# Patient Record
Sex: FEMALE | Race: WHITE | NOT HISPANIC OR LATINO | Employment: OTHER | URBAN - METROPOLITAN AREA
[De-identification: names, ages, dates, MRNs, and addresses within clinical notes are randomized per-mention and may not be internally consistent; named-entity substitution may affect disease eponyms.]

---

## 2017-06-02 DIAGNOSIS — M81.0 POST-MENOPAUSAL OSTEOPOROSIS: ICD-10-CM

## 2017-06-09 RX ORDER — ALENDRONATE SODIUM 70 MG/1
TABLET ORAL
Qty: 12 TABLET | Refills: 3 | OUTPATIENT
Start: 2017-06-09

## 2017-06-09 NOTE — TELEPHONE ENCOUNTER
Received refill request for fosamax.  Last in clinic 6/2016.     Spoke with Esperanza and advised that annual exam is due but a 30 day supply of medicine can be sent to pharmacy. She reports she DOES NOT need a refill at this time but will call later today to make an appointment. Nurse agreed with plan.

## 2017-06-26 ENCOUNTER — TELEPHONE (OUTPATIENT)
Dept: GASTROENTEROLOGY | Facility: CLINIC | Age: 72
End: 2017-06-26

## 2017-06-26 ENCOUNTER — OFFICE VISIT (OUTPATIENT)
Dept: INTERNAL MEDICINE | Facility: CLINIC | Age: 72
End: 2017-06-26
Attending: INTERNAL MEDICINE
Payer: MEDICARE

## 2017-06-26 VITALS
WEIGHT: 178 LBS | HEIGHT: 64 IN | BODY MASS INDEX: 30.39 KG/M2 | DIASTOLIC BLOOD PRESSURE: 78 MMHG | HEART RATE: 68 BPM | SYSTOLIC BLOOD PRESSURE: 123 MMHG

## 2017-06-26 DIAGNOSIS — B35.3 TINEA PEDIS OF BOTH FEET: ICD-10-CM

## 2017-06-26 DIAGNOSIS — R73.01 IMPAIRED FASTING GLUCOSE: ICD-10-CM

## 2017-06-26 DIAGNOSIS — E78.00 PURE HYPERCHOLESTEROLEMIA: ICD-10-CM

## 2017-06-26 DIAGNOSIS — M17.0 PRIMARY OSTEOARTHRITIS OF BOTH KNEES: ICD-10-CM

## 2017-06-26 DIAGNOSIS — H91.90 HEARING LOSS, UNSPECIFIED HEARING LOSS TYPE, UNSPECIFIED LATERALITY: ICD-10-CM

## 2017-06-26 DIAGNOSIS — Z12.31 ENCOUNTER FOR SCREENING MAMMOGRAM FOR MALIGNANT NEOPLASM OF BREAST: ICD-10-CM

## 2017-06-26 DIAGNOSIS — I10 BENIGN ESSENTIAL HYPERTENSION: ICD-10-CM

## 2017-06-26 DIAGNOSIS — M81.0 POST-MENOPAUSAL OSTEOPOROSIS: ICD-10-CM

## 2017-06-26 DIAGNOSIS — Z00.00 ROUTINE GENERAL MEDICAL EXAMINATION AT A HEALTH CARE FACILITY: Primary | ICD-10-CM

## 2017-06-26 DIAGNOSIS — M81.0 SENILE OSTEOPOROSIS: ICD-10-CM

## 2017-06-26 DIAGNOSIS — M19.041 PRIMARY OSTEOARTHRITIS OF RIGHT HAND: ICD-10-CM

## 2017-06-26 LAB
ANION GAP SERPL CALCULATED.3IONS-SCNC: 9 MMOL/L (ref 3–14)
BUN SERPL-MCNC: 12 MG/DL (ref 7–30)
CALCIUM SERPL-MCNC: 9 MG/DL (ref 8.5–10.1)
CHLORIDE SERPL-SCNC: 109 MMOL/L (ref 94–109)
CHOLEST SERPL-MCNC: 177 MG/DL
CO2 SERPL-SCNC: 24 MMOL/L (ref 20–32)
CREAT SERPL-MCNC: 0.64 MG/DL (ref 0.52–1.04)
GFR SERPL CREATININE-BSD FRML MDRD: ABNORMAL ML/MIN/1.7M2
GLUCOSE SERPL-MCNC: 112 MG/DL (ref 70–99)
HBA1C MFR BLD: 5.9 % (ref 4.3–6)
HDLC SERPL-MCNC: 82 MG/DL
LDLC SERPL CALC-MCNC: 79 MG/DL
NONHDLC SERPL-MCNC: 95 MG/DL
POTASSIUM SERPL-SCNC: 4.3 MMOL/L (ref 3.4–5.3)
SODIUM SERPL-SCNC: 142 MMOL/L (ref 133–144)
TRIGL SERPL-MCNC: 78 MG/DL

## 2017-06-26 PROCEDURE — 36415 COLL VENOUS BLD VENIPUNCTURE: CPT | Performed by: INTERNAL MEDICINE

## 2017-06-26 PROCEDURE — 99213 OFFICE O/P EST LOW 20 MIN: CPT | Mod: ZF

## 2017-06-26 PROCEDURE — 80061 LIPID PANEL: CPT | Performed by: INTERNAL MEDICINE

## 2017-06-26 PROCEDURE — 83036 HEMOGLOBIN GLYCOSYLATED A1C: CPT | Performed by: INTERNAL MEDICINE

## 2017-06-26 PROCEDURE — 80048 BASIC METABOLIC PNL TOTAL CA: CPT | Performed by: INTERNAL MEDICINE

## 2017-06-26 RX ORDER — ALENDRONATE SODIUM 70 MG/1
70 TABLET ORAL
Qty: 12 TABLET | Refills: 3 | Status: SHIPPED | OUTPATIENT
Start: 2017-06-26 | End: 2018-06-27

## 2017-06-26 RX ORDER — CICLOPIROX OLAMINE 7.7 MG/G
CREAM TOPICAL 2 TIMES DAILY
Qty: 30 G | Refills: 3 | Status: SHIPPED | OUTPATIENT
Start: 2017-06-26 | End: 2018-09-25

## 2017-06-26 RX ORDER — LOSARTAN POTASSIUM 25 MG/1
25 TABLET ORAL DAILY
Qty: 90 TABLET | Refills: 3 | Status: SHIPPED | OUTPATIENT
Start: 2017-06-26 | End: 2018-06-27

## 2017-06-26 RX ORDER — SIMVASTATIN 10 MG
10 TABLET ORAL AT BEDTIME
Qty: 90 TABLET | Refills: 3 | Status: SHIPPED | OUTPATIENT
Start: 2017-06-26 | End: 2018-06-27

## 2017-06-26 ASSESSMENT — PAIN SCALES - GENERAL: PAINLEVEL: NO PAIN (0)

## 2017-06-26 NOTE — PROGRESS NOTES
SUBJECTIVE:                                                            Esperanza Gage is a 71 year old female who presents for Preventive Visit.    Are you in the first 12 months of your Medicare Part B coverage?  No    Healthy Habits:    Do you get at least three servings of calcium containing foods daily (dairy, green leafy vegetables, etc.)? yes    Amount of exercise or daily activities, outside of work: limited aerobic activity due to knee pain    Problems taking medications regularly No    Medication side effects: No    Have you had an eye exam in the past two years? yes    Do you see a dentist twice per year? yes    Do you have sleep apnea, excessive snoring or daytime drowsiness?no    COGNITIVE SCREEN  1) Repeat 3 items (Banana, Sunrise, Chair)    2) Clock draw: NORMAL  3) 3 item recall: Recalls 3 objects  Results: 3 items recalled: COGNITIVE IMPAIRMENT LESS LIKELY    Mini-CogTM Copyright MARK Chaudhry. Licensed by the author for use in [facility] [employer]; reprinted with permission (ahsan@[email or web address]). All rights reserved.        -------------------------------------  Patient reports that she has enrolled in diabetes prevention program. She has been working on diet changes as well as increasing physical activity. She was able to loose 10 lbs over the last year. She has not been able to exercise due to knee pain.     Reviewed and updated as needed this visit by clinical staff  Tobacco  Allergies  Meds         Reviewed and updated as needed this visit by Provider        Social History   Substance Use Topics     Smoking status: Never Smoker     Smokeless tobacco: Never Used     Alcohol use Yes       The patient does not drink >3 drinks per day nor >7 drinks per week.    Today's PHQ-2 Score:   PHQ-2 ( 1999 Pfizer) 12/9/2013   Q1: Little interest or pleasure in doing things 0   Q2: Feeling down, depressed or hopeless 0   PHQ-2 Score 0       Do you feel safe in your environment - Yes    Do you have a Health Care  "Directive?: Yes: Patient states has Advance Directive and will bring in a copy to clinic.    Current providers sharing in care for this patient include:   Patient Care Team:  Julia Gifford MD as PCP - General (Internal Medicine)  Conchita Martínez, RN as Nurse Coordinator (Neurology)  Rubina Barron, RN as Nurse Coordinator (Neurology)  Julia Gifford MD as MD (Internal Medicine)  Sima Pascal PA-C as Physician Assistant (Family Practice)      Hearing impairment: No    Ability to successfully perform activities of daily living: Yes, no assistance needed     Fall risk:       Home safety:  none identified  click delete button to remove this line now    The following health maintenance items are reviewed in Epic and correct as of today:  Health Maintenance   Topic Date Due     ADVANCE DIRECTIVE PLANNING Q5 YRS  [birth year]14/1963     FALL RISK ASSESSMENT  12/14/2011     COLONOSCOPY Q5 YR  08/27/2012     INFLUENZA VACCINE (SYSTEM ASSIGNED)  09/01/2017     MAMMO SCREEN Q2 YR (SYSTEM ASSIGNED)  10/27/2017     TETANUS IMMUNIZATION (SYSTEM ASSIGNED)  02/08/2020     LIPID SCREEN Q5 YR FEMALE (SYSTEM ASSIGNED)  06/20/2021     DEXA SCAN SCREENING (SYSTEM ASSIGNED)  Completed     PNEUMOCOCCAL  Completed     HEPATITIS C SCREENING  Completed         ROS:  Constitutional, HEENT, cardiovascular, pulmonary, GI, , musculoskeletal, neuro, skin, endocrine and psych systems are negative, except as otherwise noted.    Problem list, Medication list, Allergies, and Medical/Social/Surgical histories reviewed in EPIC and updated as appropriate.  OBJECTIVE:                                                            /78  Pulse 68  Ht 1.626 m (5' 4\")  Wt 80.7 kg (178 lb)  Breastfeeding? No  BMI 30.55 kg/m2 Estimated body mass index is 30.55 kg/(m^2) as calculated from the following:    Height as of this encounter: 1.626 m (5' 4\").    Weight as of this encounter: 80.7 kg (178 lb).  EXAM:   GENERAL APPEARANCE: healthy, " alert and no distress  EYES: Eyes grossly normal to inspection, PERRL and conjunctivae and sclerae normal  HENT: ear canals and TM's normal, nose and mouth without ulcers or lesions, oropharynx clear and oral mucous membranes moist  NECK: no adenopathy, no asymmetry, masses, or scars and thyroid normal to palpation  RESP: lungs clear to auscultation - no rales, rhonchi or wheezes  CV: regular rate and rhythm, normal S1 S2, no S3 or S4, no murmur, click or rub, no peripheral edema and peripheral pulses strong  ABDOMEN: soft, nontender, no hepatosplenomegaly, no masses and bowel sounds normal  MS: no musculoskeletal defects are noted and gait is age appropriate without ataxia  SKIN: no suspicious lesions or rashes  NEURO: Normal strength and tone, sensory exam grossly normal, mentation intact and speech normal  PSYCH: mentation appears normal and affect normal/bright    ASSESSMENT / PLAN:                                                            1. Routine general medical examination at a health care facility  Patient is up to date on vaccinations. Recommend mammography and colonoscopy.   - Mammo Screening digital (bilateral); Future  - GASTROENTEROLOGY ADULT REF PROCEDURE ONLY    2. Pure hypercholesterolemia  Refill for simvastatin was given to the patient. Will check lipid panel today.   - simvastatin (ZOCOR) 10 MG tablet; Take 1 tablet (10 mg) by mouth At Bedtime  Dispense: 90 tablet; Refill: 3  - Lipid Profile    3. Benign essential hypertension  Blood pressure is at goal. Will continue with current medical therapy without changes.   - losartan (COZAAR) 25 MG tablet; Take 1 tablet (25 mg) by mouth daily  Dispense: 90 tablet; Refill: 3  - Basic Metabolic Panel    4. Tinea pedis of both feet  Refill for antifungal cream was given. Patient was referred to Dermatology per her request.   - ciclopirox (LOPROX) 0.77 % cream; Apply topically 2 times daily  Dispense: 30 g; Refill: 3    5. Post-menopausal  "osteoporosis  Will obtain DEXA scan. Refill for alendronate was given to the patient today.   - Dexa hip/pelvis/spine; Future    6. Impaired fasting glucose  Patient reports weight loss through lifestyle changes. Will check Hgb A1C today. Patient will be advised accordingly.   - Hemoglobin A1c    8. Primary osteoarthritis of both knees  Referral to Sports medicine was given to the patient.   - SPORTS MEDICINE REFERRAL    9. Primary osteoarthritis of right hand  Suspect osteoarthritis. Will obtain X-rays to evaluate joints of the right hand.  - XR Hand Right 2 Views; Future    10. Encounter for screening mammogram for malignant neoplasm of breast     - Mammo Screening digital (bilateral); Future    11. Hearing loss, unspecified hearing loss type, unspecified laterality  Patient reported hearing her own pulse. Recommend evaluation by ENT, referral was given today.   - OTOLARYNGOLOGY REFERRAL    End of Life Planning:  Patient currently has an advanced directive: No.  I have verified the patient's ablity to prepare an advanced directive/make health care decisions.  Literature was provided to assist patient in preparing an advanced directive.    COUNSELING:  Reviewed preventive health counseling, as reflected in patient instructions       Regular exercise       Healthy diet/nutrition       Vision screening        Estimated body mass index is 30.55 kg/(m^2) as calculated from the following:    Height as of this encounter: 1.626 m (5' 4\").    Weight as of this encounter: 80.7 kg (178 lb).     reports that she has never smoked. She has never used smokeless tobacco.      Appropriate preventive services were discussed with this patient, including applicable screening as appropriate for cardiovascular disease, diabetes, osteopenia/osteoporosis, and glaucoma.  As appropriate for age/gender, discussed screening for colorectal cancer, prostate cancer, breast cancer, and cervical cancer. Checklist reviewing preventive services " available has been given to the patient.    Reviewed patients plan of care and provided an AVS. The Basic Care Plan (routine screening as documented in Health Maintenance) for Esperanza meets the Care Plan requirement. This Care Plan has been established and reviewed with the Patient.    Counseling Resources:  ATP IV Guidelines  Pooled Cohorts Equation Calculator  Breast Cancer Risk Calculator  FRAX Risk Assessment  ICSI Preventive Guidelines  Dietary Guidelines for Americans, 2010  [employer]'s MyPlate  ASA Prophylaxis  Lung CA Screening    Julia Gifford MD  [facility]

## 2017-06-26 NOTE — LETTER
7/10/2017         Esperanza Gage   [address] [address]        Dear Ms. Gage:    Your blood glucose was mildly elevated. Other results were within acceptable range    Results for orders placed or performed in visit on 06/26/17   Lipid Profile   Result Value Ref Range    Cholesterol 177 <200 mg/dL    Triglycerides 78 <150 mg/dL    HDL Cholesterol 82 >49 mg/dL    LDL Cholesterol Calculated 79 <100 mg/dL    Non HDL Cholesterol 95 <130 mg/dL   Basic Metabolic Panel   Result Value Ref Range    Sodium 142 133 - 144 mmol/L    Potassium 4.3 3.4 - 5.3 mmol/L    Chloride 109 94 - 109 mmol/L    Carbon Dioxide 24 20 - 32 mmol/L    Anion Gap 9 3 - 14 mmol/L    Glucose 112 (H) 70 - 99 mg/dL    Urea Nitrogen 12 7 - 30 mg/dL    Creatinine 0.64 0.52 - 1.04 mg/dL    GFR Estimate >90  Non  GFR Calc   >60 mL/min/1.7m2    GFR Estimate If Black >90   GFR Calc   >60 mL/min/1.7m2    Calcium 9.0 8.5 - 10.1 mg/dL   Hemoglobin A1c   Result Value Ref Range    Hemoglobin A1C 5.9 4.3 - 6.0 %         Please note that test explanations are brief and do not reflect all diagnostic uses.  If you have any questions or concerns, please call the clinic at [phone].      Sincerely,      Maria A Mcginnis sent on behalf of  Julia Gifford MD

## 2017-06-26 NOTE — LETTER
6/26/2017       RE: Esperanza Gage  [address] [address]     Dear Colleague,    Thank you for referring your patient, Esperanza Gage, to the [facility]  at [facility]. Please see a copy of my visit note below.    SUBJECTIVE:                                                            Esperanza Gage is a 71 year old female who presents for Preventive Visit.    Are you in the first 12 months of your Medicare Part B coverage?  No    Healthy Habits:    Do you get at least three servings of calcium containing foods daily (dairy, green leafy vegetables, etc.)? yes    Amount of exercise or daily activities, outside of work: limited aerobic activity due to knee pain    Problems taking medications regularly No    Medication side effects: No    Have you had an eye exam in the past two years? yes    Do you see a dentist twice per year? yes    Do you have sleep apnea, excessive snoring or daytime drowsiness?no    COGNITIVE SCREEN  1) Repeat 3 items (Banana, Sunrise, Chair)    2) Clock draw: NORMAL  3) 3 item recall: Recalls 3 objects  Results: 3 items recalled: COGNITIVE IMPAIRMENT LESS LIKELY    Mini-CogTM Copyright S [employer]. Licensed by the author for use in [facility]; reprinted with permission (ahsan@[facility]). All rights reserved.        -------------------------------------  Patient reports that she has enrolled in diabetes prevention program. She has been working on diet changes as well as increasing physical activity. She was able to loose 10 lbs over the last year. She has not been able to exercise due to knee pain.     Reviewed and updated as needed this visit by clinical staff  Tobacco  Allergies  Meds       Reviewed and updated as needed this visit by Provider      Social History   Substance Use Topics     Smoking status: Never Smoker     Smokeless tobacco: Never Used     Alcohol use Yes       The patient does not drink >3 drinks  per day nor >7 drinks per week.    Today's PHQ-2 Score:   PHQ-2 ( 1999 Pfizer) 12/9/2013   Q1: Little interest or pleasure in doing things 0   Q2: Feeling down, depressed or hopeless 0   PHQ-2 Score 0     Do you feel safe in your environment - Yes    Do you have a Health Care Directive?: Yes: Patient states has Advance Directive and will bring in a copy to clinic.    Current providers sharing in care for this patient include:   Patient Care Team:  Julia Gifford MD as PCP - General (Internal Medicine)  Conchita Martínez, RN as Nurse Coordinator (Neurology)  Rubina Barron, RN as Nurse Coordinator (Neurology)  Julia Gifford MD as MD (Internal Medicine)  Sima Pascal PA-C as Physician Assistant (Family Practice)      Hearing impairment: No    Ability to successfully perform activities of daily living: Yes, no assistance needed     Fall risk:       Home safety:  none identified  click delete button to remove this line now    The following health maintenance items are reviewed in Epic and correct as of today:  Health Maintenance   Topic Date Due     ADVANCE DIRECTIVE PLANNING Q5 YRS  12/14/1963     FALL RISK ASSESSMENT  12/14/2011     COLONOSCOPY Q5 YR  08/27/2012     INFLUENZA VACCINE (SYSTEM ASSIGNED)  09/01/2017     MAMMO SCREEN Q2 YR (SYSTEM ASSIGNED)  10/27/2017     TETANUS IMMUNIZATION (SYSTEM ASSIGNED)  02/08/2020     LIPID SCREEN Q5 YR FEMALE (SYSTEM ASSIGNED)  06/20/2021     DEXA SCAN SCREENING (SYSTEM ASSIGNED)  Completed     PNEUMOCOCCAL  Completed     HEPATITIS C SCREENING  Completed         ROS:  Constitutional, HEENT, cardiovascular, pulmonary, GI, , musculoskeletal, neuro, skin, endocrine and psych systems are negative, except as otherwise noted.    Problem list, Medication list, Allergies, and Medical/Social/Surgical histories reviewed in [facility] and updated as appropriate.  OBJECTIVE:                                                            /78  Pulse 68  Ht 1.626 m (5'  "4\")  Wt 80.7 kg (178 lb)  Breastfeeding? No  BMI 30.55 kg/m2 Estimated body mass index is 30.55 kg/(m^2) as calculated from the following:    Height as of this encounter: 1.626 m (5' 4\").    Weight as of this encounter: 80.7 kg (178 lb).  EXAM:   GENERAL APPEARANCE: healthy, alert and no distress  EYES: Eyes grossly normal to inspection, PERRL and conjunctivae and sclerae normal  HENT: ear canals and TM's normal, nose and mouth without ulcers or lesions, oropharynx clear and oral mucous membranes moist  NECK: no adenopathy, no asymmetry, masses, or scars and thyroid normal to palpation  RESP: lungs clear to auscultation - no rales, rhonchi or wheezes  CV: regular rate and rhythm, normal S1 S2, no S3 or S4, no murmur, click or rub, no peripheral edema and peripheral pulses strong  ABDOMEN: soft, nontender, no hepatosplenomegaly, no masses and bowel sounds normal  MS: no musculoskeletal defects are noted and gait is age appropriate without ataxia  SKIN: no suspicious lesions or rashes  NEURO: Normal strength and tone, sensory exam grossly normal, mentation intact and speech normal  PSYCH: mentation appears normal and affect normal/bright  ASSESSMENT / PLAN:                                                            1. Routine general medical examination at a health care facility  Patient is up to date on vaccinations. Recommend mammography and colonoscopy.   - Mammo Screening digital (bilateral); Future  - GASTROENTEROLOGY ADULT REF PROCEDURE ONLY    2. Pure hypercholesterolemia  Refill for simvastatin was given to the patient. Will check lipid panel today.   - simvastatin (ZOCOR) 10 MG tablet; Take 1 tablet (10 mg) by mouth At Bedtime  Dispense: 90 tablet; Refill: 3  - Lipid Profile    3. Benign essential hypertension  Blood pressure is at goal. Will continue with current medical therapy without changes.   - losartan (COZAAR) 25 MG tablet; Take 1 tablet (25 mg) by mouth daily  Dispense: 90 tablet; Refill: 3  - " "Basic Metabolic Panel    4. Tinea pedis of both feet  Refill for antifungal cream was given. Patient was referred to Dermatology per her request.   - ciclopirox (LOPROX) 0.77 % cream; Apply topically 2 times daily  Dispense: 30 g; Refill: 3    5. Post-menopausal osteoporosis  Will obtain DEXA scan. Refill for alendronate was given to the patient today.   - Dexa hip/pelvis/spine; Future    6. Impaired fasting glucose  Patient reports weight loss through lifestyle changes. Will check Hgb A1C today. Patient will be advised accordingly.   - Hemoglobin A1c    8. Primary osteoarthritis of both knees  Referral to Sports medicine was given to the patient.   - SPORTS MEDICINE REFERRAL    9. Primary osteoarthritis of right hand  Suspect osteoarthritis. Will obtain X-rays to evaluate joints of the right hand.  - XR Hand Right 2 Views; Future    10. Encounter for screening mammogram for malignant neoplasm of breast     - Mammo Screening digital (bilateral); Future    11. Hearing loss, unspecified hearing loss type, unspecified laterality  Patient reported hearing her own pulse. Recommend evaluation by ENT, referral was given today.   - OTOLARYNGOLOGY REFERRAL    End of Life Planning:  Patient currently has an advanced directive: No.  I have verified the patient's ablity to prepare an advanced directive/make health care decisions.  Literature was provided to assist patient in preparing an advanced directive.    COUNSELING:  Reviewed preventive health counseling, as reflected in patient instructions       Regular exercise       Healthy diet/nutrition       Vision screening        Estimated body mass index is 30.55 kg/(m^2) as calculated from the following:    Height as of this encounter: 1.626 m (5' 4\").    Weight as of this encounter: 80.7 kg (178 lb).     reports that she has never smoked. She has never used smokeless tobacco.      Appropriate preventive services were discussed with this patient, including applicable screening " as appropriate for cardiovascular disease, diabetes, osteopenia/osteoporosis, and glaucoma.  As appropriate for age/gender, discussed screening for colorectal cancer, prostate cancer, breast cancer, and cervical cancer. Checklist reviewing preventive services available has been given to the patient.    Reviewed patients plan of care and provided an AVS. The Basic Care Plan (routine screening as documented in Health Maintenance) for Esperanza meets the Care Plan requirement. This Care Plan has been established and reviewed with the Patient.    Julia Gifford MD  [facility]

## 2017-06-26 NOTE — MR AVS SNAPSHOT
After Visit Summary   6/26/2017    Esperanza Gage    MRN: [id number]           Patient Information     Date Of Birth          1945        Visit Information        Provider Department      6/26/2017 8:20 AM Julia Gifford MD [facility]         Today's Diagnoses     Routine general medical examination at a health care facility    -  1    Pure hypercholesterolemia        Benign essential hypertension        Tinea pedis of both feet        Post-menopausal osteoporosis        Senile osteoporosis        Impaired fasting glucose        Primary osteoarthritis of both knees        Primary osteoarthritis of right hand        Encounter for screening mammogram for malignant neoplasm of breast         Hearing loss, unspecified hearing loss type, unspecified laterality          Care Instructions      Preventive Health Recommendations  Female Ages 65 +    Yearly exam:     See your health care provider every year in order to  o Review health changes.   o Discuss preventive care.    o Review your medicines if your doctor has prescribed any.      You no longer need a yearly Pap test unless you've had an abnormal Pap test in the past 10 years. If you have vaginal symptoms, such as bleeding or discharge, be sure to talk with your provider about a Pap test.      Every 1 to 2 years, have a mammogram.  If you are over 69, talk with your health care provider about whether or not you want to continue having screening mammograms.      Every 10 years, have a colonoscopy. Or, have a yearly FIT test (stool test). These exams will check for colon cancer.       Have a cholesterol test every 5 years, or more often if your doctor advises it.       Have a diabetes test (fasting glucose) every three years. If you are at risk for diabetes, you should have this test more often.       At age 65, have a bone density scan (DEXA) to check for osteoporosis (brittle bone disease).    Shots:    Get a flu shot  each year.    Get a tetanus shot every 10 years.    Talk to your doctor about your pneumonia vaccines. There are now two you should receive - Pneumovax (PPSV 23) and Prevnar (PCV 13).    Talk to your doctor about the shingles vaccine.    Talk to your doctor about the hepatitis B vaccine.    Nutrition:     Eat at least 5 servings of fruits and vegetables each day.      Eat whole-grain bread, whole-wheat pasta and brown rice instead of white grains and rice.      Talk to your provider about Calcium and Vitamin D.     Lifestyle    Exercise at least 150 minutes a week (30 minutes a day, 5 days a week). This will help you control your weight and prevent disease.      Limit alcohol to one drink per day.      No smoking.       Wear sunscreen to prevent skin cancer.       See your dentist twice a year for an exam and cleaning.      See your eye doctor every 1 to 2 years to screen for conditions such as glaucoma, macular degeneration, cataracts, etc           Follow-ups after your visit        Additional Services     DERMATOLOGY REFERRAL       Your provider has referred you to: [facility]: Dermatology Clinic [facility] [phone]   [email or web address]    Please be aware that coverage of these services is subject to the terms and limitations of your health insurance plan.  Call member services at your health plan with any benefit or coverage questions.      Please bring the following with you to your appointment:    (1) Any X-Rays, CTs or MRIs which have been performed.  Contact the facility where they were done to arrange for  prior to your scheduled appointment.    (2) List of current medications  (3) This referral request   (4) Any documents/labs given to you for this referral            GASTROENTEROLOGY ADULT REF PROCEDURE ONLY       Last Lab Result: Creatinine (mg/dL)       Date                     Value                 06/20/2016               0.59             ----------  Body  mass index is 30.55 kg/(m^2).     Needed:  No  Language:  English    Patient will be contacted to schedule procedure.     Please be aware that coverage of these services is subject to the terms and limitations of your health insurance plan.  Call member services at your health plan with any benefit or coverage questions.  Any procedures must be performed at a [place] facility OR coordinated by your clinic's referral office.    Please bring the following with you to your appointment:    (1) Any X-Rays, CTs or MRIs which have been performed.  Contact the facility where they were done to arrange for  prior to your scheduled appointment.    (2) List of current medications   (3) This referral request   (4) Any documents/labs given to you for this referral            OTOLARYNGOLOGY REFERRAL       Your provider has referred you to: [facility]: Adult [facility]roat Clinic (Otolaryngology) [facility] [phone]  [email or web address]    Please be aware that coverage of these services is subject to the terms and limitations of your health insurance plan.  Call member services at your health plan with any benefit or coverage questions.      Please bring the following with you to your appointment:    (1) Any X-Rays, CTs or MRIs which have been performed.  Contact the facility where they were done to arrange for  prior to your scheduled appointment.   (2) List of current medications  (3) This referral request   (4) Any documents/labs given to you for this referral            SPORTS MEDICINE REFERRAL       Your provider has referred you to:  [facility]: Sports Medicine Clinic [facility] [phone]   [email or web address]    Please be aware that coverage of these services is subject to the terms and limitations of your health insurance plan.  Call member services at your health plan with any benefit or coverage  questions.      Please bring the following to your appointment:    >>   Any x-rays, CTs or MRIs which have been performed.  Contact the facility where they were done to arrange for  prior to your scheduled appointment.    >>   List of current medications   >>   This referral request   >>   Any documents/labs given to you for this referral                  Future tests that were ordered for you today     Open Future Orders        Priority Expected Expires Ordered    Mammo Screening digital (bilateral) Routine  6/26/2018 6/26/2017    Dexa hip/pelvis/spine Routine  6/26/2018 6/26/2017    XR Hand Right 2 Views Routine 6/26/2017 6/26/2018 6/26/2017            Who to contact     Please call your clinic at [phone] to:    Ask questions about your health    Make or cancel appointments    Discuss your medicines    Learn about your test results    Speak to your doctor   If you have compliments or concerns about an experience at your clinic, or if you wish to file a complaint, please contact [facility] Patient Relations at [phone] or email us at [email or web address]         Additional Information About Your Visit        LegalGuruhart Information     BeMo gives you secure access to your electronic health record. If you see a primary care provider, you can also send messages to your care team and make appointments. If you have questions, please call your primary care clinic.  If you do not have a primary care provider, please call [phone] and they will assist you.      BeMo is an electronic gateway that provides easy, online access to your medical records. With BeMo, you can request a clinic appointment, read your test results, renew a prescription or communicate with your care team.     To access your existing account, please contact your [facility] Clinic or call [phone] for assistance.        Care EveryWhere ID     This is your Care  "EveryWhere ID. This could be used by other organizations to access your [place] medical records  [id number]        Your Vitals Were     Pulse Height Breastfeeding? BMI (Body Mass Index)          68 1.626 m (5' 4\") No 30.55 kg/m2         Blood Pressure from Last 3 Encounters:   06/26/17 123/78   06/20/16 117/75   10/15/15 136/82    Weight from Last 3 Encounters:   06/26/17 80.7 kg (178 lb)   06/20/16 85.3 kg (188 lb)   10/15/15 85.3 kg (188 lb 1.3 oz)              We Performed the Following     Basic Metabolic Panel     DERMATOLOGY REFERRAL     GASTROENTEROLOGY ADULT REF PROCEDURE ONLY     Hemoglobin A1c     Lipid Profile     OTOLARYNGOLOGY REFERRAL     SPORTS MEDICINE REFERRAL          Today's Medication Changes          These changes are accurate as of: 6/26/17  9:06 AM.  If you have any questions, ask your nurse or doctor.               These medicines have changed or have updated prescriptions.        Dose/Directions    losartan 25 MG tablet   Commonly known as:  COZAAR   This may have changed:  See the new instructions.   Used for:  Benign essential hypertension   Changed by:  Julia Gifford MD        Dose:  25 mg   Take 1 tablet (25 mg) by mouth daily   Quantity:  90 tablet   Refills:  3            Where to get your medicines      These medications were sent to [address] [address]     Phone:  [phone]     ciclopirox 0.77 % cream    losartan 25 MG tablet    simvastatin 10 MG tablet         Some of these will need a paper prescription and others can be bought over the counter.  Ask your nurse if you have questions.     Bring a paper prescription for each of these medications     alendronate 70 MG tablet                Primary Care Provider Office Phone # Fax #    Julia Gifford [phone]       [facility] [address] [facility] [address]      "   Equal Access to Services     [facility]: Hadii aad ku hadmyraphani Xiomyali, waisabelda luqilir, qaaurea tahiryahairanoelle galvez. So [facility] [phone].    ATENCIÓN: Si habla amaury, tiene a nair disposición servicios gratuitos de asistencia lingüística. Leoame al [phone].    We comply with applicable federal civil rights laws and [place] laws. We do not discriminate on the basis of race, color, national origin, age, disability sex, sexual orientation or gender identity.            Thank you!     Thank you for choosing [facility]   for your care. Our goal is always to provide you with excellent care. Hearing back from our patients is one way we can continue to improve our services. Please take a few minutes to complete the written survey that you may receive in the mail after your visit with us. Thank you!             Your Updated Medication List - Protect others around you: Learn how to safely use, store and throw away your medicines at www.disposemymeds.org.          This list is accurate as of: 6/26/17  9:06 AM.  Always use your most recent med list.                   Brand Name Dispense Instructions for use Diagnosis    alendronate 70 MG tablet    FOSAMAX    12 tablet    Take 1 tablet (70 mg) by mouth every 7 days Take with over 8 ounces water and stay upright for at least 30 minutes after dose.  Take at least 60 minutes before breakfast        aspirin 81 MG tablet      Take 1 tablet by mouth daily.        cholecalciferol 5000 UNITS Caps capsule    vitamin D3    30 capsule    Take 1 capsule (5,000 Units) by mouth daily    Vitamin D deficiency       ciclopirox 0.77 % cream    LOPROX    30 g    Apply topically 2 times daily    Tinea pedis of both feet       Fish Oil Oil           GLUCOSAMINE 1500 COMPLEX Caps     270 capsule    Take 1,500 mg by mouth daily.    Primary localized osteoarthrosis, lower leg, Chondromalacia of patella       LECITHIN            losartan 25 MG tablet    COZAAR    90 tablet    Take 1 tablet (25 mg) by mouth daily    Benign essential hypertension       Multi-vitamin Tabs tablet      Take 1 tablet by mouth daily        simvastatin 10 MG tablet    ZOCOR    90 tablet    Take 1 tablet (10 mg) by mouth At Bedtime    Pure hypercholesterolemia       vitamin B complex with vitamin C Tabs tablet      Take 1 tablet by mouth daily        Vitamin C 100 MG Tabs      Take 1 tablet by mouth 2 times daily.        vitamin E 400 UNITS Tabs      Take 400 Units by mouth daily

## 2017-07-06 DIAGNOSIS — M25.562 PAIN IN BOTH KNEES, UNSPECIFIED CHRONICITY: Primary | ICD-10-CM

## 2017-07-06 DIAGNOSIS — M25.561 PAIN IN BOTH KNEES, UNSPECIFIED CHRONICITY: Primary | ICD-10-CM

## 2017-07-25 ENCOUNTER — PRE VISIT (OUTPATIENT)
Dept: OTOLARYNGOLOGY | Facility: CLINIC | Age: 72
End: 2017-07-25

## 2017-07-25 NOTE — TELEPHONE ENCOUNTER
1.  Date/reason for appt: 8/7/17 - Hearing loss  2.  Referring provider: Dr. Julia Gifford  3.  Call to patient (Yes / No - short description): no, recs in epic  4.  Previous care at:   [facility]    [facility] Audio (audio to be done 8/7/17)

## 2017-08-04 DIAGNOSIS — H91.90 HL (HEARING LOSS): Primary | ICD-10-CM

## 2017-08-07 ENCOUNTER — OFFICE VISIT (OUTPATIENT)
Dept: AUDIOLOGY | Facility: CLINIC | Age: 72
End: 2017-08-07

## 2017-08-07 ENCOUNTER — OFFICE VISIT (OUTPATIENT)
Dept: OTOLARYNGOLOGY | Facility: CLINIC | Age: 72
End: 2017-08-07

## 2017-08-07 ENCOUNTER — RADIANT APPOINTMENT (OUTPATIENT)
Dept: MAMMOGRAPHY | Facility: CLINIC | Age: 72
End: 2017-08-07

## 2017-08-07 VITALS — HEIGHT: 65 IN | WEIGHT: 176 LBS | BODY MASS INDEX: 29.32 KG/M2

## 2017-08-07 DIAGNOSIS — H90.3 SENSORINEURAL HEARING LOSS, BILATERAL: Primary | ICD-10-CM

## 2017-08-07 DIAGNOSIS — Z00.00 ROUTINE GENERAL MEDICAL EXAMINATION AT A HEALTH CARE FACILITY: ICD-10-CM

## 2017-08-07 DIAGNOSIS — H90.3 SENSORINEURAL HEARING LOSS (SNHL) OF BOTH EARS: Primary | ICD-10-CM

## 2017-08-07 DIAGNOSIS — Z12.31 ENCOUNTER FOR SCREENING MAMMOGRAM FOR MALIGNANT NEOPLASM OF BREAST: ICD-10-CM

## 2017-08-07 ASSESSMENT — PAIN SCALES - GENERAL: PAINLEVEL: NO PAIN (0)

## 2017-08-07 NOTE — LETTER
8/7/2017       RE: Esperanza Gage  [address] [place] [address]     Dear Colleague,    Thank you for referring your patient, Esperanza Gage, to the [facility] EAR [facility] THROAT at [facility]. Please see a copy of my visit note below.    HISTORY OF PRESENT ILLNESS:  Ms. Gage is here to see us today for the first time.  She notes that she has been not able to hear as well and is having some left-sided pulsatile tinnitus.  This has been going on for the last year or so.  She states that she has no pain, no vertigo, facial numbness or weakness.  She states that she has no history of otologic issues in the past.  No recent hearing test previously.  She denies any other acute changes in her health.      PAST MEDICAL HISTORY:  Notable for osteoporosis, hyperlipidemia, hypertension.      FAMILY HISTORY:  Noted and reviewed in the chart.      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      REVIEW OF SYSTEMS:  As above.  Otherwise, complete review of systems is noted and reviewed in the chart.      PHYSICAL EXAMINATION:  A 71-year-old woman in no acute distress.  Normal mood, normal affect, normal ability to communicate.  Alert and appropriate.  Head is normocephalic.  Cranial nerve VII is House-Brackmann I out of VI bilaterally.  Breathing without any difficulty or stridor.  Eyes are anicteric.  Skin of the head and neck appears normal.  Examination of the ears shows normal external auditory canal and tympanic membrane bilaterally.  No evidence of any vascular lesions in the left middle ear.  Palpation of the neck shows no masses, tenderness or lymphadenopathy.  Neck is supple.  Musculature of the neck is within normal limits.  There is no thyromegaly appreciated.      Oral cavity shows normal tongue, buccal mucosa and oropharynx.      AUDIOGRAM:  The audiogram shows a mild to moderate sensorineural hearing loss in the low and high frequencies with normal hearing  in the mid-frequencies.      ASSESSMENT AND PLAN:  A 71-year-old with some sensorineural hearing loss.  We discussed the options of hearing aids today.  She decided not to do that currently.  She also has pulsatile tinnitus in her left ear going on for a year with a normal exam.  We discussed options regarding this.  We discussed doing a scan to evaluate more thoroughly.  She would like to hold off on that and wait to see if the symptoms get worse.  We will get a repeat audiogram in a year.         Again, thank you for allowing me to participate in the care of your patient.      Sincerely,    Carlos Alberto Pinto MD

## 2017-08-07 NOTE — MR AVS SNAPSHOT
After Visit Summary   8/7/2017    Esperanza Gage    MRN: [id number]           Patient Information     Date Of Birth          1945        Visit Information        Provider Department      8/7/2017 10:30 AM [facility]        Today's Diagnoses     Sensorineural hearing loss, bilateral    -  1       Follow-ups after your visit        Your next 10 appointments already scheduled     Aug 07, 2017 11:30 AM CDT   (Arrive by 11:15 AM)   New Patient Visit with Carlos Alberto Pinto MD   [facility]at ([facility])    [address] [place] [id number] [phone]            Aug 11, 2017  8:00 AM CDT   (Arrive by 7:45 AM)   MAYRA Hand with [facility] ([facility])    [address] [place] [id number] [phone]            Aug 16, 2017  8:00 AM CDT   MA[facility]RA Hand with [facility] ([facility])    [address] [place] [id number] [phone]            Aug 18, 2017  1:45 PM CDT   Colonoscopy with Andres Coleman MD   [facility] ([facility])    [address] [place] [id number] [phone]            Aug 21, 2017  9:15 AM CDT   New Patient Visit with Nathalia Marin MD   [facility] ([facility])    [address] [place] [address] [address]  [place] [facility]  [facility] [id number] [phone]            Aug 23, 2017  8:00 AM CDT   MAYRA Hand with Trice Ramires OT    [facility] ([facility])    [address] [place] [id number] [phone]              Who to contact     Please call your clinic at [phone] to:    Ask questions about your health    Make or cancel appointments    Discuss your  medicines    Learn about your test results    Speak to your doctor   If you have compliments or concerns about an experience at your clinic, or if you wish to file a complaint, please contact [facility] Patient Relations at [phone] or email us at [email or web address]         Additional Information About Your Visit        MyChart Information     365lookshart gives you secure access to your electronic health record. If you see a primary care provider, you can also send messages to your care team and make appointments. If you have questions, please call your primary care clinic.  If you do not have a primary care provider, please call [phone] and they will assist you.      "[employer] is an electronic gateway that provides easy, online access to your medical records. With "[employer] you can request a clinic appointment, read your test results, renew a prescription or communicate with your care team.     To access your existing account, please contact your [facility] or call [phone] for assistance.        Care EveryWhere ID     This is your Care EveryWhere ID. This could be used by other organizations to access your [place] medical records  [id number]         Blood Pressure from Last 3 Encounters:   06/26/17 123/78   06/20/16 117/75   10/15/15 136/82    Weight from Last 3 Encounters:   07/06/17 80.7 kg (178 lb)   06/26/17 80.7 kg (178 lb)   06/20/16 85.3 kg (188 lb)              We Performed the Following     AUDIOGRAM/TYMPANOGRAM - INTERFACE     [facility] Audiometry Thrshld Eval & Speech Recog ([address])     Tymps / Reflex   ([id number])        Primary Care Provider Office Phone # Fax #    Julia Raúl Gifford [phone]       [facility] [address]  [facility] [address]        Equal Access to Services     NICHOLAS JUAREZ AH: Sam Painter, dolly akhtar, noelle olmedo  sandra laValentinaaalilian ah. So [facility] [phone].    ATENCIÓN: Si alejandro rebolledo, tiene a nair disposición servicios gratuitos de asistencia lingüística. Ngoc al [phone].    We comply with applicable federal civil rights laws and [place] laws. We do not discriminate on the basis of race, color, national origin, age, disability sex, sexual orientation or gender identity.            Thank you!     Thank you for choosing [facility] AUDIOLOGY  for your care. Our goal is always to provide you with excellent care. Hearing back from our patients is one way we can continue to improve our services. Please take a few minutes to complete the written survey that you may receive in the mail after your visit with us. Thank you!             Your Updated Medication List - Protect others around you: Learn how to safely use, store and throw away your medicines at www.disposemymeds.org.          This list is accurate as of: 8/7/17 10:52 AM.  Always use your most recent med list.                   Brand Name Dispense Instructions for use Diagnosis    alendronate 70 MG tablet    FOSAMAX    12 tablet    Take 1 tablet (70 mg) by mouth every 7 days Take with over 8 ounces water and stay upright for at least 30 minutes after dose.  Take at least 60 minutes before breakfast        aspirin 81 MG tablet      Take 1 tablet by mouth daily.        cholecalciferol 5000 UNITS Caps capsule    vitamin D3    30 capsule    Take 1 capsule (5,000 Units) by mouth daily    Vitamin D deficiency       ciclopirox 0.77 % cream    LOPROX    30 g    Apply topically 2 times daily    Tinea pedis of both feet       Fish Oil Oil           GLUCOSAMINE 1500 COMPLEX Caps     270 capsule    Take 1,500 mg by mouth daily.    Primary localized osteoarthrosis, lower leg, Chondromalacia of patella       LECITHIN           losartan 25 MG tablet    COZAAR    90 tablet    Take 1 tablet (25 mg) by mouth daily    Benign essential hypertension       Multi-vitamin Tabs tablet      Take 1  tablet by mouth daily        simvastatin 10 MG tablet    ZOCOR    90 tablet    Take 1 tablet (10 mg) by mouth At Bedtime    Pure hypercholesterolemia       vitamin B complex with vitamin C Tabs tablet      Take 1 tablet by mouth daily        Vitamin C 100 MG Tabs      Take 1 tablet by mouth 2 times daily.        vitamin E 400 UNITS Tabs      Take 400 Units by mouth daily

## 2017-08-07 NOTE — NURSING NOTE
Chief Complaint   Patient presents with     Consult     New pt referred by Dr. Gifford for Hearing Loss. No outside records. No pain today. Pt had Audio today.        AISHA Benavidez LPN

## 2017-08-07 NOTE — PATIENT INSTRUCTIONS
Follow up with Dr Pinto is on an as needed basis. For any questions or concerns please call the nurse line at [phone].   Yusuf Castro RN  [phone]

## 2017-08-07 NOTE — PROGRESS NOTES
AUDIOLOGY REPORT    SUMMARY: Audiology visit completed. See audiogram for results.      RECOMMENDATIONS: Follow-up with ENT.    Rosa Avilez  Licensed Audiologist  [place] License #[id number]

## 2017-08-07 NOTE — LETTER
8/7/2017      RE: Esperanza Gage  [address] [place] [address]       HISTORY OF PRESENT ILLNESS:  Ms. Gage is here to see us today for the first time.  She notes that she has been not able to hear as well and is having some left-sided pulsatile tinnitus.  This has been going on for the last year or so.  She states that she has no pain, no vertigo, facial numbness or weakness.  She states that she has no history of otologic issues in the past.  No recent hearing test previously.  She denies any other acute changes in her health.      PAST MEDICAL HISTORY:  Notable for osteoporosis, hyperlipidemia, hypertension.      FAMILY HISTORY:  Noted and reviewed in the chart.      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      REVIEW OF SYSTEMS:  As above.  Otherwise, complete review of systems is noted and reviewed in the chart.      PHYSICAL EXAMINATION:  A 71-year-old woman in no acute distress.  Normal mood, normal affect, normal ability to communicate.  Alert and appropriate.  Head is normocephalic.  Cranial nerve VII is House-Brackmann I out of VI bilaterally.  Breathing without any difficulty or stridor.  Eyes are anicteric.  Skin of the head and neck appears normal.  Examination of the ears shows normal external auditory canal and tympanic membrane bilaterally.  No evidence of any vascular lesions in the left middle ear.  Palpation of the neck shows no masses, tenderness or lymphadenopathy.  Neck is supple.  Musculature of the neck is within normal limits.  There is no thyromegaly appreciated.      Oral cavity shows normal tongue, buccal mucosa and oropharynx.      AUDIOGRAM:  The audiogram shows a mild to moderate sensorineural hearing loss in the low and high frequencies with normal hearing in the mid-frequencies.      ASSESSMENT AND PLAN:  A 71-year-old with some sensorineural hearing loss.  We discussed the options of hearing aids today.  She decided not to do that currently.  She also  has pulsatile tinnitus in her left ear going on for a year with a normal exam.  We discussed options regarding this.  We discussed doing a scan to evaluate more thoroughly.  She would like to hold off on that and wait to see if the symptoms get worse.  We will get a repeat audiogram in a year.         Carlos Alberto Pinto MD

## 2017-08-07 NOTE — MR AVS SNAPSHOT
After Visit Summary   8/7/2017    Esperanza Gage    MRN: [id number]           Patient Information     Date Of Birth          1945        Visit Information        Provider Department      8/7/2017 11:30 AM Carlos Alberto Pinto MD [facility]        Today's Diagnoses     Sensorineural hearing loss (SNHL) of both ears    -  1      Care Instructions    Follow up with Dr Pinto is on an as needed basis. For any questions or concerns please call the nurse line at [phone].   Yusuf Castro RN  [phone]              Follow-ups after your visit        Your next 10 appointments already scheduled     Aug 16, 2017  8:00 AM CDT   MA[facility]RA Hand [facility] ([facility] [place])    [address] [address] [phone]            Aug 18, 2017  1:45 PM CDT   Colonoscopy with Andres Coleman MD   [facility] ([facility])    [address] [place] [id number] [phone]            Aug 21, 2017  9:15 AM CDT   New Patient Visit with Nathalia Marin MD   [facility] ([facility])    [address] [place] [address] [address] [place] [address] Professional Bldg  [facility] [address] [phone]            Aug 23, 2017  8:00 AM CDT   MAYRA Hand with Trice Ramires OT    [facility] ([facility] [place])    [address] [place] [address] [phone]              Who to contact     Please call your clinic at [phone] to:    Ask questions about your health    Make or cancel appointments    Discuss your medicines    Learn about your test results    Speak to your doctor   If you have compliments or concerns about an experience at your clinic, or if you wish to file a complaint, please contact [facility] Patient Relations at [phone] or email  "us at [email or web address]         Additional Information About Your Visit        Tourjiveharbeau Information     HistoryFile gives you secure access to your electronic health record. If you see a primary care provider, you can also send messages to your care team and make appointments. If you have questions, please call your primary care clinic.  If you do not have a primary care provider, please call [phone] and they will assist you.      HistoryFile is an electronic gateway that provides easy, online access to your medical records. With HistoryFile, you can request a clinic appointment, read your test results, renew a prescription or communicate with your care team.     To access your existing account, please contact your [facility] or call [phone] for assistance.        Care EveryWhere ID     This is your Care EveryWhere ID. This could be used by other organizations to access your [place] medical records  [id number]        Your Vitals Were     Height BMI (Body Mass Index)                1.645 m (5' 4.76\") 29.5 kg/m2           Blood Pressure from Last 3 Encounters:   06/26/17 123/78   06/20/16 117/75   10/15/15 136/82    Weight from Last 3 Encounters:   08/07/17 79.8 kg (176 lb)   07/06/17 80.7 kg (178 lb)   06/26/17 80.7 kg (178 lb)              Today, you had the following     No orders found for display       Primary Care Provider Office Phone # Fax #    Julia Raúl Gifford [phone]       [facility] [address]  [facility] [address]        Equal Access to Services     [facility]: Hadii aad ku hadasho Soomaali, waaxda luqadaha, qaybta kaalmada noelle byrnes . So [facility] [phone].    ATENCIÓN: Si habla español, tiene a nair disposición servicios gratuitos de asistencia lingüística. Llame al [phone].    We comply with applicable federal civil rights laws and [place] laws. We do not " discriminate on the basis of race, color, national origin, age, disability sex, sexual orientation or gender identity.            Thank you!     Thank you for choosing [facility]  for your care. Our goal is always to provide you with excellent care. Hearing back from our patients is one way we can continue to improve our services. Please take a few minutes to complete the written survey that you may receive in the mail after your visit with us. Thank you!             Your Updated Medication List - Protect others around you: Learn how to safely use, store and throw away your medicines at www.disposemymeds.org.          This list is accurate as of: 8/7/17 11:59 PM.  Always use your most recent med list.                   Brand Name Dispense Instructions for use Diagnosis    alendronate 70 MG tablet    FOSAMAX    12 tablet    Take 1 tablet (70 mg) by mouth every 7 days Take with over 8 ounces water and stay upright for at least 30 minutes after dose.  Take at least 60 minutes before breakfast        aspirin 81 MG tablet      Take 1 tablet by mouth daily.        cholecalciferol 5000 UNITS Caps capsule    vitamin D3    30 capsule    Take 1 capsule (5,000 Units) by mouth daily    Vitamin D deficiency       ciclopirox 0.77 % cream    LOPROX    30 g    Apply topically 2 times daily    Tinea pedis of both feet       Fish Oil Oil           GLUCOSAMINE 1500 COMPLEX Caps     270 capsule    Take 1,500 mg by mouth daily.    Primary localized osteoarthrosis, lower leg, Chondromalacia of patella       LECITHIN           losartan 25 MG tablet    COZAAR    90 tablet    Take 1 tablet (25 mg) by mouth daily    Benign essential hypertension       Multi-vitamin Tabs tablet      Take 1 tablet by mouth daily        simvastatin 10 MG tablet    ZOCOR    90 tablet    Take 1 tablet (10 mg) by mouth At Bedtime    Pure hypercholesterolemia       vitamin B complex with vitamin C Tabs tablet      Take 1 tablet by mouth daily         Vitamin C 100 MG Tabs      Take 1 tablet by mouth 2 times daily.        vitamin E 400 UNITS Tabs      Take 400 Units by mouth daily

## 2017-08-07 NOTE — PROGRESS NOTES
HISTORY OF PRESENT ILLNESS:  Ms. Gage is here to see us today for the first time.  She notes that she has been not able to hear as well and is having some left-sided pulsatile tinnitus.  This has been going on for the last year or so.  She states that she has no pain, no vertigo, facial numbness or weakness.  She states that she has no history of otologic issues in the past.  No recent hearing test previously.  She denies any other acute changes in her health.      PAST MEDICAL HISTORY:  Notable for osteoporosis, hyperlipidemia, hypertension.      FAMILY HISTORY:  Noted and reviewed in the chart.      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      REVIEW OF SYSTEMS:  As above.  Otherwise, complete review of systems is noted and reviewed in the chart.      PHYSICAL EXAMINATION:  A 71-year-old woman in no acute distress.  Normal mood, normal affect, normal ability to communicate.  Alert and appropriate.  Head is normocephalic.  Cranial nerve VII is House-Brackmann I out of VI bilaterally.  Breathing without any difficulty or stridor.  Eyes are anicteric.  Skin of the head and neck appears normal.  Examination of the ears shows normal external auditory canal and tympanic membrane bilaterally.  No evidence of any vascular lesions in the left middle ear.  Palpation of the neck shows no masses, tenderness or lymphadenopathy.  Neck is supple.  Musculature of the neck is within normal limits.  There is no thyromegaly appreciated.      Oral cavity shows normal tongue, buccal mucosa and oropharynx.      AUDIOGRAM:  The audiogram shows a mild to moderate sensorineural hearing loss in the low and high frequencies with normal hearing in the mid-frequencies.      ASSESSMENT AND PLAN:  A 71-year-old with some sensorineural hearing loss.  We discussed the options of hearing aids today.  She decided not to do that currently.  She also has pulsatile tinnitus in her left ear going on for a year with a normal exam.  We  discussed options regarding this.  We discussed doing a scan to evaluate more thoroughly.  She would like to hold off on that and wait to see if the symptoms get worse.  We will get a repeat audiogram in a year.

## 2017-08-08 ENCOUNTER — TELEPHONE (OUTPATIENT)
Dept: GASTROENTEROLOGY | Facility: OUTPATIENT CENTER | Age: 72
End: 2017-08-08

## 2017-08-08 DIAGNOSIS — Z12.11 ENCOUNTER FOR SCREENING COLONOSCOPY: Primary | ICD-10-CM

## 2017-08-08 NOTE — TELEPHONE ENCOUNTER
Patient taking any blood thinners ? Advised patient to stop aspirin 2 days prior to exam    Heart disease ? denies    Lung disease ? denies      Sleep apnea ? denies    Diabetic ? denies    Kidney disease ? denies    Dialysis ? n/a    Electronic implanted medical devices ? denies    Are you taking any narcotic pain medication ? no  What is your daily dosage ?    PTSD ? n/a    Prep instructions reviewed with patient ? Patient declined review.  policy, MAC sedation plan reviewed. Advised patient to have someone stay with her post exam    Pharmacy : [facility]    Indication for procedure :  Colonoscopy       Purpose of Procedure Screening         Referring provider :  Providers      Authorizing Provider Encounter Provider     Julia Gifford MD

## 2017-08-11 ENCOUNTER — THERAPY VISIT (OUTPATIENT)
Dept: OCCUPATIONAL THERAPY | Facility: CLINIC | Age: 72
End: 2017-08-11
Payer: COMMERCIAL

## 2017-08-11 DIAGNOSIS — M79.644 PAIN OF RIGHT THUMB: Primary | ICD-10-CM

## 2017-08-11 DIAGNOSIS — M19.031 CMC DJD(CARPOMETACARPAL DEGENERATIVE JOINT DISEASE), LOCALIZED PRIMARY, RIGHT: ICD-10-CM

## 2017-08-11 PROCEDURE — 29130 APPL FINGER SPLINT STATIC: CPT | Mod: GO | Performed by: OCCUPATIONAL THERAPIST

## 2017-08-11 PROCEDURE — 97140 MANUAL THERAPY 1/> REGIONS: CPT | Mod: GO | Performed by: OCCUPATIONAL THERAPIST

## 2017-08-11 PROCEDURE — 97165 OT EVAL LOW COMPLEX 30 MIN: CPT | Mod: GO | Performed by: OCCUPATIONAL THERAPIST

## 2017-08-11 NOTE — MR AVS SNAPSHOT
After Visit Summary   8/11/2017    Esperanza Gage    MRN: [id number]           Patient Information     Date Of Birth          1945        Visit Information        Provider Department      8/11/2017 8:00 AM Trice [facility]ires OT [facility]        Today's Diagnoses     Pain of right thumb    -  1    CMC DJD(carpometacarpal degenerative joint disease), localized primary, right           Follow-ups after your visit        Your next 10 appointments already scheduled     Aug 16, 2017  8:00 AM CDT   MA[facility]RA Hand [facility] ([facility])    [address] [facility]  [address] [facility] [id number] [phone]            Aug 18, 2017  1:45 PM CDT   Colonoscopy with Andres Coleman MD   [facility] ([facility])    [address] [facility] [address]  [facility] [address]   [phone]            Aug 21, 2017  9:15 AM CDT   New Patient Visit with Nathalia Marin MD   [facility] ([facility])    [address] [place] [address] 300  3rd [facility] Professional Bldg  [facility] [address] [phone]            Aug 23, 2017  8:00 AM CDT   MAYRA Hand with [facility] ([facility] [place])    [address] [id number] [phone]              Who to contact     If you have questions or need follow up information about today's clinic visit or your schedule please contact [facility] directly at [phone].  Normal or non-critical lab and imaging results will be communicated to you by MyChart, letter or phone within 4 business days after the clinic has received the results. If you do not hear from us within 7 days, please contact the clinic through MyChart or phone. If you have a critical or abnormal lab result, we will notify you by phone as soon as possible.  Submit refill  requests through Picklive or call your pharmacy and they will forward the refill request to us. Please allow 3 business days for your refill to be completed.          Additional Information About Your Visit        CloudLink Techhart Information     Picklive gives you secure access to your electronic health record. If you see a primary care provider, you can also send messages to your care team and make appointments. If you have questions, please call your primary care clinic.  If you do not have a primary care provider, please call [phone] and they will assist you.        Care EveryWhere ID     This is your Care EveryWhere ID. This could be used by other organizations to access your [place] medical records  [id number]         Blood Pressure from Last 3 Encounters:   06/26/17 123/78   06/20/16 117/75   10/15/15 136/82    Weight from Last 3 Encounters:   08/07/17 79.8 kg (176 lb)   07/06/17 80.7 kg (178 lb)   06/26/17 80.7 kg (178 lb)              We Performed the Following     APPLY FINGER SPLINT,STATIC     INITIAL EVAL REPORT     MANUAL THER TECH     OT Eval, Low Complexity ([address])        Primary Care Provider Office Phone # Fax #    Julia Raúl Gifford [phone]       [facility] [address] [facility] [address]        Equal Access to Services     NICHOLAS JUAREZ : Hadii oksana ku hadasho Soomaali, waaxda luqadaha, qaybta kaalmada adeegyada, noelle mtz. So [facility] [phone].    ATENCIÓN: Si habla español, tiene a nair disposición servicios gratuitos de asistencia lingüística. Llame al [phone].    We comply with applicable federal civil rights laws and [place] laws. We do not discriminate on the basis of race, color, national origin, age, disability sex, sexual orientation or gender identity.            Thank you!     Thank you for choosing [facility]  for your care. Our goal is always to provide you with excellent care. Hearing back from our  patients is one way we can continue to improve our services. Please take a few minutes to complete the written survey that you may receive in the mail after your visit with us. Thank you!             Your Updated Medication List - Protect others around you: Learn how to safely use, store and throw away your medicines at www.disposemymeds.org.          This list is accurate as of: 8/11/17  9:12 AM.  Always use your most recent med list.                   Brand Name Dispense Instructions for use Diagnosis    alendronate 70 MG tablet    FOSAMAX    12 tablet    Take 1 tablet (70 mg) by mouth every 7 days Take with over 8 ounces water and stay upright for at least 30 minutes after dose.  Take at least 60 minutes before breakfast        aspirin 81 MG tablet      Take 1 tablet by mouth daily.        cholecalciferol 5000 UNITS Caps capsule    vitamin D3    30 capsule    Take 1 capsule (5,000 Units) by mouth daily    Vitamin D deficiency       ciclopirox 0.77 % cream    LOPROX    30 g    Apply topically 2 times daily    Tinea pedis of both feet       Fish Oil Oil           GLUCOSAMINE 1500 COMPLEX Caps     270 capsule    Take 1,500 mg by mouth daily.    Primary localized osteoarthrosis, lower leg, Chondromalacia of patella       LECITHIN           losartan 25 MG tablet    COZAAR    90 tablet    Take 1 tablet (25 mg) by mouth daily    Benign essential hypertension       Multi-vitamin Tabs tablet      Take 1 tablet by mouth daily        simvastatin 10 MG tablet    ZOCOR    90 tablet    Take 1 tablet (10 mg) by mouth At Bedtime    Pure hypercholesterolemia       vitamin B complex with vitamin C Tabs tablet      Take 1 tablet by mouth daily        Vitamin C 100 MG Tabs      Take 1 tablet by mouth 2 times daily.        vitamin E 400 UNITS Tabs      Take 400 Units by mouth daily

## 2017-08-11 NOTE — PROGRESS NOTES
Hand Therapy Initial Evaluation  Current Date:  8/11/2017    Subjective:  Esperanza Gage is a 71 year old right hand dominant female.    Diagnosis:   Right CMC DJD  DOI:  7/6/2017  Post: > 1 years since onset of symptoms    Patient reports symptoms of pain and stiffness of the right thumb which occurred due to CMC DJD. Since onset symptoms are gradually getting worse.  Special tests:  Xray- Confirms CMC DJD.  Previous treatment: none.    General health as reported by patient is good.  Pertinent medical history includes:  OA, history of fractures, overweight, high blood pressure.  Medical allergies: none.  Surgical history: L Knee 1983.  Medication history: Bone density medication, losartin, simvastatin, alendronate.    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, administrative work  Prior functional level:  no limitations  Barriers include:none  Mobility: No difficulty  Transportation: drives    Functional Outcome Measure:   Upper Extremity Functional Index Score:  SCORE:   Column Totals: /80: 63   (A lower score indicates greater disability.)    Objective:    Pain Level Report - On scale 0-10/10  Date 8/11/17         Left Right Left Right Left Right Left Right Left Right Left Right   at Rest NT 0             with Activity NT 6               Primary Report:  Location: R thumb CMCJ and MPJ  Radiation: yes, to the MPJ  Pain Quality: aching, sore, just hurts  Frequency: intermittent  Pain is worse: during the day, impacted by function  Pain is exacerbated by: Gripping large objects (jars, big books), shaking hands, vacuuming, pinching and gripping  Pain is relieved by: rest, stopping the aggravating activity  Progression since onset: gradually worsening over time    Tenderness:  Tender to touch over the Right CMC of the Thumb   2/10  Tender to touch over the  Left CMC of the Thumb   NT/10    Appearance:  Shoulder sign is present over the CMC - very mild  Volar subluxation present  Swollen over the CMC joint - mild  Noted collapse of MP into hyperextension during pinch - positive bilaterally    Thumb Range of Motion:  Date 8/11/17        AROM  (PROM) Left Right Left Right Left Right Left Right Left Right Left Right   MP ext + +             MP flex 56 58             IP ext + 0             IP flex 72 64             RABD 60 55             PABD 47 50+               Special Tests:  Grind test to CMC: +    Strength: (Measured in pounds)    Date 8/11/17        Trials Left Right Left Right Left Right Left Right Left Right Left Right   1 58 70             2               3               Avg 58 70               3 Point Pinch  Date 8/11/17        Trials Left Right Left Right Left Right Left Right Left Right Left Right   1 14 12             2               3               Avg 14 12               Lateral Pinch  Date 8/11/17        Trials Left Right Left Right Left Right Left Right Left Right Left Right   1 13 13             2               3               Avg 13 13               Assessment:  Patient presents with symptoms consistent with diagnosis of CMC thumb arthritis, with conservative intervention.    Patient s limitations or Problem List includes: Pain, Decreased ROM/motion, weakness, decreased stability of the CMC joint, decreased  and pinch strength of the thumb which interferes with patients ability to perform  Self Care Tasks (dressing, eating, bathing, hygiene/toileting), Recreational Activities and Household Chores  as compared to previous level of function.    Rehab Potential: Good- Return to full activity, some limitations.    Patient will benefit from skilled Occupational Therapy to increase ROM, flexibility, pinch strength, and stability of the thumb and decrease pain to return to previous activity level and resume normal daily tasks and to reach their rehab  potential.    Barriers to Learning:  No barrier    Communication Issues: Patient appears to be able to clearly communicate and understand verbal and written communication and follow directions correctly.    Chart Review: Chart Review and Detailed history review with patient    Identified Performance Deficits: hygiene and grooming, home establishment and management, meal preparation and cleanup, work and leisure activities    Assessment of Occupational Performance:  3-5 Performance Deficits    Clinical Decision Making (Complexity): Low complexity    Treatment Explanations:  The following has been discussed with the patient,  Rx ordered/plan of care  Anticipated outcomes  Possible risks and side effects    Treatment Plan:  Frequency:  1 X week, once daily  Duration:  for 4 weeks    Clinic:  Therapeutic Exercise: AROM, Isometrics, and Stabilization exercises of the Thumb CMC, including  active and resisted abduction, 1st DI strengthening.  Manual Techniques: Joint Mobilization or reseating of the trapezium  Splinting:  Hand based Thumb Spica, CMC support (allowing more thumb mobility)  Education: anatomy of CMC, joint protection principles, adaptive equipment as needed.    Home Program:  R HBTSS - night and day per symptoms  Heat - epsom salt soaks  Adductor release with clip    Discharge Plan:  Achieve all LTG  [place] in home treatment program.  Reach maximal therapeutic benefit.  Please refer to the daily flowsheet for treatment today and total treatment time.      Next Visit:  Check splint  Paraffin trial  MFR  Initiate thumb stability - joint mobs, isometric C, 1st DI strengthening  Future visits: AE/Jt protection education, K-tape trial?

## 2017-08-16 ENCOUNTER — THERAPY VISIT (OUTPATIENT)
Dept: OCCUPATIONAL THERAPY | Facility: CLINIC | Age: 72
End: 2017-08-16
Payer: COMMERCIAL

## 2017-08-16 DIAGNOSIS — M19.031 CMC DJD(CARPOMETACARPAL DEGENERATIVE JOINT DISEASE), LOCALIZED PRIMARY, RIGHT: ICD-10-CM

## 2017-08-16 DIAGNOSIS — M79.644 PAIN OF RIGHT THUMB: ICD-10-CM

## 2017-08-16 PROCEDURE — 97140 MANUAL THERAPY 1/> REGIONS: CPT | Mod: GO | Performed by: OCCUPATIONAL THERAPIST

## 2017-08-16 PROCEDURE — 97018 PARAFFIN BATH THERAPY: CPT | Mod: GO | Performed by: OCCUPATIONAL THERAPIST

## 2017-08-16 PROCEDURE — 97110 THERAPEUTIC EXERCISES: CPT | Mod: GO | Performed by: OCCUPATIONAL THERAPIST

## 2017-08-16 NOTE — PROGRESS NOTES
"Chief Complaint   Patient presents with     Well Child     Health Maintenance       Initial BP 98/61  Pulse 78  Temp 98  F (36.7  C) (Oral)  Ht 3' 5.54\" (1.055 m)  Wt 38 lb 6.4 oz (17.4 kg)  BMI 15.65 kg/m2 Estimated body mass index is 15.65 kg/(m^2) as calculated from the following:    Height as of this encounter: 3' 5.54\" (1.055 m).    Weight as of this encounter: 38 lb 6.4 oz (17.4 kg).  Medication Reconciliation: complete     Gracia Ghosh      " Please refer to the daily flowsheet for treatment today, total treatment time and time spent performing 1:1 timed codes.       Home Program:  R HBTSS - night and day per symptoms  Heat - epsom salt soaks  Adductor release with clip  Self joint mobs on chest  Isometric C  1st DI strengthening    Next Visit:   Paraffin   MFR  Review thumb stability - joint mobs, isometric C, 1st DI strengthening  Add pinch and hold  AE/Jt protection review  Final visit one month out?

## 2017-08-16 NOTE — MR AVS SNAPSHOT
After Visit Summary   8/16/2017    Esperanza Gage    MRN: [id number]           Patient Information     Date Of Birth          1945        Visit Information        Provider Department      8/16/2017 8:00 AM Trice Ramires OT [facility]        Today's Diagnoses     Pain of right thumb        CMC DJD(carpometacarpal degenerative joint disease), localized primary, right           Follow-ups after your visit        Your next 10 appointments already scheduled     Aug 18, 2017  1:45 PM CDT   Colonoscopy with Andres Coleman MD   [facility] ([facility])    [address]  [address]  [facility] [address]   [phone]            Aug 21, 2017  9:15 AM CDT   New Patient Visit with Nathalia Marin MD   [facility] ([facility])    [address] [place] [address] [address]  [address] [place] [address] Professional Bldg  [facility] [address] [phone]            Aug 23, 2017  8:00 AM CDT   [facility]RA Hand with [facility] ([facility])    [address]  4th Floor  [facility] [address] [phone]              Who to contact     If you have questions or need follow up information about today's clinic visit or your schedule please contact [facility] directly at [phone].  Normal or non-critical lab and imaging results will be communicated to you by MyChart, letter or phone within 4 business days after the clinic has received the results. If you do not hear from us within 7 days, please contact the clinic through MyChart or phone. If you have a critical or abnormal lab result, we will notify you by phone as soon as possible.  Submit refill requests through e-Zassi or call your pharmacy and they will forward the refill request to us. Please allow 3 business days for your refill to be completed.          Additional Information About Your Visit        [facility]  Information     [employer] gives you secure access to your electronic health record. If you see a primary care provider, you can also send messages to your care team and make appointments. If you have questions, please call your primary care clinic.  If you do not have a primary care provider, please call [phone] and they will assist you.        Care EveryWhere ID     This is your Care EveryWhere ID. This could be used by other organizations to access your [place] medical records  [id number]         Blood Pressure from Last 3 Encounters:   06/26/17 123/78   06/20/16 117/75   10/15/15 136/82    Weight from Last 3 Encounters:   08/07/17 79.8 kg (176 lb)   07/06/17 80.7 kg (178 lb)   06/26/17 80.7 kg (178 lb)              We Performed the Following     MANUAL THER TECH     PARAFFIN BATH THERAPY     THERAPEUTIC EXERCISES        Primary Care Provider Office Phone # Fax #    Julia Raúl Gifford [phone]       [facility] [address] [place] [address]        Equal Access to Services     [facility]: Hadii aad ku hadasho Soomaali, waaxda luqadaha, qaybta kaalmada adeegcarly, noelle sky . So [facility] [phone].    ATENCIÓN: Si habla español, tiene a nair disposición servicios gratuitos de asistencia lingüística. Leoame al [phone].    We comply with applicable federal civil rights laws and [place] laws. We do not discriminate on the basis of race, color, national origin, age, disability sex, sexual orientation or gender identity.            Thank you!     Thank you for choosing [facility]  for your care. Our goal is always to provide you with excellent care. Hearing back from our patients is one way we can continue to improve our services. Please take a few minutes to complete the written survey that you may receive in the mail after your visit with us. Thank you!             Your Updated Medication List - Protect others around you:  Learn how to safely use, store and throw away your medicines at www.disposemymeds.org.          This list is accurate as of: 8/16/17  9:09 AM.  Always use your most recent med list.                   Brand Name Dispense Instructions for use Diagnosis    alendronate 70 MG tablet    FOSAMAX    12 tablet    Take 1 tablet (70 mg) by mouth every 7 days Take with over 8 ounces water and stay upright for at least 30 minutes after dose.  Take at least 60 minutes before breakfast        aspirin 81 MG tablet      Take 1 tablet by mouth daily.        cholecalciferol 5000 UNITS Caps capsule    vitamin D3    30 capsule    Take 1 capsule (5,000 Units) by mouth daily    Vitamin D deficiency       ciclopirox 0.77 % cream    LOPROX    30 g    Apply topically 2 times daily    Tinea pedis of both feet       Fish Oil Oil           GLUCOSAMINE 1500 COMPLEX Caps     270 capsule    Take 1,500 mg by mouth daily.    Primary localized osteoarthrosis, lower leg, Chondromalacia of patella       LECITHIN           losartan 25 MG tablet    COZAAR    90 tablet    Take 1 tablet (25 mg) by mouth daily    Benign essential hypertension       Multi-vitamin Tabs tablet      Take 1 tablet by mouth daily        simvastatin 10 MG tablet    ZOCOR    90 tablet    Take 1 tablet (10 mg) by mouth At Bedtime    Pure hypercholesterolemia       vitamin B complex with vitamin C Tabs tablet      Take 1 tablet by mouth daily        Vitamin C 100 MG Tabs      Take 1 tablet by mouth 2 times daily.        vitamin E 400 UNITS Tabs      Take 400 Units by mouth daily

## 2017-08-18 ENCOUNTER — DOCUMENTATION ONLY (OUTPATIENT)
Dept: GASTROENTEROLOGY | Facility: OUTPATIENT CENTER | Age: 72
End: 2017-08-18

## 2017-08-18 ENCOUNTER — TRANSFERRED RECORDS (OUTPATIENT)
Dept: HEALTH INFORMATION MANAGEMENT | Facility: CLINIC | Age: 72
End: 2017-08-18

## 2017-08-21 ENCOUNTER — OFFICE VISIT (OUTPATIENT)
Dept: DERMATOLOGY | Facility: CLINIC | Age: 72
End: 2017-08-21
Attending: DERMATOLOGY
Payer: MEDICARE

## 2017-08-21 VITALS
SYSTOLIC BLOOD PRESSURE: 117 MMHG | HEIGHT: 64 IN | DIASTOLIC BLOOD PRESSURE: 74 MMHG | BODY MASS INDEX: 29.96 KG/M2 | WEIGHT: 175.5 LBS | HEART RATE: 63 BPM

## 2017-08-21 DIAGNOSIS — Z86.19 HISTORY OF TINEA: ICD-10-CM

## 2017-08-21 DIAGNOSIS — L82.1 SEBORRHEIC KERATOSIS: Primary | ICD-10-CM

## 2017-08-21 DIAGNOSIS — L72.0 MILIA: ICD-10-CM

## 2017-08-21 PROCEDURE — 99213 OFFICE O/P EST LOW 20 MIN: CPT | Mod: ZF

## 2017-08-21 ASSESSMENT — PAIN SCALES - GENERAL: PAINLEVEL: NO PAIN (0)

## 2017-08-21 NOTE — NURSING NOTE
Chief Complaint   Patient presents with     Establish Care     C/O dry skin hands /feet   Maria A Mcginnis LPN

## 2017-08-21 NOTE — PROGRESS NOTES
Dermatology Clinic Note    Dermatology Problem List:  1. Milia  2. Seborrheic keratoses  3. Hand and foot eruption which resolved with ciclopirox per patient      CC: Patient presents with:  Establish Care: C/O dry skin hands /feet      HPI: Esperanza Gage is a 71 year old female presenting for initial evaluation of long-standing history of scaling itching rash on the hands and feet. Both conditions flare concurrently. Patient had tried many over the counter treatments, but none were effective. She received an prescription for ciclopirox which cleared both conditions. In addition, she dries her feet daily with blow dryer.      Patient notes a bump on the R arm that occasionally turns white. In addition, she has a bump on the central lower back. She has a white lump on the L forehead that does not resolve and itches.        Patient Active Problem List   Diagnosis     Vitamin D deficiency     Hyperlipidemia with target LDL less than 130     Osteopenia     Breast signs and symptoms     Chondromalacia of patella     Primary localized osteoarthrosis, lower leg     Pelvic fracture (H)     Medication management     Leg numbness     Pain of right thumb     CMC DJD(carpometacarpal degenerative joint disease), localized primary, right       No Known Allergies      Current Outpatient Prescriptions   Medication     simvastatin (ZOCOR) 10 MG tablet     losartan (COZAAR) 25 MG tablet     ciclopirox (LOPROX) 0.77 % cream     alendronate (FOSAMAX) 70 MG tablet     cholecalciferol (VITAMIN D3) 5000 UNITS CAPS capsule     multivitamin, therapeutic with minerals (MULTI-VITAMIN) TABS     Fish Oil OIL     Ascorbic Acid (VITAMIN C) 100 MG TABS     vitamin  B complex with vitamin C (VITAMIN  B COMPLEX) TABS     vitamin E 400 UNITS TABS     LECITHIN     Glucosamine-Chondroit-Vit C-Mn (GLUCOSAMINE 1500 COMPLEX) CAPS     aspirin 81 MG tablet     No current facility-administered medications for this visit.        Family History   Problem  "Relation Age of Onset     OSTEOPOROSIS Mother      C.A.D. No family hx of      DIABETES No family hx of      Hypertension No family hx of      Skin Cancer No family hx of      Melanoma No family hx of      Dementia No family hx of      HEART DISEASE No family hx of      CEREBROVASCULAR DISEASE No family hx of        Social History     Social History     Marital status: Single     Spouse name: N/A     Number of children: N/A     Years of education: N/A     Occupational History     Not on file.     Social History Main Topics     Smoking status: Former Smoker     Types: Cigarettes     Smokeless tobacco: Never Used     Alcohol use Yes     Drug use: No     Sexual activity: Not on file     Other Topics Concern     Not on file     Social History Narrative         ROS: Feeling well without other skin concerns.     EXAM:  /74  Pulse 63  Ht 1.626 m (5' 4\")  Wt 79.6 kg (175 lb 8 oz)  Breastfeeding? No  BMI 30.12 kg/m2  GEN: Alert, no distress  HEENT: Conjunctiva clear.   PULM: Breathing comfortably on RA  CV: Extrem warm and well perfused  ABD: No distension  SKIN: Exam of the face, neck, arms, back, hands, feet  --Hands and feet are clear  --Central lower back with an open comedone  --L upper forehead with 1 mm white papule, smooth  --R medial forearm with 3 mm firm subcutaneous papule with dimple sign  --Scatted brown scaling papules on the temples, arms      Assessment and Plan:  1. Milia of the L forehead. Benign keratinous cyst    2. History of hand and foot scaling. Noted that she may have had a tinea infection of the feet that was triggering a hand dermatitis, but both rashes are now clear. To prevent recurrent of tinea pedis I suggested 2 time weekly use of the ciclopirox to the feet.     3. Granulomatous reaction on the R forearm: Firm scar. No concerning features. May be a foreign body reaction to ingrown hair, or site of repetitive trauma. No treatment advised.     4. Seborrheic keratoses: Benign " hyperkeratotic papules and plaques. No treatment advised unless irritation occurs.       RTC prn.     Thank you for involving me in this patient's care.     Nathalia Marin MD  Dermatology Staff    CC: Isaias Self, MD Julia Gifford MD

## 2017-08-21 NOTE — LETTER
8/21/2017       RE: Esperanza Gage  [address] [place] [address]     Dear Colleague,    Thank you for referring your patient, Esperanza Gage, to [facility] Please see a copy of my visit note below.    Dermatology Clinic Note    Dermatology Problem List:  1. Milia  2. Seborrheic keratoses  3. Hand and foot eruption which resolved with ciclopirox per patient      CC: Patient presents with:  Establish Care: C/O dry skin hands /feet      HPI: Esperanza Gage is a 71 year old female presenting for initial evaluation of long-standing history of scaling itching rash on the hands and feet. Both conditions flare concurrently. Patient had tried many over the counter treatments, but none were effective. She received an prescription for ciclopirox which cleared both conditions. In addition, she dries her feet daily with blow dryer.      Patient notes a bump on the R arm that occasionally turns white. In addition, she has a bump on the central lower back. She has a white lump on the L forehead that does not resolve and itches.        Patient Active Problem List   Diagnosis     Vitamin D deficiency     Hyperlipidemia with target LDL less than 130     Osteopenia     Breast signs and symptoms     Chondromalacia of patella     Primary localized osteoarthrosis, lower leg     Pelvic fracture (H)     Medication management     Leg numbness     Pain of right thumb     CMC DJD(carpometacarpal degenerative joint disease), localized primary, right       No Known Allergies      Current Outpatient Prescriptions   Medication     simvastatin (ZOCOR) 10 MG tablet     losartan (COZAAR) 25 MG tablet     ciclopirox (LOPROX) 0.77 % cream     alendronate (FOSAMAX) 70 MG tablet     cholecalciferol (VITAMIN D3) 5000 UNITS CAPS capsule     multivitamin, therapeutic with minerals (MULTI-VITAMIN) TABS     Fish Oil OIL     Ascorbic Acid (VITAMIN C) 100 MG TABS      "vitamin  B complex with vitamin C (VITAMIN  B COMPLEX) TABS     vitamin E 400 UNITS TABS     LECITHIN     Glucosamine-Chondroit-Vit C-Mn (GLUCOSAMINE 1500 COMPLEX) CAPS     aspirin 81 MG tablet     No current facility-administered medications for this visit.        Family History   Problem Relation Age of Onset     OSTEOPOROSIS Mother      C.A.D. No family hx of      DIABETES No family hx of      Hypertension No family hx of      Skin Cancer No family hx of      Melanoma No family hx of      Dementia No family hx of      HEART DISEASE No family hx of      CEREBROVASCULAR DISEASE No family hx of        Social History     Social History     Marital status: Single     Spouse name: N/A     Number of children: N/A     Years of education: N/A     Occupational History     Not on file.     Social History Main Topics     Smoking status: Former Smoker     Types: Cigarettes     Smokeless tobacco: Never Used     Alcohol use Yes     Drug use: No     Sexual activity: Not on file     Other Topics Concern     Not on file     Social History Narrative         ROS: Feeling well without other skin concerns.     EXAM:  /74  Pulse 63  Ht 1.626 m (5' 4\")  Wt 79.6 kg (175 lb 8 oz)  Breastfeeding? No  BMI 30.12 kg/m2  GEN: Alert, no distress  HEENT: Conjunctiva clear.   PULM: Breathing comfortably on RA  CV: Extrem warm and well perfused  ABD: No distension  SKIN: Exam of the face, neck, arms, back, hands, feet  --Hands and feet are clear  --Central lower back with an open comedone  --L upper forehead with 1 mm white papule, smooth  --R medial forearm with 3 mm firm subcutaneous papule with dimple sign  --Scatted brown scaling papules on the temples, arms      Assessment and Plan:  1. Milia of the L forehead. Benign keratinous cyst    2. History of hand and foot scaling. Noted that she may have had a tinea infection of the feet that was triggering a hand dermatitis, but both rashes are now clear. To prevent recurrent of tinea " pedis I suggested 2 time weekly use of the ciclopirox to the feet.     3. Granulomatous reaction on the R forearm: Firm scar. No concerning features. May be a foreign body reaction to ingrown hair, or site of repetitive trauma. No treatment advised.     4. Seborrheic keratoses: Benign hyperkeratotic papules and plaques. No treatment advised unless irritation occurs.       RTC prn.     Thank you for involving me in this patient's care.     Nathalia Marin MD  Dermatology Staff    CC:   Referred Self, MD Julia Gifford MD

## 2017-08-21 NOTE — MR AVS SNAPSHOT
After Visit Summary   8/21/2017    Esperanza Gage    MRN: [id number]           Patient Information     Date Of Birth          1945        Visit Information        Provider Department      8/21/2017 9:15 AM Nathalia Marin MD [facility]        Today's Diagnoses     Seborrheic keratosis    -  1    Milia        History of tinea           Follow-ups after your visit        Your next 10 appointments already scheduled     Aug 23, 2017  8:00 AM CDT   MAYRA Hand with Trice Ramires OT   [facility] ([facility])    [address] [place] [address] [phone]              Who to contact     Please call your clinic at [phone] to:    Ask questions about your health    Make or cancel appointments    Discuss your medicines    Learn about your test results    Speak to your doctor   If you have compliments or concerns about an experience at your clinic, or if you wish to file a complaint, please contact [facility] Patient Relations at [phone] or email us at [email or web address]         Additional Information About Your Visit        MyChart Information     [employer] gives you secure access to your electronic health record. If you see a primary care provider, you can also send messages to your care team and make appointments. If you have questions, please call your primary care clinic.  If you do not have a primary care provider, please call [phone] and they will assist you.      [employer] is an electronic gateway that provides easy, online access to your medical records. With [employer], you can request a clinic appointment, read your test results, renew a prescription or communicate with your care team.     To access your existing account, please contact your [facility] or call [phone] for assistance.        Care EveryWhere ID     This is  "your Care EveryWhere ID. This could be used by other organizations to access your [place] medical records  [id number]        Your Vitals Were     Pulse Height Breastfeeding? BMI (Body Mass Index)          63 1.626 m (5' 4\") No 30.12 kg/m2         Blood Pressure from Last 3 Encounters:   08/21/17 117/74   06/26/17 123/78   06/20/16 117/75    Weight from Last 3 Encounters:   08/21/17 79.6 kg (175 lb 8 oz)   08/07/17 79.8 kg (176 lb)   07/06/17 80.7 kg (178 lb)              Today, you had the following     No orders found for display       Primary Care Provider Office Phone # Fax #    Julia Raúl Gifford [phone]       [facility] [address]  [facility] [address]        Equal Access to Services     [facility]: Hadii oksana tijerina Soashish, waaxda luchrista, qaybta kaalmada fitz, noelle sky . So [facility] [phone].    ATENCIÓN: Si habla español, tiene a nair disposición servicios gratuitos de asistencia lingüística. Llame al [phone].    We comply with applicable federal civil rights laws and [place] laws. We do not discriminate on the basis of race, color, national origin, age, disability sex, sexual orientation or gender identity.            Thank you!     Thank you for choosing [facility]  for your care. Our goal is always to provide you with excellent care. Hearing back from our patients is one way we can continue to improve our services. Please take a few minutes to complete the written survey that you may receive in the mail after your visit with us. Thank you!             Your Updated Medication List - Protect others around you: Learn how to safely use, store and throw away your medicines at www.disposemymeds.org.          This list is accurate as of: 8/21/17 11:39 AM.  Always use your most recent med list.                   Brand Name Dispense Instructions for use Diagnosis    alendronate 70 MG tablet    " FOSAMAX    12 tablet    Take 1 tablet (70 mg) by mouth every 7 days Take with over 8 ounces water and stay upright for at least 30 minutes after dose.  Take at least 60 minutes before breakfast        aspirin 81 MG tablet      Take 1 tablet by mouth daily.        cholecalciferol 5000 UNITS Caps capsule    vitamin D3    30 capsule    Take 1 capsule (5,000 Units) by mouth daily    Vitamin D deficiency       ciclopirox 0.77 % cream    LOPROX    30 g    Apply topically 2 times daily    Tinea pedis of both feet       Fish Oil Oil           GLUCOSAMINE 1500 COMPLEX Caps     270 capsule    Take 1,500 mg by mouth daily.    Primary localized osteoarthrosis, lower leg, Chondromalacia of patella       LECITHIN           losartan 25 MG tablet    COZAAR    90 tablet    Take 1 tablet (25 mg) by mouth daily    Benign essential hypertension       Multi-vitamin Tabs tablet      Take 1 tablet by mouth daily        simvastatin 10 MG tablet    ZOCOR    90 tablet    Take 1 tablet (10 mg) by mouth At Bedtime    Pure hypercholesterolemia       vitamin B complex with vitamin C Tabs tablet      Take 1 tablet by mouth daily        Vitamin C 100 MG Tabs      Take 1 tablet by mouth 2 times daily.        vitamin E 400 UNITS Tabs      Take 400 Units by mouth daily

## 2017-08-22 LAB — COPATH REPORT: NORMAL

## 2017-08-23 ENCOUNTER — THERAPY VISIT (OUTPATIENT)
Dept: OCCUPATIONAL THERAPY | Facility: CLINIC | Age: 72
End: 2017-08-23
Payer: COMMERCIAL

## 2017-08-23 DIAGNOSIS — M19.031 CMC DJD(CARPOMETACARPAL DEGENERATIVE JOINT DISEASE), LOCALIZED PRIMARY, RIGHT: ICD-10-CM

## 2017-08-23 DIAGNOSIS — M79.644 PAIN OF RIGHT THUMB: ICD-10-CM

## 2017-08-23 PROCEDURE — 97535 SELF CARE MNGMENT TRAINING: CPT | Mod: GO | Performed by: OCCUPATIONAL THERAPIST

## 2017-08-23 PROCEDURE — 97110 THERAPEUTIC EXERCISES: CPT | Mod: GO | Performed by: OCCUPATIONAL THERAPIST

## 2017-08-23 PROCEDURE — 97018 PARAFFIN BATH THERAPY: CPT | Mod: GO | Performed by: OCCUPATIONAL THERAPIST

## 2017-08-23 NOTE — PROGRESS NOTES
Please refer to the daily flowsheet for treatment today, total treatment time and time spent performing 1:1 timed codes.       Home Program:  R HBTSS - night and day per symptoms  Heat - epsom salt soaks  Adductor release with clip  Self joint mobs on chest  Isometric C  1st DI strengthening  Place and hold pinch  Joint protection education  AE recommendations - jar opener, can/tab opener, built up handles/ on pens, wider handles on kitchen tools/appliances, use choppers/mixers when cooking, book crawford    Next Visit:   Paraffin   MFR  Review thumb stability - joint mobs, isometric C, 1st DI strengthening, place and hold pinch  DC to HEP

## 2017-08-23 NOTE — MR AVS SNAPSHOT
After Visit Summary   8/23/2017    Esperanza Gage    MRN: [id number]           Patient Information     Date Of Birth          1945        Visit Information        Provider Department      8/23/2017 8:00 AM Trice Ramires OT [facility] Hand Therapy        Today's Diagnoses     Pain of right thumb        CMC DJD(carpometacarpal degenerative joint disease), localized primary, right           Follow-ups after your visit        Who to contact     If you have questions or need follow up information about today's clinic visit or your schedule please contact [facility]ER[facility] directly at [phone].  Normal or non-critical lab and imaging results will be communicated to you by [employer], letter or phone within 4 business days after the clinic has received the results. If you do not hear from us within 7 days, please contact the clinic through [employer]Your[employer] or phone. If you have a critical or abnormal lab result, we will notify you by phone as soon as possible.  Submit refill requests through [employer] or call your pharmacy and they will forward the refill request to us. Please allow 3 business days for your refill to be completed.          Additional Information About Your Visit        MyChart Information     Servergy gives you secure access to your electronic health record. If you see a primary care provider, you can also send messages to your care team and make appointments. If you have questions, please call your primary care clinic.  If you do not have a primary care provider, please call [phone] and they will assist you.        Care EveryWhere ID     This is your Care EveryWhere ID. This could be used by other organizations to access your [place] medical records  [id number]         Blood Pressure from Last 3 Encounters:   08/21/17 117/74   06/26/17 123/78   06/20/16 117/75    Weight from Last 3 Encounters:   08/21/17 79.6 kg (175 lb 8 oz)   08/07/17 79.8 kg (176 lb)   07/06/17 80.7 kg (178 lb)               We Performed the Following     PARAFFIN BATH THERAPY     SELF CARE MNGMENT TRAINING     THERAPEUTIC EXERCISES        Primary Care Provider Office Phone # Fax #    Julia Raúl Gifford [phone]       [facility] [address]  [facility] [address]        Equal Access to Services     MARY JANE ANN : Hadii oksana brady hadmyrao Soomaali, waaxda luqadaha, qaybta kaalmada adeegyada, waxari bibiana tamirlilian collins sivakumarbeth sky . So [facility] [phone].    ATENCIÓN: Si habla español, tiene a nair disposición servicios gratuitos de asistencia lingüística. Llame al [phone].    We comply with applicable federal civil rights laws and [place] laws. We do not discriminate on the basis of race, color, national origin, age, disability sex, sexual orientation or gender identity.            Thank you!     Thank you for choosing [facility]  for your care. Our goal is always to provide you with excellent care. Hearing back from our patients is one way we can continue to improve our services. Please take a few minutes to complete the written survey that you may receive in the mail after your visit with us. Thank you!             Your Updated Medication List - Protect others around you: Learn how to safely use, store and throw away your medicines at www.disposemymeds.org.          This list is accurate as of: 8/23/17  9:15 AM.  Always use your most recent med list.                   Brand Name Dispense Instructions for use Diagnosis    alendronate 70 MG tablet    FOSAMAX    12 tablet    Take 1 tablet (70 mg) by mouth every 7 days Take with over 8 ounces water and stay upright for at least 30 minutes after dose.  Take at least 60 minutes before breakfast        aspirin 81 MG tablet      Take 1 tablet by mouth daily.        cholecalciferol 5000 UNITS Caps capsule    vitamin D3    30 capsule    Take 1 capsule (5,000 Units) by mouth daily    Vitamin D deficiency       ciclopirox 0.77 %  cream    LOPROX    30 g    Apply topically 2 times daily    Tinea pedis of both feet       Fish Oil Oil           GLUCOSAMINE 1500 COMPLEX Caps     270 capsule    Take 1,500 mg by mouth daily.    Primary localized osteoarthrosis, lower leg, Chondromalacia of patella       LECITHIN           losartan 25 MG tablet    COZAAR    90 tablet    Take 1 tablet (25 mg) by mouth daily    Benign essential hypertension       Multi-vitamin Tabs tablet      Take 1 tablet by mouth daily        simvastatin 10 MG tablet    ZOCOR    90 tablet    Take 1 tablet (10 mg) by mouth At Bedtime    Pure hypercholesterolemia       vitamin B complex with vitamin C Tabs tablet      Take 1 tablet by mouth daily        Vitamin C 100 MG Tabs      Take 1 tablet by mouth 2 times daily.        vitamin E 400 UNITS Tabs      Take 400 Units by mouth daily

## 2017-11-17 PROBLEM — M79.644 PAIN OF RIGHT THUMB: Status: RESOLVED | Noted: 2017-08-11 | Resolved: 2017-08-23

## 2017-11-17 PROBLEM — M19.031 CMC DJD(CARPOMETACARPAL DEGENERATIVE JOINT DISEASE), LOCALIZED PRIMARY, RIGHT: Status: RESOLVED | Noted: 2017-08-11 | Resolved: 2017-08-23

## 2017-11-17 NOTE — PROGRESS NOTES
Discharge Summary - Hand Therapy    Patient did not return to therapy.  We will assume that patient's goals were met.    D/C from [facility].

## 2018-05-16 ENCOUNTER — OFFICE VISIT (OUTPATIENT)
Dept: INTERNAL MEDICINE | Facility: CLINIC | Age: 73
End: 2018-05-16
Attending: INTERNAL MEDICINE
Payer: MEDICARE

## 2018-05-16 VITALS
DIASTOLIC BLOOD PRESSURE: 82 MMHG | HEART RATE: 70 BPM | HEIGHT: 66 IN | WEIGHT: 194 LBS | BODY MASS INDEX: 31.18 KG/M2 | SYSTOLIC BLOOD PRESSURE: 127 MMHG

## 2018-05-16 DIAGNOSIS — I10 BENIGN ESSENTIAL HYPERTENSION: ICD-10-CM

## 2018-05-16 DIAGNOSIS — M17.0 PRIMARY OSTEOARTHRITIS OF BOTH KNEES: Primary | ICD-10-CM

## 2018-05-16 DIAGNOSIS — R73.01 IMPAIRED FASTING GLUCOSE: ICD-10-CM

## 2018-05-16 LAB
ANION GAP SERPL CALCULATED.3IONS-SCNC: 8 MMOL/L (ref 3–14)
BUN SERPL-MCNC: 14 MG/DL (ref 7–30)
CALCIUM SERPL-MCNC: 9.3 MG/DL (ref 8.5–10.1)
CHLORIDE SERPL-SCNC: 108 MMOL/L (ref 94–109)
CHOLEST SERPL-MCNC: 180 MG/DL
CO2 SERPL-SCNC: 26 MMOL/L (ref 20–32)
CREAT SERPL-MCNC: 0.62 MG/DL (ref 0.52–1.04)
GFR SERPL CREATININE-BSD FRML MDRD: >90 ML/MIN/1.7M2
GLUCOSE SERPL-MCNC: 109 MG/DL (ref 70–99)
HBA1C MFR BLD: 5.9 % (ref 0–5.6)
HDLC SERPL-MCNC: 82 MG/DL
LDLC SERPL CALC-MCNC: 83 MG/DL
NONHDLC SERPL-MCNC: 98 MG/DL
POTASSIUM SERPL-SCNC: 4.1 MMOL/L (ref 3.4–5.3)
SODIUM SERPL-SCNC: 142 MMOL/L (ref 133–144)
TRIGL SERPL-MCNC: 75 MG/DL

## 2018-05-16 PROCEDURE — 36415 COLL VENOUS BLD VENIPUNCTURE: CPT | Performed by: INTERNAL MEDICINE

## 2018-05-16 PROCEDURE — 80061 LIPID PANEL: CPT | Performed by: INTERNAL MEDICINE

## 2018-05-16 PROCEDURE — 83036 HEMOGLOBIN GLYCOSYLATED A1C: CPT | Performed by: INTERNAL MEDICINE

## 2018-05-16 PROCEDURE — G0463 HOSPITAL OUTPT CLINIC VISIT: HCPCS | Mod: ZF

## 2018-05-16 PROCEDURE — 80048 BASIC METABOLIC PNL TOTAL CA: CPT | Performed by: INTERNAL MEDICINE

## 2018-05-16 ASSESSMENT — PAIN SCALES - GENERAL: PAINLEVEL: MODERATE PAIN (4)

## 2018-05-16 NOTE — LETTER
5/17/2018         Esperanza Gage   [address] [place] [address]        Dear Ms. Gage:    Your glucose and Hgb A1C are mildly elevated which is consistent with prediabetes. Other results are within acceptable range    Results for orders placed or performed in visit on 05/16/18   Basic Metabolic Panel   Result Value Ref Range    Sodium 142 133 - 144 mmol/L    Potassium 4.1 3.4 - 5.3 mmol/L    Chloride 108 94 - 109 mmol/L    Carbon Dioxide 26 20 - 32 mmol/L    Anion Gap 8 3 - 14 mmol/L    Glucose 109 (H) 70 - 99 mg/dL    Urea Nitrogen 14 7 - 30 mg/dL    Creatinine 0.62 0.52 - 1.04 mg/dL    GFR Estimate >90 >60 mL/min/1.7m2    GFR Estimate If Black >90 >60 mL/min/1.7m2    Calcium 9.3 8.5 - 10.1 mg/dL   Lipid Profile   Result Value Ref Range    Cholesterol 180 <200 mg/dL    Triglycerides 75 <150 mg/dL    HDL Cholesterol 82 >49 mg/dL    LDL Cholesterol Calculated 83 <100 mg/dL    Non HDL Cholesterol 98 <130 mg/dL   Hemoglobin A1c   Result Value Ref Range    Hemoglobin A1C 5.9 (H) 0 - 5.6 %         Please note that test explanations are brief and do not reflect all diagnostic uses.  If you have any questions or concerns, please call the clinic at [phone].      Sincerely,      Maria A Mcginnis sent on behalf of  Julia Gifford MD

## 2018-05-16 NOTE — PROGRESS NOTES
"HPI  Patient is here for follow up on knee pain. SHe reports that she has been dealing with significant knee problems. She reports that it has been difficult for her to walk. She is considering knee replacement surgery. She would like a referral to orthopedic surgery. She states that she was not ready for knee replacement surgery at the time.     Review of Systems     Constitutional:  Negative for fever, chills and fatigue.   HENT:  Negative for dry mouth and sinus congestion.    Respiratory:   Negative for cough and hemoptysis.    Gastrointestinal:  Negative for nausea, vomiting, abdominal pain, diarrhea and constipation.   Musculoskeletal:  Positive for arthralgias (bilateral knee pain).   Endo/Heme:  Negative for anemia, swollen glands and bruises/bleeds easily.   Psychiatric/Behavioral:  Negative for depression, decreased concentration, mood swings and panic attacks.    Endocrine:  Negative for altered temperature regulation, polyphagia, polydipsia, unwanted hair growth and change in facial hair.    Current Outpatient Prescriptions   Medication     alendronate (FOSAMAX) 70 MG tablet     Ascorbic Acid (VITAMIN C) 100 MG TABS     aspirin 81 MG tablet     cholecalciferol (VITAMIN D3) 5000 UNITS CAPS capsule     ciclopirox (LOPROX) 0.77 % cream     Fish Oil OIL     Glucosamine-Chondroit-Vit C-Mn (GLUCOSAMINE 1500 COMPLEX) CAPS     LECITHIN     losartan (COZAAR) 25 MG tablet     multivitamin, therapeutic with minerals (MULTI-VITAMIN) TABS     simvastatin (ZOCOR) 10 MG tablet     vitamin  B complex with vitamin C (VITAMIN  B COMPLEX) TABS     vitamin E 400 UNITS TABS     No current facility-administered medications for this visit.      Vitals:    05/16/18 0832 05/16/18 0836 05/16/18 0837   BP: 137/84 133/85 127/82   Pulse: 69 69 70   Weight: 88 kg (194 lb)     Height: 1.664 m (5' 5.5\")           Physical Exam   Constitutional: She is well-developed, well-nourished, and in no distress.   HENT:   Head: Normocephalic and " atraumatic.   Cardiovascular: Normal rate.    Pulmonary/Chest: Effort normal.   Skin: Skin is warm and dry.   Psychiatric: Mood, memory, affect and judgment normal.   Vitals reviewed.    Assessment and Plan:  Esperanza was seen today for recheck.    Diagnoses and all orders for this visit:    Primary osteoarthritis of both knees.  Reviewed prior imaging with patient.  Patient has been previously advised that surgery could be considered for management of her knee osteoarthritis.  Referral to orthopedic surgery was made today as patient is willing to consider surgery at this point.  -     ORTHOPEDICS ADULT REFERRAL    Benign essential hypertension.  Blood pressure is within acceptable range.  Recommend to continue with current anti-hypertensive therapy.  Will check fasting lipid panel as well as chemistry panel today.    -     Basic Metabolic Panel  -     Lipid Profile    Impaired fasting glucose.   Discussed increased risk of progression to diabetes.  Will check hemoglobin A1c today.  Patient will be advised accordingly.  -     Hemoglobin A1c    Total time spent 25 minutes.  More than 50% of the time spent with Ms. Gage on counseling / coordinating her care    Julia Gifford MD

## 2018-05-16 NOTE — MR AVS SNAPSHOT
After Visit Summary   5/16/2018    Esperanza Gage    MRN: [id number]           Patient Information     Date Of Birth          1945        Visit Information        Provider Department      5/16/2018 8:40 AM Julia Gifford MD [facility]         Today's Diagnoses     Primary osteoarthritis of both knees    -  1    Benign essential hypertension        Impaired fasting glucose           Follow-ups after your visit        Additional Services     ORTHOPEDICS ADULT REFERRAL       Your provider has referred you to: [facility]: Orthopaedic Clinic [facility] [phone]   [email or web address]      Please be aware that coverage of these services is subject to the terms and limitations of your health insurance plan.  Call member services at your health plan with any benefit or coverage questions.      Please bring the following to your appointment:    >>   Any x-rays, CTs or MRIs which have been performed.  Contact the facility where they were done to arrange for  prior to your scheduled appointment.    >>   List of current medications   >>   This referral request   >>   Any documents/labs given to you for this referral                  Your next 10 appointments already scheduled     Jun 27, 2018  8:00 AM CDT   Annual Visit with Julia Gifford MD   [facility]  ([facility])    [address],[facility] [address]  [address]  [address] [id number] [phone]              Who to contact     Please call your clinic at [phone] to:    Ask questions about your health    Make or cancel appointments    Discuss your medicines    Learn about your test results    Speak to your doctor            Additional Information About Your Visit        MyChart Information     [employer] gives you secure access to your electronic health record. If you see a primary care provider, you can also send  "messages to your care team and make appointments. If you have questions, please call your primary care clinic.  If you do not have a primary care provider, please call [phone] and they will assist you.      Re[employer] is an electronic gateway that provides easy, online access to your medical records. With [employer], you can request a clinic appointment, read your test results, renew a prescription or communicate with your care team.     To access your existing account, please contact your [facility] or call [phone] for assistance.        Care EveryWhere ID     This is your Care EveryWhere ID. This could be used by other organizations to access your [place] medical records  [id number]        Your Vitals Were     Pulse Height Breastfeeding? BMI (Body Mass Index)          70 1.664 m (5' 5.5\") No 31.79 kg/m2         Blood Pressure from Last 3 Encounters:   05/16/18 127/82   08/21/17 117/74   06/26/17 123/78    Weight from Last 3 Encounters:   05/16/18 88 kg (194 lb)   08/21/17 79.6 kg (175 lb 8 oz)   08/07/17 79.8 kg (176 lb)              We Performed the Following     Basic Metabolic Panel     Hemoglobin A1c     Lipid Profile     ORTHOPEDICS ADULT REFERRAL        Primary Care Provider Office Phone # Fax #    Julia Raúl Gifford [phone]       [facility] [address]  [facility] [address]        Equal Access to Services     [facility]: Hadii aad ku hadasho Somiguelali, waaxda luqadaha, qaybta kaalmada adeegyada, noelle sky . So [facility] [phone].    ATENCIÓN: Si habla español, tiene a nair disposición servicios gratuitos de asistencia lingüística. Llame al [phone].    We comply with applicable federal civil rights laws and [place] laws. We do not discriminate on the basis of race, color, national origin, age, disability, sex, sexual orientation, or gender identity.            Thank you!     Thank you for " choosing [facility]   for your care. Our goal is always to provide you with excellent care. Hearing back from our patients is one way we can continue to improve our services. Please take a few minutes to complete the written survey that you may receive in the mail after your visit with us. Thank you!             Your Updated Medication List - Protect others around you: Learn how to safely use, store and throw away your medicines at www.disposemymeds.org.          This list is accurate as of 5/16/18  8:57 AM.  Always use your most recent med list.                   Brand Name Dispense Instructions for use Diagnosis    alendronate 70 MG tablet    FOSAMAX    12 tablet    Take 1 tablet (70 mg) by mouth every 7 days Take with over 8 ounces water and stay upright for at least 30 minutes after dose.  Take at least 60 minutes before breakfast        aspirin 81 MG tablet      Take 1 tablet by mouth daily.        cholecalciferol 5000 units Caps capsule    vitamin D3    30 capsule    Take 1 capsule (5,000 Units) by mouth daily    Vitamin D deficiency       ciclopirox 0.77 % cream    LOPROX    30 g    Apply topically 2 times daily    Tinea pedis of both feet       Fish Oil Oil           GLUCOSAMINE 1500 COMPLEX Caps     270 capsule    Take 1,500 mg by mouth daily.    Primary localized osteoarthrosis, lower leg, Chondromalacia of patella       LECITHIN           losartan 25 MG tablet    COZAAR    90 tablet    Take 1 tablet (25 mg) by mouth daily    Benign essential hypertension       Multi-vitamin Tabs tablet      Take 1 tablet by mouth daily        simvastatin 10 MG tablet    ZOCOR    90 tablet    Take 1 tablet (10 mg) by mouth At Bedtime    Pure hypercholesterolemia       vitamin B complex with vitamin C Tabs tablet      Take 1 tablet by mouth daily        Vitamin C 100 MG Tabs      Take 1 tablet by mouth 2 times daily.        vitamin E 400 units Tabs      Take 400 Units by mouth daily

## 2018-05-16 NOTE — NURSING NOTE
Chief Complaint   Patient presents with     RECHECK     C/O Left Knee pain    Maria A Mcginnis LPN

## 2018-05-16 NOTE — LETTER
5/16/2018       RE: Esperanza Gage  [address] [place] [address]     Dear Colleague,    Thank you for referring your patient, Esperanza Gage, to the [facility]  at [facility]. Please see a copy of my visit note below.    HPI  Patient is here for follow up on knee pain. SHe reports that she has been dealing with significant knee problems. She reports that it has been difficult for her to walk. She is considering knee replacement surgery. She would like a referral to orthopedic surgery. She states that she was not ready for knee replacement surgery at the time.     Review of Systems     Constitutional:  Negative for fever, chills and fatigue.   HENT:  Negative for dry mouth and sinus congestion.    Respiratory:   Negative for cough and hemoptysis.    Gastrointestinal:  Negative for nausea, vomiting, abdominal pain, diarrhea and constipation.   Musculoskeletal:  Positive for arthralgias (bilateral knee pain).   Endo/Heme:  Negative for anemia, swollen glands and bruises/bleeds easily.   Psychiatric/Behavioral:  Negative for depression, decreased concentration, mood swings and panic attacks.    Endocrine:  Negative for altered temperature regulation, polyphagia, polydipsia, unwanted hair growth and change in facial hair.    Current Outpatient Prescriptions   Medication     alendronate (FOSAMAX) 70 MG tablet     Ascorbic Acid (VITAMIN C) 100 MG TABS     aspirin 81 MG tablet     cholecalciferol (VITAMIN D3) 5000 UNITS CAPS capsule     ciclopirox (LOPROX) 0.77 % cream     Fish Oil OIL     Glucosamine-Chondroit-Vit C-Mn (GLUCOSAMINE 1500 COMPLEX) CAPS     LECITHIN     losartan (COZAAR) 25 MG tablet     multivitamin, therapeutic with minerals (MULTI-VITAMIN) TABS     simvastatin (ZOCOR) 10 MG tablet     vitamin  B complex with vitamin C (VITAMIN  B COMPLEX) TABS     vitamin E 400 UNITS TABS     No current facility-administered medications for this visit.   "    Vitals:    05/16/18 0832 05/16/18 0836 05/16/18 0837   BP: 137/84 133/85 127/82   Pulse: 69 69 70   Weight: 88 kg (194 lb)     Height: 1.664 m (5' 5.5\")           Physical Exam   Constitutional: She is well-developed, well-nourished, and in no distress.   HENT:   Head: Normocephalic and atraumatic.   Cardiovascular: Normal rate.    Pulmonary/Chest: Effort normal.   Skin: Skin is warm and dry.   Psychiatric: Mood, memory, affect and judgment normal.   Vitals reviewed.    Assessment and Plan:  Esperanza was seen today for recheck.    Diagnoses and all orders for this visit:    Primary osteoarthritis of both knees.  Reviewed prior imaging with patient.  Patient has been previously advised that surgery could be considered for management of her knee osteoarthritis.  Referral to orthopedic surgery was made today as patient is willing to consider surgery at this point.  -     ORTHOPEDICS ADULT REFERRAL    Benign essential hypertension.  Blood pressure is within acceptable range.  Recommend to continue with current anti-hypertensive therapy.  Will check fasting lipid panel as well as chemistry panel today.    -     Basic Metabolic Panel  -     Lipid Profile    Impaired fasting glucose.   Discussed increased risk of progression to diabetes.  Will check hemoglobin A1c today.  Patient will be advised accordingly.  -     Hemoglobin A1c    Total time spent 25 minutes.  More than 50% of the time spent with Ms. Gage on counseling / coordinating her care    Julia Gifford MD    "

## 2018-05-20 ASSESSMENT — ENCOUNTER SYMPTOMS
BRUISES/BLEEDS EASILY: 0
ABDOMINAL PAIN: 0
POLYPHAGIA: 0
FEVER: 0
VOMITING: 0
DIARRHEA: 0
CONSTIPATION: 0
COUGH: 0
SWOLLEN GLANDS: 0
PANIC: 0
HEMOPTYSIS: 0
INSOMNIA: 0
FATIGUE: 0
ARTHRALGIAS: 1
POLYDIPSIA: 0
SINUS CONGESTION: 0
NERVOUS/ANXIOUS: 0
ALTERED TEMPERATURE REGULATION: 0
CHILLS: 0
DECREASED CONCENTRATION: 0
DEPRESSION: 0
NAUSEA: 0

## 2018-05-31 NOTE — TELEPHONE ENCOUNTER
FUTURE VISIT INFORMATION      FUTURE VISIT INFORMATION:    Date: 6/4/18    Time: 0845    Location: ORTHO  REFERRAL INFORMATION:    Referring provider:  DR LOPEZ    Referring providers clinic:  SPORTS MED    Reason for visit/diagnosis  KNEE PAIN      RECORDS STATUS        ALL RECORDS INTERNAL

## 2018-06-04 ENCOUNTER — DOCUMENTATION ONLY (OUTPATIENT)
Dept: ORTHOPEDICS | Facility: CLINIC | Age: 73
End: 2018-06-04

## 2018-06-04 ENCOUNTER — PRE VISIT (OUTPATIENT)
Dept: ORTHOPEDICS | Facility: CLINIC | Age: 73
End: 2018-06-04

## 2018-06-04 ENCOUNTER — OFFICE VISIT (OUTPATIENT)
Dept: ORTHOPEDICS | Facility: CLINIC | Age: 73
End: 2018-06-04
Payer: COMMERCIAL

## 2018-06-04 ENCOUNTER — RADIANT APPOINTMENT (OUTPATIENT)
Dept: GENERAL RADIOLOGY | Facility: CLINIC | Age: 73
End: 2018-06-04
Attending: ORTHOPAEDIC SURGERY
Payer: COMMERCIAL

## 2018-06-04 DIAGNOSIS — M17.12 PRIMARY LOCALIZED OSTEOARTHROSIS OF LEFT LOWER LEG: ICD-10-CM

## 2018-06-04 DIAGNOSIS — M17.12 PRIMARY LOCALIZED OSTEOARTHROSIS OF LEFT LOWER LEG: Primary | ICD-10-CM

## 2018-06-04 ASSESSMENT — KOOS JR
HOW SEVERE IS YOUR KNEE STIFFNESS AFTER FIRST WAKING IN MORNING: MILD
HOW SEVERE IS YOUR KNEE STIFFNESS AFTER FIRST WAKING IN MORNING: 1

## 2018-06-04 ASSESSMENT — ENCOUNTER SYMPTOMS
ARTHRALGIAS: 1
JOINT SWELLING: 0
NECK PAIN: 0
MUSCLE WEAKNESS: 0
BACK PAIN: 0
MYALGIAS: 0
STIFFNESS: 1
MUSCLE CRAMPS: 0

## 2018-06-04 ASSESSMENT — ACTIVITIES OF DAILY LIVING (ADL): FUNCTION,_DAILY_LIVING_SCORE: 60.29

## 2018-06-04 NOTE — MR AVS SNAPSHOT
After Visit Summary   6/4/2018    Esperanza Gage    MRN: [id number]           Patient Information     Date Of Birth          1945        Visit Information        Provider Department      6/4/2018 8:45 AM Pierre Campos MD [facility]        Today's Diagnoses     Primary localized osteoarthrosis of left lower leg    -  1       Follow-ups after your visit        Your next 10 appointments already scheduled     Jun 27, 2018  8:00 AM CDT   Annual Visit with Julia Gifford MD   [facility]  ([facility])    [address],[facility] [address]  [address] [id number] [phone]              Who to contact     Please call your clinic at [phone] to:    Ask questions about your health    Make or cancel appointments    Discuss your medicines    Learn about your test results    Speak to your doctor            Additional Information About Your Visit        MyChart Information     [facility] gives you secure access to your electronic health record. If you see a primary care provider, you can also send messages to your care team and make appointments. If you have questions, please call your primary care clinic.  If you do not have a primary care provider, please call [phone] and they will assist you.      S[employer] is an electronic gateway that provides easy, online access to your medical records. With [employer], you can request a clinic appointment, read your test results, renew a prescription or communicate with your care team.     To access your existing account, please contact your [facility] or call [phone] for assistance.        Care EveryWhere ID     This is your Care EveryWhere ID. This could be used by other organizations to access your [facility] medical records  [id number]         Blood Pressure from Last 3 Encounters:   05/16/18 127/82   08/21/17 117/74   06/26/17 123/78     Weight from Last 3 Encounters:   05/16/18 88 kg (194 lb)   08/21/17 79.6 kg (175 lb 8 oz)   08/07/17 79.8 kg (176 lb)              We Performed the Following     Mercedes-Operative Worksheet (Tumor/Adult Reconstruction: Knee/Hip/Fracture )        Primary Care Provider Office Phone # Fax #    Julia Raúl Gifford [phone]       [facility] [address]  [facility] [address]        Equal Access to Services     MARY JANE JUAREZ : Hadii aad ku hadasho Soomaali, waaxda luqadaha, qaybta kaalmada adeegyada, noelle mccarty haybalwinder sky . So [facility] [phone].    ATENCIÓN: Si habla español, tiene a nair disposición servicios gratuitos de asistencia lingüística. Ngoc al [phone].    We comply with applicable federal civil rights laws and [place] laws. We do not discriminate on the basis of race, color, national origin, age, disability, sex, sexual orientation, or gender identity.            Thank you!     Thank you for choosing [facility]  for your care. Our goal is always to provide you with excellent care. Hearing back from our patients is one way we can continue to improve our services. Please take a few minutes to complete the written survey that you may receive in the mail after your visit with us. Thank you!             Your Updated Medication List - Protect others around you: Learn how to safely use, store and throw away your medicines at www.disposemymeds.org.          This list is accurate as of 6/4/18 10:33 AM.  Always use your most recent med list.                   Brand Name Dispense Instructions for use Diagnosis    alendronate 70 MG tablet    FOSAMAX    12 tablet    Take 1 tablet (70 mg) by mouth every 7 days Take with over 8 ounces water and stay upright for at least 30 minutes after dose.  Take at least 60 minutes before breakfast        aspirin 81 MG tablet      Take 1 tablet by mouth daily.        cholecalciferol 5000 units Caps capsule     vitamin D3    30 capsule    Take 1 capsule (5,000 Units) by mouth daily    Vitamin D deficiency       ciclopirox 0.77 % cream    LOPROX    30 g    Apply topically 2 times daily    Tinea pedis of both feet       Fish Oil Oil           GLUCOSAMINE 1500 COMPLEX Caps     270 capsule    Take 1,500 mg by mouth daily.    Primary localized osteoarthrosis, lower leg, Chondromalacia of patella       LECITHIN           losartan 25 MG tablet    COZAAR    90 tablet    Take 1 tablet (25 mg) by mouth daily    Benign essential hypertension       Multi-vitamin Tabs tablet      Take 1 tablet by mouth daily        simvastatin 10 MG tablet    ZOCOR    90 tablet    Take 1 tablet (10 mg) by mouth At Bedtime    Pure hypercholesterolemia       vitamin B complex with vitamin C Tabs tablet      Take 1 tablet by mouth daily        Vitamin C 100 MG Tabs      Take 1 tablet by mouth 2 times daily.        vitamin E 400 units Tabs      Take 400 Units by mouth daily

## 2018-06-04 NOTE — PROGRESS NOTES
[facility]  Orthopaedic Surgery, Joint Replacement Consultation  by Pierre Campos M.D.    Esperanza Gage MRN# [id number]   Age: 72 year old YOB: 1945     Requesting physician: Julia Gifford, Julia Olsen            Assessment and Plan:   Assessment:  Advised she proceed with L TKA as only means of providing complete pain relief.  She is an excellent candidate.     Plan:  Discussed both non-surgical and surgical managements.    I discussed the risks, benefits, alternatives regarding the proposed surgery with the patient who both demonstrated understanding and indicated a desire to proceed with the surgery as planned.    1. PCP H and P  2. PACS pre op visit  3. Dental eval  4. Preop teaching TKA class.  5. XR today for planning purposes             History of Present Illness:   72 year old female  chief complaint    Dr. Gifford requested consult.    C/o weakness, limp, trouble negotiating stairs ( does one stair at a time), unable to do ADL's , uses cart at grocery store and parking lot, does not use a cane.  Had steroid shot x 2 few years ago.  Not taking analgesics.    Current symptoms:  Problem: as above  Onset and duration: years  Awakens from sleep due to sx's:  Yes, in past.  Precipitating Injury:  No    Other joints or sites painful:  Yes, notes stiffness in R knee      Background history:  DX:  1. L knee DJD    TREATMENTS:  1. 7/1983, L knee medial meniscectomy. [place], [place]           Physical Exam:     EXAMINATION pertinent findings:   VITAL SIGNS: not currently breastfeeding.  There is no height or weight on file to calculate BMI.   BMI 31.1  5' 5 1/2, 190 lbs  RESP: non labored breathing   ABD: benign   SKIN: grossly normal   LYMPHATIC: grossly normal   NEURO: grossly normal   VASCULAR: satisfactory perfusion of all extremities . 2+ DP pulse  MUSCULOSKELETAL:   Gait : antalgic  Hips full ROM  Knees L 0-120 deg ROM , no laxity.  Opens to valgus stress to neutral  position.             Data:   All laboratory data reviewed  All imaging studies reviewed by me           MD Al Zimmerman Family Professor  Oncology and Adult Reconstructive Surgery  Dept Orthopaedic Surgery, [facility]  [phone] office, [phone] pager  [email or web address]            DATA for DOCUMENTATION:         Past Medical History:     Patient Active Problem List   Diagnosis     Vitamin D deficiency     Hyperlipidemia with target LDL less than 130     Osteopenia     Breast signs and symptoms     Chondromalacia of patella     Primary localized osteoarthrosis, lower leg     Pelvic fracture (H)     Medication management     Leg numbness     Seborrheic keratosis     Milia     History of tinea     Past Medical History:   Diagnosis Date     Hyperlipidemia LDL goal < 130      Hypertension      Osteoporosis, post-menopausal      Pelvic fracture (H)        Also see scanned health assessment forms.       Past Surgical History:     Past Surgical History:   Procedure Laterality Date     No prior surgeries       ORTHOPEDIC SURGERY              Social History:     Social History     Social History     Marital status: Single     Spouse name: N/A     Number of children: N/A     Years of education: N/A     Occupational History     Not on file.     Social History Main Topics     Smoking status: Former Smoker     Types: Cigarettes     Smokeless tobacco: Never Used     Alcohol use Yes     Drug use: No     Sexual activity: Not on file     Other Topics Concern     Not on file     Social History Narrative            Family History:       Family History   Problem Relation Age of Onset     OSTEOPOROSIS Mother      C.A.D. No family hx of      DIABETES No family hx of      Hypertension No family hx of      Skin Cancer No family hx of      Melanoma No family hx of      Dementia No family hx of      HEART DISEASE No family hx of      CEREBROVASCULAR DISEASE No family hx of             Medications:     Current  Outpatient Prescriptions   Medication Sig     alendronate (FOSAMAX) 70 MG tablet Take 1 tablet (70 mg) by mouth every 7 days Take with over 8 ounces water and stay upright for at least 30 minutes after dose.  Take at least 60 minutes before breakfast     Ascorbic Acid (VITAMIN C) 100 MG TABS Take 1 tablet by mouth 2 times daily.      aspirin 81 MG tablet Take 1 tablet by mouth daily.     cholecalciferol (VITAMIN D3) 5000 UNITS CAPS capsule Take 1 capsule (5,000 Units) by mouth daily     ciclopirox (LOPROX) 0.77 % cream Apply topically 2 times daily     Fish Oil OIL      Glucosamine-Chondroit-Vit C-Mn (GLUCOSAMINE 1500 COMPLEX) CAPS Take 1,500 mg by mouth daily.     LECITHIN      losartan (COZAAR) 25 MG tablet Take 1 tablet (25 mg) by mouth daily     multivitamin, therapeutic with minerals (MULTI-VITAMIN) TABS Take 1 tablet by mouth daily     simvastatin (ZOCOR) 10 MG tablet Take 1 tablet (10 mg) by mouth At Bedtime     vitamin  B complex with vitamin C (VITAMIN  B COMPLEX) TABS Take 1 tablet by mouth daily     vitamin E 400 UNITS TABS Take 400 Units by mouth daily     No current facility-administered medications for this visit.               Review of Systems:   A comprehensive 10 point review of systems (constitutional, ENT, cardiac, peripheral vascular, lymphatic, respiratory, GI, , Musculoskeletal, skin, Neurological) was performed and found to be negative except as described in this note.     See intake form completed by patient    Answers for HPI/ROS submitted by the patient on 6/4/2018   General Symptoms: No  Skin Symptoms: No  HENT Symptoms: No  EYE SYMPTOMS: No  HEART SYMPTOMS: No  LUNG SYMPTOMS: No  INTESTINAL SYMPTOMS: No  URINARY SYMPTOMS: No  GYNECOLOGIC SYMPTOMS: No  BREAST SYMPTOMS: No  SKELETAL SYMPTOMS: Yes  BLOOD SYMPTOMS: No  NERVOUS SYSTEM SYMPTOMS: No  MENTAL HEALTH SYMPTOMS: No  Back pain: No  Muscle aches: No  Neck pain: No  Swollen joints: No  Joint pain: Yes  Bone pain: Yes  Muscle cramps:  No  Muscle weakness: No  Joint stiffness: Yes  Bone fracture: No

## 2018-06-04 NOTE — LETTER
6/4/2018     RE: Esperanza Gage  [address] [place] [address]     Dear Colleague,    Thank you for referring your patient, Esperanza Gage, to the [facility] at [facility]. Please see a copy of my visit note below.         [facility]  Orthopaedic Surgery, Joint Replacement Consultation  by Pierre Campos M.D.    Esperanza Gage MRN# [id number]   Age: 72 year old YOB: 1945     Requesting physician: Julia Gifford, Julia Olsen            Assessment and Plan:   Assessment:  Advised she proceed with L TKA as only means of providing complete pain relief.  She is an excellent candidate.     Plan:  Discussed both non-surgical and surgical managements.    I discussed the risks, benefits, alternatives regarding the proposed surgery with the patient who both demonstrated understanding and indicated a desire to proceed with the surgery as planned.    1. PCP H and P  2. PACS pre op visit  3. Dental eval  4. Preop teaching TKA class.  5. XR today for planning purposes             History of Present Illness:   72 year old female  chief complaint    Dr. Gifford requested consult.    C/o weakness, limp, trouble negotiating stairs ( does one stair at a time), unable to do ADL's , uses cart at grocery store and parking lot, does not use a cane.  Had steroid shot x 2 few years ago.  Not taking analgesics.    Current symptoms:  Problem: as above  Onset and duration: years  Awakens from sleep due to sx's:  Yes, in past.  Precipitating Injury:  No    Other joints or sites painful:  Yes, notes stiffness in R knee      Background history:  DX:  1. L knee DJD    TREATMENTS:  1. 7/1983, L knee medial meniscectomy. [place], [place]           Physical Exam:     EXAMINATION pertinent findings:   VITAL SIGNS: not currently breastfeeding.  There is no height or weight on file to calculate BMI.   BMI 31.1  5' 5 1/2, 190 lbs  RESP: non labored breathing    ABD: benign   SKIN: grossly normal   LYMPHATIC: grossly normal   NEURO: grossly normal   VASCULAR: satisfactory perfusion of all extremities . 2+ DP pulse  MUSCULOSKELETAL:   Gait : antalgic  Hips full ROM  Knees L 0-120 deg ROM , no laxity.  Opens to valgus stress to neutral position.             Data:   All laboratory data reviewed  All imaging studies reviewed by me           MD Al Zimmerman Family Professor  Oncology and Adult Reconstructive Surgery  Dept Orthopaedic Surgery, [facility]  [phone] office, [phone] pager  [email or web address]            DATA for DOCUMENTATION:         Past Medical History:     Patient Active Problem List   Diagnosis     Vitamin D deficiency     Hyperlipidemia with target LDL less than 130     Osteopenia     Breast signs and symptoms     Chondromalacia of patella     Primary localized osteoarthrosis, lower leg     Pelvic fracture (H)     Medication management     Leg numbness     Seborrheic keratosis     Milia     History of tinea     Past Medical History:   Diagnosis Date     Hyperlipidemia LDL goal < 130      Hypertension      Osteoporosis, post-menopausal      Pelvic fracture (H)        Also see scanned health assessment forms.       Past Surgical History:     Past Surgical History:   Procedure Laterality Date     No prior surgeries       ORTHOPEDIC SURGERY              Social History:     Social History     Social History     Marital status: Single     Spouse name: N/A     Number of children: N/A     Years of education: N/A     Occupational History     Not on file.     Social History Main Topics     Smoking status: Former Smoker     Types: Cigarettes     Smokeless tobacco: Never Used     Alcohol use Yes     Drug use: No     Sexual activity: Not on file     Other Topics Concern     Not on file     Social History Narrative            Family History:       Family History   Problem Relation Age of Onset     OSTEOPOROSIS Mother      C.A.D. No family hx of       DIABETES No family hx of      Hypertension No family hx of      Skin Cancer No family hx of      Melanoma No family hx of      Dementia No family hx of      HEART DISEASE No family hx of      CEREBROVASCULAR DISEASE No family hx of             Medications:     Current Outpatient Prescriptions   Medication Sig     alendronate (FOSAMAX) 70 MG tablet Take 1 tablet (70 mg) by mouth every 7 days Take with over 8 ounces water and stay upright for at least 30 minutes after dose.  Take at least 60 minutes before breakfast     Ascorbic Acid (VITAMIN C) 100 MG TABS Take 1 tablet by mouth 2 times daily.      aspirin 81 MG tablet Take 1 tablet by mouth daily.     cholecalciferol (VITAMIN D3) 5000 UNITS CAPS capsule Take 1 capsule (5,000 Units) by mouth daily     ciclopirox (LOPROX) 0.77 % cream Apply topically 2 times daily     Fish Oil OIL      Glucosamine-Chondroit-Vit C-Mn (GLUCOSAMINE 1500 COMPLEX) CAPS Take 1,500 mg by mouth daily.     LECITHIN      losartan (COZAAR) 25 MG tablet Take 1 tablet (25 mg) by mouth daily     multivitamin, therapeutic with minerals (MULTI-VITAMIN) TABS Take 1 tablet by mouth daily     simvastatin (ZOCOR) 10 MG tablet Take 1 tablet (10 mg) by mouth At Bedtime     vitamin  B complex with vitamin C (VITAMIN  B COMPLEX) TABS Take 1 tablet by mouth daily     vitamin E 400 UNITS TABS Take 400 Units by mouth daily     No current facility-administered medications for this visit.               Review of Systems:   A comprehensive 10 point review of systems (constitutional, ENT, cardiac, peripheral vascular, lymphatic, respiratory, GI, , Musculoskeletal, skin, Neurological) was performed and found to be negative except as described in this note.     See intake form completed by patient    Answers for HPI/ROS submitted by the patient on 6/4/2018   General Symptoms: No  Skin Symptoms: No  HENT Symptoms: No  EYE SYMPTOMS: No  HEART SYMPTOMS: No  LUNG SYMPTOMS: No  INTESTINAL SYMPTOMS: No  URINARY  SYMPTOMS: No  GYNECOLOGIC SYMPTOMS: No  BREAST SYMPTOMS: No  SKELETAL SYMPTOMS: Yes  BLOOD SYMPTOMS: No  NERVOUS SYSTEM SYMPTOMS: No  MENTAL HEALTH SYMPTOMS: No  Back pain: No  Muscle aches: No  Neck pain: No  Swollen joints: No  Joint pain: Yes  Bone pain: Yes  Muscle cramps: No  Muscle weakness: No  Joint stiffness: Yes  Bone fracture: No    Again, thank you for allowing me to participate in the care of your patient.      Sincerely,  Pierre Campos MD

## 2018-06-04 NOTE — NURSING NOTE
Teaching Flowsheet   Relevant Diagnosis: Osteoarthritis left knee  Teaching Topic: Pre-op:  Left total knee replacement     Person(s) involved in teaching:   Patient     Motivation Level:  Asks Questions: Yes  Eager to Learn: Yes  Cooperative: Yes  Receptive (willing/able to accept information): Yes  Any cultural factors/Presybeterian beliefs that may influence understanding or compliance? NA       Patient demonstrates understanding of the following:  Reason for the appointment, diagnosis and treatment plan: Yes  Knowledge of proper use of medications and conditions for which they are ordered (with special attention to potential side effects or drug interactions): Yes  Which situations necessitate calling provider and whom to contact: Yes       Teaching Concerns Addressed:        Proper use and care of  (medical equip, care aids, etc.): NA  Nutritional needs and diet plan: Yes  Pain management techniques: Yes  Wound Care: Yes  How and/when to access community resources: NA     Instructional Materials Used/Given: Surgery folder     Time spent with patient: 15 minutes.

## 2018-06-04 NOTE — PROGRESS NOTES
Mercedes op worksheet received and forwarded to surgery scheduling. Patient has been scheduled for surgery with Dr. Campos for 7/24/18 at [place] per Daniela Bradford RN to Dr. Campos.

## 2018-06-11 DIAGNOSIS — M17.12 PRIMARY LOCALIZED OSTEOARTHROSIS OF LEFT LOWER LEG: Primary | ICD-10-CM

## 2018-06-12 ENCOUNTER — HOSPITAL ENCOUNTER (OUTPATIENT)
Dept: EDUCATION SERVICES | Facility: CLINIC | Age: 73
End: 2018-06-12
Payer: MEDICARE

## 2018-06-27 ENCOUNTER — OFFICE VISIT (OUTPATIENT)
Dept: INTERNAL MEDICINE | Facility: CLINIC | Age: 73
End: 2018-06-27
Attending: INTERNAL MEDICINE
Payer: MEDICARE

## 2018-06-27 VITALS
WEIGHT: 195.7 LBS | HEIGHT: 66 IN | SYSTOLIC BLOOD PRESSURE: 124 MMHG | HEART RATE: 67 BPM | BODY MASS INDEX: 31.45 KG/M2 | TEMPERATURE: 98.3 F | DIASTOLIC BLOOD PRESSURE: 76 MMHG

## 2018-06-27 DIAGNOSIS — M81.0 SENILE OSTEOPOROSIS: Primary | ICD-10-CM

## 2018-06-27 DIAGNOSIS — I10 BENIGN ESSENTIAL HYPERTENSION: ICD-10-CM

## 2018-06-27 DIAGNOSIS — E78.00 PURE HYPERCHOLESTEROLEMIA: ICD-10-CM

## 2018-06-27 DIAGNOSIS — M25.661 STIFFNESS OF RIGHT KNEE, NOT ELSEWHERE CLASSIFIED: ICD-10-CM

## 2018-06-27 DIAGNOSIS — L98.9 SKIN LESION: ICD-10-CM

## 2018-06-27 LAB
ALBUMIN UR-MCNC: NEGATIVE MG/DL
ANION GAP SERPL CALCULATED.3IONS-SCNC: 6 MMOL/L (ref 3–14)
APPEARANCE UR: CLEAR
BILIRUB UR QL STRIP: NEGATIVE
BUN SERPL-MCNC: 10 MG/DL (ref 7–30)
CALCIUM SERPL-MCNC: 8.9 MG/DL (ref 8.5–10.1)
CHLORIDE SERPL-SCNC: 108 MMOL/L (ref 94–109)
CO2 SERPL-SCNC: 28 MMOL/L (ref 20–32)
COLOR UR AUTO: YELLOW
CREAT SERPL-MCNC: 0.59 MG/DL (ref 0.52–1.04)
ERYTHROCYTE [DISTWIDTH] IN BLOOD BY AUTOMATED COUNT: 12.9 % (ref 10–15)
GFR SERPL CREATININE-BSD FRML MDRD: >90 ML/MIN/1.7M2
GLUCOSE SERPL-MCNC: 109 MG/DL (ref 70–99)
GLUCOSE UR STRIP-MCNC: NEGATIVE MG/DL
HCT VFR BLD AUTO: 41.6 % (ref 35–47)
HGB BLD-MCNC: 13.3 G/DL (ref 11.7–15.7)
HGB UR QL STRIP: NEGATIVE
KETONES UR STRIP-MCNC: NEGATIVE MG/DL
LEUKOCYTE ESTERASE UR QL STRIP: NEGATIVE
MCH RBC QN AUTO: 31 PG (ref 26.5–33)
MCHC RBC AUTO-ENTMCNC: 32 G/DL (ref 31.5–36.5)
MCV RBC AUTO: 97 FL (ref 78–100)
MUCOUS THREADS #/AREA URNS LPF: PRESENT /LPF
NITRATE UR QL: NEGATIVE
PH UR STRIP: 7.5 PH (ref 5–7)
PLATELET # BLD AUTO: 216 10E9/L (ref 150–450)
POTASSIUM SERPL-SCNC: 4 MMOL/L (ref 3.4–5.3)
RBC # BLD AUTO: 4.29 10E12/L (ref 3.8–5.2)
RBC #/AREA URNS AUTO: 0 /HPF (ref 0–2)
SODIUM SERPL-SCNC: 142 MMOL/L (ref 133–144)
SOURCE: ABNORMAL
SP GR UR STRIP: 1.01 (ref 1–1.03)
SQUAMOUS #/AREA URNS AUTO: <1 /HPF (ref 0–1)
UROBILINOGEN UR STRIP-MCNC: NORMAL MG/DL (ref 0–2)
WBC # BLD AUTO: 6.2 10E9/L (ref 4–11)
WBC #/AREA URNS AUTO: <1 /HPF (ref 0–5)

## 2018-06-27 PROCEDURE — 93010 ELECTROCARDIOGRAM REPORT: CPT | Performed by: INTERNAL MEDICINE

## 2018-06-27 PROCEDURE — 80048 BASIC METABOLIC PNL TOTAL CA: CPT | Performed by: INTERNAL MEDICINE

## 2018-06-27 PROCEDURE — G0463 HOSPITAL OUTPT CLINIC VISIT: HCPCS | Mod: ZF

## 2018-06-27 PROCEDURE — 85027 COMPLETE CBC AUTOMATED: CPT | Performed by: INTERNAL MEDICINE

## 2018-06-27 PROCEDURE — 93005 ELECTROCARDIOGRAM TRACING: CPT | Mod: ZF | Performed by: INTERNAL MEDICINE

## 2018-06-27 PROCEDURE — 36415 COLL VENOUS BLD VENIPUNCTURE: CPT | Performed by: INTERNAL MEDICINE

## 2018-06-27 PROCEDURE — 81001 URINALYSIS AUTO W/SCOPE: CPT | Performed by: INTERNAL MEDICINE

## 2018-06-27 RX ORDER — LOSARTAN POTASSIUM 25 MG/1
25 TABLET ORAL DAILY
Qty: 90 TABLET | Refills: 3 | Status: SHIPPED | OUTPATIENT
Start: 2018-06-27 | End: 2018-09-25

## 2018-06-27 RX ORDER — ALENDRONATE SODIUM 70 MG/1
70 TABLET ORAL
Qty: 12 TABLET | Refills: 3 | Status: SHIPPED | OUTPATIENT
Start: 2018-06-27 | End: 2019-08-12

## 2018-06-27 RX ORDER — SIMVASTATIN 10 MG
10 TABLET ORAL AT BEDTIME
Qty: 90 TABLET | Refills: 3 | Status: SHIPPED | OUTPATIENT
Start: 2018-06-27 | End: 2019-08-12

## 2018-06-27 NOTE — LETTER
6/27/2018         Esperanza Gage   [address] [place] [address]        Dear Ms. Gage:    Your labs were reviewed by Julia Gifford MD and are within acceptable ranges.  No changes are recommended.     Results for orders placed or performed in visit on 06/27/18   Basic Metabolic Panel   Result Value Ref Range    Sodium 142 133 - 144 mmol/L    Potassium 4.0 3.4 - 5.3 mmol/L    Chloride 108 94 - 109 mmol/L    Carbon Dioxide 28 20 - 32 mmol/L    Anion Gap 6 3 - 14 mmol/L    Glucose 109 (H) 70 - 99 mg/dL    Urea Nitrogen 10 7 - 30 mg/dL    Creatinine 0.59 0.52 - 1.04 mg/dL    GFR Estimate >90 >60 mL/min/1.7m2    GFR Estimate If Black >90 >60 mL/min/1.7m2    Calcium 8.9 8.5 - 10.1 mg/dL   CBC with Platelets   Result Value Ref Range    WBC 6.2 4.0 - 11.0 10e9/L    RBC Count 4.29 3.8 - 5.2 10e12/L    Hemoglobin 13.3 11.7 - 15.7 g/dL    Hematocrit 41.6 35.0 - 47.0 %    MCV 97 78 - 100 fl    MCH 31.0 26.5 - 33.0 pg    MCHC 32.0 31.5 - 36.5 g/dL    RDW 12.9 10.0 - 15.0 %    Platelet Count 216 150 - 450 10e9/L   Routine UA with Micro Reflex to Culture   Result Value Ref Range    Color Urine Yellow     Appearance Urine Clear     Glucose Urine Negative NEG^Negative mg/dL    Bilirubin Urine Negative NEG^Negative    Ketones Urine Negative NEG^Negative mg/dL    Specific Gravity Urine 1.013 1.003 - 1.035    Blood Urine Negative NEG^Negative    pH Urine 7.5 (H) 5.0 - 7.0 pH    Protein Albumin Urine Negative NEG^Negative mg/dL    Urobilinogen mg/dL Normal 0.0 - 2.0 mg/dL    Nitrite Urine Negative NEG^Negative    Leukocyte Esterase Urine Negative NEG^Negative    Source Midstream Urine     WBC Urine <1 0 - 5 /HPF    RBC Urine 0 0 - 2 /HPF    Squamous Epithelial /HPF Urine <1 0 - 1 /HPF    Mucous Urine Present (A) NEG^Negative /LPF         Please note that test explanations are brief and do not reflect all diagnostic uses.  If you have any questions or concerns, please call the clinic at [phone].       Sincerely,      Maria A Mcginnis sent on behalf of  Julia Gifford MD

## 2018-06-27 NOTE — NURSING NOTE
Chief Complaint   Patient presents with     Physical     Annual and pre op for left total knee replacement.       See OSCAR Natarajan 6/27/2018

## 2018-06-27 NOTE — LETTER
2018       RE: Esperanza Gage  [address] [place] [address]     Dear Colleague,    Thank you for referring your patient, Esperanza Gage, to the [facility]  at [facility]. Please see a copy of my visit note below.    [facility]   [facility]  [address]  [facility]  [facility] [address] [phone]  Dept: [phone]    PRE-OP EVALUATION:  Today's date: 2018    Esperanza Gage (: 1945) presents for pre-operative evaluation assessment as requested by Dr. Campos.  She requires evaluation and anesthesia risk assessment prior to undergoing surgery/procedure for treatment of knee osteoarthritis .    Proposed Surgery/ Procedure: left total knee replacement  Date of Surgery/ Procedure: 18  Time of Surgery/ Procedure: 10:00 AM  Hospital/Surgical Facility: [facility]  Primary Physician: Julia Gifford  Type of Anesthesia Anticipated: General    Patient has a Health Care Directive or Living Will:  NO    1. NO - Do you have a history of heart attack, stroke, stent, bypass or surgery on an artery in the head, neck, heart or legs?  2. NO - Do you ever have any pain or discomfort in your chest?  3. NO - Do you have a history of  Heart Failure?  4. NO - Are you troubled by shortness of breath when: walking on the level, up a slight hill or at night?  5. NO - Do you currently have a cold, bronchitis or other respiratory infection?  6. NO - Do you have a cough, shortness of breath or wheezing?  7. NO - Do you sometimes get pains in the calves of your legs when you walk?  8. NO - Do you or anyone in your family have previous history of blood clots?  9. NO - Do you or does anyone in your family have a serious bleeding problem such as prolonged bleeding following surgeries or cuts?  10. NO - Have you ever had problems with anemia or been told to take iron pills?  11. NO - Have you  had any abnormal blood loss such as black, tarry or bloody stools, or abnormal vaginal bleeding?  12. NO - Have you ever had a blood transfusion?  13. NO - Have you or any of your relatives ever had problems with anesthesia?  14. NO - Do you have sleep apnea, excessive snoring or daytime drowsiness?  15. NO - Do you have any prosthetic heart valves?  16. NO - Do you have prosthetic joints?  17. NO - Is there any chance that you may be pregnant?      HPI:   HPI related to upcoming procedure: Patient has tried several approaches to management of knee pain associated with knee osteoarthritis. She has been advised to have surgery and decided to proceed.       HYPERLIPIDEMIA - Patient has a long history of significant Hyperlipidemia requiring medication for treatment with recent good control. Patient reports no problems or side effects with the medication.                                                                                                                                                       .  HYPERTENSION - Patient has longstanding history of HTN , currently denies any symptoms referable to elevated blood pressure. Specifically denies chest pain, palpitations, dyspnea, orthopnea, PND or peripheral edema. Blood pressure readings have been in normal range. Current medication regimen is as listed below. Patient denies any side effects of medication.               .    MEDICAL HISTORY:     Patient Active Problem List    Diagnosis Date Noted     Seborrheic keratosis 08/21/2017     Priority: Medium     Milia 08/21/2017     Priority: Medium     History of tinea 08/21/2017     Priority: Medium     Leg numbness 08/19/2014     Priority: Medium     Pelvic fracture (H) 09/26/2013     Priority: Medium     Medication management 09/26/2013     Priority: Medium     Chondromalacia of patella 10/16/2012     Priority: Medium     Primary localized osteoarthrosis, lower leg 10/16/2012     Priority: Medium     Breast signs and  symptoms 07/23/2012     Priority: Medium     Osteopenia 07/20/2012     Priority: Medium     Hyperlipidemia with target LDL less than 130 07/17/2012     Priority: Medium     Diagnosis updated by automated process. Provider to review and confirm.       Vitamin D deficiency 07/16/2012     Priority: Medium      Past Medical History:   Diagnosis Date     Hearing problem 2017     Hyperlipidemia 2005     Hyperlipidemia LDL goal < 130      Hypertension      Osteoporosis, post-menopausal      Pelvic fracture (H)      Past Surgical History:   Procedure Laterality Date     KNEE SURGERY       No prior surgeries       ORTHOPEDIC SURGERY       Current Outpatient Prescriptions   Medication Sig Dispense Refill     alendronate (FOSAMAX) 70 MG tablet Take 1 tablet (70 mg) by mouth every 7 days Take with over 8 ounces water and stay upright for at least 30 minutes after dose.  Take at least 60 minutes before breakfast 12 tablet 3     Ascorbic Acid (VITAMIN C) 100 MG TABS Take 1 tablet by mouth 2 times daily.        aspirin 81 MG tablet Take 1 tablet by mouth daily.       cholecalciferol (VITAMIN D3) 5000 UNITS CAPS capsule Take 1 capsule (5,000 Units) by mouth daily 30 capsule 5     ciclopirox (LOPROX) 0.77 % cream Apply topically 2 times daily 30 g 3     Fish Oil OIL        Glucosamine-Chondroit-Vit C-Mn (GLUCOSAMINE 1500 COMPLEX) CAPS Take 1,500 mg by mouth daily. 270 capsule 1     LECITHIN        losartan (COZAAR) 25 MG tablet Take 1 tablet (25 mg) by mouth daily 90 tablet 3     multivitamin, therapeutic with minerals (MULTI-VITAMIN) TABS Take 1 tablet by mouth daily       simvastatin (ZOCOR) 10 MG tablet Take 1 tablet (10 mg) by mouth At Bedtime 90 tablet 3     vitamin  B complex with vitamin C (VITAMIN  B COMPLEX) TABS Take 1 tablet by mouth daily       vitamin E 400 UNITS TABS Take 400 Units by mouth daily       OTC products: None, except as noted above    No Known Allergies   Latex Allergy: NO    Social History   Substance Use  "Topics     Smoking status: Former Smoker     Types: Cigarettes     Smokeless tobacco: Never Used     Alcohol use Yes     History   Drug Use No       REVIEW OF SYSTEMS:   CONSTITUTIONAL: NEGATIVE for fever, chills, change in weight  INTEGUMENTARY/SKIN: NEGATIVE for worrisome rashes, moles or lesions  EYES: NEGATIVE for vision changes or irritation  ENT/MOUTH: NEGATIVE for ear, mouth and throat problems  RESP: NEGATIVE for significant cough or SOB  BREAST: NEGATIVE for masses, tenderness or discharge  CV: NEGATIVE for chest pain, palpitations or peripheral edema  GI: NEGATIVE for nausea, abdominal pain, heartburn, or change in bowel habits  : NEGATIVE for frequency, dysuria, or hematuria  MUSCULOSKELETAL:bilateral knee pain  NEURO: NEGATIVE for weakness, dizziness or paresthesias  ENDOCRINE: NEGATIVE for temperature intolerance, skin/hair changes  HEME: NEGATIVE for bleeding problems  PSYCHIATRIC: NEGATIVE for changes in mood or affect    EXAM:   /76 (BP Location: Left arm, Patient Position: Chair)  Pulse 67  Temp 98.3  F (36.8  C) (Oral)  Ht 1.664 m (5' 5.5\")  Wt 88.8 kg (195 lb 11.2 oz)  BMI 32.07 kg/m2    GENERAL APPEARANCE: healthy, alert and no distress     EYES: EOMI, PERRL     HENT: ear canals and TM's normal and nose and mouth without ulcers or lesions     NECK: no adenopathy, no asymmetry, masses, or scars and thyroid normal to palpation     RESP: lungs clear to auscultation - no rales, rhonchi or wheezes     CV: regular rates and rhythm, normal S1 S2, no S3 or S4 and no murmur, click or rub     ABDOMEN:  soft, nontender, no HSM or masses and bowel sounds normal     MS: extremities normal- no gross deformities noted, no evidence of inflammation in joints, FROM in all extremities.     SKIN: no suspicious lesions or rashes     NEURO: Normal strength and tone, sensory exam grossly normal, mentation intact and speech normal     PSYCH: mentation appears normal. and affect normal/bright     LYMPHATICS: " No cervical adenopathy    DIAGNOSTICS:   EKG: appears normal, NSR, no LVH by voltage criteria, unchanged from previous tracings    Recent Labs   Lab Test  05/16/18   0910  06/26/17   0915   06/21/10   1635   HGB   --    --    --   13.1   PLT   --    --    --   221   NA  142  142   < >  142   POTASSIUM  4.1  4.3   < >  4.7   CR  0.62  0.64   < >  0.70   A1C  5.9*  5.9   < >   --     < > = values in this interval not displayed.        IMPRESSION:   Reason for surgery/procedure: knee osteoarthritis  Diagnosis/reason for consult: hypertension, hyperlipidemia    The proposed surgical procedure is considered INTERMEDIATE risk.    REVISED CARDIAC RISK INDEX  The patient has the following serious cardiovascular risks for perioperative complications such as (MI, PE, VFib and 3  AV Block):  No serious cardiac risks  INTERPRETATION: 1 risks: Class II (low risk - 0.9% complication rate)    The patient has the following additional risks for perioperative complications:  No identified additional risks      ICD-10-CM    1. Benign essential hypertension I10 losartan (COZAAR) 25 MG tablet   2. Pure hypercholesterolemia E78.00 simvastatin (ZOCOR) 10 MG tablet       RECOMMENDATIONS:   --Consult hospital rounder / IM to assist post-op medical management    --Patient is to take all scheduled medications on the day of surgery EXCEPT for modifications listed below.    Anticoagulant or Antiplatelet Medication Use  ASPIRIN: Discontinue ASA 7-10 days prior to procedure to reduce bleeding risk.  It should be resumed post-operatively.        ACE Inhibitor or Angiotensin Receptor Blocker (ARB) Use  Ace inhibitor or Angiotensin Receptor Blocker (ARB) and will continue this medication due to the higher risk of uncontrolled perioperative hypertension (e.g. neurosurgical procedure)      APPROVAL GIVEN to proceed with proposed procedure, without further diagnostic evaluation  Signed Electronically by: Julia Gifford MD    Copy of this  evaluation report is provided to requesting physician.    [place] Preop Guidelines  Revised Cardiac Risk Indexekg

## 2018-06-27 NOTE — MR AVS SNAPSHOT
After Visit Summary   6/27/2018    Esperanza Gage    MRN: [id number]           Patient Information     Date Of Birth          1945        Visit Information        Provider Department      6/27/2018 8:00 AM Julia Gifford MD [facility]         Today's Diagnoses     Senile osteoporosis    -  1    Benign essential hypertension        Pure hypercholesterolemia        Stiffness of right knee, not elsewhere classified         Skin lesion           Follow-ups after your visit        Additional Services     DERMATOLOGY REFERRAL       Your provider has referred you to: [facility]: Dermatology Clinic [facility] [phone]   [email or web address]    Please be aware that coverage of these services is subject to the terms and limitations of your health insurance plan.  Call member services at your health plan with any benefit or coverage questions.      Please bring the following with you to your appointment:    (1) Any X-Rays, CTs or MRIs which have been performed.  Contact the facility where they were done to arrange for  prior to your scheduled appointment.    (2) List of current medications  (3) This referral request   (4) Any documents/labs given to you for this referral                  Your next 10 appointments already scheduled     Jul 16, 2018  8:30 AM CDT   (Arrive by 8:15 AM)   PAC EVALUATION with Asha Khan PA-C   [facility] ([facility])    [address] [place] [id number] [phone]            Jul 16, 2018  9:30 AM CDT   (Arrive by 9:15 AM)   PAC RN ASSESSMENT with Janet Pac Rn   [facility] ([facility])    [address] [place] [id number] [phone]            Jul 16, 2018  9:50 AM CDT   (Arrive by 9:35 AM)   PAC Anesthesia Consult with Janet Pac  "Anesthesiologist   [facility] ([facility])    [address]  4th Floor  [facility] [address] [phone]            Jul 24, 2018   Procedure with Pierre Campos MD   [facility], [place], Same Day Surgery (--)    [address]  [place] [address] [phone]              Who to contact     Please call your clinic at [phone] to:    Ask questions about your health    Make or cancel appointments    Discuss your medicines    Learn about your test results    Speak to your doctor            Additional Information About Your Visit        [employer] Information     [employer] gives you secure access to your electronic health record. If you see a primary care provider, you can also send messages to your care team and make appointments. If you have questions, please call your primary care clinic.  If you do not have a primary care provider, please call [phone] and they will assist you.      [employer] is an electronic gateway that provides easy, online access to your medical records. With [employer], you can request a clinic appointment, read your test results, renew a prescription or communicate with your care team.     To access your existing account, please contact your [facility] or call [phone] for assistance.        Care EveryWhere ID     This is your Care EveryWhere ID. This could be used by other organizations to access your [place] medical records  [id number]        Your Vitals Were     Pulse Temperature Height BMI (Body Mass Index)          67 98.3  F (36.8  C) (Oral) 1.664 m (5' 5.5\") 32.07 kg/m2         Blood Pressure from Last 3 Encounters:   06/27/18 124/76   05/16/18 127/82   08/21/17 117/74    Weight from Last 3 Encounters:   06/27/18 88.8 kg (195 lb 11.2 oz)   05/16/18 88 kg (194 lb)   08/21/17 79.6 kg (175 lb 8 oz)              We Performed the Following     Basic Metabolic Panel     CBC with " Platelets     DERMATOLOGY REFERRAL     EKG 12-lead complete w/read - Clinics     Routine UA with Micro Reflex to Culture          Where to get your medicines      These medications were sent to [facility] [address] [place] [address]), [place] - [address] [place] [address]  [address] [place] [address] ([place]) [place] [address]     Phone:  [phone]     losartan 25 MG tablet    simvastatin 10 MG tablet         Some of these will need a paper prescription and others can be bought over the counter.  Ask your nurse if you have questions.     Bring a paper prescription for each of these medications     alendronate 70 MG tablet          Primary Care Provider Office Phone # Fax #    Julia Raúl Gifford [phone]       [facility] [address] [facility] [address]        Equal Access to Services     NICHOLAS JUAREZ : Sam walkero Soashish, waaxda luqadaha, qaybta kaalmada adeegyada, noelle mtz. So [facility] [phone].    ATENCIÓN: Si habla español, tiene a nair disposición servicios gratuitos de asistencia lingüística. Ngoc al [phone].    We comply with applicable federal civil rights laws and [place] laws. We do not discriminate on the basis of race, color, national origin, age, disability, sex, sexual orientation, or gender identity.            Thank you!     Thank you for choosing [facility]   for your care. Our goal is always to provide you with excellent care. Hearing back from our patients is one way we can continue to improve our services. Please take a few minutes to complete the written survey that you may receive in the mail after your visit with us. Thank you!             Your Updated Medication List - Protect others around you: Learn how to safely use, store and throw away your medicines at www.disposemymeds.org.          This list is accurate as of 6/27/18  9:07 AM.  Always use your most recent med list.                    Brand Name Dispense Instructions for use Diagnosis    alendronate 70 MG tablet    FOSAMAX    12 tablet    Take 1 tablet (70 mg) by mouth every 7 days Take with over 8 ounces water and stay upright for at least 30 minutes after dose.  Take at least 60 minutes before breakfast    Senile osteoporosis       aspirin 81 MG tablet      Take 1 tablet by mouth daily.        cholecalciferol 5000 units Caps capsule    vitamin D3    30 capsule    Take 1 capsule (5,000 Units) by mouth daily    Vitamin D deficiency       ciclopirox 0.77 % cream    LOPROX    30 g    Apply topically 2 times daily    Tinea pedis of both feet       Fish Oil Oil           GLUCOSAMINE 1500 COMPLEX Caps     270 capsule    Take 1,500 mg by mouth daily.    Primary localized osteoarthrosis, lower leg, Chondromalacia of patella       LECITHIN           losartan 25 MG tablet    COZAAR    90 tablet    Take 1 tablet (25 mg) by mouth daily    Benign essential hypertension       Multi-vitamin Tabs tablet      Take 1 tablet by mouth daily        simvastatin 10 MG tablet    ZOCOR    90 tablet    Take 1 tablet (10 mg) by mouth At Bedtime    Pure hypercholesterolemia       vitamin B complex with vitamin C Tabs tablet      Take 1 tablet by mouth daily        Vitamin C 100 MG Tabs      Take 1 tablet by mouth 2 times daily.        vitamin E 400 units Tabs      Take 400 Units by mouth daily

## 2018-06-27 NOTE — PROGRESS NOTES
[facility]   [facility]  [address] [facility]  [address] [facility]  [facility] [id number] [phone]  Dept: [phone]    PRE-OP EVALUATION:  Today's date: 2018    Esperanza Gage (: 1945) presents for pre-operative evaluation assessment as requested by Dr. Campos.  She requires evaluation and anesthesia risk assessment prior to undergoing surgery/procedure for treatment of knee osteoarthritis .    Proposed Surgery/ Procedure: left total knee replacement  Date of Surgery/ Procedure: 18  Time of Surgery/ Procedure: 10:00 AM  Hospital/Surgical Facility: [facility]  Primary Physician: Julia Gifford  Type of Anesthesia Anticipated: General    Patient has a Health Care Directive or Living Will:  NO    1. NO - Do you have a history of heart attack, stroke, stent, bypass or surgery on an artery in the head, neck, heart or legs?  2. NO - Do you ever have any pain or discomfort in your chest?  3. NO - Do you have a history of  Heart Failure?  4. NO - Are you troubled by shortness of breath when: walking on the level, up a slight hill or at night?  5. NO - Do you currently have a cold, bronchitis or other respiratory infection?  6. NO - Do you have a cough, shortness of breath or wheezing?  7. NO - Do you sometimes get pains in the calves of your legs when you walk?  8. NO - Do you or anyone in your family have previous history of blood clots?  9. NO - Do you or does anyone in your family have a serious bleeding problem such as prolonged bleeding following surgeries or cuts?  10. NO - Have you ever had problems with anemia or been told to take iron pills?  11. NO - Have you had any abnormal blood loss such as black, tarry or bloody stools, or abnormal vaginal bleeding?  12. NO - Have you ever had a blood transfusion?  13. NO - Have you or any of your relatives ever had problems with anesthesia?  14. NO - Do you have sleep apnea, excessive snoring or daytime  drowsiness?  15. NO - Do you have any prosthetic heart valves?  16. NO - Do you have prosthetic joints?  17. NO - Is there any chance that you may be pregnant?      HPI:     HPI related to upcoming procedure: Patient has tried several approaches to management of knee pain associated with knee osteoarthritis. She has been advised to have surgery and decided to proceed.       HYPERLIPIDEMIA - Patient has a long history of significant Hyperlipidemia requiring medication for treatment with recent good control. Patient reports no problems or side effects with the medication.                                                                                                                                                       .  HYPERTENSION - Patient has longstanding history of HTN , currently denies any symptoms referable to elevated blood pressure. Specifically denies chest pain, palpitations, dyspnea, orthopnea, PND or peripheral edema. Blood pressure readings have been in normal range. Current medication regimen is as listed below. Patient denies any side effects of medication.                                                                                                                                                                                          .    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 Provider to review and confirm.       Vitamin D deficiency 07/16/2012     Priority: Medium      Past Medical History:   Diagnosis Date     Hearing problem 2017     Hyperlipidemia 2005     Hyperlipidemia LDL goal < 130      Hypertension      Osteoporosis, post-menopausal      Pelvic fracture (H)      Past Surgical History:   Procedure Laterality Date     KNEE SURGERY       No prior surgeries       ORTHOPEDIC SURGERY       Current Outpatient Prescriptions   Medication Sig Dispense Refill     alendronate (FOSAMAX) 70 MG tablet Take 1 tablet (70 mg) by mouth every 7 days Take with over 8 ounces water and stay upright for at least 30 minutes after dose.  Take at least 60 minutes before breakfast 12 tablet 3     Ascorbic Acid (VITAMIN C) 100 MG TABS Take 1 tablet by mouth 2 times daily.        aspirin 81 MG tablet Take 1 tablet by mouth daily.       cholecalciferol (VITAMIN D3) 5000 UNITS CAPS capsule Take 1 capsule (5,000 Units) by mouth daily 30 capsule 5     ciclopirox (LOPROX) 0.77 % cream Apply topically 2 times daily 30 g 3     Fish Oil OIL        Glucosamine-Chondroit-Vit C-Mn (GLUCOSAMINE 1500 COMPLEX) CAPS Take 1,500 mg by mouth daily. 270 capsule 1     LECITHIN        losartan (COZAAR) 25 MG tablet Take 1 tablet (25 mg) by mouth daily 90 tablet 3     multivitamin, therapeutic with minerals (MULTI-VITAMIN) TABS Take 1 tablet by mouth daily       simvastatin (ZOCOR) 10 MG tablet Take 1 tablet (10 mg) by mouth At Bedtime 90 tablet 3     vitamin  B complex with vitamin C (VITAMIN  B COMPLEX) TABS Take 1 tablet by mouth daily       vitamin E 400 UNITS TABS Take 400 Units by mouth daily       OTC products: None, except as noted above    No Known Allergies   Latex Allergy: NO    Social History   Substance Use Topics     Smoking status: Former Smoker     Types: Cigarettes     Smokeless tobacco: Never Used     Alcohol use  "Yes     History   Drug Use No       REVIEW OF SYSTEMS:   CONSTITUTIONAL: NEGATIVE for fever, chills, change in weight  INTEGUMENTARY/SKIN: NEGATIVE for worrisome rashes, moles or lesions  EYES: NEGATIVE for vision changes or irritation  ENT/MOUTH: NEGATIVE for ear, mouth and throat problems  RESP: NEGATIVE for significant cough or SOB  BREAST: NEGATIVE for masses, tenderness or discharge  CV: NEGATIVE for chest pain, palpitations or peripheral edema  GI: NEGATIVE for nausea, abdominal pain, heartburn, or change in bowel habits  : NEGATIVE for frequency, dysuria, or hematuria  MUSCULOSKELETAL:bilateral knee pain  NEURO: NEGATIVE for weakness, dizziness or paresthesias  ENDOCRINE: NEGATIVE for temperature intolerance, skin/hair changes  HEME: NEGATIVE for bleeding problems  PSYCHIATRIC: NEGATIVE for changes in mood or affect    EXAM:   /76 (BP Location: Left arm, Patient Position: Chair)  Pulse 67  Temp 98.3  F (36.8  C) (Oral)  Ht 1.664 m (5' 5.5\")  Wt 88.8 kg (195 lb 11.2 oz)  BMI 32.07 kg/m2    GENERAL APPEARANCE: healthy, alert and no distress     EYES: EOMI, PERRL     HENT: ear canals and TM's normal and nose and mouth without ulcers or lesions     NECK: no adenopathy, no asymmetry, masses, or scars and thyroid normal to palpation     RESP: lungs clear to auscultation - no rales, rhonchi or wheezes     CV: regular rates and rhythm, normal S1 S2, no S3 or S4 and no murmur, click or rub     ABDOMEN:  soft, nontender, no HSM or masses and bowel sounds normal     MS: extremities normal- no gross deformities noted, no evidence of inflammation in joints, FROM in all extremities.     SKIN: no suspicious lesions or rashes     NEURO: Normal strength and tone, sensory exam grossly normal, mentation intact and speech normal     PSYCH: mentation appears normal. and affect normal/bright     LYMPHATICS: No cervical adenopathy    DIAGNOSTICS:   EKG: appears normal, NSR, no LVH by voltage criteria, unchanged from " previous tracings    Recent Labs   Lab Test  05/16/18   0910  06/26/17   0915   06/21/10   1635   HGB   --    --    --   13.1   PLT   --    --    --   221   NA  142  142   < >  142   POTASSIUM  4.1  4.3   < >  4.7   CR  0.62  0.64   < >  0.70   A1C  5.9*  5.9   < >   --     < > = values in this interval not displayed.        IMPRESSION:   Reason for surgery/procedure: knee osteoarthritis  Diagnosis/reason for consult: hypertension, hyperlipidemia    The proposed surgical procedure is considered INTERMEDIATE risk.    REVISED CARDIAC RISK INDEX  The patient has the following serious cardiovascular risks for perioperative complications such as (MI, PE, VFib and 3  AV Block):  No serious cardiac risks  INTERPRETATION: 1 risks: Class II (low risk - 0.9% complication rate)    The patient has the following additional risks for perioperative complications:  No identified additional risks      ICD-10-CM    1. Benign essential hypertension I10 losartan (COZAAR) 25 MG tablet   2. Pure hypercholesterolemia E78.00 simvastatin (ZOCOR) 10 MG tablet       RECOMMENDATIONS:     --Consult hospital rounder / IM to assist post-op medical management    --Patient is to take all scheduled medications on the day of surgery EXCEPT for modifications listed below.    Anticoagulant or Antiplatelet Medication Use  ASPIRIN: Discontinue ASA 7-10 days prior to procedure to reduce bleeding risk.  It should be resumed post-operatively.        ACE Inhibitor or Angiotensin Receptor Blocker (ARB) Use  Ace inhibitor or Angiotensin Receptor Blocker (ARB) and will continue this medication due to the higher risk of uncontrolled perioperative hypertension (e.g. neurosurgical procedure)      APPROVAL GIVEN to proceed with proposed procedure, without further diagnostic evaluation       Signed Electronically by: Julia Gifford MD    Copy of this evaluation report is provided to requesting physician.    House Preop Guidelines    Revised Cardiac Risk  Indexekg

## 2018-06-28 LAB — INTERPRETATION ECG - MUSE: NORMAL

## 2018-07-16 ENCOUNTER — ANESTHESIA EVENT (OUTPATIENT)
Dept: SURGERY | Facility: CLINIC | Age: 73
End: 2018-07-16

## 2018-07-16 ENCOUNTER — OFFICE VISIT (OUTPATIENT)
Dept: SURGERY | Facility: CLINIC | Age: 73
End: 2018-07-16
Payer: COMMERCIAL

## 2018-07-16 ENCOUNTER — ALLIED HEALTH/NURSE VISIT (OUTPATIENT)
Dept: SURGERY | Facility: CLINIC | Age: 73
End: 2018-07-16
Payer: COMMERCIAL

## 2018-07-16 ENCOUNTER — APPOINTMENT (OUTPATIENT)
Dept: SURGERY | Facility: CLINIC | Age: 73
End: 2018-07-16
Payer: COMMERCIAL

## 2018-07-16 VITALS
BODY MASS INDEX: 31.77 KG/M2 | HEIGHT: 66 IN | RESPIRATION RATE: 16 BRPM | DIASTOLIC BLOOD PRESSURE: 84 MMHG | TEMPERATURE: 98.4 F | SYSTOLIC BLOOD PRESSURE: 147 MMHG | WEIGHT: 197.7 LBS | HEART RATE: 78 BPM | OXYGEN SATURATION: 96 %

## 2018-07-16 DIAGNOSIS — M17.12 OSTEOARTHRITIS OF LEFT KNEE, UNSPECIFIED OSTEOARTHRITIS TYPE: ICD-10-CM

## 2018-07-16 DIAGNOSIS — M17.12 OSTEOARTHRITIS OF LEFT KNEE, UNSPECIFIED OSTEOARTHRITIS TYPE: Primary | ICD-10-CM

## 2018-07-16 NOTE — PATIENT INSTRUCTIONS
Preparing for Your Surgery      Name:  Esperanza Gage   MRN:  [id number]   :  1945   Today's Date:  2018     Arriving for surgery:  Surgery date:  2018  Arrival time:  8:45 AM  Please come to:     [facility] [address]  [address] [place]  [address]    - parking is available in front of [facility] from 5:15AM to 8:00PM. If you prefer, park your car in the Green Lot.    -Proceed to the 3rd floor, check in at the Adult Surgery Waiting Lounge. [phone]    If an escort is needed stop at the Information Desk in the lobby. Inform the information person that you are here for surgery. An escort to the Adult Surgery Waiting Lounge will be provided.        What can I eat or drink?  -  You may have solid food or milk products until 8 hours prior to your surgery. 3:00 AM  -  You may have water, apple juice or 7up/Sprite until 2 hours prior to your surgery. 8:45 AM      -   Nothing after 8:45 AM    Which medicines can I take?  Stop Aspirin, vitamins and supplements one week prior to surgery.  Hold Ibuprofen and Naproxen for 24 hours prior to surgery.   -  Do NOT take these medications in the morning, the day of surgery   Losartan      How do I prepare myself?  -  Take two showers: one the night before surgery; and one the morning of surgery.         Use Scrubcare or Hibiclens to wash from neck down.  You may use your own shampoo and conditioner. No other hair products.   -  Do NOT use lotion, powder, deodorant, or antiperspirant the day of your surgery.  -  Do NOT wear any makeup, fingernail polish or jewelry.  - Do not bring your own medications to the hospital, except for inhalers and eye   drops.  -  Bring your ID and insurance card.    Questions or Concerns:  -If you have questions or concerns regarding the day of surgery, please call [phone].     -For questions after surgery please call your surgeons office.           AFTER  YOUR SURGERY  Breathing exercises   Breathing exercises help you recover faster. Take deep breaths and let the air out slowly. This will:     Help you wake up after surgery.    Help prevent complications like pneumonia.  Preventing complications will help you go home sooner.   Nausea and vomiting   You may feel sick to your stomach after surgery; if so, let your nurse know.    Pain control:  After surgery, you may have pain. Our goal is to help you manage your pain. Pain medicine will help you feel comfortable enough to do activities that will help you heal.  These activities may include breathing exercises, walking and physical therapy.   To help your health care team treat your pain we will ask: 1) If you have pain  2) where it is located 3) describe your pain in your words  Methods of pain control include medications given by mouth, vein or by nerve block for some surgeries.  We may give you a pain control pump that will:  1) Deliver the medicine through a tube placed in your vein  2) Control the amount of medicine you receive  3) Allow you to push a button to deliver a dose of pain medicine  Sequential Compression Device (SCD) or Pneumo Boots:  You may need to wear SCD S on your legs or feet. These are wraps connected to a machine that pumps in air and releases it. The repeated pumping helps prevent blood clots from forming.            Enhanced Recovery After Surgery       This is a team effort, including you, to get you back on your feet, eating and drinking normally and out of the hospital as quickly as possible.  The goals are:    1) NO INFECTIONS and   2) RETURN TO NORMAL DIET    How can we achieve these goals?  1) STAY ACTIVE: Walk every day before your surgery; try to increase the amount every day.  Walk after surgery as much as you can-the nurses will help you.  Walking speeds healing and gets you home quicker, you heal better at home and have less risk of infection.     2) INCENTIVE SPIROMETER: Practice  your incentive spirometer 4 times per day with 5-10 repetitions each time.  Using the incentive spirometer can strengthen your muscles between your ribs and help you have a strong cough after surgery.  A more effective cough can help prevent problems with your lungs.    3) STAY HYDRATED: Drink clear liquids up until 2 hours before your surgery. We would like you to purchase a drink such as Gatorade or Ensure Clear (not the milkshake type).  Drink this before bedtime and on the way into the hospital, drink between 8-12 ounces or until you feel hydrated.  Keeping well hydrated leads to your veins being plump, you wake up faster, and you are less likely to be nauseated. Start drinking water as soon as you can after surgery and advance to clear liquids and food as tolerated.  IV fluids contain salt, drinking fluids will minimize the amount of IV fluids you need and decrease the amount of salt you get.    The most common reason for the patient to be readmitted is dehydration. Staying hydrated after you go home from the hospital is very important.  Ensure or Ensure Clear are good options to keep you hydrated.     4) PAIN MANAGEMENT: If we minimize the amount of opioids and narcotics, and use regional blocks (which numb the area where your surgery is) along with oral pain medications; you will have less side effects of nausea and constipation. Narcotics can slow down your bowels and cause you to stay in the hospital longer.     Our goal is to keep you comfortable; eating and drinking normally and back home safely.   Using an Incentive Spirometer    An incentive spirometer is a device that helps you do deep breathing exercises. These exercises expand your lungs, aid in circulation, and help prevent pneumonia. Deep breathing exercises also help you breathe better and improve the function of your lungs by:    Keeping your lungs clear    Strengthening your breathing muscles    Helping prevent respiratory complications or  problems  The incentive spirometer gives you a way to take an active part in your care. A nurse or therapist will teach you breathing exercises. To do these exercises, you will breathe in through your mouth and not your nose. The incentive spirometer only works correctly if you breathe in through your mouth.    Steps to clear lungs  Step 1. Exhale normally. Then, inhale normally.    Relax and breathe out.  Step 2. Place your lips tightly around the mouthpiece.    Make sure the device is upright and not tilted.  Step 3. Inhale as much air as you can through the mouthpiece (don't breath through your nose).    Inhale slowly and deeply.    Hold your breath long enough to keep the balls or disk raised for at least 3 to 5 seconds, or as instructed by your healthcare provider.  Step 4. Repeat the exercise regularly.  Begin using the Incentive Spirometer one week prior to your surgery, 4 times per day-5 times each.

## 2018-07-16 NOTE — ANESTHESIA PREPROCEDURE EVALUATION
Anesthesia Evaluation     . Pt has had prior anesthetic. Type: General and MAC    No history of anesthetic complications          ROS/MED HX    ENT/Pulmonary:     (+)SATNAM risk factors hypertension, , . .    Neurologic:  - neg neurologic ROS     Cardiovascular:     (+) Dyslipidemia, hypertension----. : . . . :. . Previous cardiac testing date:results:date: results:ECG reviewed date:6/27/18 results:NSR, unchanged from previous tracings date: results:          METS/Exercise Tolerance:  >4 METS   Hematologic:  - neg hematologic  ROS       Musculoskeletal:   (+) , , other musculoskeletal- left knee pain      GI/Hepatic:  - neg GI/hepatic ROS       Renal/Genitourinary:  - ROS Renal section negative       Endo:     (+) Obesity, .      Psychiatric:  - neg psychiatric ROS       Infectious Disease:  - neg infectious disease ROS       Malignancy:      - no malignancy   Other:    (+) no H/O Chronic Pain,                   Physical Exam  Normal systems: cardiovascular and pulmonary    Airway   Mallampati: II  TM distance: >3 FB  Neck ROM: full    Dental   (+) caps    Cardiovascular   Rhythm and rate: regular and normal      Pulmonary    breath sounds clear to auscultation               PAC Discussion and Assessment    ASA Classification: 2  Case is suitable for: [facility]  Anesthetic techniques and relevant risks discussed: GA  Invasive monitoring and risk discussed:   Types:   Possibility and Risk of blood transfusion discussed:   NPO instructions given:   Additional anesthetic preparation and risks discussed:   Needs early admission to pre-op area:   Other:     PAC Resident/NP Anesthesia Assessment:  Esperanza Gage is a 72 year old who presents for pre-operative H & P in preparation for a Total Left Knee Arthroplasty on 7/24/18 with Dr. Campos.  Preop Exam done by Dr. Julia Gifford 6/27/18    1.  Cardiac:  Functional status METS  >4       Risk of Major Adverse Cardiac event: 0.4%  -HTN controlled with losartan(hold DOS); HLD on  statin  *ECHO Stress 2009:  No evidence of ishemia  *EKG 6/27/18:  NSR, unchanged from previous tracings  2.  Pulm:   SATNAM risk:  low  -Never smoker, no known pulmonary disease  3.  GI:  Risk of PONV score =3 .  If > 2, anti-emetic intervention recommended.   4.  Meds:  Asa 81 mg, hold starting 7/17/18    Asha Khan MS PA-C  07/16/18 9:34 AM            Mid-Level Provider/Resident:   Date:   Time:     Attending Anesthesiologist Anesthesia Assessment:  72 year old for left TKA in management of osteoarthritis. Active, no evidence of cardiac or pulmonary disease.     Patient/case discussed with TERESA. No need to see patient. Patient is appropriate for the planned procedure without further work-up or medical management.      Reviewed and Signed by PAC Anesthesiologist  Anesthesiologist: bryon  Date: 7/16/2018  Time:   Pass/Fail: Pass  Disposition:     PAC Pharmacist Assessment:        Pharmacist:   Date:   Time:      Anesthesia Plan      History & Physical Review  History and physical reviewed and following examination; no interval change.    ASA Status:  2 .    NPO Status:  > 8 hours    Plan for General and ETT with Intravenous and Propofol induction. Maintenance will be Balanced.    PONV prophylaxis:  Ondansetron (or other 5HT-3) and Dexamethasone or Solumedrol       Postoperative Care  Postoperative pain management:  IV analgesics, Oral pain medications and Multi-modal analgesia.      Consents  Anesthetic plan, risks, benefits and alternatives discussed with:  Patient..        ANESTHESIA PREOP EVALUATION    PROCEDURE: Procedure(s):  Left Total Knee Replacement - Wound Class:     HPI: Esperanza Gage is a 72 year old female who presents for above procedure.    PAST MEDICAL HISTORY:    Past Medical History:   Diagnosis Date     Hearing problem 2017     Hyperlipidemia 2005     Hyperlipidemia LDL goal < 130      Hypertension      Osteoporosis, post-menopausal      Pelvic fracture (H)        PAST SURGICAL HISTORY:    Past  Surgical History:   Procedure Laterality Date     KNEE SURGERY       No prior surgeries       ORTHOPEDIC SURGERY         PAST ANESTHESIA HISTORY:     No personal or family h/o anesthesia problems    SOCIAL HISTORY:       Social History   Substance Use Topics     Smoking status: Former Smoker     Types: Cigarettes     Smokeless tobacco: Never Used     Alcohol use Yes       ALLERGIES:     No Known Allergies    MEDICATIONS:     No prescriptions prior to admission.       Current Outpatient Prescriptions   Medication Sig Dispense Refill     alendronate (FOSAMAX) 70 MG tablet Take 1 tablet (70 mg) by mouth with 8oz water every 7 days 30 minutes before breakfast and remain upright during this time. 12 tablet 3     alendronate (FOSAMAX) 70 MG tablet Take 1 tablet (70 mg) by mouth every 7 days Take with over 8 ounces water and stay upright for at least 30 minutes after dose.  Take at least 60 minutes before breakfast (Patient taking differently: Take 70 mg by mouth every 7 days TUESDAYS  Take with over 8 ounces water and stay upright for at least 30 minutes after dose.  Take at least 60 minutes before breakfast) 12 tablet 3     aspirin 81 MG tablet Take 1 tablet by mouth At Bedtime ON HOLD FOR SURGERY SINCE 07/10/2018       ciclopirox (LOPROX) 0.77 % cream Apply topically 2 times daily (Patient taking differently: Apply topically as needed ) 30 g 3     losartan (COZAAR) 25 MG tablet Take 1 tablet (25 mg) by mouth daily (Patient taking differently: Take 25 mg by mouth every morning ) 90 tablet 3     simvastatin (ZOCOR) 10 MG tablet Take 1 tablet (10 mg) by mouth At Bedtime 90 tablet 3       No current [facility]-ordered facility-administered medications on file.      Current Outpatient Prescriptions Ordered in [facility]   Medication Sig Dispense Refill     alendronate (FOSAMAX) 70 MG tablet Take 1 tablet (70 mg) by mouth with 8oz water every 7 days 30 minutes before breakfast and remain upright during this time. 12 tablet 3      alendronate (FOSAMAX) 70 MG tablet Take 1 tablet (70 mg) by mouth every 7 days Take with over 8 ounces water and stay upright for at least 30 minutes after dose.  Take at least 60 minutes before breakfast (Patient taking differently: Take 70 mg by mouth every 7 days TUESDAYS  Take with over 8 ounces water and stay upright for at least 30 minutes after dose.  Take at least 60 minutes before breakfast) 12 tablet 3     aspirin 81 MG tablet Take 1 tablet by mouth At Bedtime ON HOLD FOR SURGERY SINCE 07/10/2018       ciclopirox (LOPROX) 0.77 % cream Apply topically 2 times daily (Patient taking differently: Apply topically as needed ) 30 g 3     losartan (COZAAR) 25 MG tablet Take 1 tablet (25 mg) by mouth daily (Patient taking differently: Take 25 mg by mouth every morning ) 90 tablet 3     simvastatin (ZOCOR) 10 MG tablet Take 1 tablet (10 mg) by mouth At Bedtime 90 tablet 3       PHYSICAL EXAM:    Vitals: T Data Unavailable, P Data Unavailable, BP Data Unavailable, R Data Unavailable, SpO2  , Weight   Wt Readings from Last 2 Encounters:   07/16/18 89.7 kg (197 lb 11.2 oz)   06/27/18 88.8 kg (195 lb 11.2 oz)       See doc flowsheet      No Data Recorded    NPO STATUS: see doc flowsheet    LABS:    BMP:  Recent Labs   Lab Test  06/27/18   0932   NA  142   POTASSIUM  4.0   CHLORIDE  108   CO2  28   BUN  10   CR  0.59   GLC  109*   PRASHANT  8.9       LFTs:   Recent Labs   Lab Test  12/11/13   0959   PROTTOTAL  7.6   ALBUMIN  4.2   BILITOTAL  0.5   ALKPHOS  69   AST  22   ALT  29       CBC:   Recent Labs   Lab Test  06/27/18   0932   WBC  6.2   RBC  4.29   HGB  13.3   HCT  41.6   MCV  97   MCH  31.0   MCHC  32.0   RDW  12.9   PLT  216       Coags:  No results for input(s): INR, PTT, FIBR in the last 45070 hours.    Imaging:  No orders to display       Yvan Johns MD  Anesthesiology Staff  Pager [phone]    7/23/2018  4:05 PM                      .

## 2018-07-16 NOTE — MR AVS SNAPSHOT
After Visit Summary   2018    Esperanza Gage    MRN: [id number]           Patient Information     Date Of Birth          1945        Visit Information        Provider Department      2018 9:30 AM Rn, [facility]        Care Instructions    Preparing for Your Surgery      Name:  Esperanza Gage   MRN:  [id number]   :  1945   Today's Date:  2018     Arriving for surgery:  Surgery date:  2018  Arrival time:  8:45 AM  Please come to:     [facility] [address]  [address] [place]  [address]    - parking is available in front of [facility] from 5:15AM to 8:00PM. If you prefer, park your car in the Green Lot.    -Proceed to the 3rd floor, check in at the Adult Surgery Waiting Lounge. [phone]    If an escort is needed stop at the Information Desk in the lobby. Inform the information person that you are here for surgery. An escort to the Adult Surgery Waiting Lounge will be provided.        What can I eat or drink?  -  You may have solid food or milk products until 8 hours prior to your surgery. 3:00 AM  -  You may have water, apple juice or 7up/Sprite until 2 hours prior to your surgery. 8:45 AM      -   Nothing after 8:45 AM    Which medicines can I take?  Stop Aspirin, vitamins and supplements one week prior to surgery.  Hold Ibuprofen and Naproxen for 24 hours prior to surgery.   -  Do NOT take these medications in the morning, the day of surgery   Losartan      How do I prepare myself?  -  Take two showers: one the night before surgery; and one the morning of surgery.         Use Scrubcare or Hibiclens to wash from neck down.  You may use your own shampoo and conditioner. No other hair products.   -  Do NOT use lotion, powder, deodorant, or antiperspirant the day of your surgery.  -  Do NOT wear any makeup, fingernail polish or jewelry.  - Do not bring your own  medications to the hospital, except for inhalers and eye   drops.  -  Bring your ID and insurance card.    Questions or Concerns:  -If you have questions or concerns regarding the day of surgery, please call [phone].     -For questions after surgery please call your surgeons office.           AFTER YOUR SURGERY  Breathing exercises   Breathing exercises help you recover faster. Take deep breaths and let the air out slowly. This will:     Help you wake up after surgery.    Help prevent complications like pneumonia.  Preventing complications will help you go home sooner.   Nausea and vomiting   You may feel sick to your stomach after surgery; if so, let your nurse know.    Pain control:  After surgery, you may have pain. Our goal is to help you manage your pain. Pain medicine will help you feel comfortable enough to do activities that will help you heal.  These activities may include breathing exercises, walking and physical therapy.   To help your health care team treat your pain we will ask: 1) If you have pain  2) where it is located 3) describe your pain in your words  Methods of pain control include medications given by mouth, vein or by nerve block for some surgeries.  We may give you a pain control pump that will:  1) Deliver the medicine through a tube placed in your vein  2) Control the amount of medicine you receive  3) Allow you to push a button to deliver a dose of pain medicine  Sequential Compression Device (SCD) or Pneumo Boots:  You may need to wear SCD S on your legs or feet. These are wraps connected to a machine that pumps in air and releases it. The repeated pumping helps prevent blood clots from forming.            Enhanced Recovery After Surgery       This is a team effort, including you, to get you back on your feet, eating and drinking normally and out of the hospital as quickly as possible.  The goals are:    1) NO INFECTIONS and   2) RETURN TO NORMAL DIET    How can we achieve these  goals?  1) STAY ACTIVE: Walk every day before your surgery; try to increase the amount every day.  Walk after surgery as much as you can-the nurses will help you.  Walking speeds healing and gets you home quicker, you heal better at home and have less risk of infection.     2) INCENTIVE SPIROMETER: Practice your incentive spirometer 4 times per day with 5-10 repetitions each time.  Using the incentive spirometer can strengthen your muscles between your ribs and help you have a strong cough after surgery.  A more effective cough can help prevent problems with your lungs.    3) STAY HYDRATED: Drink clear liquids up until 2 hours before your surgery. We would like you to purchase a drink such as Gatorade or Ensure Clear (not the milkshake type).  Drink this before bedtime and on the way into the hospital, drink between 8-12 ounces or until you feel hydrated.  Keeping well hydrated leads to your veins being plump, you wake up faster, and you are less likely to be nauseated. Start drinking water as soon as you can after surgery and advance to clear liquids and food as tolerated.  IV fluids contain salt, drinking fluids will minimize the amount of IV fluids you need and decrease the amount of salt you get.    The most common reason for the patient to be readmitted is dehydration. Staying hydrated after you go home from the hospital is very important.  Ensure or Ensure Clear are good options to keep you hydrated.     4) PAIN MANAGEMENT: If we minimize the amount of opioids and narcotics, and use regional blocks (which numb the area where your surgery is) along with oral pain medications; you will have less side effects of nausea and constipation. Narcotics can slow down your bowels and cause you to stay in the hospital longer.     Our goal is to keep you comfortable; eating and drinking normally and back home safely.           Follow-ups after your visit        Your next 10 appointments already scheduled     Jul 16, 2018   9:30 AM CDT   (Arrive by 9:15 AM)   PAC RN ASSESSMENT with Uc Pac Rn   [facility] ([facility])    [address]  4th Floor  [facility] [id number] [phone]            Jul 16, 2018  9:50 AM CDT   (Arrive by 9:35 AM)   PAC Anesthesia Consult with Janet Pac Anesthesiologist   [facility] [place] ([facility])    [address]  4th Floor  [facility] [id number] [phone]            Jul 24, 2018   Procedure with Pierre Campos MD   [facility], [place], Same Day Surgery (--)    [address]  [facility] [id number] [phone]              Future tests that were ordered for you today     Open Future Orders        Priority Expected Expires Ordered    ABO/Rh type and screen Routine 7/16/2018 8/15/2018 7/16/2018            Who to contact     Please call your clinic at [phone] to:    Ask questions about your health    Make or cancel appointments    Discuss your medicines    Learn about your test results    Speak to your doctor            Additional Information About Your Visit        [employer] Information     [employer] gives you secure access to your electronic health record. If you see a primary care provider, you can also send messages to your care team and make appointments. If you have questions, please call your primary care clinic.  If you do not have a primary care provider, please call [phone] and they will assist you.      [employer] is an electronic gateway that provides easy, online access to your medical records. With [employer], you can request a clinic appointment, read your test results, renew a prescription or communicate with your care team.     To access your existing account, please contact your [facility] or call [phone] for assistance.        Care EveryWhere ID     This is your Care EveryWhere ID. This could be used by other organizations to  access your [facility] medical records  [id number]         Blood Pressure from Last 3 Encounters:   07/16/18 147/84   06/27/18 124/76   05/16/18 127/82    Weight from Last 3 Encounters:   07/16/18 89.7 kg (197 lb 11.2 oz)   06/27/18 88.8 kg (195 lb 11.2 oz)   05/16/18 88 kg (194 lb)              Today, you had the following     No orders found for display         Today's Medication Changes          These changes are accurate as of 7/16/18  9:07 AM.  If you have any questions, ask your nurse or doctor.               These medicines have changed or have updated prescriptions.        Dose/Directions    alendronate 70 MG tablet   Commonly known as:  FOSAMAX   This may have changed:  additional instructions   Used for:  Senile osteoporosis        Dose:  70 mg   Take 1 tablet (70 mg) by mouth every 7 days Take with over 8 ounces water and stay upright for at least 30 minutes after dose.  Take at least 60 minutes before breakfast   Quantity:  12 tablet   Refills:  3       ciclopirox 0.77 % cream   Commonly known as:  LOPROX   This may have changed:    - when to take this  - reasons to take this   Used for:  Tinea pedis of both feet        Apply topically 2 times daily   Quantity:  30 g   Refills:  3       losartan 25 MG tablet   Commonly known as:  COZAAR   This may have changed:  when to take this   Used for:  Benign essential hypertension        Dose:  25 mg   Take 1 tablet (25 mg) by mouth daily   Quantity:  90 tablet   Refills:  3                Primary Care Provider Office Phone # Fax #    Julia Raúl Gifford [phone]       [facility] [address]  [facility] [address]        Equal Access to Services     [facility]: Hadchaz tijerina Soashish, waaxda luqadaha, qaybta kaalmanoelle galvez. So [facility] [phone].    ATENCIÓN: Si habla español, tiene a nair disposición servicios gratuitos de asistencia lingüística. Llame al  [phone].    We comply with applicable federal civil rights laws and [place] laws. We do not discriminate on the basis of race, color, national origin, age, disability, sex, sexual orientation, or gender identity.            Thank you!     Thank you for choosing [facility]  for your care. Our goal is always to provide you with excellent care. Hearing back from our patients is one way we can continue to improve our services. Please take a few minutes to complete the written survey that you may receive in the mail after your visit with us. Thank you!             Your Updated Medication List - Protect others around you: Learn how to safely use, store and throw away your medicines at www.disposemymeds.org.          This list is accurate as of 7/16/18  9:07 AM.  Always use your most recent med list.                   Brand Name Dispense Instructions for use Diagnosis    alendronate 70 MG tablet    FOSAMAX    12 tablet    Take 1 tablet (70 mg) by mouth every 7 days Take with over 8 ounces water and stay upright for at least 30 minutes after dose.  Take at least 60 minutes before breakfast    Senile osteoporosis       aspirin 81 MG tablet      Take 1 tablet by mouth At Bedtime ON HOLD FOR SURGERY SINCE 07/10/2018        ciclopirox 0.77 % cream    LOPROX    30 g    Apply topically 2 times daily    Tinea pedis of both feet       losartan 25 MG tablet    COZAAR    90 tablet    Take 1 tablet (25 mg) by mouth daily    Benign essential hypertension       simvastatin 10 MG tablet    ZOCOR    90 tablet    Take 1 tablet (10 mg) by mouth At Bedtime    Pure hypercholesterolemia

## 2018-07-19 ENCOUNTER — TELEPHONE (OUTPATIENT)
Dept: OBGYN | Facility: CLINIC | Age: 73
End: 2018-07-19

## 2018-07-19 DIAGNOSIS — M81.0 SENILE OSTEOPOROSIS: ICD-10-CM

## 2018-07-19 RX ORDER — ALENDRONATE SODIUM 70 MG/1
TABLET ORAL
Qty: 12 TABLET | Refills: 3 | Status: SHIPPED | OUTPATIENT
Start: 2018-07-19 | End: 2018-08-13

## 2018-07-19 NOTE — TELEPHONE ENCOUNTER
Esperanza calling to report she never got hard copy refill of fosamax 70 mg at her appt 6/27/18. She takes it every Tuesday. This medication is not new to her, so aware of remaining upright after taking it so will e-prescribe it with less directions so it will go thru..Pt indicated understanding and agreed with plan.

## 2018-07-23 RX ORDER — MEPERIDINE HYDROCHLORIDE 50 MG/ML
12.5 INJECTION INTRAMUSCULAR; INTRAVENOUS; SUBCUTANEOUS
Status: CANCELLED | OUTPATIENT
Start: 2018-07-23

## 2018-07-23 RX ORDER — FENTANYL CITRATE 50 UG/ML
25-50 INJECTION, SOLUTION INTRAMUSCULAR; INTRAVENOUS
Status: CANCELLED | OUTPATIENT
Start: 2018-07-23

## 2018-07-23 RX ORDER — SODIUM CHLORIDE, SODIUM LACTATE, POTASSIUM CHLORIDE, CALCIUM CHLORIDE 600; 310; 30; 20 MG/100ML; MG/100ML; MG/100ML; MG/100ML
INJECTION, SOLUTION INTRAVENOUS CONTINUOUS
Status: CANCELLED | OUTPATIENT
Start: 2018-07-23

## 2018-07-23 RX ORDER — ONDANSETRON 2 MG/ML
4 INJECTION INTRAMUSCULAR; INTRAVENOUS EVERY 30 MIN PRN
Status: CANCELLED | OUTPATIENT
Start: 2018-07-23

## 2018-07-23 RX ORDER — ONDANSETRON 4 MG/1
4 TABLET, ORALLY DISINTEGRATING ORAL EVERY 30 MIN PRN
Status: CANCELLED | OUTPATIENT
Start: 2018-07-23

## 2018-07-23 RX ORDER — NALOXONE HYDROCHLORIDE 0.4 MG/ML
.1-.4 INJECTION, SOLUTION INTRAMUSCULAR; INTRAVENOUS; SUBCUTANEOUS
Status: CANCELLED | OUTPATIENT
Start: 2018-07-23 | End: 2018-07-24

## 2018-07-23 RX ORDER — HYDROMORPHONE HYDROCHLORIDE 1 MG/ML
.3-.5 INJECTION, SOLUTION INTRAMUSCULAR; INTRAVENOUS; SUBCUTANEOUS EVERY 10 MIN PRN
Status: CANCELLED | OUTPATIENT
Start: 2018-07-23

## 2018-07-24 ENCOUNTER — ANESTHESIA (OUTPATIENT)
Dept: SURGERY | Facility: CLINIC | Age: 73
End: 2018-07-24

## 2018-07-24 ENCOUNTER — HOSPITAL ENCOUNTER (OUTPATIENT)
Facility: CLINIC | Age: 73
Discharge: HOME | End: 2018-07-24
Attending: ORTHOPAEDIC SURGERY | Admitting: ORTHOPAEDIC SURGERY
Payer: MEDICARE

## 2018-07-24 ENCOUNTER — SURGERY (OUTPATIENT)
Age: 73
End: 2018-07-24

## 2018-07-24 VITALS
BODY MASS INDEX: 32.65 KG/M2 | RESPIRATION RATE: 20 BRPM | WEIGHT: 195.99 LBS | HEIGHT: 65 IN | SYSTOLIC BLOOD PRESSURE: 145 MMHG | TEMPERATURE: 98.4 F | OXYGEN SATURATION: 97 % | DIASTOLIC BLOOD PRESSURE: 83 MMHG

## 2018-07-24 LAB
ABO + RH BLD: NORMAL
ABO + RH BLD: NORMAL
BLD GP AB SCN SERPL QL: NORMAL
BLOOD BANK CMNT PATIENT-IMP: NORMAL
BLOOD BANK CMNT PATIENT-IMP: NORMAL
GLUCOSE BLDC GLUCOMTR-MCNC: 108 MG/DL (ref 70–99)
SPECIMEN EXP DATE BLD: NORMAL

## 2018-07-24 PROCEDURE — 40000881 ZZH CANCELLED SURGERY UP TO 31-45 MINS: Performed by: ORTHOPAEDIC SURGERY

## 2018-07-24 PROCEDURE — 82962 GLUCOSE BLOOD TEST: CPT

## 2018-07-24 RX ORDER — GABAPENTIN 100 MG/1
300 CAPSULE ORAL ONCE
Status: DISCONTINUED | OUTPATIENT
Start: 2018-07-24 | End: 2018-07-24 | Stop reason: HOSPADM

## 2018-07-24 RX ORDER — CEFAZOLIN SODIUM 2 G/100ML
2 INJECTION, SOLUTION INTRAVENOUS
Status: DISCONTINUED | OUTPATIENT
Start: 2018-07-24 | End: 2018-07-24 | Stop reason: HOSPADM

## 2018-07-24 RX ORDER — ACETAMINOPHEN 325 MG/1
975 TABLET ORAL ONCE
Status: DISCONTINUED | OUTPATIENT
Start: 2018-07-24 | End: 2018-07-24 | Stop reason: HOSPADM

## 2018-07-24 NOTE — OR NURSING
Patient case cancelled in Preop by MD Campos due to patient dental issues. Face to face time 45 min.

## 2018-07-26 ENCOUNTER — DOCUMENTATION ONLY (OUTPATIENT)
Dept: ORTHOPEDICS | Facility: CLINIC | Age: 73
End: 2018-07-26

## 2018-07-26 NOTE — PROGRESS NOTES
Patient's surgery with Dr. Campos rescheduled from 7/24/18 to 8/28/18 per Daniela Alves RN to Dr. Campos.

## 2018-07-30 ENCOUNTER — DOCUMENTATION ONLY (OUTPATIENT)
Dept: OTHER | Facility: CLINIC | Age: 73
End: 2018-07-30

## 2018-07-31 DIAGNOSIS — M17.2 POST-TRAUMATIC OSTEOARTHRITIS OF BOTH KNEES: Primary | ICD-10-CM

## 2018-07-31 DIAGNOSIS — M17.12 PRIMARY OSTEOARTHRITIS OF LEFT KNEE: ICD-10-CM

## 2018-08-13 ENCOUNTER — TRANSFERRED RECORDS (OUTPATIENT)
Dept: HEALTH INFORMATION MANAGEMENT | Facility: CLINIC | Age: 73
End: 2018-08-13

## 2018-08-13 ENCOUNTER — ANESTHESIA EVENT (OUTPATIENT)
Dept: SURGERY | Facility: CLINIC | Age: 73
DRG: 470 | End: 2018-08-13
Payer: MEDICARE

## 2018-08-13 ENCOUNTER — APPOINTMENT (OUTPATIENT)
Dept: SURGERY | Facility: CLINIC | Age: 73
End: 2018-08-13
Payer: MEDICARE

## 2018-08-13 ENCOUNTER — ALLIED HEALTH/NURSE VISIT (OUTPATIENT)
Dept: SURGERY | Facility: CLINIC | Age: 73
End: 2018-08-13
Payer: MEDICARE

## 2018-08-13 ENCOUNTER — OFFICE VISIT (OUTPATIENT)
Dept: SURGERY | Facility: CLINIC | Age: 73
End: 2018-08-13
Payer: MEDICARE

## 2018-08-13 VITALS
OXYGEN SATURATION: 97 % | RESPIRATION RATE: 16 BRPM | WEIGHT: 196.4 LBS | SYSTOLIC BLOOD PRESSURE: 142 MMHG | BODY MASS INDEX: 31.57 KG/M2 | HEART RATE: 66 BPM | DIASTOLIC BLOOD PRESSURE: 84 MMHG | HEIGHT: 66 IN | TEMPERATURE: 97.8 F

## 2018-08-13 DIAGNOSIS — Z01.818 PRE-OP EXAMINATION: ICD-10-CM

## 2018-08-13 DIAGNOSIS — M17.12 PRIMARY OSTEOARTHRITIS OF LEFT KNEE: ICD-10-CM

## 2018-08-13 DIAGNOSIS — Z01.818 PRE-OP EXAMINATION: Primary | ICD-10-CM

## 2018-08-13 LAB
ALBUMIN UR-MCNC: NEGATIVE MG/DL
ANION GAP SERPL CALCULATED.3IONS-SCNC: 6 MMOL/L (ref 3–14)
APPEARANCE UR: CLEAR
BILIRUB UR QL STRIP: NEGATIVE
BUN SERPL-MCNC: 10 MG/DL (ref 7–30)
CALCIUM SERPL-MCNC: 9.1 MG/DL (ref 8.5–10.1)
CHLORIDE SERPL-SCNC: 106 MMOL/L (ref 94–109)
CO2 SERPL-SCNC: 27 MMOL/L (ref 20–32)
COLOR UR AUTO: NORMAL
CREAT SERPL-MCNC: 0.61 MG/DL (ref 0.52–1.04)
ERYTHROCYTE [DISTWIDTH] IN BLOOD BY AUTOMATED COUNT: 12.4 % (ref 10–15)
GFR SERPL CREATININE-BSD FRML MDRD: >90 ML/MIN/1.7M2
GLUCOSE SERPL-MCNC: 112 MG/DL (ref 70–99)
GLUCOSE UR STRIP-MCNC: NEGATIVE MG/DL
HCT VFR BLD AUTO: 42.4 % (ref 35–47)
HGB BLD-MCNC: 13.4 G/DL (ref 11.7–15.7)
HGB UR QL STRIP: NEGATIVE
INR PPP: 0.98 (ref 0.86–1.14)
KETONES UR STRIP-MCNC: NEGATIVE MG/DL
LEUKOCYTE ESTERASE UR QL STRIP: NEGATIVE
MCH RBC QN AUTO: 31.4 PG (ref 26.5–33)
MCHC RBC AUTO-ENTMCNC: 31.6 G/DL (ref 31.5–36.5)
MCV RBC AUTO: 99 FL (ref 78–100)
NITRATE UR QL: NEGATIVE
NT-PROBNP SERPL-MCNC: 52 PG/ML (ref 0–125)
PH UR STRIP: 6 PH (ref 5–7)
PLATELET # BLD AUTO: 211 10E9/L (ref 150–450)
POTASSIUM SERPL-SCNC: 4.4 MMOL/L (ref 3.4–5.3)
RBC # BLD AUTO: 4.27 10E12/L (ref 3.8–5.2)
SODIUM SERPL-SCNC: 140 MMOL/L (ref 133–144)
SOURCE: NORMAL
SP GR UR STRIP: 1.01 (ref 1–1.03)
UROBILINOGEN UR STRIP-MCNC: 0 MG/DL (ref 0–2)
WBC # BLD AUTO: 5.8 10E9/L (ref 4–11)

## 2018-08-13 ASSESSMENT — LIFESTYLE VARIABLES: TOBACCO_USE: 1

## 2018-08-13 NOTE — MR AVS SNAPSHOT
After Visit Summary   2018    Esperanza Gage    MRN: [id number]           Patient Information     Date Of Birth          1945        Visit Information        Provider Department      2018 9:30 AM Rn, [facility]        Care Instructions    Preparing for Your Surgery      Name:  Esperanza Gage   MRN:  [id number]   :  1945   Today's Date:  2018     Arriving for surgery:  Left Knee Arthroplasty   Surgery date:  2018  Arrival time:  7:30 am  Please come to:     [facility] [address]  [address] [place]  [address]    - parking is available in front of [facility] from 5:15AM to 8:00PM. If you prefer, park your car in the Green Lot.    -Proceed to the 3rd floor, check in at the Adult Surgery Waiting Lounge. [phone]    If an escort is needed stop at the Information Desk in the lobby. Inform the information person that you are here for surgery. An escort to the Adult Surgery Waiting Lounge will be provided.        What can I eat or drink?  -  You may have solid food or milk products until 8 hours prior to your surgery.  (midnight )  -  You may have water, apple juice or 7up/Sprite until 2 hours prior to your surgery. ( until 7:30 am arrival time)    Which medicines can I take?        Stop Aspirin, vitamins and supplements one week prior to surgery.      Hold Ibuprofen and Naproxen for 24 hours prior to surgery.     -  Do NOT take these medications in the morning, the day of surgery:      Losartan (Cozaar)      -  Please take these medications the day of surgery:     NONE      How do I prepare myself?  -  Take two showers: one the night before surgery; and one the morning of surgery.         Use Scrubcare or Hibiclens to wash from neck down.  You may use your own shampoo and conditioner. No other hair products.   -  Do NOT use lotion, powder, deodorant, or antiperspirant the day of  your surgery.  -  Do NOT wear any makeup, fingernail polish or jewelry.  -  Begin using Incentive Spirometer 1 week prior to surgery.  Use 4 times per day, up to 5-10  breaths each time.  Bring Incentive Spirometer to hospital.  - Do not bring your own medications to the hospital, except for inhalers and eye drops.  -  Bring your ID and insurance card.    Questions or Concerns:  -If you have questions or concerns regarding the day of surgery, please call [phone].       -For questions after surgery please call your surgeons office.           Enhanced Recovery After Surgery     This is a team effort, including you, to get you back on your feet, eating and drinking normally and out of the hospital as quickly as possible.  The goals are:     1) NO INFECTIONS and   2) RETURN TO NORMAL DIET    How can we achieve these goals?  1) STAY ACTIVE: Walk every day before your surgery; try to increase the amount every day.  Walk after surgery as much as you can-the nurses will help you.  Walking speeds healing and gets you home quicker, you heal better at home and have less risk of infection.     2) INCENTIVE SPIROMETER: Practice your incentive spirometer 4 times per day with 5-10 repetitions each time.  Using the incentive spirometer can strengthen your muscles between your ribs and help you have a strong cough after surgery.  A more effective cough can help prevent problems with your lungs.    3) STAY HYDRATED: Drink clear liquids up until 2 hours before your surgery. We would like you to purchase a drink such as Gatorade or Ensure Clear (not the milkshake type).  Drink this before bedtime and on the way into the hospital, drink between 8-12 ounces or until you feel hydrated.  Keeping well hydrated leads to your veins being plump, you wake up faster, and you are less likely to be nauseated. Start drinking water as soon as you can after surgery and advance to clear liquids and food as tolerated.  IV fluids contain salt,  drinking fluids will minimize the amount of IV fluids you need and decrease the amount of salt you get.    The most common reason for the patient to be readmitted is dehydration. Staying hydrated after you go home from the hospital is very important.  Ensure or Ensure Clear are good options to keep you hydrated.     4) PAIN MANAGEMENT: If we minimize the amount of opioids and narcotics, and use regional blocks (which numb the area where your surgery is) along with oral pain medications; you will have less side effects of nausea and constipation. Narcotics can slow down your bowels and cause you to stay in the hospital longer.     Our goal is to keep you comfortable; eating and drinking normally and back home safely.     Using an Incentive Spirometer    An incentive spirometer is a device that helps you do deep breathing exercises. These exercises expand your lungs, aid in circulation, and help prevent pneumonia. Deep breathing exercises also help you breathe better and improve the function of your lungs by:    Keeping your lungs clear    Strengthening your breathing muscles    Helping prevent respiratory complications or problems  The incentive spirometer gives you a way to take an active part in your care. A nurse or therapist will teach you breathing exercises. To do these exercises, you will breathe in through your mouth and not your nose. The incentive spirometer only works correctly if you breathe in through your mouth.    Steps to clear lungs  Step 1. Exhale normally. Then, inhale normally.    Relax and breathe out.  Step 2. Place your lips tightly around the mouthpiece.    Make sure the device is upright and not tilted.  Step 3. Inhale as much air as you can through the mouthpiece (don't breath through your nose).    Inhale slowly and deeply.    Hold your breath long enough to keep the balls or disk raised for at least 3 to 5 seconds, or as instructed by your healthcare provider.  Step 4. Repeat the exercise  regularly.  Begin using the Incentive Spirometer one week prior to your surgery, 4 times per day-5 times each.          Follow-ups after your visit        Your next 10 appointments already scheduled     Aug 13, 2018  9:30 AM CDT   (Arrive by 9:15 AM)   PAC RN ASSESSMENT with Janet Pac Rn   [facility] ([facility])    [address]  4th Floor  [facility] [address] [phone]            Aug 13, 2018 10:00 AM CDT   (Arrive by 9:45 AM)   PAC Anesthesia Consult with Janet Pac Anesthesiologist   [facility] ([facility])    [address]  4th Floor  [facility] [id number] [phone]            Aug 28, 2018   Procedure with Pierre Campos MD   [facility] [place], Same Day Surgery (--)    [address]  [facility] [id number] [phone]              Future tests that were ordered for you today     Open Future Orders        Priority Expected Expires Ordered    ABO/Rh type and screen Routine 8/13/2018 9/12/2018 8/13/2018    Basic metabolic panel Routine 8/13/2018 9/12/2018 8/13/2018    CBC with platelets Routine 8/13/2018 9/12/2018 8/13/2018    INR Routine 8/13/2018 9/12/2018 8/13/2018    UA reflex to Microscopic and Culture Routine 8/13/2018 9/12/2018 8/13/2018            Who to contact     Please call your clinic at [phone] to:    Ask questions about your health    Make or cancel appointments    Discuss your medicines    Learn about your test results    Speak to your doctor            Additional Information About Your Visit        MyChart Information     [employer] gives you secure access to your electronic health record. If you see a primary care provider, you can also send messages to your care team and make appointments. If you have questions, please call your primary care clinic.  If you do not have a primary care provider, please call [phone] and they will assist you.      [employer] is  an electronic gateway that provides easy, online access to your medical records. With [employer], you can request a clinic appointment, read your test results, renew a prescription or communicate with your care team.     To access your existing account, please contact your [facility] or call [phone] for assistance.        Care EveryWhere ID     This is your Care EveryWhere ID. This could be used by other organizations to access your [place] medical records  [id number]         Blood Pressure from Last 3 Encounters:   08/13/18 142/84   07/24/18 145/83   07/16/18 147/84    Weight from Last 3 Encounters:   08/13/18 89.1 kg (196 lb 6.4 oz)   07/24/18 88.9 kg (195 lb 15.8 oz)   07/16/18 89.7 kg (197 lb 11.2 oz)              Today, you had the following     No orders found for display         Today's Medication Changes          These changes are accurate as of 8/13/18  9:18 AM.  If you have any questions, ask your nurse or doctor.               These medicines have changed or have updated prescriptions.        Dose/Directions    alendronate 70 MG tablet   Commonly known as:  FOSAMAX   This may have changed:  additional instructions   Used for:  Senile osteoporosis        Dose:  70 mg   Take 1 tablet (70 mg) by mouth every 7 days Take with over 8 ounces water and stay upright for at least 30 minutes after dose.  Take at least 60 minutes before breakfast   Quantity:  12 tablet   Refills:  3       ciclopirox 0.77 % cream   Commonly known as:  LOPROX   This may have changed:    - when to take this  - reasons to take this   Used for:  Tinea pedis of both feet        Apply topically 2 times daily   Quantity:  30 g   Refills:  3       losartan 25 MG tablet   Commonly known as:  COZAAR   This may have changed:  when to take this   Used for:  Benign essential hypertension        Dose:  25 mg   Take 1 tablet (25 mg) by mouth daily   Quantity:  90 tablet   Refills:  3                Primary Care  Provider Office Phone # Fax #    Julia Raúl Gifford [phone]       [facility] [address]  [facility] [address]        Equal Access to Services     NICHOLAS JUAREZ : Hadii oksana brady aarono Madina, waisabelda luqadaha, qajohnta kaalmada fitz, noelle ferguson bharatflaco flores jose daniel mtz. So [facility] [phone].    ATENCIÓN: Si habla español, tiene a nair disposición servicios gratuitos de asistencia lingüística. Llame al [phone].    We comply with applicable federal civil rights laws and [place] laws. We do not discriminate on the basis of race, color, national origin, age, disability, sex, sexual orientation, or gender identity.            Thank you!     Thank you for choosing [facility]  for your care. Our goal is always to provide you with excellent care. Hearing back from our patients is one way we can continue to improve our services. Please take a few minutes to complete the written survey that you may receive in the mail after your visit with us. Thank you!             Your Updated Medication List - Protect others around you: Learn how to safely use, store and throw away your medicines at www.disposemymeds.org.          This list is accurate as of 8/13/18  9:18 AM.  Always use your most recent med list.                   Brand Name Dispense Instructions for use Diagnosis    alendronate 70 MG tablet    FOSAMAX    12 tablet    Take 1 tablet (70 mg) by mouth every 7 days Take with over 8 ounces water and stay upright for at least 30 minutes after dose.  Take at least 60 minutes before breakfast    Senile osteoporosis       aspirin 81 MG tablet      Take 1 tablet by mouth At Bedtime ON HOLD FOR SURGERY SINCE 07/10/2018        ciclopirox 0.77 % cream    LOPROX    30 g    Apply topically 2 times daily    Tinea pedis of both feet       losartan 25 MG tablet    COZAAR    90 tablet    Take 1 tablet (25 mg) by mouth daily    Benign essential hypertension        simvastatin 10 MG tablet    ZOCOR    90 tablet    Take 1 tablet (10 mg) by mouth At Bedtime    Pure hypercholesterolemia

## 2018-08-13 NOTE — PATIENT INSTRUCTIONS
Preparing for Your Surgery      Name:  Esperanza aGge   MRN:  [id number]   :  1945   Today's Date:  2018     Arriving for surgery:  Left Knee Arthroplasty   Surgery date:  2018  Arrival time:  7:30 am  Please come to:     [facility] [address]  [address] [place]  [address]    - parking is available in front of [facility] from 5:15AM to 8:00PM. If you prefer, park your car in the Green Lot.    -Proceed to the 3rd floor, check in at the Adult Surgery Waiting Lounge. [phone]    If an escort is needed stop at the Information Desk in the lobby. Inform the information person that you are here for surgery. An escort to the Adult Surgery Waiting Lounge will be provided.        What can I eat or drink?  -  You may have solid food or milk products until 8 hours prior to your surgery.  (midnight )  -  You may have water, apple juice or 7up/Sprite until 2 hours prior to your surgery. ( until 7:30 am arrival time)    Which medicines can I take?        Stop Aspirin, vitamins and supplements one week prior to surgery.      Hold Ibuprofen and Naproxen for 24 hours prior to surgery.     -  Do NOT take these medications in the morning, the day of surgery:      Losartan (Cozaar)      -  Please take these medications the day of surgery:     NONE      How do I prepare myself?  -  Take two showers: one the night before surgery; and one the morning of surgery.         Use Scrubcare or Hibiclens to wash from neck down.  You may use your own shampoo and conditioner. No other hair products.   -  Do NOT use lotion, powder, deodorant, or antiperspirant the day of your surgery.  -  Do NOT wear any makeup, fingernail polish or jewelry.  -  Begin using Incentive Spirometer 1 week prior to surgery.  Use 4 times per day, up to 5-10  breaths each time.  Bring Incentive Spirometer to hospital.  - Do not bring your own medications to the hospital, except for inhalers  and eye drops.  -  Bring your ID and insurance card.    Questions or Concerns:  -If you have questions or concerns regarding the day of surgery, please call [phone].       -For questions after surgery please call your surgeons office.           Enhanced Recovery After Surgery     This is a team effort, including you, to get you back on your feet, eating and drinking normally and out of the hospital as quickly as possible.  The goals are:     1) NO INFECTIONS and   2) RETURN TO NORMAL DIET    How can we achieve these goals?  1) STAY ACTIVE: Walk every day before your surgery; try to increase the amount every day.  Walk after surgery as much as you can-the nurses will help you.  Walking speeds healing and gets you home quicker, you heal better at home and have less risk of infection.     2) INCENTIVE SPIROMETER: Practice your incentive spirometer 4 times per day with 5-10 repetitions each time.  Using the incentive spirometer can strengthen your muscles between your ribs and help you have a strong cough after surgery.  A more effective cough can help prevent problems with your lungs.    3) STAY HYDRATED: Drink clear liquids up until 2 hours before your surgery. We would like you to purchase a drink such as Gatorade or Ensure Clear (not the milkshake type).  Drink this before bedtime and on the way into the hospital, drink between 8-12 ounces or until you feel hydrated.  Keeping well hydrated leads to your veins being plump, you wake up faster, and you are less likely to be nauseated. Start drinking water as soon as you can after surgery and advance to clear liquids and food as tolerated.  IV fluids contain salt, drinking fluids will minimize the amount of IV fluids you need and decrease the amount of salt you get.    The most common reason for the patient to be readmitted is dehydration. Staying hydrated after you go home from the hospital is very important.  Ensure or Ensure Clear are good options to keep you  hydrated.     4) PAIN MANAGEMENT: If we minimize the amount of opioids and narcotics, and use regional blocks (which numb the area where your surgery is) along with oral pain medications; you will have less side effects of nausea and constipation. Narcotics can slow down your bowels and cause you to stay in the hospital longer.     Our goal is to keep you comfortable; eating and drinking normally and back home safely.     Using an Incentive Spirometer    An incentive spirometer is a device that helps you do deep breathing exercises. These exercises expand your lungs, aid in circulation, and help prevent pneumonia. Deep breathing exercises also help you breathe better and improve the function of your lungs by:    Keeping your lungs clear    Strengthening your breathing muscles    Helping prevent respiratory complications or problems  The incentive spirometer gives you a way to take an active part in your care. A nurse or therapist will teach you breathing exercises. To do these exercises, you will breathe in through your mouth and not your nose. The incentive spirometer only works correctly if you breathe in through your mouth.    Steps to clear lungs  Step 1. Exhale normally. Then, inhale normally.    Relax and breathe out.  Step 2. Place your lips tightly around the mouthpiece.    Make sure the device is upright and not tilted.  Step 3. Inhale as much air as you can through the mouthpiece (don't breath through your nose).    Inhale slowly and deeply.    Hold your breath long enough to keep the balls or disk raised for at least 3 to 5 seconds, or as instructed by your healthcare provider.  Step 4. Repeat the exercise regularly.  Begin using the Incentive Spirometer one week prior to your surgery, 4 times per day-5 times each.

## 2018-08-13 NOTE — H&P
Pre-Operative H & P     CC:  Preoperative exam to assess for increased cardiopulmonary risk while undergoing surgery and anesthesia.    Date of Encounter: 8/13/2018  Primary Care Physician:  Julia Gifford  Reason for visit:  Primary osteoarthritis of left knee    HPI  Esperanza Gage is a 72 year old female who presents for pre-operative H & P in preparation for  Left Total Knee Replacement on 8/28/18 with Dr. Campos at [facility] under general anesthesia.  Ms. Gage has osteoarthritis and was seen by Dr. Campos on 6/4/18 for c/o left knee pain making it difficult for her to walk.  She has exhausted non-operative measures and the above procedure was recommended.     Ms. Gage presents to PAC and denies chest pain, SOB, palpitations, syncope, HERNANDEZ, orthopnea, or PND.  She denies any cardiopulmonary history.  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  Her walking is limited given her left knee pain and really notices pain and weakness in her left knee with stairs.  She uses a cane.  She had a root canal on 7/26/18 and will have the crown set today.  She would like to proceed with above surgical intervention.       History is obtained from the patient and electronic health record.     Past Medical History  Past Medical History:   Diagnosis Date     Hearing problem 2017     Hyperlipidemia 2005     Hyperlipidemia LDL goal < 130      Hypertension      Osteoporosis, post-menopausal      Pelvic fracture (H)        Past Surgical History  Past Surgical History:   Procedure Laterality Date     KNEE SURGERY       No prior surgeries       ORTHOPEDIC SURGERY         Hx of Blood transfusions/reactions: denies     Hx of abnormal bleeding or anti-platelet use: ASA    Menstrual history: No LMP recorded. Patient is postmenopausal.     Steroid use in the last year: denies    Personal or FH with difficulty with Anesthesia:  denies    Prior  to Admission Medications  Current Outpatient Prescriptions   Medication Sig Dispense Refill     alendronate (FOSAMAX) 70 MG tablet Take 1 tablet (70 mg) by mouth every 7 days Take with over 8 ounces water and stay upright for at least 30 minutes after dose.  Take at least 60 minutes before breakfast (Patient taking differently: Take 70 mg by mouth every 7 days SATURDAY  Take with over 8 ounces water and stay upright for at least 30 minutes after dose.  Take at least 60 minutes before breakfast) 12 tablet 3     losartan (COZAAR) 25 MG tablet Take 1 tablet (25 mg) by mouth daily (Patient taking differently: Take 25 mg by mouth every morning ) 90 tablet 3     simvastatin (ZOCOR) 10 MG tablet Take 1 tablet (10 mg) by mouth At Bedtime 90 tablet 3     aspirin 81 MG tablet Take 1 tablet by mouth At Bedtime ON HOLD FOR SURGERY SINCE 07/10/2018       ciclopirox (LOPROX) 0.77 % cream Apply topically 2 times daily (Patient taking differently: Apply topically as needed ) 30 g 3     [DISCONTINUED] alendronate (FOSAMAX) 70 MG tablet Take 1 tablet (70 mg) by mouth with 8oz water every 7 days 30 minutes before breakfast and remain upright during this time. 12 tablet 3       Allergies  No Known Allergies    Social History  Social History     Social History     Marital status: Single     Spouse name: N/A     Number of children: N/A     Years of education: N/A     Occupational History     Not on file.     Social History Main Topics     Smoking status: Former Smoker     Types: Cigarettes     Smokeless tobacco: Never Used     Alcohol use Yes      Comment: Rare     Drug use: No     Sexual activity: No     Other Topics Concern     Not on file     Social History Narrative    Lives in her own home with two flights of stairs.  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  Had two brothers who both  of CVA. Participates in seniors program at [facility].         Family History  Family History   Problem Relation Age of Onset      "Osteoperosis Mother      Hypertension Brother      Cerebrovascular Disease Brother      Cerebrovascular Disease Brother      C.A.D. No family hx of      Diabetes No family hx of      Hypertension No family hx of      Skin Cancer No family hx of      Melanoma No family hx of      Dementia No family hx of      HEART DISEASE No family hx of      Cerebrovascular Disease No family hx of        ROS/MED HX    ENT/Pulmonary: Comment: Was evaluated at dentist and underwent root canal on 7/26/2018.  Will get crown set today.     (+)tobacco use (Rarely smoked for 2 years after high school. ), Past use , . .    Neurologic:  - neg neurologic ROS     Cardiovascular:     (+) Dyslipidemia, hypertension----. Taking blood thinners Pt has received instructions: . . . :. . 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  Does yoga every Friday.    Has difficulty going up stairs given left knee pain.     Hematologic:  - neg hematologic  ROS       Musculoskeletal: Comment: Arthroscopic surgery in left knee in 7/1983.      Osteoporosis.    Right thumb issue.     H/O broken pelvis ~5 years ago.   (+) arthritis, , , -       GI/Hepatic:  - neg GI/hepatic ROS       Renal/Genitourinary:  - ROS Renal section negative       Endo:     (+) Obesity (BMI 32.47), .      Psychiatric:  - neg psychiatric ROS       Infectious Disease:  - neg infectious disease ROS       Malignancy:      - no malignancy   Other: Comment: Uses a cane to assist with mobility.    (+) No chance of pregnancy H/O Chronic Pain,       Temp: 97.8  F (36.6  C) Temp src: Oral BP: 142/84 Pulse: 66   Resp: 16 SpO2: 97 %         196 lbs 6.4 oz  5' 5.5\"   Body mass index is 32.19 kg/(m^2).       Physical Exam  Constitutional: Awake, alert, cooperative, no apparent distress, and appears stated age.  Eyes: " Pupils equal, round and reactive to light, extra ocular muscles intact, sclera clear, conjunctiva normal.  HENT: Normocephalic, oral pharynx with moist mucus membranes, dentition in fair repair. No goiter appreciated.   Respiratory: Clear to auscultation bilaterally, no crackles or wheezing.  Cardiovascular: Regular rate and rhythm, normal S1 and S2, and no murmur noted.  Carotids +2, no bruits. No edema. Palpable pulses to radial  DP and PT arteries.   GI: Normal bowel sounds, soft and non-tender. Unable to adequately assess for hepatosplenomegaly given obese abdomen. No superficial masses noted.     Lymph/Hematologic: No cervical lymphadenopathy and no supraclavicular lymphadenopathy.  Genitourinary:  na  Skin: Warm and dry.    Musculoskeletal: Full ROM of neck. There is no redness, warmth, or swelling of the joints. Gross motor strength is normal.    Neurologic: Awake, alert, oriented to name, place and time. Cranial nerves II-XII are grossly intact. Altered gait.   Neuropsychiatric: Calm, cooperative. Normal affect.     Labs: (personally reviewed)  Lab Results   Component Value Date    WBC 5.8 08/13/2018     Lab Results   Component Value Date    RBC 4.27 08/13/2018     Lab Results   Component Value Date    HGB 13.4 08/13/2018     Lab Results   Component Value Date    HCT 42.4 08/13/2018     Lab Results   Component Value Date    MCV 99 08/13/2018     Lab Results   Component Value Date    MCH 31.4 08/13/2018     Lab Results   Component Value Date    MCHC 31.6 08/13/2018     Lab Results   Component Value Date    RDW 12.4 08/13/2018     Lab Results   Component Value Date     08/13/2018     Last Comprehensive Metabolic Panel:  Sodium   Date Value Ref Range Status   08/13/2018 140 133 - 144 mmol/L Final     Potassium   Date Value Ref Range Status   08/13/2018 4.4 3.4 - 5.3 mmol/L Final     Chloride   Date Value Ref Range Status   08/13/2018 106 94 - 109 mmol/L Final     Carbon Dioxide   Date Value Ref Range  Status   08/13/2018 27 20 - 32 mmol/L Final     Anion Gap   Date Value Ref Range Status   08/13/2018 6 3 - 14 mmol/L Final     Glucose   Date Value Ref Range Status   08/13/2018 112 (H) 70 - 99 mg/dL Final     Urea Nitrogen   Date Value Ref Range Status   08/13/2018 10 7 - 30 mg/dL Final     Creatinine   Date Value Ref Range Status   08/13/2018 0.61 0.52 - 1.04 mg/dL Final     GFR Estimate   Date Value Ref Range Status   08/13/2018 >90 >60 mL/min/1.7m2 Final     Comment:     Non  GFR Calc     Calcium   Date Value Ref Range Status   08/13/2018 9.1 8.5 - 10.1 mg/dL Final       Color Urine (no units)   Date Value   08/13/2018 Straw     Appearance Urine (no units)   Date Value   08/13/2018 Clear     Glucose Urine (mg/dL)   Date Value   08/13/2018 Negative     Bilirubin Urine (no units)   Date Value   08/13/2018 Negative     Ketones Urine (mg/dL)   Date Value   08/13/2018 Negative     Specific Gravity Urine (no units)   Date Value   08/13/2018 1.010     pH Urine (pH)   Date Value   08/13/2018 6.0     Protein Albumin Urine (mg/dL)   Date Value   08/13/2018 Negative     Urobilinogen Urine (EU/dL)   Date Value   01/07/2015 0.2     Nitrite Urine (no units)   Date Value   08/13/2018 Negative     Leukocyte Esterase Urine (no units)   Date Value   08/13/2018 Negative       EKG: Personally reviewed but formal cardiology read pending: SB 59 bpm,  8/13/18.    ASSESSMENT and PLAN  Esperanza Gage is a 72 year old female scheduled to undergo Left Total Knee Replacement on 8/28/18 with Dr. Campos at [facility] under general anesthesia.      She has the following specific operative considerations:   - METS:  >4. RCRI : No serious cardiac risks.  0.4 % risk of major adverse cardiac event.  No further cardiac evaluation needed per 2014 ACC/AHA guidelines for non-cardiac surgery.   - SATNAM # of risks 3/8 = intermediate  - VTE risk:  0.5%  - Risk of PONV score = 2.  If > 2, anti-emetic intervention recommended.  -  Post-op delirium risk: elevated given age    1.  Cardiology - denies cardiopulmonary history or symptoms.  HTN, hold ARB DOS.  HLD, take statin as prescribed.  ASA for primary prevention>>>hold 7 days prior to surgery.    2.  Pulmonary - remote minimal smoking hx  3.  Musculoskeletal - OA of left knee >>>above procedure planned.    4.  HEENT - Dental restoration with root canal 7/26/2018 with crown setting today.  Instructed to have note from dentist that dental work complete and send to Dr. Campos's office.      - Anesthesia considerations:  Refer to PAC assessment in anesthesia records  - CBC, BMP, UA, INR and T&S today      Arrival time, NPO, shower and medication instructions provided by nursing staff today.  Preparing For Your Surgery handout given.  Patient was discussed with Dr Osorio. I spent 20 minutes face to face with patient assessing, educating, counseling and/or coordinating care and examining the patient.  Of that 20 minutes, I spent greater than 50% of my time counseling and/or coordinating care.      ARSH Dorsey CNP  Preoperative Assessment Center  [facility]  Phone: [phone]  Fax: [phone]

## 2018-08-13 NOTE — ANESTHESIA PREPROCEDURE EVALUATION
Anesthesia Evaluation     . Pt has had prior anesthetic. Type: General    No history of anesthetic complications          ROS/MED HX    ENT/Pulmonary: Comment: Was evaluated at dentist and underwent root canal on 7/26/2018.  Will get crown set today.     (+)tobacco use (Rarely smoked for 2 years after high school. ), Past use , . .    Neurologic:  - neg neurologic ROS     Cardiovascular:     (+) Dyslipidemia, hypertension----. Taking blood thinners Pt has received instructions: . . . :. . 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  Does yoga every Friday.    Has difficulty going up stairs given left knee pain.     Hematologic:  - neg hematologic  ROS       Musculoskeletal: Comment: Arthroscopic surgery in left knee in 7/1983.      Osteoporosis.    Right thumb issue.     H/O broken pelvis ~5 years ago.   (+) arthritis, , , -       GI/Hepatic:  - neg GI/hepatic ROS       Renal/Genitourinary:  - ROS Renal section negative       Endo:     (+) Obesity (BMI 32.47), .      Psychiatric:  - neg psychiatric ROS       Infectious Disease:  - neg infectious disease ROS       Malignancy:      - no malignancy   Other: Comment: Uses a cane to assist with mobility.    (+) No chance of pregnancy H/O Chronic Pain,                   Physical Exam  Normal systems: cardiovascular, pulmonary and dental    Airway   Mallampati: II  TM distance: >3 FB  Neck ROM: full    Dental     Cardiovascular   Rhythm and rate: regular and normal      Pulmonary     Other findings:     For further details of assessment, testing, and physical exam please see H and P completed on same date.           PAC Discussion and Assessment    ASA Classification: 3  Case is suitable for: [facility]  Anesthetic techniques and relevant risks discussed:   Invasive monitoring and risk  discussed:   Types:   Possibility and Risk of blood transfusion discussed:   NPO instructions given:   Additional anesthetic preparation and risks discussed:   Needs early admission to pre-op area:   Other:     PAC Resident/NP Anesthesia Assessment:  Esperanza Gage is a 72 year old female scheduled for Left Total Knee Replacement on 8/28/18 with Dr. Campos at [facility] under general anesthesia.  Ms. Gage has osteoarthritis and was seen by Dr. Campos on 6/4/18 for c/o knee pain making it difficult for her to walk.  She has exhausted non-operative measures and the above procedure was recommended.     Ms. Gage presents to PAC and denies chest pain, SOB, palpitations, syncope, HERNANDEZ, orthopnea, or PND.  She denies any cardiopulmonary history.  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  Her walking is limited given her left knee pain and really notices pain and weakness in her left knee with stairs.  She uses a cane.  She had a root canal on 7/26/18 and will have the crown set today.  She would like to proceed with above surgical intervention.    She has the following specific operative considerations:   - METS:  >4. RCRI : No serious cardiac risks.  0.4 % risk of major adverse cardiac event..  - SATNAM # of risks 3/8 = intermediate  - VTE risk:  0.5%  - Risk of PONV score = 2.  If > 2, anti-emetic intervention recommended.  - Post-op delirium risk: elevated given age    1.  Cardiology - denies cardiopulmonary history or symptoms.  HTN, hold ARB DOS.  HLD, take statin as prescribed.  ASA for primary prevention.  Hold 7 days prior to surgery.    2.  Pulmonary - minimal remote smoking hx  3.  Musculoskeletal - OA of left knee >>>above procedure planned.    4.  HEENT - Dental restoration with root canal 7/26/2018 with crown setting today.  Instructed to have note from dentist that dental work complete and send to Dr. Campos's office.       - Anesthesia considerations:  Refer to PAC assessment in anesthesia records  - CBC, BMP, UA, INR and T&S today      Arrival time, NPO, shower and medication instructions provided by nursing staff today.  Preparing For Your Surgery handout given.  Patient was discussed with Dr Osorio. I spent 20 minutes face to face with patient assessing, educating, counseling and/or coordinating care and examining the patient.  Of that 20 minutes, I spent greater than 50% of my time counseling and/or coordinating care.          Reviewed and Signed by PAC Mid-Level Provider/Resident  Mid-Level Provider/Resident: Gris CABAN CNP  Date: 2018  Time: 9:14    Attending Anesthesiologist Anesthesia Assessment:  72 year old for left total knee arthroplasty. Chart reviewed, patient seen and evaluated; agree with above assessment. Patient had ECG 2018 that purported to show old anterior and lateral MI, but patient has absolutely no history of cardiac disease. Repeat ECG today shows normal ECG but, give her family history (fater  of MI, brother  of stroke) we have also ordered BNP.  No other medical issues.    Patient is appropriate for the planned procedure without further workup or medical management change. The final anesthesia plan will be determined by the physician anesthesiologist caring for the patient on the day of surgery.    Reviewed and Signed by PAC Anesthesiologist  Anesthesiologist: bryon  Date: 2018  Time:   Pass/Fail: Pass  Disposition:     PAC Pharmacist Assessment:        Pharmacist:   Date:   Time:      Anesthesia Plan      History & Physical Review  History and physical reviewed and following examination; no interval change.    ASA Status:  3 .    NPO Status:  > 6 hours    Plan for General and ETT with Propofol induction. Maintenance will be Balanced.    PONV prophylaxis:  Ondansetron (or other 5HT-3) and Dexamethasone or Solumedrol       Postoperative Care  Postoperative pain management:  IV  analgesics.      Consents  Anesthetic plan, risks, benefits and alternatives discussed with:  Patient..                          .    =========================    PAC Consultation reviewed and appreciated.          Labs (08/13/2018)                              H/H                13.4/42.4                          plts                 211                          Na                  140                          K                    4.4                             Type and Screen (08/13/2018)  -  O positive, negative antibodies      Plan  -  GETA      Risks, benefits and possible complications with GETA anesthesia discussed in full with the patient. She voices understanding and wishes to proceed.    Dr. Megha Dia MD  Anesthesia  08/28/2018 @ 0935 am

## 2018-08-14 LAB — INTERPRETATION ECG - MUSE: NORMAL

## 2018-08-22 ENCOUNTER — TEAM CONFERENCE (OUTPATIENT)
Dept: OTHER | Facility: CLINIC | Age: 73
End: 2018-08-22

## 2018-08-22 NOTE — TELEPHONE ENCOUNTER
SURGERY PLAN (PRE-OP PLAN)     Patient Position (indicated by x):     Supine   X  Supine with torso rolled up on a bump      Floppy lateral on torso length bean bag      Lateral decubitus, bean bag, full length      Lateral decubitus, Wixson hip positioner      Safety paddle side supports x 2 clamped to side rail      Lithotomy, both legs in yellow padded leg crawford      Lithotomy, single leg in yellow padded leg crawford      Prone on blanket rolls/round gel pad      Prone on Jean Claude (arched) frame on Efrain table      Single thigh in orange arthroscopy clamp      Beach chair semi recumbent      Spider limb positioner      Arm out on radiolucent arm table      Split drape with top bar      Revision CHHAYA drape with plastic side bags for leg   X  Extremity drape      Shoulder pack drape      Laparotomy drape   X  Tucker catheter             TKA Requests (indicated by x):   X  Biomet Vanguard TKA (+/- PCL retention)      Biomet SSK revision TKA + stems      Serena Trathlon revision knee + stems      Reciprocating saw      Universal tibial baseplate extractor      Biomet OSS rotating hinge knee      Biomet OSS distal femur replacement      Biomet OSS prox tib replacement      Biomet IM knee fusion nail      Specimens and cultures (indicated by x):      Tissue cultures, aerobic and anaerobic without gram stain      Frozen section      pathology specimens - fresh      pathology specimens - formalin              Plan:  Primary Left TKA with BIOMET    Reyes Muller MD  Orthopaedic Surgery, Adult Reconstruction Fellow  Pager [phone]

## 2018-08-28 ENCOUNTER — ANESTHESIA (OUTPATIENT)
Dept: SURGERY | Facility: CLINIC | Age: 73
DRG: 470 | End: 2018-08-28
Payer: MEDICARE

## 2018-08-28 ENCOUNTER — HOSPITAL ENCOUNTER (INPATIENT)
Facility: CLINIC | Age: 73
LOS: 3 days | Discharge: NURSING, REHAB OR LONG-TERM CARE | DRG: 470 | End: 2018-08-31
Attending: ORTHOPAEDIC SURGERY | Admitting: ORTHOPAEDIC SURGERY
Payer: MEDICARE

## 2018-08-28 ENCOUNTER — APPOINTMENT (OUTPATIENT)
Dept: GENERAL RADIOLOGY | Facility: CLINIC | Age: 73
DRG: 470 | End: 2018-08-28
Attending: PHYSICIAN ASSISTANT
Payer: MEDICARE

## 2018-08-28 ENCOUNTER — APPOINTMENT (OUTPATIENT)
Dept: GENERAL RADIOLOGY | Facility: CLINIC | Age: 73
DRG: 470 | End: 2018-08-28
Attending: ORTHOPAEDIC SURGERY
Payer: MEDICARE

## 2018-08-28 DIAGNOSIS — Z98.890 STATUS POST KNEE SURGERY: Primary | ICD-10-CM

## 2018-08-28 LAB
ABO + RH BLD: NORMAL
ABO + RH BLD: NORMAL
BLD GP AB SCN SERPL QL: NORMAL
BLOOD BANK CMNT PATIENT-IMP: NORMAL
BLOOD BANK CMNT PATIENT-IMP: NORMAL
GLUCOSE BLDC GLUCOMTR-MCNC: 122 MG/DL (ref 70–99)
SPECIMEN EXP DATE BLD: NORMAL

## 2018-08-28 PROCEDURE — 40000985 XR KNEE PORT LT 1/2 VW: Mod: LT

## 2018-08-28 PROCEDURE — C9399 UNCLASSIFIED DRUGS OR BIOLOG: HCPCS | Performed by: NURSE ANESTHETIST, CERTIFIED REGISTERED

## 2018-08-28 PROCEDURE — 25000128 H RX IP 250 OP 636: Performed by: NURSE ANESTHETIST, CERTIFIED REGISTERED

## 2018-08-28 PROCEDURE — 71000015 ZZH RECOVERY PHASE 1 LEVEL 2 EA ADDTL HR: Performed by: ORTHOPAEDIC SURGERY

## 2018-08-28 PROCEDURE — 36000064 ZZH SURGERY LEVEL 4 EA 15 ADDTL MIN - UMMC: Performed by: ORTHOPAEDIC SURGERY

## 2018-08-28 PROCEDURE — 25000128 H RX IP 250 OP 636: Performed by: ANESTHESIOLOGY

## 2018-08-28 PROCEDURE — 40000170 ZZH STATISTIC PRE-PROCEDURE ASSESSMENT II: Performed by: ORTHOPAEDIC SURGERY

## 2018-08-28 PROCEDURE — 99207 ZZC CONSULT E&M CHANGED TO SUBSEQUENT LEVEL: CPT | Performed by: INTERNAL MEDICINE

## 2018-08-28 PROCEDURE — 25000132 ZZH RX MED GY IP 250 OP 250 PS 637: Mod: GY | Performed by: PHYSICIAN ASSISTANT

## 2018-08-28 PROCEDURE — 00000146 ZZHCL STATISTIC GLUCOSE BY METER IP

## 2018-08-28 PROCEDURE — 71000014 ZZH RECOVERY PHASE 1 LEVEL 2 FIRST HR: Performed by: ORTHOPAEDIC SURGERY

## 2018-08-28 PROCEDURE — 25000128 H RX IP 250 OP 636: Performed by: ORTHOPAEDIC SURGERY

## 2018-08-28 PROCEDURE — 36000062 ZZH SURGERY LEVEL 4 1ST 30 MIN - UMMC: Performed by: ORTHOPAEDIC SURGERY

## 2018-08-28 PROCEDURE — 25000132 ZZH RX MED GY IP 250 OP 250 PS 637: Mod: GY | Performed by: ORTHOPAEDIC SURGERY

## 2018-08-28 PROCEDURE — A9270 NON-COVERED ITEM OR SERVICE: HCPCS | Mod: GY | Performed by: ORTHOPAEDIC SURGERY

## 2018-08-28 PROCEDURE — 37000009 ZZH ANESTHESIA TECHNICAL FEE, EACH ADDTL 15 MIN: Performed by: ORTHOPAEDIC SURGERY

## 2018-08-28 PROCEDURE — 99232 SBSQ HOSP IP/OBS MODERATE 35: CPT | Performed by: INTERNAL MEDICINE

## 2018-08-28 PROCEDURE — 27810169 ZZH OR IMPLANT GENERAL: Performed by: ORTHOPAEDIC SURGERY

## 2018-08-28 PROCEDURE — 25000125 ZZHC RX 250: Performed by: ORTHOPAEDIC SURGERY

## 2018-08-28 PROCEDURE — 25000125 ZZHC RX 250: Performed by: NURSE ANESTHETIST, CERTIFIED REGISTERED

## 2018-08-28 PROCEDURE — 40000986 XR KNEE PORT LT 1/2 VW: Mod: LT

## 2018-08-28 PROCEDURE — A9270 NON-COVERED ITEM OR SERVICE: HCPCS | Mod: GY | Performed by: PHYSICIAN ASSISTANT

## 2018-08-28 PROCEDURE — A9270 NON-COVERED ITEM OR SERVICE: HCPCS | Mod: GY | Performed by: INTERNAL MEDICINE

## 2018-08-28 PROCEDURE — 25800025 ZZH RX 258: Performed by: ORTHOPAEDIC SURGERY

## 2018-08-28 PROCEDURE — 37000008 ZZH ANESTHESIA TECHNICAL FEE, 1ST 30 MIN: Performed by: ORTHOPAEDIC SURGERY

## 2018-08-28 PROCEDURE — 25000132 ZZH RX MED GY IP 250 OP 250 PS 637: Mod: GY | Performed by: INTERNAL MEDICINE

## 2018-08-28 PROCEDURE — 12000001 ZZH R&B MED SURG/OB UMMC

## 2018-08-28 PROCEDURE — 27210794 ZZH OR GENERAL SUPPLY STERILE: Performed by: ORTHOPAEDIC SURGERY

## 2018-08-28 PROCEDURE — C1776 JOINT DEVICE (IMPLANTABLE): HCPCS | Performed by: ORTHOPAEDIC SURGERY

## 2018-08-28 PROCEDURE — 27210995 ZZH RX 272: Performed by: ORTHOPAEDIC SURGERY

## 2018-08-28 PROCEDURE — 25000128 H RX IP 250 OP 636: Performed by: PHYSICIAN ASSISTANT

## 2018-08-28 PROCEDURE — 0SRD0J9 REPLACEMENT OF LEFT KNEE JOINT WITH SYNTHETIC SUBSTITUTE, CEMENTED, OPEN APPROACH: ICD-10-PCS | Performed by: ORTHOPAEDIC SURGERY

## 2018-08-28 PROCEDURE — 25000566 ZZH SEVOFLURANE, EA 15 MIN: Performed by: ORTHOPAEDIC SURGERY

## 2018-08-28 DEVICE — IMP COMP FEMORAL VANGARD BIOM PS LT 65MM 183128: Type: IMPLANTABLE DEVICE | Site: KNEE | Status: FUNCTIONAL

## 2018-08-28 DEVICE — IMP INSERT TIBIAL BIOM PS PLUS BEARING 14X71/75MM 183744: Type: IMPLANTABLE DEVICE | Site: KNEE | Status: FUNCTIONAL

## 2018-08-28 DEVICE — IMP COMP TIBIAL KNEE ASCENT 71MM 141233: Type: IMPLANTABLE DEVICE | Site: KNEE | Status: FUNCTIONAL

## 2018-08-28 DEVICE — BONE CEMENT SIMPLEX FULL DOSE 6191-1-001: Type: IMPLANTABLE DEVICE | Site: KNEE | Status: FUNCTIONAL

## 2018-08-28 DEVICE — IMP COMP PATELLA BIOM ARCM MED 34MM 11-150842: Type: IMPLANTABLE DEVICE | Site: KNEE | Status: FUNCTIONAL

## 2018-08-28 RX ORDER — ONDANSETRON 2 MG/ML
4 INJECTION INTRAMUSCULAR; INTRAVENOUS EVERY 30 MIN PRN
Status: DISCONTINUED | OUTPATIENT
Start: 2018-08-28 | End: 2018-08-28 | Stop reason: HOSPADM

## 2018-08-28 RX ORDER — FENTANYL CITRATE 50 UG/ML
25-50 INJECTION, SOLUTION INTRAMUSCULAR; INTRAVENOUS
Status: DISCONTINUED | OUTPATIENT
Start: 2018-08-28 | End: 2018-08-28 | Stop reason: HOSPADM

## 2018-08-28 RX ORDER — AMOXICILLIN 250 MG
2 CAPSULE ORAL 2 TIMES DAILY
Status: DISCONTINUED | OUTPATIENT
Start: 2018-08-28 | End: 2018-08-31 | Stop reason: HOSPADM

## 2018-08-28 RX ORDER — FENTANYL CITRATE 50 UG/ML
INJECTION, SOLUTION INTRAMUSCULAR; INTRAVENOUS PRN
Status: DISCONTINUED | OUTPATIENT
Start: 2018-08-28 | End: 2018-08-28

## 2018-08-28 RX ORDER — NALOXONE HYDROCHLORIDE 0.4 MG/ML
.1-.4 INJECTION, SOLUTION INTRAMUSCULAR; INTRAVENOUS; SUBCUTANEOUS
Status: DISCONTINUED | OUTPATIENT
Start: 2018-08-28 | End: 2018-08-28

## 2018-08-28 RX ORDER — SIMVASTATIN 10 MG
10 TABLET ORAL AT BEDTIME
Status: DISCONTINUED | OUTPATIENT
Start: 2018-08-28 | End: 2018-08-31 | Stop reason: HOSPADM

## 2018-08-28 RX ORDER — MAGNESIUM HYDROXIDE 1200 MG/15ML
LIQUID ORAL PRN
Status: DISCONTINUED | OUTPATIENT
Start: 2018-08-28 | End: 2018-08-28 | Stop reason: HOSPADM

## 2018-08-28 RX ORDER — SODIUM CHLORIDE, SODIUM LACTATE, POTASSIUM CHLORIDE, CALCIUM CHLORIDE 600; 310; 30; 20 MG/100ML; MG/100ML; MG/100ML; MG/100ML
INJECTION, SOLUTION INTRAVENOUS CONTINUOUS
Status: DISCONTINUED | OUTPATIENT
Start: 2018-08-28 | End: 2018-08-31

## 2018-08-28 RX ORDER — BISACODYL 10 MG
10 SUPPOSITORY, RECTAL RECTAL DAILY
Status: DISCONTINUED | OUTPATIENT
Start: 2018-08-29 | End: 2018-08-31 | Stop reason: HOSPADM

## 2018-08-28 RX ORDER — HYDROMORPHONE HYDROCHLORIDE 1 MG/ML
.3-.5 INJECTION, SOLUTION INTRAMUSCULAR; INTRAVENOUS; SUBCUTANEOUS EVERY 5 MIN PRN
Status: DISCONTINUED | OUTPATIENT
Start: 2018-08-28 | End: 2018-08-28 | Stop reason: HOSPADM

## 2018-08-28 RX ORDER — NALOXONE HYDROCHLORIDE 0.4 MG/ML
.1-.4 INJECTION, SOLUTION INTRAMUSCULAR; INTRAVENOUS; SUBCUTANEOUS
Status: ACTIVE | OUTPATIENT
Start: 2018-08-28 | End: 2018-08-29

## 2018-08-28 RX ORDER — HYDROMORPHONE HYDROCHLORIDE 1 MG/ML
.3-.5 INJECTION, SOLUTION INTRAMUSCULAR; INTRAVENOUS; SUBCUTANEOUS
Status: DISCONTINUED | OUTPATIENT
Start: 2018-08-28 | End: 2018-08-30

## 2018-08-28 RX ORDER — ACETAMINOPHEN 325 MG/1
975 TABLET ORAL EVERY 8 HOURS
Status: COMPLETED | OUTPATIENT
Start: 2018-08-28 | End: 2018-08-31

## 2018-08-28 RX ORDER — SODIUM CHLORIDE, SODIUM LACTATE, POTASSIUM CHLORIDE, CALCIUM CHLORIDE 600; 310; 30; 20 MG/100ML; MG/100ML; MG/100ML; MG/100ML
INJECTION, SOLUTION INTRAVENOUS CONTINUOUS PRN
Status: DISCONTINUED | OUTPATIENT
Start: 2018-08-28 | End: 2018-08-28

## 2018-08-28 RX ORDER — GABAPENTIN 100 MG/1
100 CAPSULE ORAL AT BEDTIME
Status: COMPLETED | OUTPATIENT
Start: 2018-08-28 | End: 2018-08-30

## 2018-08-28 RX ORDER — SODIUM CHLORIDE, SODIUM LACTATE, POTASSIUM CHLORIDE, CALCIUM CHLORIDE 600; 310; 30; 20 MG/100ML; MG/100ML; MG/100ML; MG/100ML
INJECTION, SOLUTION INTRAVENOUS CONTINUOUS
Status: DISCONTINUED | OUTPATIENT
Start: 2018-08-28 | End: 2018-08-28 | Stop reason: HOSPADM

## 2018-08-28 RX ORDER — DEXAMETHASONE SODIUM PHOSPHATE 4 MG/ML
INJECTION, SOLUTION INTRA-ARTICULAR; INTRALESIONAL; INTRAMUSCULAR; INTRAVENOUS; SOFT TISSUE PRN
Status: DISCONTINUED | OUTPATIENT
Start: 2018-08-28 | End: 2018-08-28

## 2018-08-28 RX ORDER — ONDANSETRON 4 MG/1
4 TABLET, ORALLY DISINTEGRATING ORAL EVERY 6 HOURS PRN
Status: DISCONTINUED | OUTPATIENT
Start: 2018-08-28 | End: 2018-08-31 | Stop reason: HOSPADM

## 2018-08-28 RX ORDER — CEFAZOLIN SODIUM 1 G/3ML
INJECTION, POWDER, FOR SOLUTION INTRAMUSCULAR; INTRAVENOUS PRN
Status: DISCONTINUED | OUTPATIENT
Start: 2018-08-28 | End: 2018-08-28

## 2018-08-28 RX ORDER — LIDOCAINE 40 MG/G
CREAM TOPICAL
Status: DISCONTINUED | OUTPATIENT
Start: 2018-08-28 | End: 2018-08-28 | Stop reason: HOSPADM

## 2018-08-28 RX ORDER — PROPOFOL 10 MG/ML
INJECTION, EMULSION INTRAVENOUS PRN
Status: DISCONTINUED | OUTPATIENT
Start: 2018-08-28 | End: 2018-08-28

## 2018-08-28 RX ORDER — EPHEDRINE SULFATE 50 MG/ML
INJECTION, SOLUTION INTRAMUSCULAR; INTRAVENOUS; SUBCUTANEOUS PRN
Status: DISCONTINUED | OUTPATIENT
Start: 2018-08-28 | End: 2018-08-28

## 2018-08-28 RX ORDER — ONDANSETRON 2 MG/ML
INJECTION INTRAMUSCULAR; INTRAVENOUS PRN
Status: DISCONTINUED | OUTPATIENT
Start: 2018-08-28 | End: 2018-08-28

## 2018-08-28 RX ORDER — KETOROLAC TROMETHAMINE 30 MG/ML
15 INJECTION, SOLUTION INTRAMUSCULAR; INTRAVENOUS EVERY 6 HOURS
Status: COMPLETED | OUTPATIENT
Start: 2018-08-28 | End: 2018-08-29

## 2018-08-28 RX ORDER — PROCHLORPERAZINE MALEATE 5 MG
5 TABLET ORAL EVERY 6 HOURS PRN
Status: DISCONTINUED | OUTPATIENT
Start: 2018-08-28 | End: 2018-08-31 | Stop reason: HOSPADM

## 2018-08-28 RX ORDER — CEFAZOLIN SODIUM 2 G/100ML
2 INJECTION, SOLUTION INTRAVENOUS
Status: COMPLETED | OUTPATIENT
Start: 2018-08-28 | End: 2018-08-28

## 2018-08-28 RX ORDER — ONDANSETRON 2 MG/ML
4 INJECTION INTRAMUSCULAR; INTRAVENOUS EVERY 6 HOURS PRN
Status: DISCONTINUED | OUTPATIENT
Start: 2018-08-28 | End: 2018-08-31 | Stop reason: HOSPADM

## 2018-08-28 RX ORDER — LIDOCAINE 40 MG/G
CREAM TOPICAL
Status: DISCONTINUED | OUTPATIENT
Start: 2018-08-28 | End: 2018-08-31 | Stop reason: HOSPADM

## 2018-08-28 RX ORDER — ONDANSETRON 4 MG/1
4 TABLET, ORALLY DISINTEGRATING ORAL EVERY 30 MIN PRN
Status: DISCONTINUED | OUTPATIENT
Start: 2018-08-28 | End: 2018-08-28 | Stop reason: HOSPADM

## 2018-08-28 RX ORDER — ACETAMINOPHEN 500 MG
1000 TABLET ORAL ONCE
Status: COMPLETED | OUTPATIENT
Start: 2018-08-28 | End: 2018-08-28

## 2018-08-28 RX ORDER — AMOXICILLIN 250 MG
1 CAPSULE ORAL 2 TIMES DAILY
Status: DISCONTINUED | OUTPATIENT
Start: 2018-08-28 | End: 2018-08-31 | Stop reason: HOSPADM

## 2018-08-28 RX ORDER — ACETAMINOPHEN 325 MG/1
650 TABLET ORAL EVERY 4 HOURS PRN
Status: DISCONTINUED | OUTPATIENT
Start: 2018-08-31 | End: 2018-08-31 | Stop reason: HOSPADM

## 2018-08-28 RX ORDER — CEFAZOLIN SODIUM 2 G/100ML
2 INJECTION, SOLUTION INTRAVENOUS EVERY 8 HOURS
Status: COMPLETED | OUTPATIENT
Start: 2018-08-28 | End: 2018-08-29

## 2018-08-28 RX ORDER — OXYCODONE HYDROCHLORIDE 5 MG/1
5-10 TABLET ORAL EVERY 4 HOURS PRN
Status: DISCONTINUED | OUTPATIENT
Start: 2018-08-28 | End: 2018-08-29

## 2018-08-28 RX ORDER — LIDOCAINE HYDROCHLORIDE 20 MG/ML
INJECTION, SOLUTION INFILTRATION; PERINEURAL PRN
Status: DISCONTINUED | OUTPATIENT
Start: 2018-08-28 | End: 2018-08-28

## 2018-08-28 RX ADMIN — SUGAMMADEX 180 MG: 100 INJECTION, SOLUTION INTRAVENOUS at 15:10

## 2018-08-28 RX ADMIN — ONDANSETRON 4 MG: 2 INJECTION INTRAMUSCULAR; INTRAVENOUS at 14:42

## 2018-08-28 RX ADMIN — FENTANYL CITRATE 25 MCG: 50 INJECTION, SOLUTION INTRAMUSCULAR; INTRAVENOUS at 16:24

## 2018-08-28 RX ADMIN — FENTANYL CITRATE 50 MCG: 50 INJECTION, SOLUTION INTRAMUSCULAR; INTRAVENOUS at 11:27

## 2018-08-28 RX ADMIN — ROCURONIUM BROMIDE 50 MG: 10 INJECTION INTRAVENOUS at 11:38

## 2018-08-28 RX ADMIN — ROCURONIUM BROMIDE 20 MG: 10 INJECTION INTRAVENOUS at 13:30

## 2018-08-28 RX ADMIN — SODIUM CHLORIDE, POTASSIUM CHLORIDE, SODIUM LACTATE AND CALCIUM CHLORIDE: 600; 310; 30; 20 INJECTION, SOLUTION INTRAVENOUS at 11:27

## 2018-08-28 RX ADMIN — SIMVASTATIN 10 MG: 10 TABLET, FILM COATED ORAL at 21:09

## 2018-08-28 RX ADMIN — SODIUM CHLORIDE, POTASSIUM CHLORIDE, SODIUM LACTATE AND CALCIUM CHLORIDE: 600; 310; 30; 20 INJECTION, SOLUTION INTRAVENOUS at 12:45

## 2018-08-28 RX ADMIN — KETOROLAC TROMETHAMINE 15 MG: 30 INJECTION, SOLUTION INTRAMUSCULAR at 18:53

## 2018-08-28 RX ADMIN — CEFAZOLIN SODIUM 2 G: 2 INJECTION, SOLUTION INTRAVENOUS at 21:12

## 2018-08-28 RX ADMIN — SODIUM CHLORIDE 1 G: 9 INJECTION, SOLUTION INTRAVENOUS at 11:52

## 2018-08-28 RX ADMIN — Medication 10 MG: at 12:06

## 2018-08-28 RX ADMIN — FENTANYL CITRATE 25 MCG: 50 INJECTION, SOLUTION INTRAMUSCULAR; INTRAVENOUS at 15:59

## 2018-08-28 RX ADMIN — FENTANYL CITRATE 25 MCG: 50 INJECTION, SOLUTION INTRAMUSCULAR; INTRAVENOUS at 15:48

## 2018-08-28 RX ADMIN — FENTANYL CITRATE 25 MCG: 50 INJECTION, SOLUTION INTRAMUSCULAR; INTRAVENOUS at 11:47

## 2018-08-28 RX ADMIN — PROPOFOL 140 MG: 10 INJECTION, EMULSION INTRAVENOUS at 11:38

## 2018-08-28 RX ADMIN — ACETAMINOPHEN 975 MG: 325 TABLET, FILM COATED ORAL at 18:53

## 2018-08-28 RX ADMIN — FENTANYL CITRATE 25 MCG: 50 INJECTION, SOLUTION INTRAMUSCULAR; INTRAVENOUS at 11:38

## 2018-08-28 RX ADMIN — CEFAZOLIN 1 G: 1 INJECTION, POWDER, FOR SOLUTION INTRAMUSCULAR; INTRAVENOUS at 13:21

## 2018-08-28 RX ADMIN — Medication 10 MG: at 13:12

## 2018-08-28 RX ADMIN — DEXAMETHASONE SODIUM PHOSPHATE 8 MG: 4 INJECTION, SOLUTION INTRAMUSCULAR; INTRAVENOUS at 11:51

## 2018-08-28 RX ADMIN — HYDROMORPHONE HYDROCHLORIDE 0.5 MG: 1 INJECTION, SOLUTION INTRAMUSCULAR; INTRAVENOUS; SUBCUTANEOUS at 13:31

## 2018-08-28 RX ADMIN — CEFAZOLIN SODIUM 2 G: 2 INJECTION, SOLUTION INTRAVENOUS at 11:27

## 2018-08-28 RX ADMIN — ASPIRIN 325 MG: 325 TABLET, DELAYED RELEASE ORAL at 21:08

## 2018-08-28 RX ADMIN — FENTANYL CITRATE 25 MCG: 50 INJECTION, SOLUTION INTRAMUSCULAR; INTRAVENOUS at 16:09

## 2018-08-28 RX ADMIN — LIDOCAINE HYDROCHLORIDE 80 MG: 20 INJECTION, SOLUTION INFILTRATION; PERINEURAL at 11:38

## 2018-08-28 RX ADMIN — GABAPENTIN 100 MG: 100 CAPSULE ORAL at 21:08

## 2018-08-28 RX ADMIN — OXYCODONE HYDROCHLORIDE 5 MG: 5 TABLET ORAL at 21:09

## 2018-08-28 RX ADMIN — SODIUM CHLORIDE, POTASSIUM CHLORIDE, SODIUM LACTATE AND CALCIUM CHLORIDE: 600; 310; 30; 20 INJECTION, SOLUTION INTRAVENOUS at 17:44

## 2018-08-28 RX ADMIN — HYDROMORPHONE HYDROCHLORIDE 0.5 MG: 1 INJECTION, SOLUTION INTRAMUSCULAR; INTRAVENOUS; SUBCUTANEOUS at 12:28

## 2018-08-28 RX ADMIN — ACETAMINOPHEN 1000 MG: 500 TABLET ORAL at 08:59

## 2018-08-28 ASSESSMENT — ACTIVITIES OF DAILY LIVING (ADL): ADLS_ACUITY_SCORE: 12

## 2018-08-28 NOTE — ANESTHESIA CARE TRANSFER NOTE
Patient: Esperanza Gage    Procedure(s):  Left Total Knee Replacement - Wound Class: I-Clean    Diagnosis: Osteoarthritis Left Knee  Diagnosis Additional Information: No value filed.    Anesthesia Type:   General, ETT     Note:  Airway :Face Mask  Patient transferred to:PACU  Comments: To PACU with 02, Spont RR. Monitors applied, VSS, PIV/airway patent, Report to RN all questions/concerns answered.   Handoff Report: Identifed the Patient, Identified the Reponsible Provider, Reviewed the pertinent medical history, Discussed the surgical course, Reviewed Intra-OP anesthesia mangement and issues during anesthesia, Set expectations for post-procedure period and Allowed opportunity for questions and acknowledgement of understanding      Vitals: (Last set prior to Anesthesia Care Transfer)    CRNA VITALS  8/28/2018 1450 - 8/28/2018 1524      8/28/2018             NIBP: 141/76    Pulse: 93    NIBP Mean: 93    Temp: 36.9  C (98.4  F)    SpO2: 97 %    Resp Rate (observed): 16                Electronically Signed By: ARSH De La Cruz CRNA  August 28, 2018  3:24 PM

## 2018-08-28 NOTE — CONSULTS
"Consult Date:  08/28/2018      INTERNAL MEDICINE CONSULTATION       ASSESSMENT:     1.  Status post left total knee arthroplasty.  The patient is doing well postoperatively.   2.  History of hypertension, stable perioperatively.   3.  Unknown coronary artery status.  The patient has never had a formal coronary artery evaluation.  EKG prior to surgery was unremarkable.  The patient has remained active prior to surgery without any worrisome cardiac symptoms.   4.  Hyperlipidemia, on statin.      RECOMMENDATIONS:     1.  Routine postoperative labs are ordered.     2.  Losartan will be held for the time being.  Blood pressure will be monitored.     3.  DVT prophylaxis will be with aspirin.      Thank you for having asked me to see this patient.      DISCUSSION:  Esperanza Gage is a 72-year-old female who underwent a left total knee arthroplasty today by Dr. Campos for the treatment of osteoarthritis of the left knee.  I have been asked to see her by    Dr. Campos to assess hypertension.      Please see Dr. Campos's notes in the charting for details regarding the patient's orthopedic history and the indications for surgery.      The events of surgery are noted.  Estimated blood loss was 250 mL.  The patient did receive general anesthesia.  Vital signs were stable throughout.  Intraoperative urine output was 400 mL.        Ms. Gage is seen by me in the recovery room.  She states she is very comfortable.  She asks \"did I miss lunch.\"  She denies chest pain, shortness of breath, nausea, abdominal pain.      PAST MEDICAL HISTORY:  Remarkable for:   1.  Chronic pain secondary to osteoarthritis.   2.  Hypertension.   3.  Hyperlipidemia.   4.  Obesity.   5.  History of pelvic fracture 5 years ago.      The patient denies knowledge of cardiac disease.  She has never had a formal coronary artery evaluation.  There is no history of DVT or PE.      MEDICATIONS:  Preoperatively were:    1.  Losartan 25 mg daily.   2.  Fosamax 70 mg " weekly.     3.  Zocor 10 mg every day.   4.  Aspirin 81 mg daily.      MEDICATION ALLERGIES:  NONE.      SOCIAL HISTORY:  The patient is single.  She denies cigarette, but there is a remote history of cigarette use.  She denies alcohol use.      REVIEW OF SYSTEMS:  Limited as the patient is quite sleepy in the recovery room.  It is noted in the chart that she has been active participating in exercise classes 2 days per week.  She has difficulty ambulating, however secondary to knee pain.  There is no history of exertional chest pain, shortness of breath, fevers, chills, cough.      PHYSICAL EXAMINATION:   GENERAL:  She is a sleepy, well-appearing female who appears comfortable.   VITAL SIGNS:  She is normotensive.   HEENT:  Facies are symmetric.  Dental exam was not performed.   NECK:  Supple.   LUNGS:  Clear.   CARDIAC:  Exam reveals a regular rate and rhythm without murmurs.   ABDOMEN:  Soft, nontender, nondistended.   EXTREMITIES:  Exam reveals no ankle edema.  Pedal pulses are intact.  Her left leg is wrapped.   NEUROLOGIC:  She is sleepy.  Facies are symmetric.      LABORATORY DATA:  Preoperative labs are reviewed.  BMP was normal.  CBC was normal.  EKG was within normal limits.         DMITRI ZAMARRIPA MD             D: 2018   T: 2018   MT: ETHAN      Name:     MOSHE GARCÍA   MRN:      [id number]        Account:       [id number]   :      1945           Consult Date:  2018      Document: [id number]       cc: Pierre Gifford MD

## 2018-08-28 NOTE — IP AVS SNAPSHOT
Esperanza Gage #[id number] (CSN:[id number])  (72 year old F)  (Adm: 18)     [id number]               UR 8A: [phone]            Patient Demographics     Patient Name Sex          Age SSN Address Phone    Esperanza Gage Female 1945 (72 year old) [id number] [place] [address] [phone] (Home) *Preferred*  [phone] (Work)      Emergency Contact(s)     Name Relation Home Work Mobile    Leny Moraes Friend   [phone]    Hood Keatingher ( niece) Relative [phone]    Melinda Mattson (niece) Relative   [phone]    Santo Gage (nephew) Relative   [phone]      Admission Information     Attending Provider Admitting Provider Admission Type Admission Date/Time    Pierre Campos MD Cheng, Edward Y, MD Elective 18  0822    Discharge Date Hospital Service Auth/Cert Status Service Area     Orthopedics [facility]    Unit Room/Bed Admission Status       UR 8A  Admission (Confirmed)       Admission     Complaint    Osteoarthritis Left Knee, Status post knee surgery      Hospital Account     Name Acct ID Class Status Primary Coverage    Esperanza Gage [id number] Inpatient Open MEDICARE - MEDICARE FOR HB SUPPLEMENT            Guarantor Account (for Hospital Account #[id number])     Name Relation to Pt Service Area Active? Acct Type    Esperanza Gage  FCS Yes Personal/Family    Address Phone          [address] [place] [address] [phone](H)  [phone](O)              Coverage Information (for Hospital Account #[id number])     1. MEDICARE/MEDICARE FOR HB SUPPLEMENT     F/O Payor/Plan Precert #    MEDICARE/MEDICARE FOR HB SUPPLEMENT     Subscriber Subscriber #    Esperanza Gage [id number]    Address Phone    ATTN CLAIMS  [address]  [facility] [place] [address] [phone]          2. MEDICA/MEDICA PRIME SOLUTION     F/O Payor/Plan Precert #    MEDICA/MEDICA PRIME SOLUTION      "Subscriber Subscriber #    Esperanza Gage [id number]    Address Phone    [address] [address]  [place] [address] [phone]                                                      INTERAGENCY TRANSFER FORM - PHYSICIAN ORDERS   8/28/2018                       UR 8A: [phone]            Attending Provider: Pierre Campos MD     Allergies:  No Known Allergies    Infection:  None   Service:  ORTHOPEDICS    Ht:  1.664 m (5' 5.51\")   Wt:  89.8 kg (197 lb 15.6 oz)   Admission Wt:  89.8 kg (197 lb 15.6 oz)    BMI:  32.43 kg/m 2   BSA:  2.04 m 2            ED Clinical Impression     Diagnosis Description Comment Added By Time Added    Status post knee surgery [Z98.890] Status post knee surgery [Z98.890]  Roldan Bowen MD 8/30/2018  8:35 AM      Hospital Problems as of 8/31/2018              Priority Class Noted POA    Status post knee surgery Medium  8/28/2018 Yes      Non-Hospital Problems as of 8/31/2018              Priority Class Noted    Vitamin D deficiency Medium  7/16/2012    Hyperlipidemia with target LDL less than 130 Medium  7/17/2012    Osteopenia Medium  7/20/2012    Breast signs and symptoms Medium  7/23/2012    Chondromalacia of patella Medium  10/16/2012    Primary localized osteoarthrosis, lower leg Medium  10/16/2012    Pelvic fracture (H) Medium  9/26/2013    Medication management Medium  9/26/2013    Leg numbness Medium  8/19/2014    Seborrheic keratosis Medium  8/21/2017    Milia Medium  8/21/2017    History of tinea Medium  8/21/2017      Code Status History     Date Active Date Inactive Code Status Order ID Comments User Context    8/31/2018  6:20 AM  Full Code [id number]  Cindy Aguila APRN CNP Outpatient    8/28/2018  5:41 PM 8/31/2018  6:20 AM Full Code [id number]  Navdeep Rivas PA-C Inpatient      Current Code Status     Date Active Code Status Order ID Comments User Context       Prior      Summary of Visit     Reason for your hospital stay       You were admitted " following your total knee arthroplasty                Medication Review      START taking        Dose / Directions Comments    acetaminophen 325 MG tablet   Commonly known as:  TYLENOL        Dose:  650 mg   Take 2 tablets (650 mg) by mouth every 4 hours as needed for other (adjunct to pain control)   Quantity:  100 tablet   Refills:  0        oxyCODONE IR 5 MG tablet   Commonly known as:  ROXICODONE        Dose:  2.5-5 mg   Take 0.5-1 tablets (2.5-5 mg) by mouth every 4 hours as needed for moderate to severe pain or other (for pain rated 4-6/10 take half tab; for pain 7-10/10 take 1 tab)   Quantity:  40 tablet   Refills:  0        senna-docusate 8.6-50 MG per tablet   Commonly known as:  SENOKOT-S;PERICOLACE        Dose:  1 tablet   Take 1 tablet by mouth 2 times daily as needed for constipation   Quantity:  30 tablet   Refills:  0          CONTINUE these medications which may have CHANGED, or have new prescriptions. If we are uncertain of the size of tablets/capsules you have at home, strength may be listed as something that might have changed.        Dose / Directions Comments    alendronate 70 MG tablet   Commonly known as:  FOSAMAX   This may have changed:  additional instructions   Used for:  Senile osteoporosis        Dose:  70 mg   Take 1 tablet (70 mg) by mouth every 7 days Take with over 8 ounces water and stay upright for at least 30 minutes after dose.  Take at least 60 minutes before breakfast   Quantity:  12 tablet   Refills:  3        * aspirin 325 MG EC tablet   Indication:  VTE Prophylaxis   This may have changed:  You were already taking a medication with the same name, and this prescription was added. Make sure you understand how and when to take each.        Dose:  325 mg   Take 1 tablet (325 mg) by mouth 2 times daily for 26 days   Quantity:  40 tablet   Refills:  0        * aspirin 81 MG tablet   This may have changed:    - additional instructions  - These instructions start on 9/26/2018. If  you are unsure what to do until then, ask your doctor or other care provider.        Dose:  81 mg   Start taking on:  9/26/2018   Take 1 tablet (81 mg) by mouth At Bedtime Resume your home baby aspirin AFTER completing your high dose post-operative aspirin (4 weeks)   Quantity:  30 tablet   Refills:  0        ciclopirox 0.77 % cream   Commonly known as:  LOPROX   This may have changed:    - when to take this  - reasons to take this   Used for:  Tinea pedis of both feet        Apply topically 2 times daily   Quantity:  30 g   Refills:  3        losartan 25 MG tablet   Commonly known as:  COZAAR   This may have changed:  when to take this   Used for:  Benign essential hypertension        Dose:  25 mg   Take 1 tablet (25 mg) by mouth daily   Quantity:  90 tablet   Refills:  3        * Notice:  This list has 2 medication(s) that are the same as other medications prescribed for you. Read the directions carefully, and ask your doctor or other care provider to review them with you.      CONTINUE these medications which have NOT CHANGED        Dose / Directions Comments    simvastatin 10 MG tablet   Commonly known as:  ZOCOR   Used for:  Pure hypercholesterolemia        Dose:  10 mg   Take 1 tablet (10 mg) by mouth At Bedtime   Quantity:  90 tablet   Refills:  3                After Care     Activity       Your activity upon discharge: activity as tolerated       Activity - Ambulate in hallway       Every shift       Activity - Up with assistive device           Activity - Up with nursing assistance           Additional Discharge Instructions       Take ASA 325mg BID for 4 weeks post op for DVT prophylaxis; then may resume home ASA 81mg daily.  Wt Bearing as tolerated       Continuous Passive Motion Machine       Start CPM Day of Surgery.  0 - 90 degrees. Advance as tolerated.       Diet       Return to your pre surgery diet       Discharge Instructions       TOTAL KNEE ARTHROPLASTY POST OPERATIVE DISCHARGE  INSTRUCTIONS    FOLLOW UP APPOINTMENT  You are scheduled for a post operative appointment with Dr. Campos approximately four weeks after surgery.     Your follow up appointments will be at the location that you regularly see Dr. Campos:    [facility]  [address] [place], [place] [address] [phone]    Physical therapy:   A prescription for physical therapy will be provided at the time of discharge.      ACTIVITY  Weight bearing status:   You may bear weight on your operative extremity as tolerated, using assistive devices (walker, cane) as needed. As you begin to feel more comfortable ambulating, you may gradually transition from a walker to a cane. Eventually, you should wean from all assistive devices. Although we would like you to discontinue use of assistive devices as soon as possible, do not transition until you have worked with your physical therapist to achieve safe balance and comfort.     Continuous passive motion machine:   Perform CPM exercises for six to eight hours per day for the first four weeks after surgery. The CPM should be set at 0 degrees to flexion tolerance with a goal of 90 degrees. Advance the CPM settings aggressively in increments of 5 degrees every 30 minutes until goal is achieved.     Exercises:   Perform the following exercises at least three times per day for the first four weeks after surgery to prevent complications, such as blood clots in your legs:  1) Point and flex your feet  2) Move your ankle around in big circles  3) Wiggle your toes   Also, perform thigh muscle tightening exercises for 10 to 15 minutes at least three times per day for the first four weeks after surgery.    Athletic Activities:  Activities such as swimming, bicycling, jogging, running, and stop-and-go sports should be avoided until permitted by your provider.    Driving:  Driving is not permitted until directed by your provider. Typically, driving is  restricted for three to four weeks after right knee surgery and three weeks after left knee surgery. Under no circumstance are you permitted to drive while using narcotic pain medications.    Return to Work:  You may return to work when directed by your provider. Typically, patients with desk/sitting jobs can return to work within two weeks while patients with heavy labor jobs can return to work around three months after surgery.      COMFORT AND PAIN MANAGEMENT  Elevation:   During times of inactivity throughout the first two weeks after surgery, make an effort to decrease swelling by elevating your operative extremity. This is most effectively done by lying down and placing several pillows lengthwise under your thigh and calf to raise your toes above the level of your nose. To ensure that you knee remains in full extension, do not place pillows directly under your knee.     Icing:  An ice pack will be provided to control swelling and discomfort after surgery. Place a thin towel on your skin and apply the ice pack overtop. You may apply ice for 20 minutes as often as two times per hour.    Pain Medications:  You will be discharged with acetaminophen (Tylenol) and a narcotic medication for pain management after surgery. Acetaminophen can help to effectively manage pain when used as prescribed, however, do not exceed the maximum daily dose of 3000 mg. The narcotic pain medication should be reserved for severe, breakthrough pain. Take the narcotic medication as prescribed and use only as often as necessary.       ANTICOAGULATION  Take Aspirin 325mg TWICE DAILY for 4 weeks after surgery. This medication will help prevent blood clots      WOUND CARE AND SHOWERING  Wound care:  Keep the dressing on, clean, and dry for the first five days after surgery. You may then remove the dressing and replace it with fresh 4x4s and tubigrip (or ACE wrap). Your surgical incision was closed with a clear knotless suture and tails were  left at the margins of the incision. These tails can be left in place until your follow up appointment. The steri strips (small white tape that is directly on the incision areas) should be left in place until they fall off or are removed at your first office visit.    Showering:  You may shower ten days after surgery provided your incision is intact and dry without drainage. If there is fluid at the incision site, cover the incision with a non-permeable plastic bag. You may allow water to run over the incision, but do not soak or submerge the incision. Do not scrub the incision.     Tub Bathing:  Tub bathing, swimming, or any other activities that cause your incision to be submerged should be avoided until allowed by your provider. Typically, patients are allowed to return to these activities four weeks after surgery.      CONTACTING YOUR PHYSICIAN:  You may experience symptoms that require follow-up before your scheduled appointment. Please contact Dr. Campos's office if you experience:  1) Pain that persists or worsens in the first few days after surgery  2) Excessive redness or drainage of cloudy or bloody material from the wounds (clear red tinted fluid and some mild drainage should be expected) or drainage of any kind five days after surgery  3) A temperature elevation greater than 101.5    4) Pain, swelling or redness in your calf  5) Numbness or weakness in your leg or foot      Regular business hours (Monday - Friday, 8am - 5pm):  [facility]: [phone]    If you have any other concerns during normal business hours, please contact Dr. Campos's nurse, Daniela Bradford RN, at [phone] during normal business hours 8 am - 5 pm (leave message as needed). If your concern is urgent, please page Daniela Bradford RN at [phone] and key in your 10 digit phone number followed by the # sign after the beep.     After hours and weekends:  [facility] on call  Orthopedic resident: [phone]    If you have an emergent concern, contact the Emergency Department at the [place] - [phone] - or [place] - [phone] - [facility].       Fall precautions           General info for SNF       Length of Stay Estimate: Short Term Care: Estimated # of Days <30  Condition at Discharge: Improving  Level of care:skilled   Rehabilitation Potential: Good  Admission H&P remains valid and up-to-date: Yes  Recent Chemotherapy: N/A  Use Nursing Home Standing Orders: Yes    Hgb and BMP on 9/4/18       Mantoux instructions       Give two-step Mantoux (PPD) Per Facility Policy Yes       Weight bearing status       As tolerated       Wound care (specify)       Site:   Left knee  Instructions:  Post surgical dressing to remain clean and intact until 5-7 days post op, then change daily with nonadhesive, dry gauze and ace wrap. Avoid adhesives if possible. See full care instructions in patient AVS             Referrals     Occupational Therapy Adult Consult       Evaluate and treat as clinically indicated.    Reason: s/p tka       Physical Therapy Adult Consult       Evaluate and treat as clinically indicated.    Reason:  eval and treat             Follow-Up Appointment Instructions     Adult [facility] Follow-up and recommended labs and tests       Follow up with Dr. Campos at 4  weeks post op for wound check.     [facility] ([address] [place] [address]). Call [phone] to schedule a follow-up appointment at this location with your provider.    *patient is scheduled with Dr. Campos on 9/24/18 at 9:30a*             Your next 10 appointments already scheduled     Sep 24, 2018  9:30 AM CDT   (Arrive by 9:15 AM)   Return Visit with Pierre Campos MD   [facility] ([facility])    [address] [place] [address] [phone]              Statement of Approval     Ordered           "08/31/18 0747  I have reviewed and agree with all the recommendations and orders detailed in this document.  EFFECTIVE NOW     Approved and electronically signed by:  Roldan Bowen MD                                                 INTERAGENCY TRANSFER FORM - NURSING   8/28/2018                       UR 8A: [phone]            Attending Provider: Pierre Campos MD     Allergies:  No Known Allergies    Infection:  None   Service:  ORTHOPEDICS    Ht:  1.664 m (5' 5.51\")   Wt:  89.8 kg (197 lb 15.6 oz)   Admission Wt:  89.8 kg (197 lb 15.6 oz)    BMI:  32.43 kg/m 2   BSA:  2.04 m 2            Advance Directives        Scanned docmt in ACP Activity?           Yes, scanned ACP docmt        Immunizations     Name Date      Influenza (H1N1) 01/27/10     Influenza (High Dose) 3 valent vaccine 10/02/13     Pneumo Conj 13-V (2010&after) 01/14/15     Pneumococcal 23 valent 07/11/12     Tdap (Adacel,Boostrix) 02/08/10     Zoster vaccine, live 07/11/12       ASSESSMENT     Discharge Profile Flowsheet     DISCHARGE NEEDS ASSESSMENT     Inspection of bony prominences  Full except (identify areas not inspected) 08/31/18 0800    Equipment Currently Used at Home  grab bar;shower chair 08/29/18 1210   Full except areas not inspected   Spine;Hip, left;Hip, right;Buttock, left;Buttock, right;Sacrum;Coccyx 08/31/18 0800    Transportation Available  car;family or friend will provide 08/29/18 1210   Inspection under devices  Full except (identify device(s) not inspected) 08/31/18 0800    GASTROINTESTINAL (ADULT,PEDIATRIC,OB)     Not Inspected under devices  -- (UTV under dressing) 08/31/18 0800    GI WDL  WDL 08/31/18 0800   Skin WDL  ex 08/31/18 0800    Last Bowel Movement  08/31/18 08/31/18 0802   Skin Color/Characteristics  without discoloration 08/31/18 0800    GI Signs/Symptoms  heartburn 08/28/18 0917   Skin Integrity  incision(s) 08/31/18 0800    Passing flatus  yes 08/31/18 0800   SAFETY      COMMUNICATION " "ASSESSMENT     Safety WDL  WDL 08/31/18 0800    Patient's communication style  spoken language (English or Bilingual) 08/13/18 1133   Safety Factors  bed in low position;wheels locked;call light in reach;upper side rails raised x 2;ID band on 08/31/18 0800    SKIN     All Alarms  other (see comments) 08/31/18 0956                 Assessment WDL (Within Defined Limits) Definitions           Safety WDL     Effective: 09/28/15    Row Information: <b>WDL Definition:</b> Bed in low position, wheels locked; call light in reach; upper side rails up x 2; ID band on<br> <font color=\"gray\"><i>Item=AS safety wdl>>List=AS safety wdl>>Version=F14</i></font>      Skin WDL     Effective: 09/28/15    Row Information: <b>WDL Definition:</b> Warm; dry; intact; elastic; without discoloration; pressure points without redness<br> <font color=\"gray\"><i>Item=AS skin wdl>>List=AS skin wdl>>Version=F14</i></font>      Vitals     Vital Signs Flowsheet     QUICK ADDS     Pain Orientation  Left 08/30/18 2024    Quick Adds  -- (Moonlighter notified of temp ) 08/29/18 2224   Pain Descriptors  Aching 08/30/18 2024    VITAL SIGNS     Pain Intervention(s)  Medication (See eMAR);Cold applied;Repositioned 08/30/18 2024    Temp  98.1  F (36.7  C) 08/31/18 0901   CLINICALLY ALIGNED PAIN ASSESSMENT (CAPA) ([facility] ADULTS ONLY)      Temp src  Oral 08/31/18 0247   Comfort  comfortably manageable 08/31/18 1413    Resp  16 08/31/18 0901   Change in Pain  getting better 08/31/18 1413    Pulse  78 08/30/18 1524   Pain Control  partially effective 08/31/18 1413    Heart Rate  75 08/31/18 0901   Functioning  can do most things, but pain gets in the way of some 08/31/18 1413    Pulse/Heart Rate Source  Monitor 08/30/18 2234   ANALGESIA SIDE EFFECTS MONITORING      BP  114/53 08/31/18 0901   Side Effects Monitoring: Respiratory Quality  R 08/31/18 1413    BP Location  Left arm 08/30/18 0611   Side Effects Monitoring: Respiratory Depth  N " "08/31/18 1413    Patient Position  Lying 08/28/18 1700   Side Effects Monitoring: Sedation Level  1 08/31/18 1413    OXYGEN THERAPY     HEIGHT AND WEIGHT      SpO2  97 % 08/31/18 0901   Height  1.664 m (5' 5.51\") 08/28/18 0844    O2 Device  None (Room air) 08/31/18 0901   Weight  89.8 kg (197 lb 15.6 oz) 08/28/18 0844    Oxygen Delivery  2 LPM 08/28/18 1602   BSA (Calculated - sq m)  2.04 08/28/18 0844    RESPIRATORY MONITORING     BMI (Calculated)  32.5 08/28/18 0844    Respiratory Monitoring (EtCO2)  36 mmHg 08/29/18 1924   POSITIONING      Integrated Pulmonary Index (IPI)  8-9 08/29/18 1924   Body Position  independently positioning 08/31/18 0956    PAIN/COMFORT     Head of Bed (HOB)  HOB at 20-30 degrees 08/31/18 0956    Patient Currently in Pain  sleeping: patient not able to self report 08/31/18 0541   DAILY CARE      Preferred Pain Scale  CAPA (Clinically Aligned Pain Assessment) ([facility] Adults Only) 08/31/18 0541   Activity Management  activity adjusted per tolerance 08/31/18 0956    Pain Location  Knee 08/30/18 2024   Activity Assistance Provided  assistance, 1 person 08/31/18 0956            Patient Lines/Drains/Airways Status    Active LINES/DRAINS/AIRWAYS     Name: Placement date: Placement time: Site: Days: Last dressing change:    Closed/Suction Drain Left Knee Bulb 15 [place] 08/28/18   1412   Knee   3     Peripheral IV 08/28/18 Left Lower forearm 08/28/18   0931   Lower forearm   3     Incision/Surgical Site 08/28/18 Left Knee 08/28/18   1229    3             Patient Lines/Drains/Airways Status    Active PICC/CVC     None            Intake/Output Detail Report     Date Intake     Output     Net    Shift P.O. I.V. IV Piggyback Total Urine Drains Blood Total       Day 08/30/18 0000 - 08/30/18 0659 -- -- -- -- 800 40 -- 840 -840    Lili 08/30/18 0700 - 08/30/18 1459 -- -- -- -- 200 -- -- 200 -200    Noc 08/30/18 1500 - 08/30/18 2359 -- -- -- -- 1000 -- -- 1000 -1000    Day 08/31/18 0000 " - 08/31/18 0659 -- -- -- -- 600 -- -- 600 -600    Lili 08/31/18 0700 - 08/31/18 1459 -- -- -- -- -- -- -- -- 0      Last Void/BM       Most Recent Value    Urine Occurrence 1 at 08/30/2018 1619    Stool Occurrence       Case Management/Discharge Planning     Case Management/Discharge Planning Flowsheet     LIVING ENVIRONMENT     Equipment Currently Used at Home  grab bar;shower chair 08/29/18 1210    Lives With  alone 08/29/18 1210   ABUSE RISK SCREEN      Living Arrangements  house 08/29/18 1210   QUESTION TO PATIENT:  Has a member of your family or a partner(now or in the past) intimidated, hurt, manipulated, or controlled you in any way?  no 08/28/18 0857    COPING/STRESS     QUESTION TO PATIENT: Do you feel safe going back to the place where you are living?  yes 08/28/18 0857    Major Change/Loss/Stressor  hospitalization;surgery/procedure 08/13/18 1133   OBSERVATION: Is there reason to believe there has been maltreatment of a vulnerable adult (ie. Physical/Sexual/Emotional abuse, self neglect, lack of adequate food, shelter, medical care, or financial exploitation)?  no 08/28/18 0857    DISCHARGE PLANNING     OTHER      Transportation Available  car;family or friend will provide 08/29/18 1210   Are you depressed or being treated for depression?  No 08/28/18 1924    FINAL RESOURCES                         UR 8A: [phone]            Medication Administration Report for Esperanza Gage as of 08/31/18 1430   Legend:    Given Hold Not Given Due Canceled Entry Other Actions    Time Time (Time) Time  Time-Action       Inactive    Active    Linked        Medications 08/25/18 08/26/18 08/27/18 08/28/18 08/29/18 08/30/18 08/31/18    acetaminophen (TYLENOL) tablet 650 mg  Dose: 650 mg  Freq: EVERY 4 HOURS PRN Route: PO  PRN Reason: other  PRN Comment: multimodal surgical pain management along with NSAIDS and opioid medication as indicated based on pain control and physical function.  Start: 08/31/18 1400   Admin  "Instructions: May give first dose 4 hours after last scheduled dose of acetaminophen.  Maximum acetaminophen dose from all sources = 75 mg/kg/day not to exceed 4 grams/day.    Admin. Amount: 2 tablet (2 × 325 mg tablet)  Dispense Loc: [facility] ADS 8AEAST  POC: Post-procedure               aspirin EC tablet 325 mg  Dose: 325 mg  Freq: 2 TIMES DAILY Route: PO  Indications Comment: VTE Prophylaxis  Start: 08/28/18 2000   Admin Instructions: DO NOT CRUSH.    Admin. Amount: 1 tablet (1 × 325 mg tablet)  Last Admin: 08/31/18 0755  Dispense Loc: [facility] ADS 8AEAST  POC: Post-procedure        2108 (325 mg)-Given        0950 (325 mg)-Given       2044 (325 mg)-Given        0850 (325 mg)-Given       2020 (325 mg)-Given        0755 (325 mg)-Given       [ ] 2000           bisacodyl (DULCOLAX) Suppository 10 mg  Dose: 10 mg  Freq: DAILY Route: RE  Start: 08/29/18 0800   Admin Instructions: Start POD 1.  May discontinue if patient having bowel movement.    Admin. Amount: 1 suppository (1 × 10 mg suppository)  Dispense Loc: [facility] ADS [facility]  POC: Post-procedure         (0953)-Not Given        (0851)-Not Given        (0754)-Not Given           lidocaine (LMX4) cream  Freq: EVERY 1 HOUR PRN Route: Top  PRN Reason: pain  PRN Comment: with VAD insertion or accessing implanted port.  Start: 08/28/18 1741   Admin Instructions: Do NOT give if patient has a history of allergy to any local anesthetic or any \"andrea\" product.   Apply 30 minutes prior to VAD insertion or port access.  MAX Dose:  2.5 g (  of 5 g tube)    Dispense Loc: [facility] ADS [facility]AST  POC: Post-procedure               lidocaine 1 % 1 mL  Dose: 1 mL  Freq: EVERY 1 HOUR PRN Route: OTHER  PRN Comment: mild pain with VAD insertion or accessing implanted port  Start: 08/28/18 1741   Admin Instructions: Do NOT give if patient has a history of allergy to any local anesthetic or any \"andrea\" product. MAX dose 1 mL subcutaneous OR intradermal in divided doses.    Admin. Amount: 1 " mL  Dispense Loc: [facility] ADS 8[facility]  Volume: 2 mL  POC: Post-procedure               naloxone (NARCAN) injection 0.1-0.4 mg  Dose: 0.1-0.4 mg  Freq: EVERY 2 MIN PRN Route: IV  PRN Reason: opioid reversal  Start: 08/30/18 0729   Admin Instructions: For respiratory rate LESS than or EQUAL to 8.  Partial reversal dose:  0.1 mg titrated q 2 minutes for Analgesia Side Effects Monitoring Sedation Level of 3 (frequently drowsy, arousable, drifts to sleep during conversation).Full reversal dose:  0.4 mg bolus for Analgesia Side Effects Monitoring Sedation Level of 4 (somnolent, minimal or no response to stimulation).  For ordered doses up to 2mg give IVP. Give each 0.4mg over 15 seconds in emergency situations. For non-emergent situations further dilute in 9mL of NS to facilitate titration of response.    Admin. Amount: 0.1-0.4 mg = 0.25-1 mL Conc: 0.4 mg/mL  Dispense Loc: [facility] ADS [facility]  Volume: 1 mL               ondansetron (ZOFRAN-ODT) ODT tab 4 mg  Dose: 4 mg  Freq: EVERY 6 HOURS PRN Route: PO  PRN Reasons: nausea,vomiting  Start: 08/28/18 2045   Admin Instructions: This is Step 1 of nausea and vomiting management.  If nausea not resolved in 15 minutes, go to Step 2 prochlorperazine (COMPAZINE). Do not push through foil backing. Peel back foil and gently remove. Place on tongue immediately. Administration with liquid unnecessary  With dry hands, peel back foil backing and gently remove tablet; do not push oral disintegrating tablet through foil backing; administer immediately on tongue and oral disintegrating tablet dissolves in seconds; then swallow with saliva; liquid not required.    Admin. Amount: 1 tablet (1 × 4 mg tablet)  Dispense Loc: [facility] ADS [facility]  POC: Post-procedure              Or  ondansetron (ZOFRAN) injection 4 mg  Dose: 4 mg  Freq: EVERY 6 HOURS PRN Route: IV  PRN Reasons: nausea,vomiting  Start: 08/28/18 2045   Admin Instructions: This is Step 1 of nausea and vomiting management.  If nausea  not resolved in 15 minutes, go to Step 2 prochlorperazine (COMPAZINE).  Irritant. For ordered doses up to 4 mg, give IV Push undiluted over 2-5 minutes.    Admin. Amount: 4 mg = 2 mL Conc: 4 mg/2 mL  Dispense Loc: [facility] ADS [facility]  Infused Over: 2-5 Minutes  Volume: 2 mL  POC: Post-procedure               oxyCODONE IR (ROXICODONE) half-tab 2.5-5 mg  Dose: 2.5-5 mg  Freq: EVERY 3 HOURS PRN Route: PO  PRN Reason: other  PRN Comment: pain control or improvement in physical function. Hold dose for analgesic side effects.  Start: 08/30/18 0727   Admin Instructions: Start with the lowest dose. May adjust dose by 5 mg every 4 hours as needed. Notify provider to assess for uncontrolled pain or analgesic side effects. Hold while on PCA or with regular IV opioid dosing. Maximum total is 60 mg in 24 hours.    Admin. Amount: 1-2 half-tab (1-2 × 2.5 mg half-tab)  Last Admin: 08/31/18 1301  Dispense Loc: [facility] ADS [facility]  POC: Post-procedure          0825 (2.5 mg)-Given       1319 (2.5 mg)-Given       1628 (5 mg)-Given       2020 (2.5 mg)-Given        0859 (2.5 mg)-Given       1301 (2.5 mg)-Given           prochlorperazine (COMPAZINE) injection 5 mg  Dose: 5 mg  Freq: EVERY 6 HOURS PRN Route: IV  PRN Reasons: nausea,vomiting  Start: 08/28/18 1741   Admin Instructions: This is Step 2 of nausea and vomiting management.   If nausea not resolved in 15 minutes, give metoclopramide (REGLAN) if ordered (step 3 of nausea and vomiting management)  For ordered doses up to 10 mg, give IV Push undiluted. Each 5mg over 1 minute.    Admin. Amount: 5 mg = 1 mL Conc: 5 mg/mL  Dispense Loc: [facility] ADS [facility]  Infused Over: 1-2 Minutes  Volume: 1 mL  POC: Post-procedure              Or  prochlorperazine (COMPAZINE) tablet 5 mg  Dose: 5 mg  Freq: EVERY 6 HOURS PRN Route: PO  PRN Reasons: nausea,vomiting  Start: 08/28/18 1741   Admin Instructions: This is Step 2 of nausea and vomiting management.   If nausea not resolved in 15 minutes, give  metoclopramide (REGLAN) if ordered (step 3 of nausea and vomiting management)    Admin. Amount: 1 tablet (1 × 5 mg tablet)  Dispense Loc: [facility]  POC: Post-procedure               senna-docusate (SENOKOT-S;PERICOLACE) 8.6-50 MG per tablet 1 tablet  Dose: 1 tablet  Freq: 2 TIMES DAILY Route: PO  Start: 08/28/18 1741   Admin Instructions: If no bowel movement in 24 hours, increase to 2 tablets PO.  Hold for loose stools.    Admin. Amount: 1 tablet  Dispense Loc: [facility] ADS [facility]  POC: Post-procedure        (2112)-Not Given               (2044)-Not Given                              [ ] 2000          Or  senna-docusate (SENOKOT-S;PERICOLACE) 8.6-50 MG per tablet 2 tablet  Dose: 2 tablet  Freq: 2 TIMES DAILY Route: PO  Start: 08/28/18 1741   Admin Instructions: Hold for loose stools.    Admin. Amount: 2 tablet  Last Admin: 08/31/18 0755  Dispense Loc: [facility] ADS [facility]  POC: Post-procedure                0950 (2 tablet)-Given               0850 (2 tablet)-Given       2020 (2 tablet)-Given        0755 (2 tablet)-Given       [ ] 2000           simvastatin (ZOCOR) tablet 10 mg  Dose: 10 mg  Freq: AT BEDTIME Route: PO  Start: 08/28/18 2200   Admin. Amount: 1 tablet (1 × 10 mg tablet)  Last Admin: 08/30/18 2319  Dispense Loc: [facility] ADS [facility]        2109 (10 mg)-Given        2141 (10 mg)-Given        2319 (10 mg)-Given        [ ] 2200           sodium chloride (PF) 0.9% PF flush 3 mL  Dose: 3 mL  Freq: EVERY 8 HOURS Route: IK  Start: 08/28/18 1745   Admin Instructions: And Q1H PRN, to lock peripheral IV dormant line.    Admin. Amount: 3 mL  Last Admin: 08/30/18 2020  Dispense Loc: [facility] Floor Stock  Volume: 3 mL  POC: Post-procedure   Current Line: Peripheral IV 08/28/18 Left Lower forearm       (1745)-Not Given        0153 (3 mL)-Given       (0951)-Not Given       1900 (3 mL)-Given        0303 (3 mL)-Given       1153 (3 mL)-Given       2020 (3 mL)-Given        (0144)-Not Given       [ ] 0945       [ ]             sodium chloride (PF) 0.9% PF flush 3 mL  Dose: 3 mL  Freq: EVERY 1 HOUR PRN Route: IK  PRN Reason: line flush  PRN Comment: for peripheral IV flush post IV meds  Start: 18   Admin. Amount: 3 mL  Dispense Loc: [facility] Floor Stock  Volume: 3 mL  POC: Post-procedure              Completed Medications  Medications 18         Dose: 975 mg  Freq: EVERY 8 HOURS Route: PO  Start: 18 1800   End: 18 1115   Admin Instructions: Do not use if patient has an active opioid/acetaminophen combined analgesic product ordered for pain.  Maximum acetaminophen dose from all sources = 75 mg/kg/day not to exceed 4 grams/day.    Admin. Amount: 3 tablet (3 × 325 mg tablet)  Last Admin: 18 111  Dispense Loc: [facility] ADS [facility]  Administrations Remainin  POC: Post-procedure        1853 (975 mg)-Given        0251 (975 mg)-Given       1148 (975 mg)-Given       1856 (975 mg)-Given        0301 (975 mg)-Given       1152 (975 mg)-Given       2019 (975 mg)-Given        0426 (975 mg)-Given       1115 (975 mg)-Given             Dose: 2 g  Freq: EVERY 8 HOURS Route: IV  Indications of Use: PERIOPERATIVE PHARMACOPROPHYLAXIS  Start: 18   End: 18 0614   Admin Instructions: First post-op dose due 8 hours after intra-op dose, see eMAR.      Admin. Amount: 2 g = 100 mL Conc: 2 g/100 mL  Last Admin: 18 0544  Dispense Loc: [facility]  Infused Over: 30 Minutes  Administrations Remainin  Volume: 100 mL  POC: Post-procedure   Current Line: Peripheral IV 18 Left Lower forearm        (2 g)-Given        0544 (2 g)-Given               Dose: 100 mg  Freq: AT BEDTIME Route: PO  Start: 18   End: 18 231   Admin. Amount: 1 capsule (1 × 100 mg capsule)  Last Admin: 18 6393  Dispense Loc: [facility] ADS [facility]  Administrations Remainin  POC: Post-procedure         (100 mg)-Given          (100 mg)-Given        2319 (100 mg)-Given              Dose: 15 mg  Freq: EVERY 6 HOURS Route: IV  Start: 18 1600   End: 18 014   Admin Instructions: Age greater than or equal to 65 years AND CrCl greater than 50 mL/minute.  May continue use for up to 5 days MAX if order renewed.  IF celecoxib (CELEBREX) was given pre-operatively, start ketorolac (TORADOL) 12 hours after celecoxib (CELEBREX) given.  Can cause pain on injection. Administer through a running maintenance fluid over 1 minute followed by a flush. If patient complains of pain on injection, may dilute 15-30 mg in 5 mL and push over 1 to 2 minutes.    Admin. Amount: 15 mg = 0.5 mL Conc: 30 mg/mL  Last Admin: 18  Dispense Loc: [facility] ADS [facility]  Administrations Remainin  Volume: 1 mL  POC: Post-procedure   Current Line: Peripheral IV 18 Left Lower forearm       1853 (15 mg)-Given        014 (15 mg)-Given            Discontinued Medications  Medications 18         Dose: 25-50 mcg  Freq: EVERY 2 MIN PRN Route: IV  PRN Reason: other  PRN Comment: acute pain  Start: 18 1532   End: 18 172   Admin Instructions: MAX cumulative dose = 250 mcg.   Use fentaNYL (SUBLIMAZE) initially, as a short acting agent for acute pain control. If insufficient, or a longer acting agent is needed, begin morphine or HYDROmorphone (DILAUDID) if ordered.  For ordered doses up to 100 mcg give IV Push undiluted over a minimum of 3-5 minutes.    Admin. Amount: 25-50 mcg = 0.5-1 mL Conc: 50 mcg/mL  Last Admin: 18  Dispense Loc: [facility] ADS PACU  Volume: 2 mL  POC: PACU        1548 (25 mcg)-Given       1559 (25 mcg)-Given       1609 (25 mcg)-Given       1624 (25 mcg)-Given       1726-Med Discontinued            Dose: 0.3-0.5 mg  Freq: EVERY 2 HOURS PRN Route: IV  PRN Reason: other  PRN Comment: pain control or improvement in physical function. Hold dose for analgesic side  effects.  Start: 08/28/18 1556   End: 08/30/18 0727   Admin Instructions: Start at the lowest dose.  May adjust dose by 0.1 mg every 2 hours as needed.   Notify provider to assess for uncontrolled pain or analgesic side effects.  Hold while on IV PCA or with regular IV opioid dosing.  For ordered doses up to 4 mg give IV Push undiluted. Administer each 2mg over 2-5 minutes.    Admin. Amount: 0.3-0.5 mg  Last Admin: 08/29/18 0941  Dispense Loc: [facility] ADS [facility]  POC: Post-procedure   Current Line: Peripheral IV 08/28/18 Left Lower forearm        0941 (0.3 mg)-Given        0727-Med Discontinued          Dose: 0.3-0.5 mg  Freq: EVERY 5 MIN PRN Route: IV  PRN Reason: other  PRN Comment: acute pain.  May administer if Respiratory Rate is greater than 10  Start: 08/28/18 1532   End: 08/28/18 1726   Admin Instructions: Max cumulative dose = 2 mg  If fentaNYL (SUBLIMAZE) is also ordered, use HYDROmorphone (DILAUDID) if pain control insufficient with fentaNYL (SUBLIMAZE) or a longer acting agent is needed.  For ordered doses up to 4 mg give IV Push undiluted. Administer each 2mg over 2-5 minutes.    Admin. Amount: 0.3-0.5 mg  Dispense Loc: [facility] ADS PACU  POC: PACU        1726-Med Discontinued            Rate: 75 mL/hr   Freq: CONTINUOUS Route: IV  Start: 08/28/18 1600   End: 08/31/18 0736   Admin Instructions: Discontinue when patient PO intake is GREATER than 500 mL per shift.    Last Admin: 08/29/18 0550  Dispense Loc: [facility] Floor Stock  Volume: 1,000 mL  POC: Post-procedure   Current Line: Peripheral IV 08/28/18 Left Lower forearm       1744 ( )-New Bag        0550 ( )-New Bag         0736-Med Discontinued         Rate: 100 mL/hr   Freq: CONTINUOUS Route: IV  Start: 08/28/18 1545   End: 08/28/18 1726   Admin Instructions: Continue until IV catheter is weaned    POC: PACU               1726-Med Discontinued            Rate: 25 mL/hr   Freq: CONTINUOUS Route: IV  Start: 08/28/18 0845   End: 08/28/18 1523   Admin  "Instructions: IF patient NOT on dialysis.    Dispense Loc: [facility] Floor Stock  Volume: 1,000 mL  POC: Pre-procedure               1523-Med Discontinued            Freq: EVERY 1 HOUR PRN Route: Top  PRN Reason: pain  PRN Comment: with VAD insertion or accessing implanted port.  Start: 18   End: 18   Admin Instructions: Do NOT give if patient has a history of allergy to any local anesthetic or any \"andrea\" product.   Apply 30 minutes prior to VAD insertion or port access.  MAX Dose:  2.5 g (  of 5 g tube)    Dispense Loc: [facility] ADS PACU  POC: Pre-procedure        1523-Med Discontinued            Dose: 1 mL  Freq: EVERY 1 HOUR PRN Route: OTHER  PRN Comment: mild pain with VAD insertion or accessing implanted port  Start: 18   End: 18   Admin Instructions: Do NOT give if patient has a history of allergy to any local anesthetic or any \"andrea\" product. MAX dose 1 mL subcutaneous OR intradermal in divided doses.    Admin. Amount: 1 mL  Dispense Loc: [facility] ADS PACU  Volume: 2 mL  POC: Pre-procedure        1523-Med Discontinued            Dose: 15 mL  Freq: 2 TIMES DAILY Route: PO  Start: 18   End: 18   Admin Instructions: Start evening of surgery.    Admin. Amount: 15 mL  Dispense Loc: [facility] ADS [facility]  Administrations Remainin  Volume: 30 mL  POC: Post-procedure        ()-Not Given        (952)-Not Given       -Med Discontinued           Dose: 0.1-0.4 mg  Freq: EVERY 2 MIN PRN Route: IV  PRN Reason: opioid reversal  Start: 18   End: 18   Admin Instructions: For respiratory rate LESS than or EQUAL to 8.  Partial reversal dose:  0.1 mg titrated q 2 minutes for Analgesia Side Effects Monitoring Sedation Level of 3 (frequently drowsy, arousable, drifts to sleep during conversation).Full reversal dose:  0.4 mg bolus for Analgesia Side Effects Monitoring Sedation Level of 4 (somnolent, minimal or no response to " stimulation).  For ordered doses up to 2mg give IVP. Give each 0.4mg over 15 seconds in emergency situations. For non-emergent situations further dilute in 9mL of NS to facilitate titration of response.    Admin. Amount: 0.1-0.4 mg = 0.25-1 mL Conc: 0.4 mg/mL  Volume: 1 mL  POC: Post-procedure        1742-Med Discontinued            Dose: 0.1-0.4 mg  Freq: EVERY 2 MIN PRN Route: IV  PRN Reason: opioid reversal  Start: 08/28/18 1741   End: 08/29/18 1740   Admin Instructions: For apnea or imminent respiratory arrest: give 0.4 mg IV undiluted Q 2 minutes PRN until desired degree of reversal is obtained, stop opioid and notify provider. Continue monitoring until discharge criteria are met for a minimum of 2 hours.  For severe sedation, decrease in respiratory depth, quality or respiratory rate less than 8: give 0.1 mg IV Q 2 minutes x 3 doses, stop opioid and notify provider.  Try to minimize reversal of analgesia especially in end-of-life patients  For ordered doses up to 2mg give IVP. Give each 0.4mg over 15 seconds in emergency situations. For non-emergent situations further dilute in 9mL of NS to facilitate titration of response.    Admin. Amount: 0.1-0.4 mg = 0.25-1 mL Conc: 0.4 mg/mL  Dispense Loc: [facility] ADS [facility]  Volume: 1 mL  POC: Post-procedure         1740-Med Discontinued           Dose: 4 mg  Freq: EVERY 30 MIN PRN Route: PO  PRN Reason: nausea  Start: 08/28/18 1532   End: 08/28/18 1726   Admin Instructions: MAX total dose = 8 mg, including OR dosing. If not resolved in 15 minutes, then go to step 2 [prochlorperazine (COMPAZINE), if ordered].  With dry hands, peel back foil backing and gently remove tablet; do not push oral disintegrating tablet through foil backing; administer immediately on tongue and oral disintegrating tablet dissolves in seconds; then swallow with saliva; liquid not required.    Admin. Amount: 1 tablet (1 × 4 mg tablet)  Dispense Loc: [facility] ADS 3APRE-OP  Administrations Remaining:  2  POC: PACU        1726-Med Discontinued         Or    Dose: 4 mg  Freq: EVERY 30 MIN PRN Route: IV  PRN Reason: nausea  Start: 18 1532   End: 18 1726   Admin Instructions: MAX total dose = 8 mg, including OR dosing. If not resolved in 15 minutes, then go to step 2 [prochlorperazine (COMPAZINE), if ordered].  Irritant. For ordered doses up to 4 mg, give IV Push undiluted over 2-5 minutes.    Admin. Amount: 4 mg = 2 mL Conc: 4 mg/2 mL  Dispense Loc: [facility] ADS PACU  Infused Over: 2-5 Minutes  Administrations Remainin  Volume: 2 mL  POC: PACU        1726-Med Discontinued            Dose: 5-10 mg  Freq: EVERY 3 HOURS PRN Route: PO  PRN Reason: other  PRN Comment: pain control or improvement in physical function. Hold dose for analgesic side effects.  Start: 18 1415   End: 18 0727   Admin Instructions: Start with the lowest dose. May adjust dose by 5 mg every 4 hours as needed. Notify provider to assess for uncontrolled pain or analgesic side effects. Hold while on PCA or with regular IV opioid dosing. Maximum total is 60 mg in 24 hours.    Admin. Amount: 1-2 tablet (1-2 × 5 mg tablet)  Last Admin: 18 1858  Dispense Loc: [facility] ADS [facility]  POC: Post-procedure         1408 (5 mg)-Given       1858 (5 mg)-Given        0727-Med Discontinued          Dose: 5-10 mg  Freq: EVERY 4 HOURS PRN Route: PO  PRN Reason: other  PRN Comment: pain control or improvement in physical function. Hold dose for analgesic side effects.  Start: 18 1556   End: 18 1407   Admin Instructions: Start with the lowest dose. May adjust dose by 5 mg every 4 hours as needed. Notify provider to assess for uncontrolled pain or analgesic side effects. Hold while on PCA or with regular IV opioid dosing. Maximum total is 60 mg in 24 hours.    Admin. Amount: 1-2 tablet (1-2 × 5 mg tablet)  Last Admin: 18 1018  Dispense Loc: [facility] ADS [facility]  POC: Post-procedure        2109 (5 mg)-Given        1018 (5  mg)-Given       1407-Med Discontinued           Dose: 5 mg  Freq: EVERY 6 HOURS PRN Route: IV  PRN Reasons: nausea,vomiting  Start: 08/28/18 1532   End: 08/28/18 1726   Admin Instructions: This is Step 2 of the nausea and vomiting protocol.   If nausea not resolved in 15 minutes, give metoclopramide (REGLAN) if ordered (step 3 of nausea and vomiting protocol)  For ordered doses up to 10 mg, give IV Push undiluted. Each 5mg over 1 minute.    Admin. Amount: 5 mg = 1 mL Conc: 5 mg/mL  Dispense Loc: [facility] ADS PACU  Infused Over: 1-2 Minutes  Volume: 1 mL  POC: PACU        1726-Med Discontinued            Dose: 3 mL  Freq: EVERY 8 HOURS Route: IK  Start: 08/28/18 0845   End: 08/28/18 1523   Admin Instructions: And Q1H PRN, to lock peripheral IV dormant line.    Admin. Amount: 3 mL  Dispense Loc: [facility] Floor Stock  Volume: 3 mL  POC: Pre-procedure               1523-Med Discontinued            Dose: 3 mL  Freq: EVERY 1 HOUR PRN Route: IK  PRN Reason: line flush  PRN Comment: for peripheral IV flush post IV meds  Start: 08/28/18 0842   End: 08/28/18 1523   Admin. Amount: 3 mL  Dispense Loc: [facility] Floor Stock  Volume: 3 mL  POC: Pre-procedure        1523-Med Discontinued            Freq: PRN  Start: 08/28/18 1346   End: 08/28/18 1726   Last Admin: 08/28/18 1346  POC: Intra-procedure        1346 (800 mL)-Given       1726-Med Discontinued            Freq: PRN  Start: 08/28/18 1331   End: 08/28/18 1726   Last Admin: 08/28/18 1331  POC: Intra-procedure        1331 (200 mL)-Given       1726-Med Discontinued       Medications 08/25/18 08/26/18 08/27/18 08/28/18 08/29/18 08/30/18 08/31/18               INTERAGENCY TRANSFER FORM - NOTES (H&P, Discharge Summary, Consults, Procedures, Therapies)   8/28/2018                       UR 8A: [phone]               History & Physicals      H&P signed by Judith Provider at 8/31/2018 10:36 AM      Author:  Dereck Wong Service:  (none) Author Type:  Physician    Filed:  8/31/2018  10:36 AM Date of Service:  8/31/2018  9:11 AM Creation Time:  8/31/2018 10:36 AM    Status:  Signed :  Judith Provider (Physician)     Scan on 8/31/2018 10:36 AM by Judith, Provider : [facility] PRE-OP H/P/08/13/2018 1          Revision History        User Key Date/Time User Provider Type Action    > [N/A] 8/31/2018 10:36 AM Scan, Provider Physician Sign            H&P by Gris Rangel APRN CNP at 8/13/2018  8:30 AM     Author:  Gris Rangel APRN CNP Service:  (none) Author Type:  Nurse Practitioner    Filed:  8/13/2018 12:30 PM Encounter Date:  8/13/2018 Status:  Signed    :  Gris Rangel APRN CNP (Nurse Practitioner)             Pre-Operative H & P     CC:  Preoperative exam to assess for increased cardiopulmonary risk while undergoing surgery and anesthesia.    Date of Encounter: 8/13/2018  Primary Care Physician:  Julia Gifford  Reason for visit:  Primary osteoarthritis of left knee    HPI  Esperanza Gage is a 72 year old female who presents for pre-operative H & P in preparation for  Left Total Knee Replacement on 8/28/18 with Dr. Campos at [facility] under general anesthesia.  Ms. Gage has osteoarthritis and was seen by Dr. Campos on 6/4/18 for c/o left knee pain making it difficult for her to walk.  She has exhausted non-operative measures and the above procedure was recommended.     Ms. Gage presents to PAC and denies chest pain, SOB, palpitations, syncope, HERNANDEZ, orthopnea, or PND.  She denies any cardiopulmonary history.  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  Her walking is limited given her left knee pain and really notices pain and weakness in her left knee with stairs.  She uses a cane.  She had a root canal on 7/26/18 and will have the crown set today.  She would like to proceed with above surgical intervention.       History is obtained from the patient and electronic health  record.     Past Medical History  Past Medical History:   Diagnosis Date     Hearing problem 2017     Hyperlipidemia 2005     Hyperlipidemia LDL goal < 130      Hypertension      Osteoporosis, post-menopausal      Pelvic fracture (H)        Past Surgical History  Past Surgical History:   Procedure Laterality Date     KNEE SURGERY       No prior surgeries       ORTHOPEDIC SURGERY         Hx of Blood transfusions/reactions: denies     Hx of abnormal bleeding or anti-platelet use: ASA    Menstrual history: No LMP recorded. Patient is postmenopausal.     Steroid use in the last year: denies    Personal or FH with difficulty with Anesthesia:  denies    Prior to Admission Medications  Current Outpatient Prescriptions   Medication Sig Dispense Refill     alendronate (FOSAMAX) 70 MG tablet Take 1 tablet (70 mg) by mouth every 7 days Take with over 8 ounces water and stay upright for at least 30 minutes after dose.  Take at least 60 minutes before breakfast (Patient taking differently: Take 70 mg by mouth every 7 days SATURDAY  Take with over 8 ounces water and stay upright for at least 30 minutes after dose.  Take at least 60 minutes before breakfast) 12 tablet 3     losartan (COZAAR) 25 MG tablet Take 1 tablet (25 mg) by mouth daily (Patient taking differently: Take 25 mg by mouth every morning ) 90 tablet 3     simvastatin (ZOCOR) 10 MG tablet Take 1 tablet (10 mg) by mouth At Bedtime 90 tablet 3     aspirin 81 MG tablet Take 1 tablet by mouth At Bedtime ON HOLD FOR SURGERY SINCE 07/10/2018       ciclopirox (LOPROX) 0.77 % cream Apply topically 2 times daily (Patient taking differently: Apply topically as needed ) 30 g 3     [DISCONTINUED] alendronate (FOSAMAX) 70 MG tablet Take 1 tablet (70 mg) by mouth with 8oz water every 7 days 30 minutes before breakfast and remain upright during this time. 12 tablet 3       Allergies  No Known Allergies    Social History  Social History     Social History     Marital status: Single      Spouse name: N/A     Number of children: N/A     Years of education: N/A     Occupational History     Not on file.     Social History Main Topics     Smoking status: Former Smoker     Types: Cigarettes     Smokeless tobacco: Never Used     Alcohol use Yes      Comment: Rare     Drug use: No     Sexual activity: No     Other Topics Concern     Not on file     Social History Narrative    Lives in her own home with two flights of stairs.  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  Had two brothers who both  of CVA. [facility]ates [facility].         Family History  Family History   Problem Relation Age of Onset     Osteoperosis Mother      Hypertension Brother      Cerebrovascular Disease Brother      Cerebrovascular Disease Brother      C.A.D. No family hx of      Diabetes No family hx of      Hypertension No family hx of      Skin Cancer No family hx of      Melanoma No family hx of      Dementia No family hx of      HEART DISEASE No family hx of      Cerebrovascular Disease No family hx of        ROS/MED HX    ENT/Pulmonary: Comment: Was evaluated at dentist and underwent root canal on 2018.  Will get crown set today.     (+)tobacco use (Rarely smoked for 2 years after high school. ), Past use , . .    Neurologic:  - neg neurologic ROS     Cardiovascular:     (+) Dyslipidemia, hypertension----. Taking blood thinners Pt has received instructions: . . . :. . Previous cardiac testing date:results:date: results:ECG reviewed date:18 results:SB 59 bpm date: results:          METS/Exercise Tolerance: Comment: Chairs exercise classes two days per week>>stretching, reaching and weights.  Does yoga every Friday.    Has difficulty going up stairs given left knee pain.     Hematologic:  - neg hematologic  ROS       Musculoskeletal: Comment: Arthroscopic surgery in left knee in 1983.      Osteoporosis.    Right thumb issue.     H/O broken pelvis ~5 years ago.   (+) arthritis, , ,  "-       GI/Hepatic:  - neg GI/hepatic ROS       Renal/Genitourinary:  - ROS Renal section negative       Endo:     (+) Obesity (BMI 32.47), .      Psychiatric:  - neg psychiatric ROS       Infectious Disease:  - neg infectious disease ROS       Malignancy:      - no malignancy   Other: Comment: Uses a cane to assist with mobility.    (+) No chance of pregnancy H/O Chronic Pain,       Temp: 97.8  F (36.6  C) Temp src: Oral BP: 142/84 Pulse: 66   Resp: 16 SpO2: 97 %         196 lbs 6.4 oz  5' 5.5\"   Body mass index is 32.19 kg/(m^2).       Physical Exam  Constitutional: Awake, alert, cooperative, no apparent distress, and appears stated age.  Eyes: Pupils equal, round and reactive to light, extra ocular muscles intact, sclera clear, conjunctiva normal.  HENT: Normocephalic, oral pharynx with moist mucus membranes, dentition in fair repair. No goiter appreciated.   Respiratory: Clear to auscultation bilaterally, no crackles or wheezing.  Cardiovascular: Regular rate and rhythm, normal S1 and S2, and no murmur noted.  Carotids +2, no bruits. No edema. Palpable pulses to radial  DP and PT arteries.   GI: Normal bowel sounds, soft and non-tender. Unable to adequately assess for hepatosplenomegaly given obese abdomen. No superficial masses noted.     Lymph/Hematologic: No cervical lymphadenopathy and no supraclavicular lymphadenopathy.  Genitourinary:  na  Skin: Warm and dry.    Musculoskeletal: Full ROM of neck. There is no redness, warmth, or swelling of the joints. Gross motor strength is normal.    Neurologic: Awake, alert, oriented to name, place and time. Cranial nerves II-XII are grossly intact. Altered gait.   Neuropsychiatric: Calm, cooperative. Normal affect.     Labs: (personally reviewed)  Lab Results   Component Value Date    WBC 5.8 08/13/2018     Lab Results   Component Value Date    RBC 4.27 08/13/2018     Lab Results   Component Value Date    HGB 13.4 08/13/2018     Lab Results   Component Value Date    " HCT 42.4 08/13/2018     Lab Results   Component Value Date    MCV 99 08/13/2018     Lab Results   Component Value Date    MCH 31.4 08/13/2018     Lab Results   Component Value Date    MCHC 31.6 08/13/2018     Lab Results   Component Value Date    RDW 12.4 08/13/2018     Lab Results   Component Value Date     08/13/2018     Last Comprehensive Metabolic Panel:[SE1.1]  Sodium   Date Value Ref Range Status   08/13/2018 140 133 - 144 mmol/L Final     Potassium   Date Value Ref Range Status   08/13/2018 4.4 3.4 - 5.3 mmol/L Final     Chloride   Date Value Ref Range Status   08/13/2018 106 94 - 109 mmol/L Final     Carbon Dioxide   Date Value Ref Range Status   08/13/2018 27 20 - 32 mmol/L Final     Anion Gap   Date Value Ref Range Status   08/13/2018 6 3 - 14 mmol/L Final     Glucose   Date Value Ref Range Status   08/13/2018 112 (H) 70 - 99 mg/dL Final     Urea Nitrogen   Date Value Ref Range Status   08/13/2018 10 7 - 30 mg/dL Final     Creatinine   Date Value Ref Range Status   08/13/2018 0.61 0.52 - 1.04 mg/dL Final     GFR Estimate   Date Value Ref Range Status   08/13/2018 >90 >60 mL/min/1.7m2 Final     Comment:     Non  GFR Calc     Calcium   Date Value Ref Range Status   08/13/2018 9.1 8.5 - 10.1 mg/dL Final       Color Urine (no units)   Date Value   08/13/2018 Straw     Appearance Urine (no units)   Date Value   08/13/2018 Clear     Glucose Urine (mg/dL)   Date Value   08/13/2018 Negative     Bilirubin Urine (no units)   Date Value   08/13/2018 Negative     Ketones Urine (mg/dL)   Date Value   08/13/2018 Negative     Specific Gravity Urine (no units)   Date Value   08/13/2018 1.010     pH Urine (pH)   Date Value   08/13/2018 6.0     Protein Albumin Urine (mg/dL)   Date Value   08/13/2018 Negative     Urobilinogen Urine (EU/dL)   Date Value   01/07/2015 0.2     Nitrite Urine (no units)   Date Value   08/13/2018 Negative     Leukocyte Esterase Urine (no units)   Date Value   08/13/2018  Negative[SE1.2]       EKG: Personally reviewed but formal cardiology read pending: SB 59 bpm,  8/13/18.    ASSESSMENT and PLAN  Esperanza Gage is a 72 year old female scheduled to undergo Left Total Knee Replacement on 8/28/18 with Dr. Campos at [facility] under general anesthesia.      She has the following specific operative considerations:   - METS:  >4. RCRI : No serious cardiac risks.  0.4 % risk of major adverse cardiac event.  No further cardiac evaluation needed per 2014 ACC/AHA guidelines for non-cardiac surgery.   - SATNAM # of risks 3/8 = intermediate  - VTE risk:  0.5%  - Risk of PONV score = 2.  If > 2, anti-emetic intervention recommended.  - Post-op delirium risk: elevated given age    1.  Cardiology - denies cardiopulmonary history or symptoms.  HTN, hold ARB DOS.  HLD, take statin as prescribed.  ASA for primary prevention>>>hold 7 days prior to surgery.    2.  Pulmonary - remote minimal smoking hx  3.  Musculoskeletal - OA of left knee >>>above procedure planned.    4.  HEENT - Dental restoration with root canal 7/26/2018 with crown setting today.  Instructed to have note from dentist that dental work complete and send to Dr. Campos's office.      - Anesthesia considerations:  Refer to PAC assessment in anesthesia records  - CBC, BMP, UA, INR and T&S today      Arrival time, NPO, shower and medication instructions provided by nursing staff today.  Preparing For Your Surgery handout given.  Patient was discussed with Dr Osorio. I spent 20 minutes face to face with patient assessing, educating, counseling and/or coordinating care and examining the patient.  Of that 20 minutes, I spent greater than 50% of my time counseling and/or coordinating care.      ARSH Dorsey CNP  Preoperative Assessment Center  [facility]  Phone: [phone]  Fax: [phone][SE1.1]     Revision History        User Key Date/Time User Provider Type Action    > SE1.2  8/13/2018 12:30 PM Gris Rangel APRN CNP Nurse Practitioner Sign     SE1.1 8/13/2018 12:21 PM Gris Rangel APRN CNP Nurse Practitioner                      Discharge Summaries      Discharge Summaries by Roldan Bowen MD at 8/30/2018  8:45 AM     Author:  Roldan Bowen MD Service:  Orthopedics Author Type:  Resident    Filed:  8/31/2018  7:48 AM Date of Service:  8/30/2018  8:45 AM Creation Time:  8/30/2018  8:44 AM    Status:  Cosign Needed :  Roldan Bowen MD (Resident)    Cosign Required:  Yes             ORTHOPAEDIC DISCHARGE SUMMARY     Date of Admission: 8/28/2018  Date of Discharge:[MS1.1] 8/31/2018[MS1.2]  Disposition:[MS1.1] Rehab[MS1.2]  Staff Physician: Pierre Campos MD  Primary Care Provider: Julia Gifford    DISCHARGE DIAGNOSIS:  Osteoarthritis Left Knee    PROCEDURES: Procedure(s):  ARTHROPLASTY KNEE on 8/28/2018    BRIEF HISTORY:  C/o weakness, limp, trouble negotiating stairs ( does one stair at a time), unable to do ADL's , uses cart at grocery store and parking lot, does not use a cane.  Had steroid injections in the past with diminishing return.  Not taking analgesics. After exhausting conservative measures and after weighing the risks and benefits, patient elected to proceed with joint replacement surgery.      Background history:  DX:  1. L knee DJD     TREATMENTS:  1. 7/1983, L knee medial meniscectomy. [facility] COURSE:    Surgery was uncomplicated. Esperanza Gage has done well post-operatively. Medicine was consulted post operatively to aid in management of medical comorbidities. See final recommendations below. The patient received routine nursing cares and is medically stable. Vital signs are stable. The patient is tolerating a regular diet without GI distress/nausea or vomiting. Voiding spontaneously. All PT/OT goals have been met for safe mobility. Pain is now controlled on oral medications which  will be available on discharge. Stool softeners have been used while taking pain medications to help prevent constipation. Esperanza Gage is deemed medically safe to discharge.     Antibiotics:  Ancef given periop and 24 hours postop.   DVT prophylaxis:  Aspirin 325mg BID for 4 weeks; then may resume Home ASA 81mg daily  PT Progress:  Has met PT/OT goals for safe mobility.    Pain Meds:  Weaned off all IV pain meds by discharge.  Inpatient Events: No significant events or complications.     Discharge orders and instructions as below.    FOLLOWUP:    Follow up with Dr. Campos at 2 and 6 weeks postoperatively.    [facility] ([address] [place] [id number]). Call [phone] to schedule a follow-up appointment at this location with your provider if you have not heard confirmation of your appointment in the next 3-5 business days    PLANNED DISCHARGE ORDERS:[MS1.1]           Current Discharge Medication List      START taking these medications    Details   acetaminophen (TYLENOL) 325 MG tablet Take 2 tablets (650 mg) by mouth every 4 hours as needed for other (adjunct to pain control)  Qty: 100 tablet    Associated Diagnoses: Status post knee surgery      aspirin 325 MG EC tablet Take 1 tablet (325 mg) by mouth 2 times daily for 26 days  Qty: 40 tablet, Refills: 0    Associated Diagnoses: Status post knee surgery      oxyCODONE IR (ROXICODONE) 5 MG tablet Take 0.5-1 tablets (2.5-5 mg) by mouth every 4 hours as needed for moderate to severe pain or other (for pain rated 4-6/10 take half tab; for pain 7-10/10 take 1 tab)  Qty: 40 tablet, Refills: 0    Associated Diagnoses: Status post knee surgery      senna-docusate (SENOKOT-S;PERICOLACE) 8.6-50 MG per tablet Take 1 tablet by mouth 2 times daily as needed for constipation  Qty: 30 tablet    Associated Diagnoses: Status post knee surgery         CONTINUE these medications which have CHANGED    Details   aspirin 81 MG tablet Take 1 tablet  (81 mg) by mouth At Bedtime Resume your home baby aspirin AFTER completing your high dose post-operative aspirin (4 weeks)  Qty: 30 tablet, Refills: 0    Associated Diagnoses: Status post knee surgery         CONTINUE these medications which have NOT CHANGED    Details   simvastatin (ZOCOR) 10 MG tablet Take 1 tablet (10 mg) by mouth At Bedtime  Qty: 90 tablet, Refills: 3    Associated Diagnoses: Pure hypercholesterolemia      alendronate (FOSAMAX) 70 MG tablet Take 1 tablet (70 mg) by mouth every 7 days Take with over 8 ounces water and stay upright for at least 30 minutes after dose.  Take at least 60 minutes before breakfast  Qty: 12 tablet, Refills: 3    Associated Diagnoses: Senile osteoporosis      ciclopirox (LOPROX) 0.77 % cream Apply topically 2 times daily  Qty: 30 g, Refills: 3    Associated Diagnoses: Tinea pedis of both feet      losartan (COZAAR) 25 MG tablet Take 1 tablet (25 mg) by mouth daily  Qty: 90 tablet, Refills: 3    Associated Diagnoses: Benign essential hypertension               Discharge Procedure Orders  General info for SNF   Order Comments: Length of Stay Estimate: Short Term Care: Estimated # of Days <30  Condition at Discharge: Stable  Level of care:skilled   Rehabilitation Potential: Good  Admission H&P remains valid and up-to-date: Yes  Recent Chemotherapy: N/A  Use Nursing Home Standing Orders: Yes     Mantoux instructions   Order Comments: Give two-step Mantoux (PPD) Per Facility Policy Yes     Reason for your hospital stay   Order Comments: You were admitted following your total knee arthroplasty     Discharge Instructions   Order Comments: TOTAL KNEE ARTHROPLASTY POST OPERATIVE DISCHARGE INSTRUCTIONS    FOLLOW UP APPOINTMENT  You are scheduled for a post operative appointment with Dr. Campos approximately four weeks after surgery.     Your follow up appointments will be at the location that you regularly see Dr. Campos:    [facility]  [address] [address]  [phone]    Physical therapy:   A prescription for physical therapy will be provided at the time of discharge.      ACTIVITY  Weight bearing status:   You may bear weight on your operative extremity as tolerated, using assistive devices (walker, cane) as needed. As you begin to feel more comfortable ambulating, you may gradually transition from a walker to a cane. Eventually, you should wean from all assistive devices. Although we would like you to discontinue use of assistive devices as soon as possible, do not transition until you have worked with your physical therapist to achieve safe balance and comfort.     Continuous passive motion machine:   Perform CPM exercises for six to eight hours per day for the first four weeks after surgery. The CPM should be set at 0 degrees to flexion tolerance with a goal of 90 degrees. Advance the CPM settings aggressively in increments of 5 degrees every 30 minutes until goal is achieved.     Exercises:   Perform the following exercises at least three times per day for the first four weeks after surgery to prevent complications, such as blood clots in your legs:  1) Point and flex your feet  2) Move your ankle around in big circles  3) Wiggle your toes   Also, perform thigh muscle tightening exercises for 10 to 15 minutes at least three times per day for the first four weeks after surgery.    Athletic Activities:  Activities such as swimming, bicycling, jogging, running, and stop-and-go sports should be avoided until permitted by your provider.    Driving:  Driving is not permitted until directed by your provider. Typically, driving is restricted for three to four weeks after right knee surgery and three weeks after left knee surgery. Under no circumstance are you permitted to drive while using narcotic pain medications.    Return to Work:  You may return to work when directed by your provider. Typically, patients with  desk/sitting jobs can return to work within two weeks while patients with heavy labor jobs can return to work around three months after surgery.      COMFORT AND PAIN MANAGEMENT  Elevation:   During times of inactivity throughout the first two weeks after surgery, make an effort to decrease swelling by elevating your operative extremity. This is most effectively done by lying down and placing several pillows lengthwise under your thigh and calf to raise your toes above the level of your nose. To ensure that you knee remains in full extension, do not place pillows directly under your knee.     Icing:  An ice pack will be provided to control swelling and discomfort after surgery. Place a thin towel on your skin and apply the ice pack overtop. You may apply ice for 20 minutes as often as two times per hour.    Pain Medications:  You will be discharged with acetaminophen (Tylenol) and a narcotic medication for pain management after surgery. Acetaminophen can help to effectively manage pain when used as prescribed, however, do not exceed the maximum daily dose of 3000 mg. The narcotic pain medication should be reserved for severe, breakthrough pain. Take the narcotic medication as prescribed and use only as often as necessary.       ANTICOAGULATION  Take Aspirin 325mg TWICE DAILY for 4 weeks after surgery. This medication will help prevent blood clots      WOUND CARE AND SHOWERING  Wound care:  Keep the dressing on, clean, and dry for the first five days after surgery. You may then remove the dressing and replace it with fresh 4x4s and tubigrip (or ACE wrap). Your surgical incision was closed with a clear knotless suture and tails were left at the margins of the incision. These tails can be left in place until your follow up appointment. The steri strips (small white tape that is directly on the incision areas) should be left in place until they fall off or are removed at your first office visit.    Showering:  You may  shower ten days after surgery provided your incision is intact and dry without drainage. If there is fluid at the incision site, cover the incision with a non-permeable plastic bag. You may allow water to run over the incision, but do not soak or submerge the incision. Do not scrub the incision.     Tub Bathing:  Tub bathing, swimming, or any other activities that cause your incision to be submerged should be avoided until allowed by your provider. Typically, patients are allowed to return to these activities four weeks after surgery.      CONTACTING YOUR PHYSICIAN:  You may experience symptoms that require follow-up before your scheduled appointment. Please contact Dr. Campos's office if you experience:  1) Pain that persists or worsens in the first few days after surgery  2) Excessive redness or drainage of cloudy or bloody material from the wounds (clear red tinted fluid and some mild drainage should be expected) or drainage of any kind five days after surgery  3) A temperature elevation greater than 101.5    4) Pain, swelling or redness in your calf  5) Numbness or weakness in your leg or foot      Regular business hours (Monday - Friday, 8am - 5pm):  [facility]: [phone]    If you have any other concerns during normal business hours, please contact Dr. Campos's nurse, Daniela Bradford RN, at [phone] during normal business hours 8 am - 5 pm (leave message as needed). If your concern is urgent, please page Daniela Bradford RN at [phone] and key in your 10 digit phone number followed by the # sign after the beep.     After hours and weekends:  [facility] on call Orthopedic resident: [phone]    If you have an emergent concern, contact the Emergency Department at the [facility] - [phone] - or [place] - [phone] - [place].     Glucose monitor nursing POCT   Order Comments: Before meals and at bedtime     Intake and output   Order  Comments: Every shift     Bladder scan   Order Comments: X 2 for post void residual     Wound care (specify)   Order Comments: Site:   Left knee  Instructions:  Post surgical dressing to remain clean and intact until 5-7 days post op, then change daily with nonadhesive, dry gauze and ace wrap. Avoid adhesives if possible. See full care instructions in patient AVS     Additional Discharge Instructions   Order Comments: Take ASA 325mg BID for 4 weeks post op for DVT prophylaxis; then may resume home ASA 81mg daily.  Wt Bearing as tolerated     Activity - Up with assistive device   Order Specific Question Answer Comments   Is discharge order? Yes      Activity - Up with nursing assistance   Order Specific Question Answer Comments   Is discharge order? Yes      Activity - Ambulate in hallway   Order Comments: Every shift   Order Specific Question Answer Comments   Is discharge order? Yes      Continuous Passive Motion Machine   Order Comments: Start CPM Day of Surgery.  0 - 90 degrees. Advance as tolerated.     Weight bearing status   Order Comments: As tolerated     Adult [facility] Follow-up and recommended labs and tests   Order Comments: Follow up with Dr. Campos at 2 weeks post op for wound check.     [facility] ([address] [place] [address]). Call [phone] to schedule a follow-up appointment at this location with your provider.     Activity   Order Comments: Your activity upon discharge: activity as tolerated   Order Specific Question Answer Comments   Is discharge order? Yes      When to contact your care team   Order Comments: CALL YOUR PHYSICIAN IF:  1.  Your pain begins to worsen and is unable to be controlled with your medications.  2.  Excessive redness or drainage of cloudy or bloody material from the wounds (Clear red tinted fluid and some mild drainage should be expected). Drainage of any kind 5 days after surgery should be reported to the doctor.  3.  You have a  temperature elevation greater than 101.5    4.  You have pain, swelling or redness in your calf. You have numbness or weakness in your leg or foot.     Wound care and dressings   Order Comments: Instructions to care for your wound at home: as directed below.     Physical Therapy Adult Consult   Order Comments: Evaluate and treat as clinically indicated.    Reason:  eval and treat     Fall precautions     Pneumatic Compression Device    Order Comments: Bilateral calf. Remove 30 mins BID.     Diet   Order Comments: Return to your pre surgery diet   Order Specific Question Answer Comments   Is discharge order? Yes      Advance Diet as Tolerated   Order Comments: Follow this diet upon discharge: Regular   Order Specific Question Answer Comments   Is discharge order? Yes      Assign Questionnaire Series to Patient[MS1.3]         Roldan Bowen MD  Orthopaedic Surgery Resident, PGY-4  Pager: [phone][MS1.1]       Revision History        User Key Date/Time User Provider Type Action    > MS1.2 8/31/2018  7:48 AM Roldan Bowen MD Resident Sign     MS1.3 8/30/2018  8:47 AM Roldan Bowen MD Resident Share     MS1.1 8/30/2018  8:44 AM Roldan Bowen MD Resident                      Consult Notes      Consults signed by Aftab Mcdonald MD at 8/29/2018  2:08 PM      Author:  Aftab Mcdonald MD Service:  Hospitalist Author Type:  Physician    Filed:  8/29/2018  2:08 PM Date of Service:  8/28/2018  3:58 PM Creation Time:  8/28/2018  4:14 PM    Status:  Signed :  Aftab Mcdonald MD (Physician)         Consult Date:  08/28/2018      INTERNAL MEDICINE CONSULTATION       ASSESSMENT:     1.  Status post left total knee arthroplasty.  The patient is doing well postoperatively.   2.  History of hypertension, stable perioperatively.   3.  Unknown coronary artery status.  The patient has never had a formal coronary artery evaluation.  EKG prior to surgery  "was unremarkable.  The patient has remained active prior to surgery without any worrisome cardiac symptoms.   4.  Hyperlipidemia, on statin.      RECOMMENDATIONS:     1.  Routine postoperative labs are ordered.     2.  Losartan will be held for the time being.  Blood pressure will be monitored.     3.  DVT prophylaxis will be with aspirin.      Thank you for having asked me to see this patient.      DISCUSSION:  Esperanza Gage is a 72-year-old female who underwent a left total knee arthroplasty today by Dr. Campos for the treatment of osteoarthritis of the left knee.  I have been asked to see her by    Dr. Campos to assess hypertension.      Please see Dr. Campos's notes in the charting for details regarding the patient's orthopedic history and the indications for surgery.      The events of surgery are noted.  Estimated blood loss was 250 mL.  The patient did receive general anesthesia.  Vital signs were stable throughout.  Intraoperative urine output was 400 mL.        Ms. Gage is seen by me in the recovery room.  She states she is very comfortable.  She asks \"did I miss lunch.\"  She denies chest pain, shortness of breath, nausea, abdominal pain.      PAST MEDICAL HISTORY:  Remarkable for:   1.  Chronic pain secondary to osteoarthritis.   2.  Hypertension.   3.  Hyperlipidemia.   4.  Obesity.   5.  History of pelvic fracture 5 years ago.      The patient denies knowledge of cardiac disease.  She has never had a formal coronary artery evaluation.  There is no history of DVT or PE.      MEDICATIONS:  Preoperatively were:    1.  Losartan 25 mg daily.   2.  Fosamax 70 mg weekly.     3.  Zocor 10 mg every day.   4.  Aspirin 81 mg daily.      MEDICATION ALLERGIES:  NONE.      SOCIAL HISTORY:  The patient is single.  She denies cigarette, but there is a remote history of cigarette use.  She denies alcohol use.      REVIEW OF SYSTEMS:  Limited as the patient is quite sleepy in the recovery room.  It is noted in the chart that " she has been active participating in exercise classes 2 days per week.  She has difficulty ambulating, however secondary to knee pain.  There is no history of exertional chest pain, shortness of breath, fevers, chills, cough.      PHYSICAL EXAMINATION:   GENERAL:  She is a sleepy, well-appearing female who appears comfortable.   VITAL SIGNS:  She is normotensive.   HEENT:  Facies are symmetric.  Dental exam was not performed.   NECK:  Supple.   LUNGS:  Clear.   CARDIAC:  Exam reveals a regular rate and rhythm without murmurs.   ABDOMEN:  Soft, nontender, nondistended.   EXTREMITIES:  Exam reveals no ankle edema.  Pedal pulses are intact.  Her left leg is wrapped.   NEUROLOGIC:  She is sleepy.  Facies are symmetric.      LABORATORY DATA:  Preoperative labs are reviewed.  BMP was normal.  CBC was normal.  EKG was within normal limits.         AFTAB ZAMARRIPA MD             D: 2018   T: 2018   MT: ETHAN      Name:     MOSHE GARCÍA   MRN:      [id number]        Account:       [id number]   :      1945           Consult Date:  2018      Document: [id number]       cc: Pierre Gifford MD[DS1.1]        Revision History        User Key Date/Time User Provider Type Action    > DS1.1 2018  2:08 PM Aftab Zamarripa MD Physician Sign     [N/A] 2018  4:14 PM Aftab Zamarripa MD Physician Edit            Consults by Aftab Zamarripa MD at 2018  3:48 PM     Author:  Aftab Zamarripa MD Service:  Hospitalist Author Type:  Physician    Filed:  2018  3:48 PM Date of Service:  2018  3:48 PM Creation Time:  2018  3:48 PM    Status:  Signed :  Aftab Zamarripa MD (Physician)         IM consult dictated.[DS1.1]     Revision History        User Key Date/Time User Provider Type Action    > DS1.1 2018  3:48 PM Aftab Zamarripa MD Physician Sign                     Progress Notes - Physician (Notes for yesterday and today)       Progress Notes by Aftab Mcdonald MD at 8/31/2018 11:08 AM     Author:  Aftab Mcdonald MD Service:  Hospitalist Author Type:  Physician    Filed:  8/31/2018 11:11 AM Date of Service:  8/31/2018 11:08 AM Creation Time:  8/31/2018 11:08 AM    Status:  Signed :  Aftab Mcdonald MD (Physician)         Pt feels well, feels ready to dc to TCU  Pain is controlled  + BM yesterday  No chest pain, SOB or dizziness    VSS  BP 110s-150s/  HR 70s    Alert, fully oriented,  Lungs clear  CV rrr  Abd soft  No L calf edema or tenderness    Hgb 8.8 (9.1)      Assessment    S/p L TKA, Pt is doing well overall. Pain is controlled.      Acute BL anemia. Hgb reasonably stable. Pt remains asymptomatic      Hx HTN, stable post op, off PTA losartan    Plan  Pt is medically stable for dc to TCU  OK to resume losartan after dc  Dc orders reviewed.    [DS1.1]     Revision History        User Key Date/Time User Provider Type Action    > DS1.1 8/31/2018 11:11 AM Aftab Mcdonald MD Physician Sign            Progress Notes by Roldan Bowen MD at 8/31/2018  7:41 AM     Author:  Roldan Bowen MD Service:  Orthopedics Author Type:  Resident    Filed:  8/31/2018  7:46 AM Date of Service:  8/31/2018  7:41 AM Creation Time:  8/31/2018  7:41 AM    Status:  Signed :  Roldan Bowen MD (Resident)         Orthopaedic Surgery Progress Note:       Subjective:   NAEO. Patient reports doing well. She is in great spirits this AM. Using the CPM. Pain controlled. Going to rehab today    Denies new onset tingling/numbness in operative extremity. Denies fever/chills/SOB/nause/vomiting.     Objective:   Temp:  [98.3  F (36.8  C)-100.2  F (37.9  C)] 98.3  F (36.8  C)  Pulse:  [78] 78  Heart Rate:  [93] 93  Resp:  [16] 16  BP: (121-151)/(48-55) 151/55  SpO2:  [96 %-100 %] 96 %    Intake/Output Summary (Last 24 hours) at 08/29/18 1209  Last data filed at 08/29/18 1148   Gross per 24  hour   Intake             2150 ml   Output             2250 ml   Net             -100 ml     Gen: NAD. Resting comfortably in bed  Resp: Breathing comfortably on RA  LLE:  Dressing/ACE is c/d/i.   Drain site is clean and dressed  SILT in femoral, saphenous, sural, deep peroneal, superficial peroneal, and tibial n dist.   Fires EHL/FHL/TA/Gastroc with 5/5 strength    DP pulses intact, 2+ and foot is wwp    Labs:    Recent Labs  Lab 08/31/18  0629 08/30/18  0711 08/29/18  0550   HGB 8.8* 9.1* 10.0*        Assessment & Plan:   Post-Op Plan:  Assessment/Plan: Esperanza Gage is a 72 year old female s/p Lt TKA on 8/28 with Dr. Campos.    Activity: Up with assist and assistive devices as needed until independent. Knee ROM as tolerated. Advance CPM to 90 deg as tolerated  Weight bearing status: WBAT.   Antibiotics: Ancef x 24 hours.  Diet: Begin with clear fluids and progress diet as tolerated. Bowel regimen. Anti-emetics PRN.   DVT prophylaxis: mechanical and ASA while inpatient,  discharge on ASA 325mg BID x 4 weeks.  Elevation: Elevate heels off of bed on pillows. No pillows behind the knee at any time.   Wound Care: Dressing to remain until POD 5-7 if clean; then nonadhesive, dry gauze, ace wrap PRN  Drains: Document output per shift, discontinue when <30cc/shift.   Pain management: transition from IV to orals as tolerated.    X-rays: XR of Lt knee in PACU  Physical Therapy:  ROM, ADL's.   Occupational Therapy: ADL's  Labs: Trend Hgb on POD #1, 2.   Consults: PT, OT. Hospitalist, , appreciate assistance in caring for this patient.   Follow-up: Clinic in 2 weeks for wound check with Dr. Campos; then at 6 weeks post op with repeat XR of operative knee    Disposition: Pending progress with therapies, pain control on orals, and medical stability, anticipate discharge to TCU Friday    Roldan Bowen MD 08/31/2018  Orthopaedic Surgery Resident, PGY-4  Pager: [phone][MS1.1]           Revision History        User Key  Date/Time User Provider Type Action    > MS1.1 8/31/2018  7:46 AM Roldan Bowen MD Resident Sign            Progress Notes by Aftab Mcdonald MD at 8/30/2018 11:43 AM     Author:  Aftab Mcdonald MD Service:  Hospitalist Author Type:  Physician    Filed:  8/30/2018 11:46 AM Date of Service:  8/30/2018 11:43 AM Creation Time:  8/30/2018 11:43 AM    Status:  Signed :  Aftab Mcdonald MD (Physician)         Pt feels well, anticipates dc to TCU tomorrow  Pain is controlled  No BM, + flatus, voiding without difficulty    VSS  BP 130s/  Low grade T    Alert, fully oriented  Lungs clear  CV rrr  Abd soft  L calf soft, non-tender      Hgb 9.1 (10.0)      Assessment    S/p L TKA, Pt is doing well overall. Pain is controlled.     Acute BL anemia. Hgb is slowly declining, Pt remains asymptomatic     Hx HTN, stable post op, off PTA losartan     Plan  Recheck Hgb in am  Continue to hold losartan  ASA for DVT proph  Prob dc to TCU tomorrow[DS1.1]     Revision History        User Key Date/Time User Provider Type Action    > DS1.1 8/30/2018 11:46 AM Aftab Mcdonald MD Physician Sign            Progress Notes by Roldan Bowen MD at 8/30/2018  7:47 AM     Author:  Roldan Bowen MD Service:  Orthopedics Author Type:  Resident    Filed:  8/30/2018  7:48 AM Date of Service:  8/30/2018  7:47 AM Creation Time:  8/30/2018  7:47 AM    Status:  Signed :  Roldan Bowen MD (Resident)         Orthopaedic Surgery Progress Note:       Subjective:   NAEO. Patient reports doing well. Pain well controlled on current regimen. Working with PT but apprehensive about putting full weight on operative leg.     Denies new onset tingling/numbness in operative extremity. Denies fever/chills/SOB/nause/vomiting.     Objective:   Temp:  [98.8  F (37.1  C)-100.5  F (38.1  C)] 99  F (37.2  C)  Pulse:  [82-87] 82  Heart Rate:  [83-87] 83  Resp:  [16-18] 18  BP:  (112-133)/(44-80) 133/49  SpO2:  [92 %-95 %] 95 %    Intake/Output Summary (Last 24 hours) at 08/29/18 1209  Last data filed at 08/29/18 1148   Gross per 24 hour   Intake             2150 ml   Output             2250 ml   Net             -100 ml     Drain: 160/20cc  last 2 shifts    Gen: NAD. Resting comfortably in bed  Resp: Breathing comfortably on RA  LLE:  Dressing/ACE is c/d/i.   Drain is draining bloody fluid  SILT in femoral, saphenous, sural, deep peroneal, superficial peroneal, and tibial n dist.   Fires EHL/FHL/TA/Gastroc with 5/5 strength    PT and DP pulses intact, 2+ and foot is wwp    Labs:    Recent Labs  Lab 08/30/18  0711 08/29/18  0550   HGB 9.1* 10.0*        Assessment & Plan:   Post-Op Plan:  Assessment/Plan: Esperanza Gage is a 72 year old female s/p Lt TKA on 8/28 with Dr. Campos.    Activity: Up with assist and assistive devices as needed until independent. Knee ROM as tolerated. Advance CPM to 90 deg as tolerated  Weight bearing status: WBAT.   Antibiotics: Ancef x 24 hours.  Diet: Begin with clear fluids and progress diet as tolerated. Bowel regimen. Anti-emetics PRN.   DVT prophylaxis: mechanical and ASA while inpatient,  discharge on ASA 325mg BID x 4 weeks.  Elevation: Elevate heels off of bed on pillows. No pillows behind the knee at any time.   Wound Care: Dressing to remain until POD 5-7 if clean; then nonadhesive, dry gauze, ace wrap PRN  Drains: Document output per shift, discontinue when <30cc/shift.   Pain management: transition from IV to orals as tolerated.    X-rays: XR of Lt knee in PACU  Physical Therapy:  ROM, ADL's.   Occupational Therapy: ADL's  Labs: Trend Hgb on POD #1, 2.   Consults: PT, OT. Hospitalist, , appreciate assistance in caring for this patient.   Follow-up: Clinic in 2 weeks for wound check with Dr. Campos; then at 6 weeks post op with repeat XR of operative knee    Disposition: Pending progress with therapies, pain control on orals, and medical stability,  anticipate discharge to TCU on POD 3    Roldan Bowen MD 08/30/2018  Orthopaedic Surgery Resident, PGY-4  Pager: [phone][MS1.1]           Revision History        User Key Date/Time User Provider Type Action    > MS1.1 8/30/2018  7:48 AM Roldan Bowen MD Resident Sign                  Procedure Notes     No notes of this type exist for this encounter.         Progress Notes - Therapies (Notes from 08/28/18 through 08/31/18)      Progress Notes by Belen Dickens OT at 8/29/2018 12:13 PM     Author:  Belen Dickens OT Service:  (none) Author Type:  Occupational Therapist    Filed:  8/29/2018 12:13 PM Date of Service:  8/29/2018 12:13 PM Creation Time:  8/29/2018 12:13 PM    Status:  Signed :  Belen Dickens OT (Occupational Therapist)          08/29/18 1200   Quick Adds   Type of Visit Initial Occupational Therapy Evaluation   Living Environment   Lives With alone   Living Arrangements house   Home Accessibility bed and bath are not on the first floor;stairs to enter home;stairs within home;tub/shower is not walk in;grab bars present (bathtub);grab bars present (toilet)   Number of Stairs to Enter Home 4   Number of Stairs Within Home 14   Transportation Available car;family or friend will provide   Living Environment Comment Has tub/shower with grab bars. Lives alone.   Self-Care   Usual Activity Tolerance good   Current Activity Tolerance fair   Regular Exercise yes   Activity/Exercise Type strength training   Exercise Amount/Frequency 2 times/wk   Equipment Currently Used at Home grab bar;shower chair   Activity/Exercise/Self-Care Comment indep with ADLs, leads a senior exercise class 2x/week    Functional Level Prior   Ambulation 0-->independent   Transferring 0-->independent   Toileting 0-->independent   Bathing 0-->independent   Dressing 0-->independent   Eating 0-->independent   Communication 0-->understands/communicates without difficulty   Swallowing 0-->swallows  foods/liquids without difficulty   Cognition 0 - no cognition issues reported   Fall history within last six months no   General Information   Onset of Illness/Injury or Date of Surgery - Date 08/28/18   Referring Physician Dr. Campos   Patient/Family Goals Statement Did not state, in agreement with OT related goals.    Additional Occupational Profile Info/Pertinent History of Current Problem s/p L TKA   Precautions/Limitations fall precautions   Weight-Bearing Status - LLE weight-bearing as tolerated   General Observations Patient alert, agreeable to OT.    Cognitive Status Examination   Orientation orientation to person, place and time   Level of Consciousness alert   Able to Follow Commands WNL/WFL   Personal Safety (Cognitive) WNL/WFL   Memory intact   Visual Perception   Visual Perception Wears glasses   Sensory Examination   Sensory Comments Reports baseline NT in L shin and bottom of R foot.    Pain Assessment   Patient Currently in Pain Yes, see Vital Sign flowsheet   Range of Motion (ROM)   ROM Comment B UE's WFL   Strength   Strength Comments UE strength WFL   Muscle Tone Assessment   Muscle Tone Quick Adds No deficits were identified   Coordination   Upper Extremity Coordination No deficits were identified   Mobility   Bed Mobility Comments Supine>sit with SBA, sit>supine with min A for L LE   Transfer Skill: Bed to Chair/Chair to Bed   Level of [place]: Bed to Chair contact guard   Physical Assist/Nonphysical Assist: Bed to Chair set-up required;verbal cues   Weight-Bearing Restrictions weight-bearing as tolerated   Assistive Device - Transfer Skill Bed to Chair Chair to Bed Rehab Eval rolling walker   Transfer Skill: Sit to Stand   Level of [place]: Sit/Stand contact guard   Physical Assist/Nonphysical Assist: Sit/Stand set-up required;verbal cues   Transfer Skill: Sit to Stand weight-bearing as tolerated   Assistive Device for Transfer: Sit/Stand rolling walker   Transfer Skill: Toilet  "Transfer   Level of [place]: Toilet contact guard   Physical Assist/Nonphysical Assist: Toilet set-up required;verbal cues   Weight-Bearing Restrictions: Toilet weight-bearing as tolerated   Assistive Device seat riser;grab bars   Upper Body Dressing   Level of [place]: Dress Upper Body stand-by assist   Physical Assist/Nonphysical Assist: Dress Upper Body set-up required   Lower Body Dressing   Level of [place]: Dress Lower Body moderate assist (50% patients effort)   Physical Assist/Nonphysical Assist: Dress Lower Body set-up required   Toileting   Level of [place]: Toilet contact guard   Physical Assist/Nonphysical Assist: Toilet set-up required   Grooming   Level of [place]: Grooming stand-by assist   Physical Assist/Nonphysical Assist: Grooming set-up required   Eating/Self Feeding   Level of [place]: Eating independent   Activities of Daily Living Analysis   Impairments Contributing to Impaired Activities of Daily Living balance impaired;pain;ROM decreased;strength decreased   General Therapy Interventions   Planned Therapy Interventions ADL retraining;transfer training   Clinical Impression   Criteria for Skilled Therapeutic Interventions Met yes, treatment indicated   OT Diagnosis Decreased functional mobility and ADLs   Influenced by the following impairments pain, decreased strength and ROM L LE   Assessment of Occupational Performance 3-5 Performance Deficits   Identified Performance Deficits dressing, bathing, toileting, transfers, home mgmt   Clinical Decision Making (Complexity) Low complexity   Therapy Frequency daily   Predicted Duration of Therapy Intervention (days/wks) 4 days   Anticipated Equipment Needs at Discharge (TBD)   Anticipated Discharge Disposition Transitional Care Facility   Risks and Benefits of Treatment have been explained. Yes   Patient, Family & other staff in agreement with plan of care Yes   [facility] AM-PAC TM \"6 Clicks\"   2016, Trustees " "of [facility], under license to [employer] [facility].  All rights reserved.   6 Clicks Short Forms Daily Activity Inpatient Short Form   [facility] AM-PAC  \"6 Clicks\" Daily Activity Inpatient Short Form   1. Putting on and taking off regular lower body clothing? 2 - A Lot   2. Bathing (including washing, rinsing, drying)? 2 - A Lot   3. Toileting, which includes using toilet, bedpan or urinal? 3 - A Little   4. Putting on and taking off regular upper body clothing? 4 - None   5. Taking care of personal grooming such as brushing teeth? 4 - None   6. Eating meals? 4 - None   Daily Activity Raw Score (Score out of 24.Lower scores equate to lower levels of function) 19   Total Evaluation Time   Total Evaluation Time (Minutes) 8[KH1.1]        Revision History        User Key Date/Time User Provider Type Action    > KH1.1 8/29/2018 12:13 PM Belen Dickens OT Occupational Therapist Sign            Progress Notes by Asha Valdez PT at 8/29/2018 11:34 AM     Author:  Asha Valdez PT Service:  (none) Author Type:  Physical Therapist    Filed:  8/29/2018 11:34 AM Date of Service:  8/29/2018 11:34 AM Creation Time:  8/29/2018 11:34 AM    Status:  Signed :  Asha Valdez PT (Physical Therapist)          08/29/18 0925   Quick Adds   Type of Visit Initial PT Evaluation   Living Environment   Lives With alone   Living Arrangements house   Home Accessibility bed and bath are not on the first floor;stairs to enter home;stairs within home;tub/shower is not walk in;grab bars present (toilet);grab bars present (bathtub)   Number of Stairs to Enter Home 4   Number of Stairs Within Home 14   Stair Railings at Home inside, present on right side   Transportation Available car   Living Environment Comment pt lives alone in a home with 4 stairs to enter, 14 stairs to access bed/bath; has one person to help assist for a couple days (nephews wife)   Self-Care   Usual Activity Tolerance good   Current Activity " "Tolerance fair   Regular Exercise yes   Activity/Exercise Type strength training   Exercise Amount/Frequency 2 times/wk   Equipment Currently Used at Home grab bar;shower chair   Activity/Exercise/Self-Care Comment indep with ADLs, leads a senior exercise class 2x/week    Functional Level Prior   Ambulation 0-->independent   Transferring 0-->independent   Toileting 0-->independent   Bathing 0-->independent   Dressing 0-->independent   Eating 0-->independent   Communication 0-->understands/communicates without difficulty   Swallowing 0-->swallows foods/liquids without difficulty   Cognition 0 - no cognition issues reported   Fall history within last six months no   Which of the above functional risks had a recent onset or change? none   Prior Functional Level Comment indep with functional mobility   General Information   Onset of Illness/Injury or Date of Surgery - Date 08/28/18   Referring Physician Navdeep Rivas PABARBER   Patient/Family Goals Statement \"less pain in knee\", \"be able to walk around the block without feeling pain with every step\"   Pertinent History of Current Problem (include personal factors and/or comorbidities that impact the POC) s/p L TKA with Dr. Campos 8/28; PMH includes HTN, HLD, obesity, pelvic fracture 5 years ago   Weight-Bearing Status - LLE weight-bearing as tolerated   General Info Comments activity: amb with assist   Cognitive Status Examination   Orientation orientation to person, place and time   Level of Consciousness alert   Follows Commands and Answers Questions 100% of the time;able to follow multistep instructions   Personal Safety and Judgment intact   Memory intact   Pain Assessment   Patient Currently in Pain Yes, see Vital Sign flowsheet   Integumentary/Edema   Integumentary/Edema Comments L knee incision not visible d/t bandage   Posture    Posture Not impaired   Range of Motion (ROM)   ROM Comment L knee AROM while supine: 0-12-56; R LE WFL    Strength   Strength " "Comments Good functional strength as noted by level of assist with bed mobility and transfers; L hip and knee with post op weakness in quad    Bed Mobility   Bed Mobility Comments SBA bed mobility for L LE   Transfer Skills   Transfer Comments SBA sit <> stands   Gait   Gait Comments CGA ambulating with  feet   Balance   Balance Comments standing static balance good; dynamic balance fair - needs B UE support d/t WB restriction and pain in L knee   Sensory Examination   Sensory Perception no deficits were identified   Coordination   Coordination no deficits were identified   Muscle Tone   Muscle Tone no deficits were identified   Modality Interventions   Planned Modality Interventions Cryotherapy   General Therapy Interventions   Planned Therapy Interventions balance training;bed mobility training;gait training;neuromuscular re-education;ROM;strengthening;transfer training;risk factor education;home program guidelines;progressive activity/exercise   Clinical Impression   Criteria for Skilled Therapeutic Intervention yes, treatment indicated   PT Diagnosis impaired functional mobility    Influenced by the following impairments post-op pain, weakness, WB restriction, decreased tolerance to activity   Functional limitations due to impairments decreased indep with functional mobility    Clinical Presentation Stable/Uncomplicated   Clinical Presentation Rationale uncomplicated surgery, no significant PMH affecting PT POC   Clinical Decision Making (Complexity) Low complexity   Therapy Frequency` 2 times/day   Predicted Duration of Therapy Intervention (days/wks) 3 days   Anticipated Equipment Needs at Discharge front wheeled walker   Anticipated Discharge Disposition Transitional Care Facility   Risk & Benefits of therapy have been explained Yes   Patient, Family & other staff in agreement with plan of care Yes   [facility] AM-PAC TM \"6 Clicks\"   2016, Trustees of [facility], under license to [employer] " "LL[employer].  All rights reserved.   6 Clicks Short Forms Basic Mobility Inpatient Short Form   [facility] AM-PAC  \"6 Clicks\" V.2 Basic Mobility Inpatient Short Form   1. Turning from your back to your side while in a flat bed without using bedrails? 4 - None   2. Moving from lying on your back to sitting on the side of a flat bed without using bedrails? 3 - A Little   3. Moving to and from a bed to a chair (including a wheelchair)? 3 - A Little   4. Standing up from a chair using your arms (e.g., wheelchair, or bedside chair)? 3 - A Little   5. To walk in hospital room? 3 - A Little   6. Climbing 3-5 steps with a railing? 2 - A Lot   Basic Mobility Raw Score (Score out of 24.Lower scores equate to lower levels of function) 18   Total Evaluation Time   Total Evaluation Time (Minutes) 10[KA1.1]        Revision History        User Key Date/Time User Provider Type Action    > KA1.1 8/29/2018 11:34 AM Asha Valdez, PT Physical Therapist Sign                                                      INTERAGENCY TRANSFER FORM - LAB / IMAGING / EKG / EMG RESULTS   8/28/2018                       UR 8A: [phone]            Unresulted Labs (24h ago through future)    Start       Ordered    Unscheduled  Hemoglobin  CONDITIONAL X 2,   Routine     Comments:  Release on POD 1 and POD 2 if the morning Hgb is less than 8.0    08/28/18 1741         Lab Results - 3 Days      Hemoglobin [[id number]] (Abnormal)  Resulted: 08/31/18 0719, Result status: Final result    Ordering provider: Aftab Mcdonald MD  08/31/18 0000 Resulting lab: [facility]    Specimen Information    Type Source Collected On   Blood  08/31/18 0629          Components       Value Reference Range Flag Lab   Hemoglobin 8.8 11.7 - 15.7 g/dL L 13            Basic metabolic panel [[id number]] (Abnormal)  Resulted: 08/30/18 0743, Result status: Final result    Ordering provider: Aftab Mcdonald MD  08/30/18 0000 Resulting lab: " [facility]    Specimen Information    Type Source Collected On   Blood  08/30/18 0711          Components       Value Reference Range Flag Lab   Sodium 145 133 - 144 mmol/L H 13   Potassium 3.8 3.4 - 5.3 mmol/L  13   Chloride 113 94 - 109 mmol/L H 13   Carbon Dioxide 25 20 - 32 mmol/L  13   Anion Gap 7 3 - 14 mmol/L  13   Glucose 116 70 - 99 mg/dL H 13   Urea Nitrogen 9 7 - 30 mg/dL  13   Creatinine 0.58 0.52 - 1.04 mg/dL  13   GFR Estimate >90 >60 mL/min/1.7m2  13   Comment:  Non  GFR Calc   GFR Estimate If Black >90 >60 mL/min/1.7m2  13   Comment:  African American GFR Calc   Calcium 7.8 8.5 - 10.1 mg/dL L 13            Hemoglobin [[id number]] (Abnormal)  Resulted: 08/30/18 0726, Result status: Final result    Ordering provider: Navdeep Rivas PA-C  08/30/18 0000 Resulting lab: [facility]    Specimen Information    Type Source Collected On   Blood  08/30/18 0711          Components       Value Reference Range Flag Lab   Hemoglobin 9.1 11.7 - 15.7 g/dL L 13            Basic metabolic panel [[id number]] (Abnormal)  Resulted: 08/29/18 0616, Result status: Final result    Ordering provider: Aftab Mcdonald MD  08/29/18 0001 Resulting lab: [facility]    Specimen Information    Type Source Collected On   Blood  08/29/18 0550          Components       Value Reference Range Flag Lab   Sodium 142 133 - 144 mmol/L  13   Potassium 4.2 3.4 - 5.3 mmol/L  13   Chloride 112 94 - 109 mmol/L H 13   Carbon Dioxide 24 20 - 32 mmol/L  13   Anion Gap 6 3 - 14 mmol/L  13   Glucose 119 70 - 99 mg/dL H 13   Urea Nitrogen 13 7 - 30 mg/dL  13   Creatinine 0.63 0.52 - 1.04 mg/dL  13   GFR Estimate >90 >60 mL/min/1.7m2  13   Comment:  Non  GFR Calc   GFR Estimate If Black >90 >60 mL/min/1.7m2  13   Comment:  African American GFR Calc   Calcium 7.7 8.5 - 10.1 mg/dL L 13            Hemoglobin [[id number]]  (Abnormal)  Resulted: 08/29/18 0555, Result status: Final result    Ordering provider: Navdeep Rivas PA-C  08/29/18 0001 Resulting lab: [facility]    Specimen Information    Type Source Collected On   Blood  08/29/18 0550          Components       Value Reference Range Flag Lab   Hemoglobin 10.0 11.7 - 15.7 g/dL L 13            Glucose by meter [[id number]] (Abnormal)  Resulted: 08/28/18 0906, Result status: Final result    Ordering provider: Pierre Campos MD  08/28/18 0854 Resulting lab: POINT OF CARE TEST, GLUCOSE    Specimen Information    Type Source Collected On     08/28/18 0854          Components       Value Reference Range Flag Lab   Glucose 122 70 - 99 mg/dL H 170            Testing Performed By     Lab - Abbreviation Name Director Address Valid Date Range    13 - Unknown [facility] Unknown [address] [facility] [id number] 01/15/15 0916 - Present    170 - Unknown POINT OF CARE TEST, GLUCOSE Unknown Unknown 10/31/11 1114 - Present               Imaging Results - 3 Days      XR Knee Port Left 1/2 Views [[id number]]  Resulted: 08/28/18 1614, Result status: Final result    Ordering provider: Navdeep Rivas PA-C  08/28/18 1527 Resulted by: Yumiko Urban MD    Performed: 08/28/18 1529 - 08/28/18 1546 Resulting lab: RADIOLOGY RESULTS    Narrative:       Exam: 2 views of the left knee dated 8/28/2018.    COMPARISON: Same day.    CLINICAL HISTORY: Postop evaluation.    FINDINGS: AP and lateral views of the left knee were obtained. 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  The hardware appears intact. Surgical drain in place. Postoperative  air is noted.      Impression:       IMPRESSION: New postsurgical changes of placement of a left total knee  arthroplasty, without complication.    YUMIKO URBAN MD      XR Knee Port Left 1/2 Views [[id number]]  Resulted: 08/28/18 1352, Result status:  Final result    Ordering provider: Pierre Campos MD  08/28/18 1311 Resulted by: Yumiko Urban MD    Performed: 08/28/18 1320 - 08/28/18 1337 Resulting lab: RADIOLOGY RESULTS    Narrative:       Exam: Single intraoperative view of the left knee dated 8/28/2018.    COMPARISON: 8/4/2018.    CLINICAL HISTORY: Intra-Op evaluation.    FINDINGS: Single intraoperative view of the left knee was obtained.  Tibial tray component in place. Postoperative air is noted. No femoral  component 16.      Impression:       IMPRESSION: Intraoperative view of the left knee with a tibial tray  component in place.    YUMIKO URBAN MD      Testing Performed By     Lab - Abbreviation Name Director Address Valid Date Range    104 - Rad [facility] RADIOLOGY RESULTS Unknown Unknown 02/16/05 1553 - Present            Encounter-Level Documents:     There are no encounter-level documents.      Order-Level Documents:     There are no order-level documents.

## 2018-08-28 NOTE — IP AVS SNAPSHOT
MRN:[id number]                      After Visit Summary   8/28/2018    Esperanza Gage    MRN: [id number]           Thank you!     Thank you for choosing [facility] for your care. Our goal is always to provide you with excellent care. Hearing back from our patients is one way we can continue to improve our services. Please take a few minutes to complete the written survey that you may receive in the mail after you visit with us. Thank you!        Patient Information     Date Of Birth          1945        Designated Caregiver       Most Recent Value    Caregiver    Will someone help with your care after discharge? yes    Name of designated caregiver Shayy gage    Phone number of caregiver [phone]    Caregiver address [address]      About your hospital stay     You were admitted on:  August 28, 2018 You last received care in the:  [facility]    You were discharged on:  August 31, 2018        Reason for your hospital stay       You were admitted following your total knee arthroplasty                  Who to Call     For medical emergencies, please call 911.  For non-urgent questions about your medical care, please call your primary care provider or clinic, [phone]  For questions related to your surgery, please call your surgery clinic        Attending Provider     Provider Specialty    Pierre Campos MD Orthopedics       Primary Care Provider Office Phone # Fax #    Julia Raúl Gifford [phone]       When to contact your care team       CALL YOUR PHYSICIAN IF:  1.  Your pain begins to worsen and is unable to be controlled with your medications.  2.  Excessive redness or drainage of cloudy or bloody material from the wounds (Clear red tinted fluid and some mild drainage should be expected). Drainage of any kind 5 days after surgery should be reported to the doctor.  3.  You have a temperature elevation greater than 101.5    4.  You have pain, swelling or redness in  your calf. You have numbness or weakness in your leg or foot.                  After Care Instructions     Activity       Your activity upon discharge: activity as tolerated            Activity - Ambulate in hallway       Every shift            Activity - Up with assistive device           Activity - Up with nursing assistance           Additional Discharge Instructions       Take ASA 325mg BID for 4 weeks post op for DVT prophylaxis; then may resume home ASA 81mg daily.  Wt Bearing as tolerated            Continuous Passive Motion Machine       Start CPM Day of Surgery.  0 - 90 degrees. Advance as tolerated.            Diet       Return to your pre surgery diet            Discharge Instructions       TOTAL KNEE ARTHROPLASTY POST OPERATIVE DISCHARGE INSTRUCTIONS    FOLLOW UP APPOINTMENT  You are scheduled for a post operative appointment with Dr. Campos approximately four weeks after surgery.     Your follow up appointments will be at the location that you regularly see Dr. Campos:    [facility]  [address] [place] [address]  [phone]    Physical therapy:   A prescription for physical therapy will be provided at the time of discharge.      ACTIVITY  Weight bearing status:   You may bear weight on your operative extremity as tolerated, using assistive devices (walker, cane) as needed. As you begin to feel more comfortable ambulating, you may gradually transition from a walker to a cane. Eventually, you should wean from all assistive devices. Although we would like you to discontinue use of assistive devices as soon as possible, do not transition until you have worked with your physical therapist to achieve safe balance and comfort.     Continuous passive motion machine:   Perform CPM exercises for six to eight hours per day for the first four weeks after surgery. The CPM should be set at 0 degrees to flexion tolerance with a goal of 90 degrees. Advance  the CPM settings aggressively in increments of 5 degrees every 30 minutes until goal is achieved.     Exercises:   Perform the following exercises at least three times per day for the first four weeks after surgery to prevent complications, such as blood clots in your legs:  1) Point and flex your feet  2) Move your ankle around in big circles  3) Wiggle your toes   Also, perform thigh muscle tightening exercises for 10 to 15 minutes at least three times per day for the first four weeks after surgery.    Athletic Activities:  Activities such as swimming, bicycling, jogging, running, and stop-and-go sports should be avoided until permitted by your provider.    Driving:  Driving is not permitted until directed by your provider. Typically, driving is restricted for three to four weeks after right knee surgery and three weeks after left knee surgery. Under no circumstance are you permitted to drive while using narcotic pain medications.    Return to Work:  You may return to work when directed by your provider. Typically, patients with desk/sitting jobs can return to work within two weeks while patients with heavy labor jobs can return to work around three months after surgery.      COMFORT AND PAIN MANAGEMENT  Elevation:   During times of inactivity throughout the first two weeks after surgery, make an effort to decrease swelling by elevating your operative extremity. This is most effectively done by lying down and placing several pillows lengthwise under your thigh and calf to raise your toes above the level of your nose. To ensure that you knee remains in full extension, do not place pillows directly under your knee.     Icing:  An ice pack will be provided to control swelling and discomfort after surgery. Place a thin towel on your skin and apply the ice pack overtop. You may apply ice for 20 minutes as often as two times per hour.    Pain Medications:  You will be discharged with acetaminophen (Tylenol) and a  narcotic medication for pain management after surgery. Acetaminophen can help to effectively manage pain when used as prescribed, however, do not exceed the maximum daily dose of 3000 mg. The narcotic pain medication should be reserved for severe, breakthrough pain. Take the narcotic medication as prescribed and use only as often as necessary.       ANTICOAGULATION  Take Aspirin 325mg TWICE DAILY for 4 weeks after surgery. This medication will help prevent blood clots      WOUND CARE AND SHOWERING  Wound care:  Keep the dressing on, clean, and dry for the first five days after surgery. You may then remove the dressing and replace it with fresh 4x4s and tubigrip (or ACE wrap). Your surgical incision was closed with a clear knotless suture and tails were left at the margins of the incision. These tails can be left in place until your follow up appointment. The steri strips (small white tape that is directly on the incision areas) should be left in place until they fall off or are removed at your first office visit.    Showering:  You may shower ten days after surgery provided your incision is intact and dry without drainage. If there is fluid at the incision site, cover the incision with a non-permeable plastic bag. You may allow water to run over the incision, but do not soak or submerge the incision. Do not scrub the incision.     Tub Bathing:  Tub bathing, swimming, or any other activities that cause your incision to be submerged should be avoided until allowed by your provider. Typically, patients are allowed to return to these activities four weeks after surgery.      CONTACTING YOUR PHYSICIAN:  You may experience symptoms that require follow-up before your scheduled appointment. Please contact Dr. Campos's office if you experience:  1) Pain that persists or worsens in the first few days after surgery  2) Excessive redness or drainage of cloudy or bloody material from the wounds (clear red tinted fluid and some  mild drainage should be expected) or drainage of any kind five days after surgery  3) A temperature elevation greater than 101.5    4) Pain, swelling or redness in your calf  5) Numbness or weakness in your leg or foot      Regular business hours (Monday - Friday, 8am - 5pm):  [facility]: [phone]    If you have any other concerns during normal business hours, please contact Dr. Campos's nurse, Daniela Bradford RN, at [phone] during normal business hours 8 am - 5 pm (leave message as needed). If your concern is urgent, please page Daniela Bradford RN at [phone] and key in your 10 digit phone number followed by the # sign after the beep.     After hours and weekends:  [facility] on call Orthopedic resident: [phone]    If you have an emergent concern, contact the Emergency Department at the [facility] - [phone] - or [facility] - [phone] - [facility].            Fall precautions           General info for SNF       Length of Stay Estimate: Short Term Care: Estimated # of Days <30  Condition at Discharge: Improving  Level of care:skilled   Rehabilitation Potential: Good  Admission H&P remains valid and up-to-date: Yes  Recent Chemotherapy: N/A  Use Nursing Home Standing Orders: Yes    Hgb and BMP on 9/4/18            Mantoux instructions       Give two-step Mantoux (PPD) Per Facility Policy Yes            Weight bearing status       As tolerated            Wound care (specify)       Site:   Left knee  Instructions:  Post surgical dressing to remain clean and intact until 5-7 days post op, then change daily with nonadhesive, dry gauze and ace wrap. Avoid adhesives if possible. See full care instructions in patient AVS                  Follow-up Appointments     Adult P/[facility] Follow-up and recommended labs and tests       Follow up with Dr. Campos at 4  weeks post op for wound check.     [facility] ([address] [place] [address]  ", [place] [address]). Call [phone] to schedule a follow-up appointment at this location with your provider.    *patient is scheduled with Dr. Campos on 9/24/18 at 9:30a*                  Your next 10 appointments already scheduled     Sep 24, 2018  9:30 AM CDT   (Arrive by 9:15 AM)   Return Visit with Pierre Campos MD   [facility] ([facility])    [address]  4th Floor  [place] [address] [phone]              Additional Services     Occupational Therapy Adult Consult       Evaluate and treat as clinically indicated.    Reason: s/p tka            Physical Therapy Adult Consult       Evaluate and treat as clinically indicated.    Reason:  eval and treat                  Future tests that were ordered for you     Assign Questionnaire Series to Patient                 Pending Results     No orders found from 8/26/2018 to 8/29/2018.            Statement of Approval     Ordered          08/31/18 0747  I have reviewed and agree with all the recommendations and orders detailed in this document.  EFFECTIVE NOW     Approved and electronically signed by:  Roldan Bowen MD             Admission Information     Date & Time Provider Department Dept. Phone    8/28/2018 Pierre Campos MD UR 8A [phone]      Your Vitals Were     Blood Pressure Pulse Temperature Respirations Height Weight    114/53 78 98.1  F (36.7  C) 16 1.664 m (5' 5.51\") 89.8 kg (197 lb 15.6 oz)    Pulse Oximetry BMI (Body Mass Index)                97% 32.43 kg/m2          MyChart Information     [employer] gives you secure access to your electronic health record. If you see a primary care provider, you can also send messages to your care team and make appointments. If you have questions, please call your primary care clinic.  If you do not have a primary care provider, please call [phone] and they will assist you.        Care EveryWhere ID     This is your Care " EveryWhere ID. This could be used by other organizations to access your [facility] medical records  [id number]        Equal Access to Services     NICHOLAS JUAREZ : Hadii oksana Painter, waisabelda hugobrindaha, joejcarlos kajamaal byrnes, noelle parvinin hayaalilian nicholsonflaco flores laValentinabalwinder mtzBerta Sprague [facility] [phone].    ATENCIÓN: Si habla español, tiene a nair disposición servicios gratuitos de asistencia lingüística. Ngoc al [phone].    We comply with applicable federal civil rights laws and [place] laws. We do not discriminate on the basis of race, color, national origin, age, disability, sex, sexual orientation, or gender identity.               Review of your medicines      START taking        Dose / Directions    acetaminophen 325 MG tablet   Commonly known as:  TYLENOL        Dose:  650 mg   Take 2 tablets (650 mg) by mouth every 4 hours as needed for other (adjunct to pain control)   Quantity:  100 tablet   Refills:  0       oxyCODONE IR 5 MG tablet   Commonly known as:  ROXICODONE        Dose:  2.5-5 mg   Take 0.5-1 tablets (2.5-5 mg) by mouth every 4 hours as needed for moderate to severe pain or other (for pain rated 4-6/10 take half tab; for pain 7-10/10 take 1 tab)   Quantity:  40 tablet   Refills:  0       senna-docusate 8.6-50 MG per tablet   Commonly known as:  SENOKOT-S;PERICOLACE        Dose:  1 tablet   Take 1 tablet by mouth 2 times daily as needed for constipation   Quantity:  30 tablet   Refills:  0         CONTINUE these medicines which may have CHANGED, or have new prescriptions. If we are uncertain of the size of tablets/capsules you have at home, strength may be listed as something that might have changed.        Dose / Directions    alendronate 70 MG tablet   Commonly known as:  FOSAMAX   This may have changed:  additional instructions   Used for:  Senile osteoporosis        Dose:  70 mg   Take 1 tablet (70 mg) by mouth every 7 days Take with over 8 ounces water and stay upright for at least 30  minutes after dose.  Take at least 60 minutes before breakfast   Quantity:  12 tablet   Refills:  3       * aspirin 325 MG EC tablet   Indication:  VTE Prophylaxis   This may have changed:  You were already taking a medication with the same name, and this prescription was added. Make sure you understand how and when to take each.        Dose:  325 mg   Take 1 tablet (325 mg) by mouth 2 times daily for 26 days   Quantity:  40 tablet   Refills:  0       * aspirin 81 MG tablet   This may have changed:    - additional instructions  - These instructions start on 9/26/2018. If you are unsure what to do until then, ask your doctor or other care provider.        Dose:  81 mg   Start taking on:  9/26/2018   Take 1 tablet (81 mg) by mouth At Bedtime Resume your home baby aspirin AFTER completing your high dose post-operative aspirin (4 weeks)   Quantity:  30 tablet   Refills:  0       ciclopirox 0.77 % cream   Commonly known as:  LOPROX   This may have changed:    - when to take this  - reasons to take this   Used for:  Tinea pedis of both feet        Apply topically 2 times daily   Quantity:  30 g   Refills:  3       losartan 25 MG tablet   Commonly known as:  COZAAR   This may have changed:  when to take this   Used for:  Benign essential hypertension        Dose:  25 mg   Take 1 tablet (25 mg) by mouth daily   Quantity:  90 tablet   Refills:  3       * Notice:  This list has 2 medication(s) that are the same as other medications prescribed for you. Read the directions carefully, and ask your doctor or other care provider to review them with you.      CONTINUE these medicines which have NOT CHANGED        Dose / Directions    simvastatin 10 MG tablet   Commonly known as:  ZOCOR   Used for:  Pure hypercholesterolemia        Dose:  10 mg   Take 1 tablet (10 mg) by mouth At Bedtime   Quantity:  90 tablet   Refills:  3            Where to get your medicines      Some of these will need a paper prescription and others can be  bought over the counter. Ask your nurse if you have questions.     You don't need a prescription for these medications     acetaminophen 325 MG tablet    aspirin 325 MG EC tablet    aspirin 81 MG tablet    senna-docusate 8.6-50 MG per tablet         Information about where to get these medications is not yet available     ! Ask your nurse or doctor about these medications     oxyCODONE IR 5 MG tablet                Protect others around you: Learn how to safely use, store and throw away your medicines at www.disposemymeds.org.        Information about OPIOIDS     PRESCRIPTION OPIOIDS: WHAT YOU NEED TO KNOW   We gave you an opioid (narcotic) pain medicine. It is important to manage your pain, but opioids are not always the best choice. You should first try all the other options your care team gave you. Take this medicine for as short a time (and as few doses) as possible.    Some activities can increase your pain, such as bandage changes or therapy sessions. It may help to take your pain medicine 30 to 60 minutes before these activities. Reduce your stress by getting enough sleep, working on hobbies you enjoy and practicing relaxation or meditation. Talk to your care team about ways to manage your pain beyond prescription opioids.    These medicines have risks:    DO NOT drive when on new or higher doses of pain medicine. These medicines can affect your alertness and reaction times, and you could be arrested for driving under the influence (DUI). If you need to use opioids long-term, talk to your care team about driving.    DO NOT operate heavy machinery    DO NOT do any other dangerous activities while taking these medicines.    DO NOT drink any alcohol while taking these medicines.     If the opioid prescribed includes acetaminophen, DO NOT take with any other medicines that contain acetaminophen. Read all labels carefully. Look for the word  acetaminophen  or  Tylenol.  Ask your pharmacist if you have questions or  are unsure.    You can get addicted to pain medicines, especially if you have a history of addiction (chemical, alcohol or substance dependence). Talk to your care team about ways to reduce this risk.    All opioids tend to cause constipation. Drink plenty of water and eat foods that have a lot of fiber, such as fruits, vegetables, prune juice, apple juice and high-fiber cereal. Take a laxative (Miralax, milk of magnesia, Colace, Senna) if you don t move your bowels at least every other day. Other side effects include upset stomach, sleepiness, dizziness, throwing up, tolerance (needing more of the medicine to have the same effect), physical dependence and slowed breathing.    Store your pills in a secure place, locked if possible. We will not replace any lost or stolen medicine. If you don t finish your medicine, please throw away (dispose) as directed by your pharmacist. The [place] Pollution Control Agency has more information about safe disposal: https://www.pca.[facility].[place].us/living-green/managing-unwanted-medications             Medication List: This is a list of all your medications and when to take them. Check marks below indicate your daily home schedule. Keep this list as a reference.      Medications           Morning Afternoon Evening Bedtime As Needed    acetaminophen 325 MG tablet   Commonly known as:  TYLENOL   Take 2 tablets (650 mg) by mouth every 4 hours as needed for other (adjunct to pain control)   Last time this was given:  975 mg on 8/31/2018 11:15 AM                                alendronate 70 MG tablet   Commonly known as:  FOSAMAX   Take 1 tablet (70 mg) by mouth every 7 days Take with over 8 ounces water and stay upright for at least 30 minutes after dose.  Take at least 60 minutes before breakfast                                * aspirin 325 MG EC tablet   Take 1 tablet (325 mg) by mouth 2 times daily for 26 days   Last time this was given:  325 mg on 8/31/2018  7:55 AM                                 * aspirin 81 MG tablet   Take 1 tablet (81 mg) by mouth At Bedtime Resume your home baby aspirin AFTER completing your high dose post-operative aspirin (4 weeks)   Start taking on:  9/26/2018                                ciclopirox 0.77 % cream   Commonly known as:  LOPROX   Apply topically 2 times daily                                losartan 25 MG tablet   Commonly known as:  COZAAR   Take 1 tablet (25 mg) by mouth daily                                oxyCODONE IR 5 MG tablet   Commonly known as:  ROXICODONE   Take 0.5-1 tablets (2.5-5 mg) by mouth every 4 hours as needed for moderate to severe pain or other (for pain rated 4-6/10 take half tab; for pain 7-10/10 take 1 tab)   Last time this was given:  2.5 mg on 8/31/2018  1:01 PM                                senna-docusate 8.6-50 MG per tablet   Commonly known as:  SENOKOT-S;PERICOLACE   Take 1 tablet by mouth 2 times daily as needed for constipation   Last time this was given:  2 tablets on 8/31/2018  7:55 AM                                simvastatin 10 MG tablet   Commonly known as:  ZOCOR   Take 1 tablet (10 mg) by mouth At Bedtime   Last time this was given:  10 mg on 8/30/2018 11:19 PM                                * Notice:  This list has 2 medication(s) that are the same as other medications prescribed for you. Read the directions carefully, and ask your doctor or other care provider to review them with you.

## 2018-08-28 NOTE — ANESTHESIA POSTPROCEDURE EVALUATION
Patient: Esperanza Gage    Procedure(s):  Left Total Knee Replacement - Wound Class: I-Clean    Diagnosis:Osteoarthritis Left Knee  Diagnosis Additional Information: No value filed.    Anesthesia Type:  General, ETT    Note:  Anesthesia Post Evaluation    Patient location during evaluation: PACU and Bedside  Patient participation: Able to fully participate in evaluation  Level of consciousness: awake and alert  Pain management: adequate  Airway patency: patent  Cardiovascular status: acceptable  Respiratory status: acceptable  Hydration status: balanced  PONV: none     Anesthetic complications: None          Last vitals:  Vitals:    08/28/18 1645 08/28/18 1700 08/28/18 1737   BP: 119/65 108/63 133/62   Pulse:      Resp:   16   Temp: 36.7  C (98.1  F)  36  C (96.8  F)   SpO2: 97%  93%         Electronically Signed By: Esperanza Gutierrez MD  August 28, 2018  5:56 PM

## 2018-08-28 NOTE — OR NURSING
PACU to Inpatient Nursing Handoff    Patient Esperanza Gage is a 72 year old female who speaks English.   Procedure Procedure(s):  Left Total Knee Replacement - Wound Class: I-Clean   Surgeon(s) Primary: Pierre Campos MD  Assisting: Navdeep Rivas PA-C; Reyes Muller MD     No Known Allergies    Isolation  [unfilled]     Past Medical History   has a past medical history of Hearing problem (2017); Hyperlipidemia (2005); Hyperlipidemia LDL goal < 130; Hypertension; Osteoporosis, post-menopausal; and Pelvic fracture (H).    Anesthesia General   Dermatome Level     Preop Meds acetaminophen (Tylenol) - time given: 0900   Nerve block Not applicable   Intraop Meds dexamethasone (Decadron)  fentanyl (Sublimaze): 100 mcg total  hydromorphone (Dilaudid): 1 mg total  ondansetron (Zofran): last given at 4   Local Meds Yes - Local Cocktail (morphine, ropivacaine, epinephrine, Toradol)   Antibiotics cefazolin (Ancef) - last given at 1321     Pain Patient Currently in Pain: yes  Comfort: comfortably manageable  Pain Control: partially effective   PACU meds  fentanyl (Sublimaze): 100 mcg (total dose) last given at 1626    PCA / epidural No   Capnography Respiratory Monitoring (EtCO2): 39 mmHg   Telemetry     Inpatient Telemetry Monitor Ordered? No        Labs Glucose Lab Results   Component Value Date     08/13/2018       Hgb Lab Results   Component Value Date    HGB 13.4 08/13/2018       INR Lab Results   Component Value Date    INR 0.98 08/13/2018      PACU Imaging Completed     Wound/Incision Incision/Surgical Site 08/28/18 Left Knee (Active)   Incision Assessment UTV 8/28/2018  4:56 PM   Closure RAJANI 8/28/2018  4:56 PM   Incision Drainage Amount UTV 8/28/2018  4:56 PM   Incision Care Ice applied 8/28/2018  4:56 PM   Dressing Intervention Clean, dry, intact 8/28/2018  4:56 PM   Number of days:0      CMS Peripheral Neurovascular WDL: WDL (08/28/18 1656)  All Extremities Temperature: warm (08/28/18  1656)  All Extremities Color: no discoloration (08/28/18 1656)  All Extremities Sensation: no tingling;no numbness (08/28/18 1522)  LLE Temperature: warm (08/28/18 1656)  LLE Color: no discoloration (08/28/18 1656)  LLE Sensation: numbness present;no tingling (big toe and top of foot) (08/28/18 1656)  RLE Temperature: warm (08/28/18 1656)  RLE Color: no discoloration (08/28/18 1656)  RLE Sensation: no numbness;no tingling (08/28/18 1656)   Equipment ice pack and continuous passive motion machine (CPM)   Other LDA       IV Access Peripheral IV 08/28/18 Left Lower forearm (Active)   Site Assessment WDL 8/28/2018  4:00 PM   Line Status Infusing 8/28/2018  4:00 PM   Phlebitis Scale 0-->no symptoms 8/28/2018  4:00 PM   Infiltration Scale 0 8/28/2018  4:00 PM   Infiltration Site Treatment Method  None 8/28/2018  9:30 AM   Extravasation? No 8/28/2018  9:30 AM   Dressing Intervention New dressing  8/28/2018  9:30 AM   Number of days:0      Blood Products Not applicable  mL   Intake/Output Date 08/28/18 0700 - 08/29/18 0659   Shift 5373-3631 6287-9795 9977-6784 24 Hour Total   I  N  T  A  K  E   P.O.  120  120    I.V. 1700 350  2050    Shift Total  (mL/kg) 1700  (18.93) 470  (5.23)  2170  (24.17)   O  U  T  P  U  T   Urine 400 150  550    Drains  90  90    Blood 250   250    Shift Total  (mL/kg) 650  (7.24) 240  (2.67)  890  (9.91)   Weight (kg) 89.8 89.8 89.8 89.8        Drains / Tucker Closed/Suction Drain Left Knee Bulb 15 Greenlandic (Active)   Site Description UTV 8/28/2018  4:00 PM   Dressing Status Normal: Clean, Dry & Intact 8/28/2018  4:00 PM   Drainage Appearance Bloody/Bright Red 8/28/2018  4:00 PM   Status To bulb suction 8/28/2018  4:00 PM   Output (ml) 90 ml 8/28/2018  4:42 PM   Number of days:0       Urethral Catheter Double-lumen;Latex;Straight-tip 16 fr (Active)   Tube Description UTV 8/28/2018  4:56 PM   Collection Container Standard 8/28/2018  4:56 PM   Securement Method Securing device (Describe)  8/28/2018  4:56 PM   Rationale for Continued Use Anesthesia 8/28/2018  4:56 PM   Urine Output 100 mL 8/28/2018  4:42 PM   Number of days:0      Time of void PreOp Void Prior to Procedure: 0820 (08/28/18 0902)    PostOp      Diapered? No   Bladder Scan      mL (08/28/18 1642)  tolerating sips and ice chips     Vitals    B/P: 108/63  T: 98.1  F (36.7  C)    Temp src: Oral  P:  Pulse: 81 (08/28/18 0841)    Heart Rate: 82 (08/28/18 1615)     R: 14  O2:  SpO2: 97 %    O2 Device: Nasal cannula (08/28/18 1545)    Oxygen Delivery: 2 LPM (08/28/18 1545)         Family/support present No family here   Patient belongings Patient Belongings: clothing;glasses;shoes   Patient transported on bed and air mat   DC meds/scripts (obs/outpt) Not applicable   Inpatient Pain Meds Released? Yes       Special needs/considerations None   Tasks needing completion None       Omayra Sheridan, RN  [facility]COM [id number]

## 2018-08-28 NOTE — IP AVS SNAPSHOT
[facility]    [address]    [facility] [place] [address]    Phone:  [phone]                                       After Visit Summary   8/28/2018    Esperanza Gage    MRN: [id number]           After Visit Summary Signature Page     I have received my discharge instructions, and my questions have been answered. I have discussed any challenges I see with this plan with the nurse or doctor.    ..........................................................................................................................................  Patient/Patient Representative Signature      ..........................................................................................................................................  Patient Representative Print Name and Relationship to Patient    ..................................................               ................................................  Date                                            Time    ..........................................................................................................................................  Reviewed by Signature/Title    ...................................................              ..............................................  Date                                                            Time          22EPIC Rev 08/18

## 2018-08-28 NOTE — BRIEF OP NOTE
Orthopaedic Surgery Brief Op-Note      Patient: Esperanza Gage  : 1945  Date of Service: 2018 3:27 PM    Pre-operative Diagnosis: Osteoarthritis Left Knee  Post-operative Diagnosis: same    Procedure(s) Performed: Procedure(s):  Left Total Knee Replacement - Wound Class: I-Clean    Staff: Dr. Campos  Assistants:   Navdeep Rivas PA-C    Anesthesia: General  EBL: 250 cc  UOP: see anesthesia record  Tourniquet Time: 124 minutes at 350 mmHg    Implants:     Implant Name Type Inv. Item Serial No.  Lot No. LRB No. Used   BONE CEMENT SIMPLEX FULL DOSE 6191-1-001 Cement, Bone BONE CEMENT SIMPLEX FULL DOSE 6191-1-001  [employer] [id number] Left 2   IMP COMP TIBIAL KNEE ASCENT 71MM [id number] Total Joint Component/Insert IMP COMP TIBIAL KNEE ASCENT 71MM [id number]  [employer] [id number] Left 1   IMP COMP FEMORAL VANGARD BIOM PS LT 65MM [id number] Total Joint Component/Insert IMP COMP FEMORAL VANGARD BIOM PS LT 65MM [id number]  [employer] [id number] Left 1   IMP COMP PATELLA BIOM ARCM MED 34MM [id number] Total Joint Component/Insert IMP COMP PATELLA BIOM ARCM MED 34MM [id number]  [employer] [id number] Left 1   IMP INSERT TIBIAL BIOM PS PLUS BEARING 14X71/75MM [id number] Total Joint Component/Insert IMP INSERT TIBIAL BIOM PS PLUS BEARING 14X71/75MM [id number]   [employer] [id number] Left 1     Drains: ANNE-MARIE drain x1  Intra-op Labs/Cxs/Specimens: none  Complications: No apparent complications during procedure  Findings: Please see dictated operative note for details    Disposition: Stable to PACU, then admit to Orthopaedics.    Post-Op Plan:  Assessment/Plan: Esperanza Gage is a 72 year old female s/p Procedure(s):  Left Total Knee Replacement - Wound Class: I-Clean on 2018 with Dr. Campos.    Activity: Up with assist and assistive devices as needed until independent. Knee ROM as tolerated. Advance CPM as tolerated to goal of 0 to 90 degrees.  Weight bearing status: WBAT    Antibiotics: Cefazolin x 24 hours    Diet: Begin with clear fluids and progress diet as tolerated. Bowel regimen. Anti-emetics PRN.    DVT prophylaxis:  Mechanical while in hospital with ASA 325mg BID x 4 weeks  Elevation: Elevate heels off of bed on pillows, no pillows behind the knee at any time    Wound Care: Dressing change at bedside by Ortho on POD #1  Drains: Document output per shift, Ortho will discontinue on POD #1-2.    Pain management: Orals PRN, IV for breakthrough only  X-rays: AP/Lat L knee XR in PACU  Physical Therapy: Mobilization, ROM, ADL's  Occupational Therapy: ADL's  Labs: Trend Hgb on PODs #1 & 2  Cultures: None  Consults: PT, OT. Hospitalist, appreciate assistance in caring for this patient throughout the perioperative period    Future Appointments  Date Time Provider Department Center   8/29/2018 6:30 AM UR PT OVERFLOW [facility] [place]   8/29/2018 9:30 AM Belen Dickens OT [facility] [place]   8/29/2018 6:30 PM UR PT OVERFLOW [facility] [place]       Disposition: Pending progress with therapies, pain control on orals, and medical stability, anticipate discharge to Home vs TCU on POD #2-3.    I placed and held retractors to provide adequate exposure that allowed the case to proceed in a safe, efficient manner. I assisted in identifying and protecting important structures. I ligated blood vessels to maintain hemostasis and minimize bleeding risk. I suctioned fluids when needed. I assisted with the proper selection and positioning of any implants required for the case. I performed wound closure of the operative incisions.    An experienced physician assistant was medically necessary to ensure a safe and efficient procedure. The assistance that I provided decreased operative time and thereby, reduced the risk of infection and complications from prolonged time of anesthesia. My assistance was vital in achieving best practices.        Navdeep Rivas PA-C 8/28/2018 3:27 PM  Orthopaedic Surgery     Please page me at [phone] with any  questions/concerns during regular weekday hours before 5pm. If there is no response, if it is a weekend, or if it is during evening hours then please page the orthopaedic surgery resident on call.

## 2018-08-29 ENCOUNTER — APPOINTMENT (OUTPATIENT)
Dept: PHYSICAL THERAPY | Facility: CLINIC | Age: 73
DRG: 470 | End: 2018-08-29
Attending: ORTHOPAEDIC SURGERY
Payer: MEDICARE

## 2018-08-29 ENCOUNTER — APPOINTMENT (OUTPATIENT)
Dept: OCCUPATIONAL THERAPY | Facility: CLINIC | Age: 73
DRG: 470 | End: 2018-08-29
Attending: PHYSICIAN ASSISTANT
Payer: MEDICARE

## 2018-08-29 ENCOUNTER — APPOINTMENT (OUTPATIENT)
Dept: PHYSICAL THERAPY | Facility: CLINIC | Age: 73
DRG: 470 | End: 2018-08-29
Attending: PHYSICIAN ASSISTANT
Payer: MEDICARE

## 2018-08-29 LAB
ANION GAP SERPL CALCULATED.3IONS-SCNC: 6 MMOL/L (ref 3–14)
BUN SERPL-MCNC: 13 MG/DL (ref 7–30)
CALCIUM SERPL-MCNC: 7.7 MG/DL (ref 8.5–10.1)
CHLORIDE SERPL-SCNC: 112 MMOL/L (ref 94–109)
CO2 SERPL-SCNC: 24 MMOL/L (ref 20–32)
CREAT SERPL-MCNC: 0.63 MG/DL (ref 0.52–1.04)
GFR SERPL CREATININE-BSD FRML MDRD: >90 ML/MIN/1.7M2
GLUCOSE SERPL-MCNC: 119 MG/DL (ref 70–99)
HGB BLD-MCNC: 10 G/DL (ref 11.7–15.7)
POTASSIUM SERPL-SCNC: 4.2 MMOL/L (ref 3.4–5.3)
SODIUM SERPL-SCNC: 142 MMOL/L (ref 133–144)

## 2018-08-29 PROCEDURE — 97530 THERAPEUTIC ACTIVITIES: CPT | Mod: GO

## 2018-08-29 PROCEDURE — 25000132 ZZH RX MED GY IP 250 OP 250 PS 637: Mod: GY | Performed by: INTERNAL MEDICINE

## 2018-08-29 PROCEDURE — 40000193 ZZH STATISTIC PT WARD VISIT

## 2018-08-29 PROCEDURE — 40000133 ZZH STATISTIC OT WARD VISIT

## 2018-08-29 PROCEDURE — 12000001 ZZH R&B MED SURG/OB UMMC

## 2018-08-29 PROCEDURE — 97110 THERAPEUTIC EXERCISES: CPT | Mod: GP

## 2018-08-29 PROCEDURE — A9270 NON-COVERED ITEM OR SERVICE: HCPCS | Mod: GY | Performed by: PHYSICIAN ASSISTANT

## 2018-08-29 PROCEDURE — 40000193 ZZH STATISTIC PT WARD VISIT: Performed by: PHYSICAL THERAPIST

## 2018-08-29 PROCEDURE — 97165 OT EVAL LOW COMPLEX 30 MIN: CPT | Mod: GO

## 2018-08-29 PROCEDURE — 97530 THERAPEUTIC ACTIVITIES: CPT | Mod: GP | Performed by: PHYSICAL THERAPIST

## 2018-08-29 PROCEDURE — 97535 SELF CARE MNGMENT TRAINING: CPT | Mod: GO

## 2018-08-29 PROCEDURE — 97110 THERAPEUTIC EXERCISES: CPT | Mod: GP | Performed by: PHYSICAL THERAPIST

## 2018-08-29 PROCEDURE — 80048 BASIC METABOLIC PNL TOTAL CA: CPT | Performed by: PHYSICIAN ASSISTANT

## 2018-08-29 PROCEDURE — A9270 NON-COVERED ITEM OR SERVICE: HCPCS | Mod: GY | Performed by: INTERNAL MEDICINE

## 2018-08-29 PROCEDURE — 36415 COLL VENOUS BLD VENIPUNCTURE: CPT | Performed by: PHYSICIAN ASSISTANT

## 2018-08-29 PROCEDURE — 25000128 H RX IP 250 OP 636: Performed by: PHYSICIAN ASSISTANT

## 2018-08-29 PROCEDURE — 85018 HEMOGLOBIN: CPT | Performed by: PHYSICIAN ASSISTANT

## 2018-08-29 PROCEDURE — 25000132 ZZH RX MED GY IP 250 OP 250 PS 637: Mod: GY | Performed by: PHYSICIAN ASSISTANT

## 2018-08-29 PROCEDURE — 97116 GAIT TRAINING THERAPY: CPT | Mod: GP | Performed by: PHYSICAL THERAPIST

## 2018-08-29 PROCEDURE — 97161 PT EVAL LOW COMPLEX 20 MIN: CPT | Mod: GP

## 2018-08-29 PROCEDURE — 97116 GAIT TRAINING THERAPY: CPT | Mod: GP

## 2018-08-29 PROCEDURE — 97530 THERAPEUTIC ACTIVITIES: CPT | Mod: GP

## 2018-08-29 PROCEDURE — 99231 SBSQ HOSP IP/OBS SF/LOW 25: CPT | Performed by: INTERNAL MEDICINE

## 2018-08-29 RX ORDER — OXYCODONE HYDROCHLORIDE 5 MG/1
5-10 TABLET ORAL
Status: DISCONTINUED | OUTPATIENT
Start: 2018-08-29 | End: 2018-08-30

## 2018-08-29 RX ADMIN — ASPIRIN 325 MG: 325 TABLET, DELAYED RELEASE ORAL at 09:50

## 2018-08-29 RX ADMIN — OXYCODONE HYDROCHLORIDE 5 MG: 5 TABLET ORAL at 10:18

## 2018-08-29 RX ADMIN — ASPIRIN 325 MG: 325 TABLET, DELAYED RELEASE ORAL at 20:44

## 2018-08-29 RX ADMIN — KETOROLAC TROMETHAMINE 15 MG: 30 INJECTION, SOLUTION INTRAMUSCULAR at 01:46

## 2018-08-29 RX ADMIN — CEFAZOLIN SODIUM 2 G: 2 INJECTION, SOLUTION INTRAVENOUS at 05:44

## 2018-08-29 RX ADMIN — ACETAMINOPHEN 975 MG: 325 TABLET, FILM COATED ORAL at 11:48

## 2018-08-29 RX ADMIN — ACETAMINOPHEN 975 MG: 325 TABLET, FILM COATED ORAL at 18:56

## 2018-08-29 RX ADMIN — OXYCODONE HYDROCHLORIDE 5 MG: 5 TABLET ORAL at 18:58

## 2018-08-29 RX ADMIN — ACETAMINOPHEN 975 MG: 325 TABLET, FILM COATED ORAL at 02:51

## 2018-08-29 RX ADMIN — OXYCODONE HYDROCHLORIDE 5 MG: 5 TABLET ORAL at 14:08

## 2018-08-29 RX ADMIN — Medication 0.3 MG: at 09:41

## 2018-08-29 RX ADMIN — SODIUM CHLORIDE, POTASSIUM CHLORIDE, SODIUM LACTATE AND CALCIUM CHLORIDE: 600; 310; 30; 20 INJECTION, SOLUTION INTRAVENOUS at 05:50

## 2018-08-29 RX ADMIN — SIMVASTATIN 10 MG: 10 TABLET, FILM COATED ORAL at 21:41

## 2018-08-29 RX ADMIN — GABAPENTIN 100 MG: 100 CAPSULE ORAL at 21:41

## 2018-08-29 RX ADMIN — SENNOSIDES AND DOCUSATE SODIUM 2 TABLET: 8.6; 5 TABLET ORAL at 09:50

## 2018-08-29 ASSESSMENT — ACTIVITIES OF DAILY LIVING (ADL)
ADLS_ACUITY_SCORE: 11
ADLS_ACUITY_SCORE: 12
ADLS_ACUITY_SCORE: 11
ADLS_ACUITY_SCORE: 12

## 2018-08-29 NOTE — PROGRESS NOTES
Orthopaedic Surgery Progress Note:       Subjective:   NAEO. Patient reports doing well. Pain well controlled on current regimen. Yet to work with PT. Tucker removed this AM. Will trial void today    Denies new onset tingling/numbness in operative extremity. Denies fever/chills/SOB/nause/vomiting.     Objective:   Temp:  [96.8  F (36  C)-98.8  F (37.1  C)] 98.8  F (37.1  C)  Heart Rate:  [74-92] 87  Resp:  [12-24] 16  BP: ()/(42-80) 119/80  SpO2:  [90 %-98 %] 93 %    Intake/Output Summary (Last 24 hours) at 08/29/18 1209  Last data filed at 08/29/18 1148   Gross per 24 hour   Intake             2150 ml   Output             2250 ml   Net             -100 ml     Drain: 230/120cc last 2 shifts    Gen: NAD. Resting comfortably in bed  Resp: Breathing comfortably on RA  LLE:  Dressing/ACE is c/d/i.   Drain is draining bloody fluid  SILT in femoral, saphenous, sural, deep peroneal, superficial peroneal, and tibial n dist.   Fires EHL/FHL/TA/Gastroc with 5/5 strength    PT and DP pulses intact, 2+ and foot is wwp    Labs:    Recent Labs  Lab 08/29/18  0550   HGB 10.0*        Assessment & Plan:   Post-Op Plan:  Assessment/Plan: Esperanza Gage is a 72 year old female s/p Lt TKA on 8/28 with Dr. Campos.    Activity: Up with assist and assistive devices as needed until independent. Knee ROM as tolerated. Advance CPM to 90 deg as tolerated  Weight bearing status: WBAT.   Antibiotics: Ancef x 24 hours.  Diet: Begin with clear fluids and progress diet as tolerated. Bowel regimen. Anti-emetics PRN.   DVT prophylaxis: mechanical and ASA while inpatient,  discharge on ASA 325mg BID x 4 weeks.  Elevation: Elevate heels off of bed on pillows. No pillows behind the knee at any time.   Wound Care: Dressing to remain until POD 5-7 if clean; then nonadhesive, dry gauze, ace wrap PRN  Drains: Document output per shift, discontinue when <30cc/shift.   Pain management: transition from IV to orals as tolerated.    X-rays: XR of Lt knee in  PACU  Physical Therapy:  ROM, ADL's.   Occupational Therapy: ADL's  Labs: Trend Hgb on POD #1, 2.   Consults: PT, OT. Hospitalist, , appreciate assistance in caring for this patient.   Follow-up: Clinic in 2 weeks for wound check with Dr. Campos; then at 6 weeks post op with repeat XR of operative knee    Disposition: Pending progress with therapies, pain control on orals, and medical stability, anticipate discharge to Home on POD #2.    Roldan Bowen MD 08/29/2018  Orthopaedic Surgery Resident, PGY-4  Pager: [phone]

## 2018-08-29 NOTE — PLAN OF CARE
Problem: Patient Care Overview  Goal: Plan of Care/Patient Progress Review  Outcome: Improving  Patient A/Ox4. VSS. Denies CP, SOB, dizziness/LH. LSCTA. +fl/BS. Voiding per BSC with assist of 1 using walker. Baseline numbness  Plantar right foot ,LLE. Dressing to left knee CDI , ANNE-MARIE draining, ice applied. Tolerating regular diet . IS encouraged. Activity as tolerated . IV SL. Pain rated manageable throughout shift, managed with Roxicodone 5 mg po and 1 time Dilaudid 0.3 mg IV. Patient has demonstrated ability to call appropriately. Patient is resting with call light within reach. Will continue to monitor.

## 2018-08-29 NOTE — PLAN OF CARE
Problem: Patient Care Overview  Goal: Plan of Care/Patient Progress Review  Discharge Planner OT   Patient plan for discharge: TCU  Current status: Supine>sit with SBA using R LE to assist L LE. Sit<>stand and ambulation to/from bathroom using FWW with CGA. Patient completed toilet transfer using BSC as overlay with CGA. Patient stood at sink to complete oral cares with SBA. Sit>supine with min A for L LE.   Barriers to return to prior living situation: stairs, pain, lives alone  Recommendations for discharge: TCU  Rationale for recommendations: To maximize safety and IND with functional mobility and ADLs/IADLs       Entered by: AFRICA JONAS 08/29/2018 12:04 PM

## 2018-08-29 NOTE — PROGRESS NOTES
Pt notes fair pain control  No chest pain or SOB    BP 100s-110s/  HR 70s-80s    02 sats low 90s RA    Alert, fully oriented  Lungs clear  CV rrr  Abd soft, non-tender  No L calf edema or tenderness      Results for MOSHE GARCÍA (MRN [id number]) as of 8/29/2018 14:01   Ref. Range 8/29/2018 05:50   Sodium Latest Ref Range: 133 - 144 mmol/L 142   Potassium Latest Ref Range: 3.4 - 5.3 mmol/L 4.2   Chloride Latest Ref Range: 94 - 109 mmol/L 112 (H)   Carbon Dioxide Latest Ref Range: 20 - 32 mmol/L 24   Urea Nitrogen Latest Ref Range: 7 - 30 mg/dL 13   Creatinine Latest Ref Range: 0.52 - 1.04 mg/dL 0.63   GFR Estimate Latest Ref Range: >60 mL/min/1.7m2 >90   GFR Estimate If Black Latest Ref Range: >60 mL/min/1.7m2 >90   Calcium Latest Ref Range: 8.5 - 10.1 mg/dL 7.7 (L)   Anion Gap Latest Ref Range: 3 - 14 mmol/L 6   Glucose Latest Ref Range: 70 - 99 mg/dL 119 (H)   Hemoglobin Latest Ref Range: 11.7 - 15.7 g/dL 10.0 (L)         Assessment    S/p L TKA, Pt is doing well overall. Fair pain control    Acute BL anemia, mild, asymptomatic    Hx HTN, stable post op, off PTA losartan    Plan  Continue to hold losartan  Recheck BMP and Hgb in am  Continue current pain regimen  ASA for DVT prophylaxis

## 2018-08-29 NOTE — PLAN OF CARE
Problem: Knee Arthroplasty (Total, Partial) (Adult)  Goal: Signs and Symptoms of Listed Potential Problems Will be Absent, Minimized or Managed (Knee Arthroplasty)  Signs and symptoms of listed potential problems will be absent, minimized or managed by discharge/transition of care (reference Knee Arthroplasty (Total, Partial) (Adult) CPG).  Outcome: No Change    VS: Post operative BP's trending WDL - Afebrile   O2: Stable on room air low to mid 90's - CAPNO stable   Output: Voiding via bermudez cathter (250 output). Pt declines having bermudez removed this shift, education provided. Will have nights remove.   Last BM: 8.28.18 prior to surgery    Activity: WBAT - Pt declines ambulation this evening. Education provided   Skin: Intact ex incisions   Pain: Managed with PRN oxycodone 5-10mg every 4hrs   CMS: Intact no numbness or tingling. Neuro's intact as well   Dressing: CDI    Diet: Regular, appetite was fair. Encourage fluids   LDA: PIV is infusing LR at 75ml/hr   Equipment: Bermudez, CAPNO, Walker, Gaitbelt, CPM (45 degrees - currently off at pt request.)   Plan: Continue to manage pain and monitor progression. Therapy will start tomorrow.   Additional Info: General anesthesia, pain management cocktail, EBL 250mL

## 2018-08-29 NOTE — OP NOTE
OPERATION REPORT     DATE OF OPERATION: 8/28/2018     SURGEON: Pierre Campos MD.     ASSISTANT: Reyes Muller MD - Fellow    JAKE Lanier    ANESTHESIOLOGIST: Anesthesiologist: Megha Dia MD; Esperanza Gutierrez MD  CRNA: Fredis Lafleur APRN CRNA; Madelin Mohan APRN CRNA    ANESTHESIA: General    PREOPERATIVE DIAGNOSIS: Left knee degenerative joint disease.     POSTOPERATIVE DIAGNOSIS: Left knee degenerative joint disease.     OPERATION(S) PERFORMED: Left total knee arthroplasty.     ESTIMATED BLOOD LOSS: 250 mL.   TOURNIQUET TIME: 124 min    BACKGROUND:  Ms. Gage is a 72 year old female with end-stage DJD of the Left knee that has failed non operative treatments. I have discussed the risks, benefits and alternative treatments options with the patient; the patient understands and wishes to proceed with a total knee arthroplasty.    OPERATIVE FINDINGS: Advanced degenerative changes with osteophyte formation and cartilage loss.    OPERATIVE PROCEDURE:   Ms. Gage was seen in the pre-operative area; informed consent was obtained.  The left knee was appropriately marked by the patient and the operating surgeon.  The patient was brought to the operating room and transferred to the OR bed.  General anesthesia was induced.  The patient was positioned supine with a bump under the left hip and with appropriate padding and a safety strap.  A proximal thigh tourniquet was placed but not inflated at this time.  The patient's left lower extremity was prepped and draped in the standard fashion.  At this time a surgical safety timeout was performed involving the operating room nurse, anesthesia team, scrub tech, and operating surgeon. The left lower extremity was exsanguinated and the tourniquet inflated.  Using a 10 blade scalpel, an anterior left knee incision was made.  Sharp dissection was carried down through the subcutaneous tissues and the quadriceps tendon, vastus medialis insertion, patella,  and patellar tendon were visualized.  Switching to a deep knife, a medial parapatellar arthrotomy was created.  A medial flap was created along the medial proximal tibia extending around the posterior-medial corner; the ACL was removed and the anterior horn of the lateral meniscus was removed.  The patella was subluxed and the knee was flexed to expose the distal femur.  The opening drill was used to enter the distal femoral intramedullary canal and the intramedullary femur guide was inserted.  The distal femur cutting block was pinned in place at 5degrees of valgus and for a 10mm distal femur resection.  The distal femur was then cut; the cut was checked with a flat block and deemed to be appropriate.    At this point there was some generalized oozing from the surgical field suggestive of a venous tourniquet.  The tourniquet was deflated.  The wound was packed with a sponge and the extremity was once again elevated and exsanguinated with an Esmarch and the tourniquet was again inflated.  Following this the tourniquet appeared to be functioning much better.  The extra-medullary guide was then placed on the tibia; it was aligned in neutral varus/valgus, posterior slope matching the native proximal tibia, and for approximately 10mm resection off of the lateral tibial plateau; pre-op x-rays revealed a varus knee.  The tibial cutting guide was pinned in place.  Retractors were placed for exposure and to protect the patellar tendon and MCL.  The proximal tibial cut was made. Visible portions of the menisci were removed.  The knee was placed in extension and the Tensometer was inserted; it was opened to 40 and the size of the gap was noted.  The knee was then moved into flexion and the Tensometer was again opened to 40.  Pins were drilled in the gig and the size of the femur was measured.  The 65mm four-in-one femoral cutting guide was impacted onto the distal femur; cuts were made beginning with the anterior, then the  posterior and lastly the two champfer cuts.  Osteophytes were removed.  The size 65mm trial femur was impacted into place.  A size 71 tibia and 14mm poly trial were placed.  The knee was taken through a ROM; it achieved full flexion and extension. The knee was well balanced.  Rotation of the tibial component was marked. The femoral trial was removed and the PS box cutting gig was inserted.  With an oscillating saw followed by an osteotome, the distal femur box was cut.  The trial tibial baseplate was pinned in its correct position and the tibia was prepared with a keel.  The patella thickness was then measured and cut with a cutting gig; the size 34mm patella guide fit well and the lug holes were drilled.  All trial components were removed; excess osteophytes and soft tissue were excised.   A small drill was used to make holes in the proximal tibia and patella to improve cement interdigitation.  The bone ends were then irrigated with a power  and dried well.  Simplex P cement was mixed in standard fashion.  A 71mm tibial component was cemented in place followed by a 65 PS femoral component; excess cement was removed.  A 14mm trial poly liner was inserted.  The patella was then cleaned and dried.  A 34mm patella component was cemented into place and held with a patellar clamp.  All excess cement was removed and the knee was maintained in full extension until the cement had fully hardened.  The knee had full ROM.  The trial poly was removed. A portion of the local anesthetic was injected into the posterior knee capsule.  The final poly component, an 14mm, was then inserted and clamped in place.  Knee ROM remained full and was stable.  The knee was then copiously irrigated.  The remained of the local anesthetic was injected into the periarticular tissue.  A ANNE-MARIE drain was placed deep.  Wound closure was done in layers beginning with a running 2-0 vicryl in the fat pad and synovium; 0-vicryl sutures in figure of  eight were used to close the arthrotomy and quadriceps.  Closure continued with 2-0 vicryl buried interrupted sutures for the subcutaneous tissue and a running subcuticular 3-0 PDS.  Steri-strips and a sterile dressing were applied.  The patient was transferred to the hospital bed.  Ms. Gage had a palpable dorsalis pedis pulse prior to leaving the operating room.    POSTOPERATIVE PLAN:   Admit to Orthopaedic floor  Pain control  DVT prophylaxis  PT/OT; WBAT    FINAL IMPLANTS:   Implant Name Type Inv. Item Serial No.  Lot No. LRB No. Used   BONE CEMENT SIMPLEX FULL DOSE 6191-1-001 Cement, Bone BONE CEMENT SIMPLEX FULL DOSE 6191-1-001  [employer] [id number] Left 2   IMP COMP TIBIAL KNEE ASCENT 71MM [id number] Total Joint Component/Insert IMP COMP TIBIAL KNEE ASCENT 71MM [id number]  [employer] [id number] Left 1   IMP COMP FEMORAL VANGARD BIOM PS LT 65MM [id number] Total Joint Component/Insert IMP COMP FEMORAL VANGARD BIOM PS LT 65MM [id number]  [employer] [id number] Left 1   IMP COMP PATELLA BIOM ARCM MED 34MM [id number] Total Joint Component/Insert IMP COMP PATELLA BIOM ARCM MED 34MM [id number]  [employer] [id number] Left 1   IMP INSERT TIBIAL BIOM PS PLUS BEARING 14X71/75MM [id number] Total Joint Component/Insert IMP INSERT TIBIAL BIOM PS PLUS BEARING 14X71/75MM [id number]   [employer] [id number] Left 1       ATTESTATION: Dr. Campos was present at all critical portions of this case and immediately available at all times during the duration of the case.     Reyes Muller MD  Orthopaedic Surgery, Adult Reconstruction Fellow  Pager [phone]    Attending MD (Dr. Pierre Campos) Attestation:  I was present during the key portions of the procedure and I was immediately available for the entire procedure between opening and closing.    Pierre Campos MD  [facility] Family Professor  Oncology and Adult Reconstructive Surgery  Dept Orthopaedic Surgery, [facility]

## 2018-08-29 NOTE — PROGRESS NOTES
" 08/29/18 0928   Quick Adds   Type of Visit Initial PT Evaluation   Living Environment   Lives With alone   Living Arrangements house   Home Accessibility bed and bath are not on the first floor;stairs to enter home;stairs within home;tub/shower is not walk in;grab bars present (toilet);grab bars present (bathtub)   Number of Stairs to Enter Home 4   Number of Stairs Within Home 14   Stair Railings at Home inside, present on right side   Transportation Available car   Living Environment Comment pt lives alone in a home with 4 stairs to enter, 14 stairs to access bed/bath; has one person to help assist for a couple days (nephews wife)   Self-Care   Usual Activity Tolerance good   Current Activity Tolerance fair   Regular Exercise yes   Activity/Exercise Type strength training   Exercise Amount/Frequency 2 times/wk   Equipment Currently Used at Home grab bar;shower chair   Activity/Exercise/Self-Care Comment indep with ADLs, leads a senior exercise class 2x/week    Functional Level Prior   Ambulation 0-->independent   Transferring 0-->independent   Toileting 0-->independent   Bathing 0-->independent   Dressing 0-->independent   Eating 0-->independent   Communication 0-->understands/communicates without difficulty   Swallowing 0-->swallows foods/liquids without difficulty   Cognition 0 - no cognition issues reported   Fall history within last six months no   Which of the above functional risks had a recent onset or change? none   Prior Functional Level Comment indep with functional mobility   General Information   Onset of Illness/Injury or Date of Surgery - Date 08/28/18   Referring Physician Navdeep Rivas, PAFlorC   Patient/Family Goals Statement \"less pain in knee\", \"be able to walk around the block without feeling pain with every step\"   Pertinent History of Current Problem (include personal factors and/or comorbidities that impact the POC) s/p L TKA with Dr. Campos 8/28; PMH includes HTN, HLD, obesity, pelvic " fracture 5 years ago   Weight-Bearing Status - LLE weight-bearing as tolerated   General Info Comments activity: amb with assist   Cognitive Status Examination   Orientation orientation to person, place and time   Level of Consciousness alert   Follows Commands and Answers Questions 100% of the time;able to follow multistep instructions   Personal Safety and Judgment intact   Memory intact   Pain Assessment   Patient Currently in Pain Yes, see Vital Sign flowsheet   Integumentary/Edema   Integumentary/Edema Comments L knee incision not visible d/t bandage   Posture    Posture Not impaired   Range of Motion (ROM)   ROM Comment L knee AROM while supine: 0-12-56; R LE WFL    Strength   Strength Comments Good functional strength as noted by level of assist with bed mobility and transfers; L hip and knee with post op weakness in quad    Bed Mobility   Bed Mobility Comments SBA bed mobility for L LE   Transfer Skills   Transfer Comments SBA sit <> stands   Gait   Gait Comments CGA ambulating with  feet   Balance   Balance Comments standing static balance good; dynamic balance fair - needs B UE support d/t WB restriction and pain in L knee   Sensory Examination   Sensory Perception no deficits were identified   Coordination   Coordination no deficits were identified   Muscle Tone   Muscle Tone no deficits were identified   Modality Interventions   Planned Modality Interventions Cryotherapy   General Therapy Interventions   Planned Therapy Interventions balance training;bed mobility training;gait training;neuromuscular re-education;ROM;strengthening;transfer training;risk factor education;home program guidelines;progressive activity/exercise   Clinical Impression   Criteria for Skilled Therapeutic Intervention yes, treatment indicated   PT Diagnosis impaired functional mobility    Influenced by the following impairments post-op pain, weakness, WB restriction, decreased tolerance to activity   Functional limitations  "due to impairments decreased indep with functional mobility    Clinical Presentation Stable/Uncomplicated   Clinical Presentation Rationale uncomplicated surgery, no significant PMH affecting PT POC   Clinical Decision Making (Complexity) Low complexity   Therapy Frequency` 2 times/day   Predicted Duration of Therapy Intervention (days/wks) 3 days   Anticipated Equipment Needs at Discharge front wheeled walker   Anticipated Discharge Disposition Transitional Care Facility   Risk & Benefits of therapy have been explained Yes   Patient, Family & other staff in agreement with plan of care Yes   [facility] AM-PAC TM \"6 Clicks\"   2016, Trustees of [facility], under license to [employer].  All rights reserved.   6 Clicks Short Forms Basic Mobility Inpatient Short Form   [facility] AM-PAC  \"6 Clicks\" V.2 Basic Mobility Inpatient Short Form   1. Turning from your back to your side while in a flat bed without using bedrails? 4 - None   2. Moving from lying on your back to sitting on the side of a flat bed without using bedrails? 3 - A Little   3. Moving to and from a bed to a chair (including a wheelchair)? 3 - A Little   4. Standing up from a chair using your arms (e.g., wheelchair, or bedside chair)? 3 - A Little   5. To walk in hospital room? 3 - A Little   6. Climbing 3-5 steps with a railing? 2 - A Lot   Basic Mobility Raw Score (Score out of 24.Lower scores equate to lower levels of function) 18   Total Evaluation Time   Total Evaluation Time (Minutes) 10     "

## 2018-08-29 NOTE — PLAN OF CARE
Problem: Patient Care Overview  Goal: Plan of Care/Patient Progress Review  Discharge Planner PT   Patient plan for discharge: TCU vs home  Current status: Patient needing SBA for bed mobility, transfers, CGA with ambulation 100 feet using FWW. Limited by pain at beginning of session which resolves with IV Dilaudid and demos improved participation with PT. Engaged in LE ROM exercises.   Barriers to return to prior living situation: stairs (14 to access bed/bath), level of assist (lives alone), medical stability  Recommendations for discharge: TCU  Rationale for recommendations: pt with multiple stairs to access bed/bathroom at home, lives alone with limited assistance available from family; would benefit from continued skilled PT to increase indep with ambulation and stairs, strength of L LE       Entered by: Asha Valdez 08/29/2018 11:34 AM

## 2018-08-29 NOTE — PLAN OF CARE
Problem: Patient Care Overview  Goal: Individualization & Mutuality  Pt A&O x's 4.BP soft, IV infusing, po fluid encouraged. Afebrile. 02 sats in the low to mid 90s on RA. Lungs clear. Denies SOB, CP and nausea. Tolerating regular diet. Bowel sound active in all quadrants. No BM but passing gas. Tucker removed at 0300, pt is due to void. Pain well managed with current pain regimen. Declined CPM during the shift. Left foot pum and right pcd applied. Pt also does leg exercise. CMS intact, reports base line BLEs numbness related to Neuropathy. PIV infusing. Pt slept between care and is able to make needs known, call light with in reach. Will continue to monitor.

## 2018-08-29 NOTE — PROGRESS NOTES
08/29/18 1200   Quick Adds   Type of Visit Initial Occupational Therapy Evaluation   Living Environment   Lives With alone   Living Arrangements house   Home Accessibility bed and bath are not on the first floor;stairs to enter home;stairs within home;tub/shower is not walk in;grab bars present (bathtub);grab bars present (toilet)   Number of Stairs to Enter Home 4   Number of Stairs Within Home 14   Transportation Available car;family or friend will provide   Living Environment Comment Has tub/shower with grab bars. Lives alone.   Self-Care   Usual Activity Tolerance good   Current Activity Tolerance fair   Regular Exercise yes   Activity/Exercise Type strength training   Exercise Amount/Frequency 2 times/wk   Equipment Currently Used at Home grab bar;shower chair   Activity/Exercise/Self-Care Comment indep with ADLs, leads a senior exercise class 2x/week    Functional Level Prior   Ambulation 0-->independent   Transferring 0-->independent   Toileting 0-->independent   Bathing 0-->independent   Dressing 0-->independent   Eating 0-->independent   Communication 0-->understands/communicates without difficulty   Swallowing 0-->swallows foods/liquids without difficulty   Cognition 0 - no cognition issues reported   Fall history within last six months no   General Information   Onset of Illness/Injury or Date of Surgery - Date 08/28/18   Referring Physician Dr. Campos   Patient/Family Goals Statement Did not state, in agreement with OT related goals.    Additional Occupational Profile Info/Pertinent History of Current Problem s/p L TKA   Precautions/Limitations fall precautions   Weight-Bearing Status - LLE weight-bearing as tolerated   General Observations Patient alert, agreeable to OT.    Cognitive Status Examination   Orientation orientation to person, place and time   Level of Consciousness alert   Able to Follow Commands WNL/WFL   Personal Safety (Cognitive) WNL/WFL   Memory intact   Visual Perception   Visual  Perception Wears glasses   Sensory Examination   Sensory Comments Reports baseline NT in L shin and bottom of R foot.    Pain Assessment   Patient Currently in Pain Yes, see Vital Sign flowsheet   Range of Motion (ROM)   ROM Comment B UE's WFL   Strength   Strength Comments UE strength WFL   Muscle Tone Assessment   Muscle Tone Quick Adds No deficits were identified   Coordination   Upper Extremity Coordination No deficits were identified   Mobility   Bed Mobility Comments Supine>sit with SBA, sit>supine with min A for L LE   Transfer Skill: Bed to Chair/Chair to Bed   Level of [place]: Bed to Chair contact guard   Physical Assist/Nonphysical Assist: Bed to Chair set-up required;verbal cues   Weight-Bearing Restrictions weight-bearing as tolerated   Assistive Device - Transfer Skill Bed to Chair Chair to Bed Rehab Eval rolling walker   Transfer Skill: Sit to Stand   Level of [place]: Sit/Stand contact guard   Physical Assist/Nonphysical Assist: Sit/Stand set-up required;verbal cues   Transfer Skill: Sit to Stand weight-bearing as tolerated   Assistive Device for Transfer: Sit/Stand rolling walker   Transfer Skill: Toilet Transfer   Level of [place]: Toilet contact guard   Physical Assist/Nonphysical Assist: Toilet set-up required;verbal cues   Weight-Bearing Restrictions: Toilet weight-bearing as tolerated   Assistive Device seat riser;grab bars   Upper Body Dressing   Level of [place]: Dress Upper Body stand-by assist   Physical Assist/Nonphysical Assist: Dress Upper Body set-up required   Lower Body Dressing   Level of [place]: Dress Lower Body moderate assist (50% patients effort)   Physical Assist/Nonphysical Assist: Dress Lower Body set-up required   Toileting   Level of [place]: Toilet contact guard   Physical Assist/Nonphysical Assist: Toilet set-up required   Grooming   Level of [place]: Grooming stand-by assist   Physical Assist/Nonphysical Assist: Grooming set-up required  "  Eating/Self Feeding   Level of [place]: Eating independent   Activities of Daily Living Analysis   Impairments Contributing to Impaired Activities of Daily Living balance impaired;pain;ROM decreased;strength decreased   General Therapy Interventions   Planned Therapy Interventions ADL retraining;transfer training   Clinical Impression   Criteria for Skilled Therapeutic Interventions Met yes, treatment indicated   OT Diagnosis Decreased functional mobility and ADLs   Influenced by the following impairments pain, decreased strength and ROM L LE   Assessment of Occupational Performance 3-5 Performance Deficits   Identified Performance Deficits dressing, bathing, toileting, transfers, home mgmt   Clinical Decision Making (Complexity) Low complexity   Therapy Frequency daily   Predicted Duration of Therapy Intervention (days/wks) 4 days   Anticipated Equipment Needs at Discharge (TBD)   Anticipated Discharge Disposition Transitional Care Facility   Risks and Benefits of Treatment have been explained. Yes   Patient, Family & other staff in agreement with plan of care Yes   [facility] TM \"6 Clicks\"   2016, Trustees of [facility], under license to [employer].  All rights reserved.   6 Clicks Short Forms Daily Activity Inpatient Short Form   [facility]  \"6 Clicks\" Daily Activity Inpatient Short Form   1. Putting on and taking off regular lower body clothing? 2 - A Lot   2. Bathing (including washing, rinsing, drying)? 2 - A Lot   3. Toileting, which includes using toilet, bedpan or urinal? 3 - A Little   4. Putting on and taking off regular upper body clothing? 4 - None   5. Taking care of personal grooming such as brushing teeth? 4 - None   6. Eating meals? 4 - None   Daily Activity Raw Score (Score out of 24.Lower scores equate to lower levels of function) 19   Total Evaluation Time   Total Evaluation Time (Minutes) 8     "

## 2018-08-30 ENCOUNTER — APPOINTMENT (OUTPATIENT)
Dept: PHYSICAL THERAPY | Facility: CLINIC | Age: 73
DRG: 470 | End: 2018-08-30
Attending: ORTHOPAEDIC SURGERY
Payer: MEDICARE

## 2018-08-30 ENCOUNTER — APPOINTMENT (OUTPATIENT)
Dept: OCCUPATIONAL THERAPY | Facility: CLINIC | Age: 73
DRG: 470 | End: 2018-08-30
Attending: ORTHOPAEDIC SURGERY
Payer: MEDICARE

## 2018-08-30 LAB
ANION GAP SERPL CALCULATED.3IONS-SCNC: 7 MMOL/L (ref 3–14)
BUN SERPL-MCNC: 9 MG/DL (ref 7–30)
CALCIUM SERPL-MCNC: 7.8 MG/DL (ref 8.5–10.1)
CHLORIDE SERPL-SCNC: 113 MMOL/L (ref 94–109)
CO2 SERPL-SCNC: 25 MMOL/L (ref 20–32)
CREAT SERPL-MCNC: 0.58 MG/DL (ref 0.52–1.04)
GFR SERPL CREATININE-BSD FRML MDRD: >90 ML/MIN/1.7M2
GLUCOSE SERPL-MCNC: 116 MG/DL (ref 70–99)
HGB BLD-MCNC: 9.1 G/DL (ref 11.7–15.7)
POTASSIUM SERPL-SCNC: 3.8 MMOL/L (ref 3.4–5.3)
SODIUM SERPL-SCNC: 145 MMOL/L (ref 133–144)

## 2018-08-30 PROCEDURE — 40000133 ZZH STATISTIC OT WARD VISIT

## 2018-08-30 PROCEDURE — A9270 NON-COVERED ITEM OR SERVICE: HCPCS | Mod: GY | Performed by: NURSE PRACTITIONER

## 2018-08-30 PROCEDURE — 25000132 ZZH RX MED GY IP 250 OP 250 PS 637: Mod: GY | Performed by: NURSE PRACTITIONER

## 2018-08-30 PROCEDURE — 12000001 ZZH R&B MED SURG/OB UMMC

## 2018-08-30 PROCEDURE — 25000132 ZZH RX MED GY IP 250 OP 250 PS 637: Mod: GY | Performed by: PHYSICIAN ASSISTANT

## 2018-08-30 PROCEDURE — 36415 COLL VENOUS BLD VENIPUNCTURE: CPT | Performed by: PHYSICIAN ASSISTANT

## 2018-08-30 PROCEDURE — 85018 HEMOGLOBIN: CPT | Performed by: PHYSICIAN ASSISTANT

## 2018-08-30 PROCEDURE — 25000132 ZZH RX MED GY IP 250 OP 250 PS 637: Mod: GY | Performed by: INTERNAL MEDICINE

## 2018-08-30 PROCEDURE — A9270 NON-COVERED ITEM OR SERVICE: HCPCS | Mod: GY | Performed by: INTERNAL MEDICINE

## 2018-08-30 PROCEDURE — 40000193 ZZH STATISTIC PT WARD VISIT: Performed by: PHYSICAL THERAPIST

## 2018-08-30 PROCEDURE — 97116 GAIT TRAINING THERAPY: CPT | Mod: GP | Performed by: PHYSICAL THERAPIST

## 2018-08-30 PROCEDURE — 97110 THERAPEUTIC EXERCISES: CPT | Mod: GP | Performed by: PHYSICAL THERAPIST

## 2018-08-30 PROCEDURE — 97535 SELF CARE MNGMENT TRAINING: CPT | Mod: GO

## 2018-08-30 PROCEDURE — 99231 SBSQ HOSP IP/OBS SF/LOW 25: CPT | Performed by: INTERNAL MEDICINE

## 2018-08-30 PROCEDURE — 80048 BASIC METABOLIC PNL TOTAL CA: CPT | Performed by: PHYSICIAN ASSISTANT

## 2018-08-30 PROCEDURE — A9270 NON-COVERED ITEM OR SERVICE: HCPCS | Mod: GY | Performed by: PHYSICIAN ASSISTANT

## 2018-08-30 RX ORDER — AMOXICILLIN 250 MG
1 CAPSULE ORAL 2 TIMES DAILY PRN
Qty: 30 TABLET | DISCHARGE
Start: 2018-08-30 | End: 2018-09-24

## 2018-08-30 RX ORDER — ACETAMINOPHEN 325 MG/1
650 TABLET ORAL EVERY 4 HOURS PRN
Qty: 100 TABLET | DISCHARGE
Start: 2018-08-31 | End: 2018-09-25

## 2018-08-30 RX ORDER — ASPIRIN 325 MG
325 TABLET, DELAYED RELEASE (ENTERIC COATED) ORAL 2 TIMES DAILY
Qty: 40 TABLET | Refills: 0 | DISCHARGE
Start: 2018-08-30 | End: 2018-09-25

## 2018-08-30 RX ORDER — NALOXONE HYDROCHLORIDE 0.4 MG/ML
.1-.4 INJECTION, SOLUTION INTRAMUSCULAR; INTRAVENOUS; SUBCUTANEOUS
Status: DISCONTINUED | OUTPATIENT
Start: 2018-08-30 | End: 2018-08-31 | Stop reason: HOSPADM

## 2018-08-30 RX ORDER — OXYCODONE HYDROCHLORIDE 5 MG/1
2.5-5 TABLET ORAL EVERY 4 HOURS PRN
Qty: 40 TABLET | Refills: 0 | Status: SHIPPED | DISCHARGE
Start: 2018-08-30 | End: 2018-09-24

## 2018-08-30 RX ADMIN — OXYCODONE HYDROCHLORIDE 2.5 MG: 5 TABLET ORAL at 08:25

## 2018-08-30 RX ADMIN — OXYCODONE HYDROCHLORIDE 2.5 MG: 5 TABLET ORAL at 20:20

## 2018-08-30 RX ADMIN — GABAPENTIN 100 MG: 100 CAPSULE ORAL at 23:19

## 2018-08-30 RX ADMIN — OXYCODONE HYDROCHLORIDE 2.5 MG: 5 TABLET ORAL at 13:19

## 2018-08-30 RX ADMIN — OXYCODONE HYDROCHLORIDE 5 MG: 5 TABLET ORAL at 16:28

## 2018-08-30 RX ADMIN — SENNOSIDES AND DOCUSATE SODIUM 2 TABLET: 8.6; 5 TABLET ORAL at 20:20

## 2018-08-30 RX ADMIN — ACETAMINOPHEN 975 MG: 325 TABLET, FILM COATED ORAL at 11:52

## 2018-08-30 RX ADMIN — SENNOSIDES AND DOCUSATE SODIUM 2 TABLET: 8.6; 5 TABLET ORAL at 08:50

## 2018-08-30 RX ADMIN — ACETAMINOPHEN 975 MG: 325 TABLET, FILM COATED ORAL at 20:19

## 2018-08-30 RX ADMIN — ASPIRIN 325 MG: 325 TABLET, DELAYED RELEASE ORAL at 08:50

## 2018-08-30 RX ADMIN — ACETAMINOPHEN 975 MG: 325 TABLET, FILM COATED ORAL at 03:01

## 2018-08-30 RX ADMIN — SIMVASTATIN 10 MG: 10 TABLET, FILM COATED ORAL at 23:19

## 2018-08-30 RX ADMIN — ASPIRIN 325 MG: 325 TABLET, DELAYED RELEASE ORAL at 20:20

## 2018-08-30 ASSESSMENT — ACTIVITIES OF DAILY LIVING (ADL)
ADLS_ACUITY_SCORE: 11
ADLS_ACUITY_SCORE: 11
ADLS_ACUITY_SCORE: 10
ADLS_ACUITY_SCORE: 11

## 2018-08-30 NOTE — PLAN OF CARE
Problem: Patient Care Overview  Goal: Plan of Care/Patient Progress Review  Discharge Planner PT   Patient plan for discharge: TCU  Current status: Pt supine to sit with SBA, pt able to use right foot to assist left LE. Sit to supine with Luci x1 to help lift left LE. Sit to/from stand with FWW and SBA x1. Pt ambulated with FWW and SBA x1 for approx 80ft. Left LE ROM 0-17-64.  Barriers to return to prior living situation: left LE decreased strength/ROM, pain, stairs  Recommendations for discharge: TCU  Rationale for recommendations: Pt would benefit from stay at TCU for continued skilled PT for right LE strengthening/ROM and gait to improve functional mobility and independence.       Entered by: Carla Barrera 08/30/2018 4:16 PM

## 2018-08-30 NOTE — PROGRESS NOTES
Social Work Services Progress Note    Hospital Day: 3  Date of Initial Social Work Evaluation: Not completed  Collaborated with:  Pt, TCU Admissions, Bedside Team, EMR    Data:  SW assisting pt with discharge planning to TCU    Intervention:  SW met with pt to discuss discharge plan. Pt stated she had no previous experience with TCU other that her pelvic fracture rehab in [place] many years ago. 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 Pt stated she preferred [facility] (due to her primary health needs being provided in their system), [facility], and [facility] with preference in that order.    [facility]--no bed avaialbe at pt discharge date  [facility]-reviewing  [facility]-accepted    Pt agreed with discharge to  [facility] 8/31 and will have marshal Valenzuela provide transport at time of discharge.     Assessment:  See RN, PT, OT, Medical Team notes    Plan:    Anticipated Disposition:  Facility:  [facility]    Barriers to d/c plan:  Medical stability    Follow Up:  [facility] will continue to follow for discharge needs    Veronika DELCID  Weekend [facility]  [phone]  On Call Pager: [phone] 4pm - Midnight

## 2018-08-30 NOTE — PROGRESS NOTES
Orthopaedic Surgery Progress Note:       Subjective:   NAEO. Patient reports doing well. Pain well controlled on current regimen. Working with PT but apprehensive about putting full weight on operative leg.     Denies new onset tingling/numbness in operative extremity. Denies fever/chills/SOB/nause/vomiting.     Objective:   Temp:  [98.8  F (37.1  C)-100.5  F (38.1  C)] 99  F (37.2  C)  Pulse:  [82-87] 82  Heart Rate:  [83-87] 83  Resp:  [16-18] 18  BP: (112-133)/(44-80) 133/49  SpO2:  [92 %-95 %] 95 %    Intake/Output Summary (Last 24 hours) at 08/29/18 1209  Last data filed at 08/29/18 1148   Gross per 24 hour   Intake             2150 ml   Output             2250 ml   Net             -100 ml     Drain: 160/20cc  last 2 shifts    Gen: NAD. Resting comfortably in bed  Resp: Breathing comfortably on RA  LLE:  Dressing/ACE is c/d/i.   Drain is draining bloody fluid  SILT in femoral, saphenous, sural, deep peroneal, superficial peroneal, and tibial n dist.   Fires EHL/FHL/TA/Gastroc with 5/5 strength    PT and DP pulses intact, 2+ and foot is wwp    Labs:    Recent Labs  Lab 08/30/18  0711 08/29/18  0550   HGB 9.1* 10.0*        Assessment & Plan:   Post-Op Plan:  Assessment/Plan: Esperanza Gage is a 72 year old female s/p Lt TKA on 8/28 with Dr. Campos.    Activity: Up with assist and assistive devices as needed until independent. Knee ROM as tolerated. Advance CPM to 90 deg as tolerated  Weight bearing status: WBAT.   Antibiotics: Ancef x 24 hours.  Diet: Begin with clear fluids and progress diet as tolerated. Bowel regimen. Anti-emetics PRN.   DVT prophylaxis: mechanical and ASA while inpatient,  discharge on ASA 325mg BID x 4 weeks.  Elevation: Elevate heels off of bed on pillows. No pillows behind the knee at any time.   Wound Care: Dressing to remain until POD 5-7 if clean; then nonadhesive, dry gauze, ace wrap PRN  Drains: Document output per shift, discontinue when <30cc/shift.   Pain management: transition from  IV to orals as tolerated.    X-rays: XR of Lt knee in PACU  Physical Therapy:  ROM, ADL's.   Occupational Therapy: ADL's  Labs: Trend Hgb on POD #1, 2.   Consults: PT, OT. Hospitalist, , appreciate assistance in caring for this patient.   Follow-up: Clinic in 2 weeks for wound check with Dr. Campos; then at 6 weeks post op with repeat XR of operative knee    Disposition: Pending progress with therapies, pain control on orals, and medical stability, anticipate discharge to TCU on POD 3    Roldan Bowen MD 08/30/2018  Orthopaedic Surgery Resident, PGY-4  Pager: [phone]

## 2018-08-30 NOTE — DISCHARGE SUMMARY
ORTHOPAEDIC DISCHARGE SUMMARY     Date of Admission: 8/28/2018  Date of Discharge: 8/31/2018  Disposition: Rehab  Staff Physician: Pierre Campos MD  Primary Care Provider: Julia Gifford    DISCHARGE DIAGNOSIS:  Osteoarthritis Left Knee    PROCEDURES: Procedure(s):  ARTHROPLASTY KNEE on 8/28/2018    BRIEF HISTORY:  C/o weakness, limp, trouble negotiating stairs ( does one stair at a time), unable to do ADL's , uses cart at grocery store and parking lot, does not use a cane.  Had steroid injections in the past with diminishing return.  Not taking analgesics. After exhausting conservative measures and after weighing the risks and benefits, patient elected to proceed with joint replacement surgery.      Background history:  DX:  1. L knee DJD     TREATMENTS:  1. 7/1983, L knee medial meniscectomy. [facility] COURSE:    Surgery was uncomplicated. Esperanza Gage has done well post-operatively. Medicine was consulted post operatively to aid in management of medical comorbidities. See final recommendations below. The patient received routine nursing cares and is medically stable. Vital signs are stable. The patient is tolerating a regular diet without GI distress/nausea or vomiting. Voiding spontaneously. All PT/OT goals have been met for safe mobility. Pain is now controlled on oral medications which will be available on discharge. Stool softeners have been used while taking pain medications to help prevent constipation. Esperanza Gage is deemed medically safe to discharge.     Antibiotics:  Ancef given periop and 24 hours postop.   DVT prophylaxis:  Aspirin 325mg BID for 4 weeks; then may resume Home ASA 81mg daily  PT Progress:  Has met PT/OT goals for safe mobility.    Pain Meds:  Weaned off all IV pain meds by discharge.  Inpatient Events: No significant events or complications.     Discharge orders and instructions as below.    FOLLOWUP:    Follow up with Dr. Campos at 2 and 6 weeks  [facility] [place] ([address] [place] [address]). Call [phone] to schedule a follow-up appointment at this location with your provider if you have not heard confirmation of your appointment in the next 3-5 business days    PLANNED DISCHARGE ORDERS:           Current Discharge Medication List      START taking these medications    Details   acetaminophen (TYLENOL) 325 MG tablet Take 2 tablets (650 mg) by mouth every 4 hours as needed for other (adjunct to pain control)  Qty: 100 tablet    Associated Diagnoses: Status post knee surgery      aspirin 325 MG EC tablet Take 1 tablet (325 mg) by mouth 2 times daily for 26 days  Qty: 40 tablet, Refills: 0    Associated Diagnoses: Status post knee surgery      oxyCODONE IR (ROXICODONE) 5 MG tablet Take 0.5-1 tablets (2.5-5 mg) by mouth every 4 hours as needed for moderate to severe pain or other (for pain rated 4-6/10 take half tab; for pain 7-10/10 take 1 tab)  Qty: 40 tablet, Refills: 0    Associated Diagnoses: Status post knee surgery      senna-docusate (SENOKOT-S;PERICOLACE) 8.6-50 MG per tablet Take 1 tablet by mouth 2 times daily as needed for constipation  Qty: 30 tablet    Associated Diagnoses: Status post knee surgery         CONTINUE these medications which have CHANGED    Details   aspirin 81 MG tablet Take 1 tablet (81 mg) by mouth At Bedtime Resume your home baby aspirin AFTER completing your high dose post-operative aspirin (4 weeks)  Qty: 30 tablet, Refills: 0    Associated Diagnoses: Status post knee surgery         CONTINUE these medications which have NOT CHANGED    Details   simvastatin (ZOCOR) 10 MG tablet Take 1 tablet (10 mg) by mouth At Bedtime  Qty: 90 tablet, Refills: 3    Associated Diagnoses: Pure hypercholesterolemia      alendronate (FOSAMAX) 70 MG tablet Take 1 tablet (70 mg) by mouth every 7 days Take with over 8 ounces water and stay upright for at least 30 minutes after dose.  Take at  least 60 minutes before breakfast  Qty: 12 tablet, Refills: 3    Associated Diagnoses: Senile osteoporosis      ciclopirox (LOPROX) 0.77 % cream Apply topically 2 times daily  Qty: 30 g, Refills: 3    Associated Diagnoses: Tinea pedis of both feet      losartan (COZAAR) 25 MG tablet Take 1 tablet (25 mg) by mouth daily  Qty: 90 tablet, Refills: 3    Associated Diagnoses: Benign essential hypertension               Discharge Procedure Orders  General info for SNF   Order Comments: Length of Stay Estimate: Short Term Care: Estimated # of Days <30  Condition at Discharge: Stable  Level of care:skilled   Rehabilitation Potential: Good  Admission H&P remains valid and up-to-date: Yes  Recent Chemotherapy: N/A  Use Nursing Home Standing Orders: Yes     Mantoux instructions   Order Comments: Give two-step Mantoux (PPD) Per Facility Policy Yes     Reason for your hospital stay   Order Comments: You were admitted following your total knee arthroplasty     Discharge Instructions   Order Comments: TOTAL KNEE ARTHROPLASTY POST OPERATIVE DISCHARGE INSTRUCTIONS    FOLLOW UP APPOINTMENT  You are scheduled for a post operative appointment with Dr. Campos approximately four weeks after surgery.     Your follow up appointments will be at the location that you regularly see Dr. Campos:    [facility]  [address] [place] [address]  [phone]    Physical therapy:   A prescription for physical therapy will be provided at the time of discharge.      ACTIVITY  Weight bearing status:   You may bear weight on your operative extremity as tolerated, using assistive devices (walker, cane) as needed. As you begin to feel more comfortable ambulating, you may gradually transition from a walker to a cane. Eventually, you should wean from all assistive devices. Although we would like you to discontinue use of assistive devices as soon as possible, do not transition until you have worked  with your physical therapist to achieve safe balance and comfort.     Continuous passive motion machine:   Perform CPM exercises for six to eight hours per day for the first four weeks after surgery. The CPM should be set at 0 degrees to flexion tolerance with a goal of 90 degrees. Advance the CPM settings aggressively in increments of 5 degrees every 30 minutes until goal is achieved.     Exercises:   Perform the following exercises at least three times per day for the first four weeks after surgery to prevent complications, such as blood clots in your legs:  1) Point and flex your feet  2) Move your ankle around in big circles  3) Wiggle your toes   Also, perform thigh muscle tightening exercises for 10 to 15 minutes at least three times per day for the first four weeks after surgery.    Athletic Activities:  Activities such as swimming, bicycling, jogging, running, and stop-and-go sports should be avoided until permitted by your provider.    Driving:  Driving is not permitted until directed by your provider. Typically, driving is restricted for three to four weeks after right knee surgery and three weeks after left knee surgery. Under no circumstance are you permitted to drive while using narcotic pain medications.    Return to Work:  You may return to work when directed by your provider. Typically, patients with desk/sitting jobs can return to work within two weeks while patients with heavy labor jobs can return to work around three months after surgery.      COMFORT AND PAIN MANAGEMENT  Elevation:   During times of inactivity throughout the first two weeks after surgery, make an effort to decrease swelling by elevating your operative extremity. This is most effectively done by lying down and placing several pillows lengthwise under your thigh and calf to raise your toes above the level of your nose. To ensure that you knee remains in full extension, do not place pillows directly under your knee.     Icing:  An  ice pack will be provided to control swelling and discomfort after surgery. Place a thin towel on your skin and apply the ice pack overtop. You may apply ice for 20 minutes as often as two times per hour.    Pain Medications:  You will be discharged with acetaminophen (Tylenol) and a narcotic medication for pain management after surgery. Acetaminophen can help to effectively manage pain when used as prescribed, however, do not exceed the maximum daily dose of 3000 mg. The narcotic pain medication should be reserved for severe, breakthrough pain. Take the narcotic medication as prescribed and use only as often as necessary.       ANTICOAGULATION  Take Aspirin 325mg TWICE DAILY for 4 weeks after surgery. This medication will help prevent blood clots      WOUND CARE AND SHOWERING  Wound care:  Keep the dressing on, clean, and dry for the first five days after surgery. You may then remove the dressing and replace it with fresh 4x4s and tubigrip (or ACE wrap). Your surgical incision was closed with a clear knotless suture and tails were left at the margins of the incision. These tails can be left in place until your follow up appointment. The steri strips (small white tape that is directly on the incision areas) should be left in place until they fall off or are removed at your first office visit.    Showering:  You may shower ten days after surgery provided your incision is intact and dry without drainage. If there is fluid at the incision site, cover the incision with a non-permeable plastic bag. You may allow water to run over the incision, but do not soak or submerge the incision. Do not scrub the incision.     Tub Bathing:  Tub bathing, swimming, or any other activities that cause your incision to be submerged should be avoided until allowed by your provider. Typically, patients are allowed to return to these activities four weeks after surgery.      CONTACTING YOUR PHYSICIAN:  You may experience symptoms that  require follow-up before your scheduled appointment. Please contact Dr. Campos's office if you experience:  1) Pain that persists or worsens in the first few days after surgery  2) Excessive redness or drainage of cloudy or bloody material from the wounds (clear red tinted fluid and some mild drainage should be expected) or drainage of any kind five days after surgery  3) A temperature elevation greater than 101.5    4) Pain, swelling or redness in your calf  5) Numbness or weakness in your leg or foot      Regular business hours (Monday - Friday, 8am - 5pm):  [facility]: [phone]    If you have any other concerns during normal business hours, please contact Dr. Campos's nurse, Daniela Bradford RN, at [phone] during normal business hours 8 am - 5 pm (leave message as needed). If your concern is urgent, please page Daniela Bradford RN at [phone] and key in your 10 digit phone number followed by the # sign after the beep.     After hours and weekends:  [facility] on call Orthopedic resident: [phone]    If you have an emergent concern, contact the Emergency Department at the [place] - [phone] - or [place] - [phone] - [facility].     Glucose monitor nursing POCT   Order Comments: Before meals and at bedtime     Intake and output   Order Comments: Every shift     Bladder scan   Order Comments: X 2 for post void residual     Wound care (specify)   Order Comments: Site:   Left knee  Instructions:  Post surgical dressing to remain clean and intact until 5-7 days post op, then change daily with nonadhesive, dry gauze and ace wrap. Avoid adhesives if possible. See full care instructions in patient AVS     Additional Discharge Instructions   Order Comments: Take ASA 325mg BID for 4 weeks post op for DVT prophylaxis; then may resume home ASA 81mg daily.  Wt Bearing as tolerated     Activity - Up with assistive device   Order Specific  Question Answer Comments   Is discharge order? Yes      Activity - Up with nursing assistance   Order Specific Question Answer Comments   Is discharge order? Yes      Activity - Ambulate in hallway   Order Comments: Every shift   Order Specific Question Answer Comments   Is discharge order? Yes      Continuous Passive Motion Machine   Order Comments: Start CPM Day of Surgery.  0 - 90 degrees. Advance as tolerated.     Weight bearing status   Order Comments: As tolerated     Adult [facility] Follow-up and recommended labs and tests   Order Comments: Follow up with Dr. Campos at 2 weeks post op for wound check.     [facility] [place] ([address] [place] [id number]). Call [phone] to schedule a follow-up appointment at this location with your provider.     Activity   Order Comments: Your activity upon discharge: activity as tolerated   Order Specific Question Answer Comments   Is discharge order? Yes      When to contact your care team   Order Comments: CALL YOUR PHYSICIAN IF:  1.  Your pain begins to worsen and is unable to be controlled with your medications.  2.  Excessive redness or drainage of cloudy or bloody material from the wounds (Clear red tinted fluid and some mild drainage should be expected). Drainage of any kind 5 days after surgery should be reported to the doctor.  3.  You have a temperature elevation greater than 101.5    4.  You have pain, swelling or redness in your calf. You have numbness or weakness in your leg or foot.     Wound care and dressings   Order Comments: Instructions to care for your wound at home: as directed below.     Physical Therapy Adult Consult   Order Comments: Evaluate and treat as clinically indicated.    Reason:  eval and treat     Fall precautions     Pneumatic Compression Device    Order Comments: Bilateral calf. Remove 30 mins BID.     Diet   Order Comments: Return to your pre surgery diet   Order Specific Question Answer Comments   Is  discharge order? Yes      Advance Diet as Tolerated   Order Comments: Follow this diet upon discharge: Regular   Order Specific Question Answer Comments   Is discharge order? Yes      Assign Questionnaire Series to Patient         Roldan Bowen MD  Orthopaedic Surgery Resident, PGY-4  Pager: [phone]

## 2018-08-30 NOTE — PLAN OF CARE
Problem: Patient Care Overview  Goal: Plan of Care/Patient Progress Review  Discharge Planner OT   Patient plan for discharge: TCU  Current status: Patient not feeling upon arrival, but agreeable to participate. Supine<>sit with min A for L LE. Patient worked on LE dressing sitting EOB. Sit<>stand transfers and transfer to chair using FWW with SBA. Needs cues to slow descent when sitting. More pain today. Patient tolerated sitting in chair for approx 3 mins, then returned to bed as she could not get comfortable.   Barriers to return to prior living situation: pain, lives alone, stairs  Recommendations for discharge: TCU  Rationale for recommendations: To maximize safety and IND with functional mobility and ADLs       Entered by: AFRICA CAGLE HELD 08/30/2018 2:20 PM

## 2018-08-30 NOTE — PLAN OF CARE
"Problem: Patient Care Overview  Goal: Plan of Care/Patient Progress Review  Outcome: Improving  Patient A/Ox4. VSS. Denies CP, SOB, dizziness/LH. LSCTA. +fl/BS. Voiding per BSC. Pt c/o \"being foggy\" Roxicodone dec to 2.5 mg. Dressing to affected knee CDI, ice applied. Tolerating regular diet . IS encouraged. Activity as tolerated IV SL.  CPM x 1 hour and 45 min, limited because of pain. Plan to give Roxicodone 5 mg at 4:20 PM prior CPM this evening. Patient has demonstrated ability to call appropriately. Patient is resting with call light within reach. Will continue to monitor.        "

## 2018-08-30 NOTE — PROGRESS NOTES
Pt feels well, anticipates dc to TCU tomorrow  Pain is controlled  No BM, + flatus, voiding without difficulty    VSS  BP 130s/  Low grade T    Alert, fully oriented  Lungs clear  CV rrr  Abd soft  L calf soft, non-tender      Hgb 9.1 (10.0)      Assessment    S/p L TKA, Pt is doing well overall. Pain is controlled.     Acute BL anemia. Hgb is slowly declining, Pt remains asymptomatic     Hx HTN, stable post op, off PTA losartan     Plan  Recheck Hgb in am  Continue to hold losartan  ASA for DVT proph  Prob dc to TCU tomorrow

## 2018-08-30 NOTE — PLAN OF CARE
Problem: Patient Care Overview  Goal: Plan of Care/Patient Progress Review  Pt. A&Ox4. VSS. Afebrile. Lung sounds CTA. Maintaining sats on RA. IS encouraged. Bowel sounds active, LBM 8/28. PP+ DP+. CMS and neuro's are intact. Reports no change to baseline numbness. Denies nausea, shortness of breath, and chest pain. L knee pain managed w/ scheduled Tylenol, declined prn pain meds overnight. Voids spontaneously without difficulty. Tolerating diet. LLE incisional dressing is CDI. ANNE-MARIE in place. Pt up with ax1. PIV is patent and SL. Bilateral heels are elevated off the bed. Call light is within reach, pt able to make needs known and resting comfortably between cares. Will continue to monitor.

## 2018-08-30 NOTE — PLAN OF CARE
Problem: Knee Arthroplasty (Total, Partial) (Adult)  Goal: Signs and Symptoms of Listed Potential Problems Will be Absent, Minimized or Managed (Knee Arthroplasty)  Signs and symptoms of listed potential problems will be absent, minimized or managed by discharge/transition of care (reference Knee Arthroplasty (Total, Partial) (Adult) CPG).   Outcome: Improving          VS:     Pt A/O X 4. /44 (BP Location: Left arm)  Pulse 87  Temp 100.5  F (38.1  C) (Oral)  Resp 16  SpO2 92%. Moonlighter notified of temp. No further action at this time. Continue to monitor per Dr. Montiel. Lungs- clear and equal bilaterally. IS encouraged. Denies nausea, shortness of breath, and chest pain.     Output:     Bowels- active in all four quadrants. Voiding adequately.    Activity:     Pt up SBA with walker.     Skin: Incision.     Pain:     Has pain in the left knee and given oxycodone 5mg, ICE applied, and is tolerating well.      CMS:     CMS and Neuro's are intact. Numbness and tingling present left leg and right pad of foot, baseline per pt.       Dressing:     Left knee incisional dressing is CDI. ANNE-MARIE draining moderate amount of serosanguinous drainage.       Diet:     Pt is on a Regular diet and appetite was good this shift.       LDA:     PIV is patent in the left forearm and saline locked.      Equipment:     Declined to use CPM this evening. PCDs, walker.      Plan:     Continue to monitor.

## 2018-08-31 ENCOUNTER — APPOINTMENT (OUTPATIENT)
Dept: PHYSICAL THERAPY | Facility: CLINIC | Age: 73
DRG: 470 | End: 2018-08-31
Attending: ORTHOPAEDIC SURGERY
Payer: MEDICARE

## 2018-08-31 ENCOUNTER — APPOINTMENT (OUTPATIENT)
Dept: OCCUPATIONAL THERAPY | Facility: CLINIC | Age: 73
DRG: 470 | End: 2018-08-31
Attending: ORTHOPAEDIC SURGERY
Payer: MEDICARE

## 2018-08-31 VITALS
DIASTOLIC BLOOD PRESSURE: 53 MMHG | HEART RATE: 78 BPM | TEMPERATURE: 98.1 F | WEIGHT: 197.97 LBS | RESPIRATION RATE: 16 BRPM | SYSTOLIC BLOOD PRESSURE: 114 MMHG | BODY MASS INDEX: 31.82 KG/M2 | HEIGHT: 66 IN | OXYGEN SATURATION: 97 %

## 2018-08-31 LAB — HGB BLD-MCNC: 8.8 G/DL (ref 11.7–15.7)

## 2018-08-31 PROCEDURE — 97110 THERAPEUTIC EXERCISES: CPT | Mod: GP | Performed by: PHYSICAL THERAPIST

## 2018-08-31 PROCEDURE — A9270 NON-COVERED ITEM OR SERVICE: HCPCS | Mod: GY | Performed by: PHYSICIAN ASSISTANT

## 2018-08-31 PROCEDURE — 40000193 ZZH STATISTIC PT WARD VISIT: Performed by: PHYSICAL THERAPIST

## 2018-08-31 PROCEDURE — 97116 GAIT TRAINING THERAPY: CPT | Mod: GP | Performed by: PHYSICAL THERAPIST

## 2018-08-31 PROCEDURE — 25000132 ZZH RX MED GY IP 250 OP 250 PS 637: Mod: GY | Performed by: PHYSICIAN ASSISTANT

## 2018-08-31 PROCEDURE — 40000133 ZZH STATISTIC OT WARD VISIT

## 2018-08-31 PROCEDURE — 36415 COLL VENOUS BLD VENIPUNCTURE: CPT | Performed by: INTERNAL MEDICINE

## 2018-08-31 PROCEDURE — 25000132 ZZH RX MED GY IP 250 OP 250 PS 637: Mod: GY | Performed by: NURSE PRACTITIONER

## 2018-08-31 PROCEDURE — 97110 THERAPEUTIC EXERCISES: CPT | Mod: GP | Performed by: REHABILITATION PRACTITIONER

## 2018-08-31 PROCEDURE — 40000193 ZZH STATISTIC PT WARD VISIT: Performed by: REHABILITATION PRACTITIONER

## 2018-08-31 PROCEDURE — A9270 NON-COVERED ITEM OR SERVICE: HCPCS | Mod: GY | Performed by: NURSE PRACTITIONER

## 2018-08-31 PROCEDURE — 85018 HEMOGLOBIN: CPT | Performed by: INTERNAL MEDICINE

## 2018-08-31 PROCEDURE — 99231 SBSQ HOSP IP/OBS SF/LOW 25: CPT | Performed by: INTERNAL MEDICINE

## 2018-08-31 PROCEDURE — 97116 GAIT TRAINING THERAPY: CPT | Mod: GP | Performed by: REHABILITATION PRACTITIONER

## 2018-08-31 PROCEDURE — 97535 SELF CARE MNGMENT TRAINING: CPT | Mod: GO

## 2018-08-31 RX ADMIN — ACETAMINOPHEN 975 MG: 325 TABLET, FILM COATED ORAL at 11:15

## 2018-08-31 RX ADMIN — SENNOSIDES AND DOCUSATE SODIUM 2 TABLET: 8.6; 5 TABLET ORAL at 07:55

## 2018-08-31 RX ADMIN — OXYCODONE HYDROCHLORIDE 2.5 MG: 5 TABLET ORAL at 08:59

## 2018-08-31 RX ADMIN — ACETAMINOPHEN 975 MG: 325 TABLET, FILM COATED ORAL at 04:26

## 2018-08-31 RX ADMIN — ASPIRIN 325 MG: 325 TABLET, DELAYED RELEASE ORAL at 07:55

## 2018-08-31 RX ADMIN — OXYCODONE HYDROCHLORIDE 2.5 MG: 5 TABLET ORAL at 13:01

## 2018-08-31 ASSESSMENT — ACTIVITIES OF DAILY LIVING (ADL)
ADLS_ACUITY_SCORE: 10

## 2018-08-31 NOTE — PLAN OF CARE
Problem: Patient Care Overview  Goal: Plan of Care/Patient Progress Review  Outcome: Adequate for Discharge Date Met: 08/31/18  Discharge to [facility] accompanied by family. AVS faxed. Discharge papers sent for admitting nurse. Patient given copies of AVS. Left unit at 15:35 with belongings .

## 2018-08-31 NOTE — PLAN OF CARE
Problem: Knee Arthroplasty (Total, Partial) (Adult)  Goal: Signs and Symptoms of Listed Potential Problems Will be Absent, Minimized or Managed (Knee Arthroplasty)  Signs and symptoms of listed potential problems will be absent, minimized or managed by discharge/transition of care (reference Knee Arthroplasty (Total, Partial) (Adult) CPG).     VS: VS stable, afebrile, denies CP   O2: 96% on RA, denies SOB   Output: Voiding adequately in bathroom   Last BM: 8/28/2018, passing gas. Denies abdominal discomfort    Activity: WBAT with walker, Ax1. Ambulated to bathroom   Skin: Intact except for incision   Pain: Well managed with scheduled Tylenol   CMS: Intact; denies N/T    Dressing: CDI to R knee and mepilex on coccyx   Diet: Regular; denies N/V   LDA: PIV in L forearm SL   Equipment: Walker, CPM (started using at 0430, 0-45).   Plan: Continue to monitor. Possible discharge to rehab today   Additional Info:

## 2018-08-31 NOTE — PROGRESS NOTES
Orthopaedic Surgery Progress Note:       Subjective:   NAEO. Patient reports doing well. She is in great spirits this AM. Using the CPM. Pain controlled. Going to rehab today    Denies new onset tingling/numbness in operative extremity. Denies fever/chills/SOB/nause/vomiting.     Objective:   Temp:  [98.3  F (36.8  C)-100.2  F (37.9  C)] 98.3  F (36.8  C)  Pulse:  [78] 78  Heart Rate:  [93] 93  Resp:  [16] 16  BP: (121-151)/(48-55) 151/55  SpO2:  [96 %-100 %] 96 %    Intake/Output Summary (Last 24 hours) at 08/29/18 1209  Last data filed at 08/29/18 1148   Gross per 24 hour   Intake             2150 ml   Output             2250 ml   Net             -100 ml     Gen: NAD. Resting comfortably in bed  Resp: Breathing comfortably on RA  LLE:  Dressing/ACE is c/d/i.   Drain site is clean and dressed  SILT in femoral, saphenous, sural, deep peroneal, superficial peroneal, and tibial n dist.   Fires EHL/FHL/TA/Gastroc with 5/5 strength    DP pulses intact, 2+ and foot is wwp    Labs:    Recent Labs  Lab 08/31/18  0629 08/30/18  0711 08/29/18  0550   HGB 8.8* 9.1* 10.0*        Assessment & Plan:   Post-Op Plan:  Assessment/Plan: Esperanza Gage is a 72 year old female s/p Lt TKA on 8/28 with Dr. Campos.    Activity: Up with assist and assistive devices as needed until independent. Knee ROM as tolerated. Advance CPM to 90 deg as tolerated  Weight bearing status: WBAT.   Antibiotics: Ancef x 24 hours.  Diet: Begin with clear fluids and progress diet as tolerated. Bowel regimen. Anti-emetics PRN.   DVT prophylaxis: mechanical and ASA while inpatient,  discharge on ASA 325mg BID x 4 weeks.  Elevation: Elevate heels off of bed on pillows. No pillows behind the knee at any time.   Wound Care: Dressing to remain until POD 5-7 if clean; then nonadhesive, dry gauze, ace wrap PRN  Drains: Document output per shift, discontinue when <30cc/shift.   Pain management: transition from IV to orals as tolerated.    X-rays: XR of Lt knee in  PACU  Physical Therapy:  ROM, ADL's.   Occupational Therapy: ADL's  Labs: Trend Hgb on POD #1, 2.   Consults: PT, OT. Hospitalist, , appreciate assistance in caring for this patient.   Follow-up: Clinic in 2 weeks for wound check with Dr. Campos; then at 6 weeks post op with repeat XR of operative knee    Disposition: Pending progress with therapies, pain control on orals, and medical stability, anticipate discharge to TCU Friday    Roldan Bowen MD 08/31/2018  Orthopaedic Surgery Resident, PGY-4  Pager: [phone]

## 2018-08-31 NOTE — PLAN OF CARE
Problem: Patient Care Overview  Goal: Plan of Care/Patient Progress Review  Discharge Planner OT   Patient plan for discharge: TCU  Current status: Patient donned socks with SBA and increased time/effort with L sock. Sit<>stand and ambulation to/from bathroom using FWW with SBA. Patient completed toilet transfer and task using BSC as overlay with SBA. Patient stood at sink for approx 10 mins to complete self care tasks. Patient mildly SOB with activity. Back in bed at end of session with CPM donned.   Barriers to return to prior living situation: decreased activity tolerance, lives alone, stairs  Recommendations for discharge: TCU  Rationale for recommendations: To maximize safety and IND with functional mobility and ADLs/IADLs       Entered by: AFRICA JONAS 08/31/2018 11:54 AM       Occupational Therapy Discharge Summary    Reason for therapy discharge:    Discharged to transitional care facility.    Progress towards therapy goal(s). See goals on Care Plan in Psychiatric electronic health record for goal details.  Goals partially met.  Barriers to achieving goals:   limited tolerance for therapy and discharge from facility.    Therapy recommendation(s):    Continued therapy is recommended.  Rationale/Recommendations:  To maximize safety and IND with functional mobility and ADLs/IADLs.

## 2018-08-31 NOTE — PROGRESS NOTES
VS: VSS. Temp slightly elevated. Alert and oriented x 3.    O2: Maintains O2 SATS in upper 90s on room air.    Output: Voiding independently.    Last BM: 8/28/18 . Passing gas.    Activity: Ambulates with staff assist of 1 and walker.    Skin: Intact. Mepliex on coccyx to prevent skin breakdown.    Pain: Pain well managed with Oxycodone and Tylenol.    CMS: Baseline numbness to bottom of right foot.    Dressing: CDI    Diet: Regular. Good appetite.    LDA: PIV saline locked. Drain removed.    Equipment: Encouraged patient to use CPM this evening. Pt able to tolerate CPM for a total of 4 hours. Walker for ambulation, PCDS on bilateral. IS

## 2018-08-31 NOTE — PLAN OF CARE
Problem: Patient Care Overview  Goal: Plan of Care/Patient Progress Review  Discharge Planner PT   Patient plan for discharge: TCU  Current status: Pt demonstrated supine to sit with SBAx1 and sit to supine with minAx1 to help lift left LE. Sit to/from stand with FWW and SBAx1. Pt ambulated 140ft with FWW and SBAx1.  Barriers to return to prior living situation: stairs, pain, left LE weakness and decreased ROM  Recommendations for discharge: TCU  Rationale for recommendations: Pt would benefit from stay at TCU to continue skilled PT for left LE strengthening/ROM and gait to improve bed mobility, transfers, gait, and stairs.         Entered by: Carla Barrera 08/31/2018 2:53 PM        Physical Therapy Discharge Summary    Reason for therapy discharge:    Discharged to transitional care facility.    Progress towards therapy goal(s). See goals on Care Plan in [facility] electronic health record for goal details.  Goals partially met.  Barriers to achieving goals:   limited tolerance for therapy and discharge from facility.    Therapy recommendation(s):    Continued therapy is recommended.  Rationale/Recommendations:  See above.

## 2018-08-31 NOTE — PLAN OF CARE
Problem: Patient Care Overview  Goal: Plan of Care/Patient Progress Review  PT - per plan established by the Physical Therapist, according to functional mobility the  discharge recommendation is TCU for cont skilled PT to progress functional mobility. Pt is limited by pain weakness and fatigue. Pt needing min A for all bed mob, and sit to stand to WW for standing support. Pt demo amb up to 120'x 1 with WW needing v.c for tech for step through pattern. Pt demo supine there ex program x 10. Pt CPN 0-55 progressing ROM -10-55  Discharge Planner PT   Patient plan for discharge: rehab  Current status: see above.   Barriers to return to prior living situation: pain weakness, fatigue.   Recommendations for discharge: TCU   Rationale for recommendations: pt needing cont skilled PT to progress functional mobility.        Entered by: Lang Ta 08/31/2018 10:18 AM

## 2018-08-31 NOTE — PROGRESS NOTES
Pt feels well, feels ready to dc to TCU  Pain is controlled  + BM yesterday  No chest pain, SOB or dizziness    VSS  BP 110s-150s/  HR 70s    Alert, fully oriented,  Lungs clear  CV rrr  Abd soft  No L calf edema or tenderness    Hgb 8.8 (9.1)      Assessment    S/p L TKA, Pt is doing well overall. Pain is controlled.      Acute BL anemia. Hgb reasonably stable. Pt remains asymptomatic      Hx HTN, stable post op, off PTA losartan    Plan  Pt is medically stable for dc to TCU  OK to resume losartan after dc  Dc orders reviewed.

## 2018-08-31 NOTE — PROGRESS NOTES
Social Work Services Discharge Note      Patient Name:  Esperanza Gage     Anticipated Discharge Date:  8/31/2018  Discharge Disposition:   [facility]    Following MD:  Julia Gifford     Pre-Admission Screening (PAS) online form has been completed.  The Level of Care (LOC) is:  Determined  Confirmation Code is: [id number]   Patient/caregiver informed of referral to [facility] for Pre-Admission Screening for skilled nursing facility (SNF) placement and to expect a phone call post discharge from SNF.     Additional Services/Equipment Arranged:  None     Patient / Family response to discharge plan:  Patient agreeable to plan     Persons notified of above discharge plan:  Patient    Staff Discharge Instructions:  Please fax discharge orders and signed hard scripts for any controlled substances.  Please print a packet and send with patient.     CTS Handoff completed:  YES    Medicare Notice of Rights provided to the patient/family:  YES    Veronika OCONONR  Weekend   [phone]  On Call Pager: [phone] 4pm - Midnight

## 2018-09-04 ENCOUNTER — AMBULATORY - HEALTHEAST (OUTPATIENT)
Dept: ADMINISTRATIVE | Facility: CLINIC | Age: 73
End: 2018-09-04

## 2018-09-04 ENCOUNTER — RECORDS - HEALTHEAST (OUTPATIENT)
Dept: LAB | Facility: CLINIC | Age: 73
End: 2018-09-04

## 2018-09-04 LAB
ANION GAP SERPL CALCULATED.3IONS-SCNC: 9 MMOL/L (ref 5–18)
BUN SERPL-MCNC: 13 MG/DL (ref 8–28)
CALCIUM SERPL-MCNC: 9.3 MG/DL (ref 8.5–10.5)
CHLORIDE BLD-SCNC: 106 MMOL/L (ref 98–107)
CO2 SERPL-SCNC: 24 MMOL/L (ref 22–31)
CREAT SERPL-MCNC: 0.6 MG/DL (ref 0.6–1.1)
CREAT SERPL-MCNC: 0.6 MG/DL (ref 0.6–1.1)
GFR SERPL CREATININE-BSD FRML MDRD: >60 ML/MIN/1.73M2
GFR SERPL CREATININE-BSD FRML MDRD: >60 ML/MIN/1.73M2
GLUCOSE BLD-MCNC: 156 MG/DL (ref 70–125)
GLUCOSE SERPL-MCNC: 156 MG/DL (ref 70–125)
HGB BLD-MCNC: 9.5 G/DL (ref 12–16)
POTASSIUM BLD-SCNC: 3.8 MMOL/L (ref 3.5–5)
POTASSIUM SERPL-SCNC: 3.8 MMOL/L (ref 3.5–5)
SODIUM SERPL-SCNC: 139 MMOL/L (ref 136–145)

## 2018-09-05 ENCOUNTER — OFFICE VISIT - HEALTHEAST (OUTPATIENT)
Dept: GERIATRICS | Facility: CLINIC | Age: 73
End: 2018-09-05

## 2018-09-05 ENCOUNTER — TRANSFERRED RECORDS (OUTPATIENT)
Dept: HEALTH INFORMATION MANAGEMENT | Facility: CLINIC | Age: 73
End: 2018-09-05

## 2018-09-05 DIAGNOSIS — K59.03 DRUG-INDUCED CONSTIPATION: ICD-10-CM

## 2018-09-05 DIAGNOSIS — M81.0 OSTEOPOROSIS: ICD-10-CM

## 2018-09-05 DIAGNOSIS — K12.0 ULCER APHTHOUS ORAL: ICD-10-CM

## 2018-09-05 DIAGNOSIS — Z96.652 PRESENCE OF ARTIFICIAL KNEE JOINT, LEFT: ICD-10-CM

## 2018-09-05 DIAGNOSIS — E78.5 HYPERLIPIDEMIA: ICD-10-CM

## 2018-09-05 DIAGNOSIS — R52 PAIN MANAGEMENT: ICD-10-CM

## 2018-09-05 DIAGNOSIS — I10 ESSENTIAL HYPERTENSION: ICD-10-CM

## 2018-09-05 LAB
CREAT SERPL-MCNC: 0.6 MG/DL (ref 0.6–1.1)
GFR SERPL CREATININE-BSD FRML MDRD: >60 ML/MIN/1.73M2
GLUCOSE SERPL-MCNC: 156 MG/DL (ref 70–125)
POTASSIUM SERPL-SCNC: 3.8 MMOL/L (ref 3.5–5)

## 2018-09-05 ASSESSMENT — MIFFLIN-ST. JEOR: SCORE: 1396.95

## 2018-09-06 ENCOUNTER — TRANSFERRED RECORDS (OUTPATIENT)
Dept: HEALTH INFORMATION MANAGEMENT | Facility: CLINIC | Age: 73
End: 2018-09-06

## 2018-09-06 ENCOUNTER — OFFICE VISIT - HEALTHEAST (OUTPATIENT)
Dept: GERIATRICS | Facility: CLINIC | Age: 73
End: 2018-09-06

## 2018-09-06 DIAGNOSIS — R52 PAIN MANAGEMENT: ICD-10-CM

## 2018-09-06 DIAGNOSIS — Z96.652 S/P TOTAL KNEE ARTHROPLASTY, LEFT: ICD-10-CM

## 2018-09-06 DIAGNOSIS — E78.5 HYPERLIPIDEMIA: ICD-10-CM

## 2018-09-06 DIAGNOSIS — I10 ESSENTIAL HYPERTENSION: ICD-10-CM

## 2018-09-06 DIAGNOSIS — D50.0 BLOOD LOSS ANEMIA: ICD-10-CM

## 2018-09-10 ENCOUNTER — OFFICE VISIT - HEALTHEAST (OUTPATIENT)
Dept: GERIATRICS | Facility: CLINIC | Age: 73
End: 2018-09-10

## 2018-09-10 ENCOUNTER — TRANSFERRED RECORDS (OUTPATIENT)
Dept: HEALTH INFORMATION MANAGEMENT | Facility: CLINIC | Age: 73
End: 2018-09-10

## 2018-09-10 DIAGNOSIS — M81.0 OSTEOPOROSIS: ICD-10-CM

## 2018-09-10 DIAGNOSIS — R52 PAIN MANAGEMENT: ICD-10-CM

## 2018-09-10 DIAGNOSIS — Z96.652 S/P TOTAL KNEE ARTHROPLASTY, LEFT: ICD-10-CM

## 2018-09-10 DIAGNOSIS — I10 ESSENTIAL HYPERTENSION: ICD-10-CM

## 2018-09-10 DIAGNOSIS — K59.03 DRUG-INDUCED CONSTIPATION: ICD-10-CM

## 2018-09-10 DIAGNOSIS — K12.0 ULCER APHTHOUS ORAL: ICD-10-CM

## 2018-09-10 ASSESSMENT — MIFFLIN-ST. JEOR: SCORE: 1396.95

## 2018-09-12 ENCOUNTER — OFFICE VISIT - HEALTHEAST (OUTPATIENT)
Dept: GERIATRICS | Facility: CLINIC | Age: 73
End: 2018-09-12

## 2018-09-12 ENCOUNTER — AMBULATORY - HEALTHEAST (OUTPATIENT)
Dept: ADMINISTRATIVE | Facility: REHABILITATION | Age: 73
End: 2018-09-12

## 2018-09-12 ENCOUNTER — TRANSFERRED RECORDS (OUTPATIENT)
Dept: HEALTH INFORMATION MANAGEMENT | Facility: CLINIC | Age: 73
End: 2018-09-12

## 2018-09-12 DIAGNOSIS — I10 ESSENTIAL HYPERTENSION: ICD-10-CM

## 2018-09-12 DIAGNOSIS — Z96.659 KNEE JOINT REPLACEMENT STATUS: ICD-10-CM

## 2018-09-12 DIAGNOSIS — Z96.652 S/P TOTAL KNEE ARTHROPLASTY, LEFT: ICD-10-CM

## 2018-09-12 DIAGNOSIS — M81.0 OSTEOPOROSIS: ICD-10-CM

## 2018-09-12 DIAGNOSIS — K59.03 DRUG-INDUCED CONSTIPATION: ICD-10-CM

## 2018-09-12 ASSESSMENT — MIFFLIN-ST. JEOR: SCORE: 1401.49

## 2018-09-17 ENCOUNTER — AMBULATORY - HEALTHEAST (OUTPATIENT)
Dept: GERIATRICS | Facility: CLINIC | Age: 73
End: 2018-09-17

## 2018-09-17 ENCOUNTER — OFFICE VISIT - HEALTHEAST (OUTPATIENT)
Dept: PHYSICAL THERAPY | Facility: REHABILITATION | Age: 73
End: 2018-09-17

## 2018-09-17 DIAGNOSIS — M25.662 DECREASED RANGE OF MOTION OF LEFT KNEE: ICD-10-CM

## 2018-09-17 DIAGNOSIS — R26.9 ABNORMALITY OF GAIT: ICD-10-CM

## 2018-09-17 DIAGNOSIS — Z96.652 TOTAL KNEE REPLACEMENT STATUS, LEFT: ICD-10-CM

## 2018-09-19 ENCOUNTER — OFFICE VISIT - HEALTHEAST (OUTPATIENT)
Dept: PHYSICAL THERAPY | Facility: REHABILITATION | Age: 73
End: 2018-09-19

## 2018-09-19 DIAGNOSIS — M25.662 DECREASED RANGE OF MOTION OF LEFT KNEE: ICD-10-CM

## 2018-09-19 DIAGNOSIS — Z96.652 TOTAL KNEE REPLACEMENT STATUS, LEFT: ICD-10-CM

## 2018-09-19 DIAGNOSIS — R26.9 ABNORMALITY OF GAIT: ICD-10-CM

## 2018-09-21 DIAGNOSIS — Z98.890 STATUS POST KNEE SURGERY: Primary | ICD-10-CM

## 2018-09-24 ENCOUNTER — RADIANT APPOINTMENT (OUTPATIENT)
Dept: GENERAL RADIOLOGY | Facility: CLINIC | Age: 73
End: 2018-09-24
Attending: ORTHOPAEDIC SURGERY
Payer: MEDICARE

## 2018-09-24 ENCOUNTER — OFFICE VISIT - HEALTHEAST (OUTPATIENT)
Dept: PHYSICAL THERAPY | Facility: REHABILITATION | Age: 73
End: 2018-09-24

## 2018-09-24 ENCOUNTER — OFFICE VISIT (OUTPATIENT)
Dept: ORTHOPEDICS | Facility: CLINIC | Age: 73
End: 2018-09-24
Payer: MEDICARE

## 2018-09-24 DIAGNOSIS — M25.662 DECREASED RANGE OF MOTION OF LEFT KNEE: ICD-10-CM

## 2018-09-24 DIAGNOSIS — Z96.652 STATUS POST TOTAL LEFT KNEE REPLACEMENT: Primary | ICD-10-CM

## 2018-09-24 DIAGNOSIS — R26.9 ABNORMALITY OF GAIT: ICD-10-CM

## 2018-09-24 DIAGNOSIS — Z96.652 TOTAL KNEE REPLACEMENT STATUS, LEFT: ICD-10-CM

## 2018-09-24 DIAGNOSIS — Z98.890 STATUS POST KNEE SURGERY: ICD-10-CM

## 2018-09-24 ASSESSMENT — KOOS JR
HOW SEVERE IS YOUR KNEE STIFFNESS AFTER FIRST WAKING IN MORNING: MODERATE
HOW SEVERE IS YOUR KNEE STIFFNESS AFTER FIRST WAKING IN MORNING: 1

## 2018-09-24 ASSESSMENT — ENCOUNTER SYMPTOMS
MYALGIAS: 0
ARTHRALGIAS: 0
NECK PAIN: 0
JOINT SWELLING: 1
MUSCLE CRAMPS: 1
MUSCLE WEAKNESS: 1
STIFFNESS: 1
BACK PAIN: 0

## 2018-09-24 ASSESSMENT — ACTIVITIES OF DAILY LIVING (ADL): FUNCTION,_DAILY_LIVING_SCORE: 76.47

## 2018-09-24 NOTE — PROGRESS NOTES
[facility]  Orthopaedic Surgery, Joint Replacement Consultation  by Pierre Campos M.D.    Esperanza Gage MRN# [id number]   Age: 72 year old YOB: 1945     Requesting physician: Julia Branham      Background history:  DX:  1. L knee DJD     TREATMENTS:  1. 7/1983, L knee medial meniscectomy. [place], [place]  2. 8/28/18, L TKA ([facility]), [facility]      Postoperative knee replacement follow-up clinic visit    IMPRESSION:  Excellent outcome to date after knee replacement.     PLAN:  1. She can remove her bandages  2. Stop taking aspirin  3. Take Tylenol as needed.  4. Continue trying to bend the knee farther and to walk without a limp  5. Schedule a follow up 1 year postoperatively      HISTORY OF PRESENT ILLNESS:  Esperanza Gage is doing very well after last surgery listed above. She continues to have stiffness in her left leg. The patient feels a variety of different sensations in her left leg. She feels pain during abrupt movements of her knee. Her ankle can feel sore at times.    She has been taking Aspirin and icing her leg.    EXAM:  Incision healing, clean and dry.  Left leg flexion: 90 degrees, pain free  Effusion: none  Periarticular swelling or leg edema: a slight amount of swelling around her knee  Gait: She still limps slightly     IMAGING:  Radiographs demonstrate the left knee implant in satisfactory position without evidence of any complications.       Scribe Disclosure:   I, Rudy Pichardo, am serving as a scribe to document services personally performed by Pierre Campos MD at this visit, based upon the provider's statements to me. All documentation has been reviewed by the aforementioned provider prior to being entered into the official medical record.    Portions of this medical record were completed by a scribe. UPON MY REVIEW AND AUTHENTICATION BY ELECTRONIC SIGNATURE, this confirms (a) I performed the applicable clinical services, and (b) the record is  accurate.    Attending MD (Dr. Pierre Campos) Attestation :  This patient was seen and evaluated by me including a history, exam, and interpretation of all imaging and/or lab data.  Either a training physician (resident/fellow), who also saw the patient, or scribe has documented the clinic visit in the attached note.    MD Al Zimmerman Family Professor  Oncology and Adult Reconstructive Surgery  Dept Orthopaedic Surgery, [facility]  [phone] office, [phone] pager  [email or web address]      KOOS Knee Survey Assessment    Knee Outcome Survey ADL Scale (Michael, JANAY; Obdulia, L; Arben, RS; Helder, FH; Bobby, CARL; 1998) 6/4/2018   Pain (ADLS1) -   Stiffness (ADLS2) -   Swelling (ADLS3) -   Giving Way, Buckling or Shifting of Knee (ADLS4) -   Weakness (ADLS5) -   Limping (ADLS6) -   Walk? (ADLS7) -   Go up stairs? (ADLS8) -   Go down stairs? (ADLS9) -   Stand? (ADLS10) -   Kneel on the front of your knee? (ADLS11) -   Squat? (ADLS12) -   Sit with your knee bent? (ADLS13) -   How would you rate the current function of your knee during your usual daily activities on a scale from 0 to 100 with 100 being your level of knee function prior to your injury and 0 being the inability to perform any of your usual daily activities? -   How would you rate the overall function of your knee during your usual daily activities?  (please check the one response that best describes you) -   As a result of your knee injury, how would you rate your current level of daily activity? (please check the one response that best describes you) -   Sum -   Count -   Raw Score -   Knee Activity of Daily Living Score -   Do you have swelling in your knee? Rarely   Do you feel grinding, hear clicking or any other type of noise when your knee moves? Sometimes   Does your knee catch or hang up when moving? Rarely   Can you straighten your knee fully? Always   Can you bend your knee fully? Never   How severe is your knee joint  stiffness after first wakening in the morning? Mild   How severe is your knee stiffness after sitting, lying or resting LATER IN THE DAY? Moderate   How often do you experience knee pain? Always   Twisting/pivoting on your knee Moderate   Straightening knee fully Moderate   Bending knee fully Moderate   Walking on flat surface Moderate   Going up or down stairs Moderate   At night while in bed None   Sitting or lying None   Standing upright Severe   Descending stairs Moderate   Ascending stairs Severe   Rising from sitting Moderate   Standing Severe   Bending to floor/ an object Moderate   Walking on flat surface Moderate   Getting in/out of car Mild   Going shopping Moderate   Putting on socks/stockings Mild   Rising from bed Mild   Taking off socks/stockings Mild   Lying in bed (turning over, maintaining knee position) Mild   Getting in/out of bath Mild   Sitting None   Getting on/off toilet Mild   Heavy domestic duties (moving heavy boxes, scrubbing floors, etc) Severe   Light domestic duties (cooking, dusting, etc) Mild   Squatting Extreme   Running Extreme   Jumping Extreme   Twisting/pivoting on your injured knee Severe   Kneeling Severe   How often are you aware of your knee problem? Constantly   Have you modified your life style to avoid potentially damaging activities to your knee? Severely   How much are you troubled with lack of confidence in your knee? Severely   In general, how much difficulty do you have with your knee? Severe   Pain Score 52.77   Symptoms Score 60.71   Function, Daily Living Score 60.29   Sports and Rec Score 10   Quality of Life Score 18.75        Answers for HPI/ROS submitted by the patient on 9/24/2018   General Symptoms: No  Skin Symptoms: No  HENT Symptoms: No  EYE SYMPTOMS: No  HEART SYMPTOMS: No  LUNG SYMPTOMS: No  INTESTINAL SYMPTOMS: No  URINARY SYMPTOMS: No  GYNECOLOGIC SYMPTOMS: No  BREAST SYMPTOMS: No  SKELETAL SYMPTOMS: Yes  BLOOD SYMPTOMS: No  NERVOUS SYSTEM  SYMPTOMS: No  MENTAL HEALTH SYMPTOMS: No  Back pain: No  Muscle aches: No  Neck pain: No  Swollen joints: Yes  Joint pain: No  Bone pain: No  Muscle cramps: Yes  Muscle weakness: Yes  Joint stiffness: Yes  Bone fracture: No

## 2018-09-24 NOTE — NURSING NOTE
Chief Complaint   Patient presents with     Surgical Followup     4wk POP F/u Left Total Knee Replacement DOS:8/28/2018     RECHECK     Pt. states that she feels as if her Left Leg is longer than her Right.        72 year old  1945         [facility] - [facility] [place] - [address] [place] [address] PHARMACY MAIL DELIVERY - [facility] [address]    No Known Allergies  Current Outpatient Prescriptions   Medication     acetaminophen (TYLENOL) 325 MG tablet     alendronate (FOSAMAX) 70 MG tablet     aspirin 325 MG EC tablet     ciclopirox (LOPROX) 0.77 % cream     losartan (COZAAR) 25 MG tablet     simvastatin (ZOCOR) 10 MG tablet     [START ON 9/26/2018] aspirin 81 MG tablet     No current facility-administered medications for this visit.          Questionnaires:      KOOS Knee Survey Assessment    Knee Outcome Survey ADL Scale (JANAY Rodriguez; MARIAH Steiner; Arben, RS; Helder, FH; CARL Rosales; 1998) 9/24/2018   Pain (ADLS1) -   Stiffness (ADLS2) -   Swelling (ADLS3) -   Giving Way, Buckling or Shifting of Knee (ADLS4) -   Weakness (ADLS5) -   Limping (ADLS6) -   Walk? (ADLS7) -   Go up stairs? (ADLS8) -   Go down stairs? (ADLS9) -   Stand? (ADLS10) -   Kneel on the front of your knee? (ADLS11) -   Squat? (ADLS12) -   Sit with your knee bent? (ADLS13) -   How would you rate the current function of your knee during your usual daily activities on a scale from 0 to 100 with 100 being your level of knee function prior to your injury and 0 being the inability to perform any of your usual daily activities? -   How would you rate the overall function of your knee during your usual daily activities?  (please check the one response that best describes you) -   As a result of your knee injury, how would you rate your current level of daily activity? (please check the one response that best describes you) -   Sum -   Count -   Raw Score -   Knee Activity of Daily Living Score -   Do you  have swelling in your knee? Often   Do you feel grinding, hear clicking or any other type of noise when your knee moves? Rarely   Does your knee catch or hang up when moving? Often   Can you straighten your knee fully? Never   Can you bend your knee fully? Never   How severe is your knee joint stiffness after first wakening in the morning? Moderate   How severe is your knee stiffness after sitting, lying or resting LATER IN THE DAY? Moderate   How often do you experience knee pain? Daily   Twisting/pivoting on your knee Moderate   Straightening knee fully Mild   Bending knee fully Moderate   Walking on flat surface Mild   Going up or down stairs Mild   At night while in bed Moderate   Sitting or lying Mild   Standing upright Mild   Descending stairs Mild   Ascending stairs Mild   Rising from sitting Mild   Standing None   Bending to floor/ an object Mild   Walking on flat surface None   Getting in/out of car Mild   Going shopping Mild   Putting on socks/stockings Mild   Rising from bed None   Taking off socks/stockings Mild   Lying in bed (turning over, maintaining knee position) Mild   Getting in/out of bath Mild   Sitting Mild   Getting on/off toilet Mild   Heavy domestic duties (moving heavy boxes, scrubbing floors, etc) Severe   Light domestic duties (cooking, dusting, etc) Mild   Squatting Extreme   Running Extreme   Jumping Extreme   Twisting/pivoting on your injured knee Severe   Kneeling Extreme   How often are you aware of your knee problem? Daily   Have you modified your life style to avoid potentially damaging activities to your knee? Moderately   How much are you troubled with lack of confidence in your knee? Mildly   In general, how much difficulty do you have with your knee? Moderate   Pain Score 61.11   Symptoms Score 60.71   Function, Daily Living Score 76.47   Sports and Rec Score 5   Quality of Life Score 50            Promis 10 Assessment    PROMIS 10 9/24/2018   In general, would you say  your health is: Good   In general, would you say your quality of life is: Good   In general, how would you rate your physical health? Good   In general, how would you rate your mental health, including your mood and your ability to think? Very good   In general, how would you rate your satisfaction with your social activities and relationships? Very good   In general, please rate how well you carry out your usual social activities and roles Good   To what extent are you able to carry out your everyday physical activities such as walking, climbing stairs, carrying groceries, or moving a chair? A little   How often have you been bothered by emotional problems such as feeling anxious, depressed or irritable? Never   How would you rate your fatigue on average? Mild   How would you rate your pain on average?   0 = No Pain  to  10 = Worst Imaginable Pain 2   In general, would you say your health is: 3   In general, would you say your quality of life is: 3   In general, how would you rate your physical health? 3   In general, how would you rate your mental health, including your mood and your ability to think? 4   In general, how would you rate your satisfaction with your social activities and relationships? 4   In general, please rate how well you carry out your usual social activities and roles. (This includes activities at home, at work and in your community, and responsibilities as a parent, child, spouse, employee, friend, etc.) 3   To what extent are you able to carry out your everyday physical activities such as walking, climbing stairs, carrying groceries, or moving a chair? 2   In the past 7 days, how often have you been bothered by emotional problems such as feeling anxious, depressed, or irritable? 1   In the past 7 days, how would you rate your fatigue on average? 2   In the past 7 days, how would you rate your pain on average, where 0 means no pain, and 10 means worst imaginable pain? 2   Global Mental Health  Score 16   Global Physical Health Score 13   PROMIS TOTAL - SUBSCORES 29   Some recent data might be hidden

## 2018-09-24 NOTE — LETTER
9/24/2018       RE: Esperanza Gage  [address]  [place] [address]     Dear Colleague,    Thank you for referring your patient, Esperanza Gage, to the [facility] at [facility]. Please see a copy of my visit note below.        [facility]  Orthopaedic Surgery, Joint Replacement Consultation  by Pierre Campos M.D.    Esperanza Gage MRN# [id number]   Age: 72 year old YOB: 1945     Requesting physician: Julia Branham      Background history:  DX:  1. L knee DJD     TREATMENTS:  1. 7/1983, L knee medial meniscectomy. [facility] [place]  2. 8/28/18, L TKA (Andres), [facility]      Postoperative knee replacement follow-up clinic visit    IMPRESSION:  Excellent outcome to date after knee replacement.     PLAN:  1. She can remove her bandages  2. Stop taking aspirin  3. Take Tylenol as needed.  4. Continue trying to bend the knee farther and to walk without a limp  5. Schedule a follow up 1 year postoperatively      HISTORY OF PRESENT ILLNESS:  Esperanza Gage is doing very well after last surgery listed above. She continues to have stiffness in her left leg. The patient feels a variety of different sensations in her left leg. She feels pain during abrupt movements of her knee. Her ankle can feel sore at times.    She has been taking Aspirin and icing her leg.    EXAM:  Incision healing, clean and dry.  Left leg flexion: 90 degrees, pain free  Effusion: none  Periarticular swelling or leg edema: a slight amount of swelling around her knee  Gait: She still limps slightly     IMAGING:  Radiographs demonstrate the left knee implant in satisfactory position without evidence of any complications.       Scribe Disclosure:   I, Rudy Pichardo, am serving as a scribe to document services personally performed by Pierre Campos MD at this visit, based upon the provider's statements to me. All documentation has been reviewed by the  aforementioned provider prior to being entered into the official medical record.    Portions of this medical record were completed by a scribe. UPON MY REVIEW AND AUTHENTICATION BY ELECTRONIC SIGNATURE, this confirms (a) I performed the applicable clinical services, and (b) the record is accurate.    Attending MD (Dr. Pierre Campos) Attestation :  This patient was seen and evaluated by me including a history, exam, and interpretation of all imaging and/or lab data.  Either a training physician (resident/fellow), who also saw the patient, or scribe has documented the clinic visit in the attached note.    KOOS Knee Survey Assessment    Knee Outcome Survey ADL Scale (Michael, JANAY; Obdulia, L; Arben, RS; Helder, FH; Bobby, CARL; 1998) 6/4/2018   Pain (ADLS1) -   Stiffness (ADLS2) -   Swelling (ADLS3) -   Giving Way, Buckling or Shifting of Knee (ADLS4) -   Weakness (ADLS5) -   Limping (ADLS6) -   Walk? (ADLS7) -   Go up stairs? (ADLS8) -   Go down stairs? (ADLS9) -   Stand? (ADLS10) -   Kneel on the front of your knee? (ADLS11) -   Squat? (ADLS12) -   Sit with your knee bent? (ADLS13) -   How would you rate the current function of your knee during your usual daily activities on a scale from 0 to 100 with 100 being your level of knee function prior to your injury and 0 being the inability to perform any of your usual daily activities? -   How would you rate the overall function of your knee during your usual daily activities?  (please check the one response that best describes you) -   As a result of your knee injury, how would you rate your current level of daily activity? (please check the one response that best describes you) -   Sum -   Count -   Raw Score -   Knee Activity of Daily Living Score -   Do you have swelling in your knee? Rarely   Do you feel grinding, hear clicking or any other type of noise when your knee moves? Sometimes   Does your knee catch or hang up when moving? Rarely   Can you straighten your  knee fully? Always   Can you bend your knee fully? Never   How severe is your knee joint stiffness after first wakening in the morning? Mild   How severe is your knee stiffness after sitting, lying or resting LATER IN THE DAY? Moderate   How often do you experience knee pain? Always   Twisting/pivoting on your knee Moderate   Straightening knee fully Moderate   Bending knee fully Moderate   Walking on flat surface Moderate   Going up or down stairs Moderate   At night while in bed None   Sitting or lying None   Standing upright Severe   Descending stairs Moderate   Ascending stairs Severe   Rising from sitting Moderate   Standing Severe   Bending to floor/ an object Moderate   Walking on flat surface Moderate   Getting in/out of car Mild   Going shopping Moderate   Putting on socks/stockings Mild   Rising from bed Mild   Taking off socks/stockings Mild   Lying in bed (turning over, maintaining knee position) Mild   Getting in/out of bath Mild   Sitting None   Getting on/off toilet Mild   Heavy domestic duties (moving heavy boxes, scrubbing floors, etc) Severe   Light domestic duties (cooking, dusting, etc) Mild   Squatting Extreme   Running Extreme   Jumping Extreme   Twisting/pivoting on your injured knee Severe   Kneeling Severe   How often are you aware of your knee problem? Constantly   Have you modified your life style to avoid potentially damaging activities to your knee? Severely   How much are you troubled with lack of confidence in your knee? Severely   In general, how much difficulty do you have with your knee? Severe   Pain Score 52.77   Symptoms Score 60.71   Function, Daily Living Score 60.29   Sports and Rec Score 10   Quality of Life Score 18.75    MD Al Zimmerman Family Professor  Oncology and Adult Reconstructive Surgery  Dept Orthopaedic Surgery, [facility]  [phone] office, [phone] pager  [email or web address]

## 2018-09-25 ENCOUNTER — OFFICE VISIT (OUTPATIENT)
Dept: INTERNAL MEDICINE | Facility: CLINIC | Age: 73
End: 2018-09-25
Attending: INTERNAL MEDICINE
Payer: MEDICARE

## 2018-09-25 VITALS
BODY MASS INDEX: 31.78 KG/M2 | HEART RATE: 73 BPM | WEIGHT: 194 LBS | SYSTOLIC BLOOD PRESSURE: 142 MMHG | DIASTOLIC BLOOD PRESSURE: 83 MMHG

## 2018-09-25 DIAGNOSIS — I10 BENIGN ESSENTIAL HYPERTENSION: ICD-10-CM

## 2018-09-25 DIAGNOSIS — G57.30 PERONEAL NEUROPATHY, UNSPECIFIED LATERALITY: ICD-10-CM

## 2018-09-25 DIAGNOSIS — Z23 NEED FOR INFLUENZA VACCINATION: Primary | ICD-10-CM

## 2018-09-25 PROCEDURE — 25000128 H RX IP 250 OP 636: Mod: ZF

## 2018-09-25 PROCEDURE — G0008 ADMIN INFLUENZA VIRUS VAC: HCPCS | Mod: ZF

## 2018-09-25 PROCEDURE — 90662 IIV NO PRSV INCREASED AG IM: CPT | Mod: ZF

## 2018-09-25 PROCEDURE — G0463 HOSPITAL OUTPT CLINIC VISIT: HCPCS | Mod: 25,ZF

## 2018-09-25 RX ORDER — LOSARTAN POTASSIUM 50 MG/1
50 TABLET ORAL DAILY
Qty: 30 TABLET | Refills: 3 | Status: SHIPPED | OUTPATIENT
Start: 2018-09-25 | End: 2018-10-16

## 2018-09-25 ASSESSMENT — ANXIETY QUESTIONNAIRES
3. WORRYING TOO MUCH ABOUT DIFFERENT THINGS: NOT AT ALL
1. FEELING NERVOUS, ANXIOUS, OR ON EDGE: NOT AT ALL
GAD7 TOTAL SCORE: 3
7. FEELING AFRAID AS IF SOMETHING AWFUL MIGHT HAPPEN: NOT AT ALL
2. NOT BEING ABLE TO STOP OR CONTROL WORRYING: NOT AT ALL
5. BEING SO RESTLESS THAT IT IS HARD TO SIT STILL: SEVERAL DAYS
6. BECOMING EASILY ANNOYED OR IRRITABLE: SEVERAL DAYS

## 2018-09-25 ASSESSMENT — PATIENT HEALTH QUESTIONNAIRE - PHQ9: 5. POOR APPETITE OR OVEREATING: SEVERAL DAYS

## 2018-09-25 ASSESSMENT — PAIN SCALES - GENERAL: PAINLEVEL: MODERATE PAIN (4)

## 2018-09-25 NOTE — LETTER
9/25/2018     RE: Esperanza Gage  [address] [place] [address]     Dear Colleague,    Thank you for referring your patient, Esperanza Gage, to the [facility]  at [facility]. Please see a copy of my visit note below.    HPI  Patient is here for follow-up on recent surgery.  She reports that she has been feeling a lot better.  She is working on increasing her range of motion in her left knee.  She is wondering about possible medication changes as well as the need to schedule another colonoscopy.  Assessment and plan:      ROS  Current Outpatient Prescriptions   Medication     alendronate (FOSAMAX) 70 MG tablet     ASPIRIN PO     losartan (COZAAR) 25 MG tablet     simvastatin (ZOCOR) 10 MG tablet     No current facility-administered medications for this visit.      Vitals:    09/25/18 1041 09/25/18 1048 09/25/18 1049 09/25/18 1050   BP: 147/84 137/82 143/84 142/83   Pulse: 76 73 73 73   Weight: 88 kg (194 lb)            Physical Exam    Assessment and plan:    Esperanza was seen today for recheck.    Diagnoses and all orders for this visit:    Need for influenza vaccination.  Recommend influenza vaccine.  Patient has agreed and will be given the dose today.  -     Cancel: HC FLU VAC PRESRV FREE QUAD SPLIT VIR 3+YRS IM  -     HC FLU VACCINE, INCREASED ANTIGEN, PRESV FREE    Benign essential hypertension.  Blood pressure is elevated today.  Recommend increasing the dose of losartan to 50 mg daily.  Patient will follow-up in 2-3 weeks for blood pressure check.  -     losartan (COZAAR) 50 MG tablet; Take 1 tablet (50 mg) by mouth daily  -     Basic Metabolic Panel; Future  -     CBC with Platelets; Future    Peroneal neuropathy, unspecified laterality.  Patient has been diagnosed with peroneal neuropathy.  She is wondering if anything can be done to decrease her symptoms.  She was referred to neurology for further evaluation and recommendations on  management.  -     NEUROLOGY ADULT REFERRAL      Total time spent 25 minutes.  More than 50% of the time spent with Ms. Gage on counseling / coordinating her care    Julia Gifford MD

## 2018-09-25 NOTE — PROGRESS NOTES
HPI  Patient is here for follow-up on recent surgery.  She reports that she has been feeling a lot better.  She is working on increasing her range of motion in her left knee.  She is wondering about possible medication changes as well as the need to schedule another colonoscopy.  Assessment and plan:        ROS  Current Outpatient Prescriptions   Medication     alendronate (FOSAMAX) 70 MG tablet     ASPIRIN PO     losartan (COZAAR) 25 MG tablet     simvastatin (ZOCOR) 10 MG tablet     No current facility-administered medications for this visit.      Vitals:    09/25/18 1041 09/25/18 1048 09/25/18 1049 09/25/18 1050   BP: 147/84 137/82 143/84 142/83   Pulse: 76 73 73 73   Weight: 88 kg (194 lb)            Physical Exam    Assessment and plan:    Esperanza was seen today for recheck.    Diagnoses and all orders for this visit:    Need for influenza vaccination.  Recommend influenza vaccine.  Patient has agreed and will be given the dose today.  -     Cancel: HC FLU VAC PRESRV FREE QUAD SPLIT VIR 3+YRS IM  -     HC FLU VACCINE, INCREASED ANTIGEN, PRESV FREE    Benign essential hypertension.  Blood pressure is elevated today.  Recommend increasing the dose of losartan to 50 mg daily.  Patient will follow-up in 2-3 weeks for blood pressure check.  -     losartan (COZAAR) 50 MG tablet; Take 1 tablet (50 mg) by mouth daily  -     Basic Metabolic Panel; Future  -     CBC with Platelets; Future    Peroneal neuropathy, unspecified laterality.  Patient has been diagnosed with peroneal neuropathy.  She is wondering if anything can be done to decrease her symptoms.  She was referred to neurology for further evaluation and recommendations on management.  -     NEUROLOGY ADULT REFERRAL      Total time spent 25 minutes.  More than 50% of the time spent with Ms. Gage on counseling / coordinating her care    Julia Gifford MD

## 2018-09-25 NOTE — MR AVS SNAPSHOT
After Visit Summary   9/25/2018    Esperanza Gage    MRN: [id number]           Patient Information     Date Of Birth          1945        Visit Information        Provider Department      9/25/2018 10:40 AM Julia Gifford MD [facility]         Today's Diagnoses     Need for influenza vaccination    -  1    Benign essential hypertension        Peroneal neuropathy, unspecified laterality           Follow-ups after your visit        Additional Services     NEUROLOGY ADULT REFERRAL       Your provider has referred you for the following:   Consult at [facility]: Neurology Clinic [facility] [phone]   [email or web address]  General Neurology    Please be aware that coverage of these services is subject to the terms and limitations of your health insurance plan.  Call member services at your health plan with any benefit or coverage questions.      Please bring the following with you to your appointment:    (1) Any X-Rays, CTs or MRIs which have been performed.  Contact the facility where they were done to arrange for  prior to your scheduled appointment.    (2) List of current medications  (3) This referral request   (4) Any documents/labs given to you for this referral                  Follow-up notes from your care team     Return in about 2 weeks (around 10/9/2018).      Future tests that were ordered for you today     Open Future Orders        Priority Expected Expires Ordered    Basic Metabolic Panel Routine  12/25/2018 9/25/2018    CBC with Platelets Routine  12/25/2018 9/25/2018            Who to contact     Please call your clinic at [phone] to:    Ask questions about your health    Make or cancel appointments    Discuss your medicines    Learn about your test results    Speak to your doctor            Additional Information About Your Visit        [facility] [employer] Information     [employer] gives you secure access to your electronic  health record. If you see a primary care provider, you can also send messages to your care team and make appointments. If you have questions, please call your primary care clinic.  If you do not have a primary care provider, please call [phone] and they will assist you.      [employer] is an electronic gateway that provides easy, online access to your medical records. With [employer], you can request a clinic appointment, read your test results, renew a prescription or communicate with your care team.     To access your existing account, please contact your [facility] or call [phone] for assistance.        Care EveryWhere ID     This is your Care EveryWhere ID. This could be used by other organizations to access your [facility] medical records  [id number]        Your Vitals Were     Pulse Breastfeeding? BMI (Body Mass Index)             73 No 31.78 kg/m2          Blood Pressure from Last 3 Encounters:   09/25/18 142/83   08/31/18 114/53   08/13/18 142/84    Weight from Last 3 Encounters:   09/25/18 88 kg (194 lb)   08/28/18 89.8 kg (197 lb 15.6 oz)   08/13/18 89.1 kg (196 lb 6.4 oz)              We Performed the Following     HC FLU VAC PRESRV FREE QUAD SPLIT VIR 3+YRS IM     NEUROLOGY ADULT REFERRAL          Today's Medication Changes          These changes are accurate as of 9/25/18 11:29 AM.  If you have any questions, ask your nurse or doctor.               These medicines have changed or have updated prescriptions.        Dose/Directions    alendronate 70 MG tablet   Commonly known as:  FOSAMAX   This may have changed:  additional instructions   Used for:  Senile osteoporosis        Dose:  70 mg   Take 1 tablet (70 mg) by mouth every 7 days Take with over 8 ounces water and stay upright for at least 30 minutes after dose.  Take at least 60 minutes before breakfast   Quantity:  12 tablet   Refills:  3       losartan 50 MG tablet   Commonly known as:  COZAAR   This may have  changed:    - medication strength  - how much to take   Used for:  Benign essential hypertension   Changed by:  Julia Gifford MD        Dose:  50 mg   Take 1 tablet (50 mg) by mouth daily   Quantity:  30 tablet   Refills:  3         Stop taking these medicines if you haven't already. Please contact your care team if you have questions.     acetaminophen 325 MG tablet   Commonly known as:  TYLENOL   Stopped by:  Julia Gifford MD           aspirin 325 MG EC tablet   Stopped by:  Julia Gifford MD           aspirin 81 MG tablet   Stopped by:  Julia Gifford MD           ciclopirox 0.77 % cream   Commonly known as:  LOPROX   Stopped by:  Julia Gifford MD                Where to get your medicines      These medications were sent to [address], [address] ([place]) [place] [address]     Phone:  [phone]     losartan 50 MG tablet                Primary Care Provider Office Phone # Fax #    Julia Gifford [phone]       [facility] [address] [place] [id number]        Equal Access to Services     [facility]: Hadii aad ku hadasho Soomaali, waaxda luqadaha, qaybta kaalmada adeegyada, waxay idiin hayaan adeflaco sky . So [facility] [phone].    ATENCIÓN: Si habla español, tiene a nair disposición servicios gratuitos de asistencia lingüística. [facility] [phone].    We comply with applicable federal civil rights laws and [place] laws. We do not discriminate on the basis of race, color, national origin, age, disability, sex, sexual orientation, or gender identity.            Thank you!     Thank you for choosing [facility]   for your care. Our goal is always to provide you with excellent care. Hearing back from our patients is one way we can continue to improve our services. Please take a few minutes to complete the written  survey that you may receive in the mail after your visit with us. Thank you!             Your Updated Medication List - Protect others around you: Learn how to safely use, store and throw away your medicines at www.disposemymeds.org.          This list is accurate as of 9/25/18 11:29 AM.  Always use your most recent med list.                   Brand Name Dispense Instructions for use Diagnosis    alendronate 70 MG tablet    FOSAMAX    12 tablet    Take 1 tablet (70 mg) by mouth every 7 days Take with over 8 ounces water and stay upright for at least 30 minutes after dose.  Take at least 60 minutes before breakfast    Senile osteoporosis       ASPIRIN PO      Take 81 mg by mouth daily        losartan 50 MG tablet    COZAAR    30 tablet    Take 1 tablet (50 mg) by mouth daily    Benign essential hypertension       simvastatin 10 MG tablet    ZOCOR    90 tablet    Take 1 tablet (10 mg) by mouth At Bedtime    Pure hypercholesterolemia

## 2018-09-25 NOTE — NURSING NOTE
Chief Complaint   Patient presents with     RECHECK     Follow-up B/P check   Maria A Mcginnis LPN    FLU VACCINE QUESTIONNAIRE:  Ask the following questions of all parties who want influenza vaccination:     CONTRAINDICATIONS  1.  Is the patient age less than 6 months?  NO  2.  Has the person to be vaccinated ever had Guillain-Edgerton syndrome? NO  3.  Has the person to be vaccinated had the vaccine this year? NO  4.  Is the person to be vaccinated sick today? NO  5.  Does the person to be vaccinated have an allergy to eggs or a component of the vaccine? NO  6.  Has the person to vaccinated ever had a serious reaction to an influenza vaccination in the past? NO                             INFLUENZA VACCINATION NOTE      Information sheet given to patient and questions answered.

## 2018-09-26 ASSESSMENT — ANXIETY QUESTIONNAIRES: GAD7 TOTAL SCORE: 3

## 2018-09-26 ASSESSMENT — PATIENT HEALTH QUESTIONNAIRE - PHQ9: SUM OF ALL RESPONSES TO PHQ QUESTIONS 1-9: 4

## 2018-09-27 ENCOUNTER — OFFICE VISIT - HEALTHEAST (OUTPATIENT)
Dept: PHYSICAL THERAPY | Facility: REHABILITATION | Age: 73
End: 2018-09-27

## 2018-09-27 DIAGNOSIS — Z96.652 TOTAL KNEE REPLACEMENT STATUS, LEFT: ICD-10-CM

## 2018-09-27 DIAGNOSIS — M25.662 DECREASED RANGE OF MOTION OF LEFT KNEE: ICD-10-CM

## 2018-09-27 DIAGNOSIS — R26.9 ABNORMALITY OF GAIT: ICD-10-CM

## 2018-10-01 ENCOUNTER — OFFICE VISIT - HEALTHEAST (OUTPATIENT)
Dept: PHYSICAL THERAPY | Facility: REHABILITATION | Age: 73
End: 2018-10-01

## 2018-10-01 DIAGNOSIS — M25.662 DECREASED RANGE OF MOTION OF LEFT KNEE: ICD-10-CM

## 2018-10-01 DIAGNOSIS — R26.9 ABNORMALITY OF GAIT: ICD-10-CM

## 2018-10-01 DIAGNOSIS — Z96.652 TOTAL KNEE REPLACEMENT STATUS, LEFT: ICD-10-CM

## 2018-10-04 ENCOUNTER — OFFICE VISIT (OUTPATIENT)
Dept: NEUROLOGY | Facility: CLINIC | Age: 73
End: 2018-10-04
Payer: MEDICARE

## 2018-10-04 VITALS
SYSTOLIC BLOOD PRESSURE: 143 MMHG | DIASTOLIC BLOOD PRESSURE: 75 MMHG | BODY MASS INDEX: 31.36 KG/M2 | HEIGHT: 66 IN | WEIGHT: 195.1 LBS | HEART RATE: 77 BPM

## 2018-10-04 DIAGNOSIS — R20.0 LEG NUMBNESS: Primary | ICD-10-CM

## 2018-10-04 ASSESSMENT — PAIN SCALES - GENERAL: PAINLEVEL: MILD PAIN (2)

## 2018-10-04 NOTE — MR AVS SNAPSHOT
After Visit Summary   10/4/2018    Esperanza Gage    MRN: [id number]           Patient Information     Date Of Birth          1945        Visit Information        Provider Department      10/4/2018 7:00 AM Hudson Dey MD [facility] Neurology        Today's Diagnoses     Leg numbness    -  1       Follow-ups after your visit        Follow-up notes from your care team     Return if symptoms worsen or fail to improve.      Your next 10 appointments already scheduled     Oct 16, 2018 10:20 AM CDT   Return Visit with Julia Gifford MD   [facility]  ([facility])    [facility]  3rd [facility] [address]  [address]  [facility]  [facility] [id number] [phone]            Nov 02, 2018  9:30 AM CDT   MA SCREENING DIGITAL BILATERAL with [id number]   [facility] ([facility])    [address] [facility] [address] [facility] [address], 2nd Floor  [facility] [id number] [phone]           How do I prepare for my exam? (Food and drink instructions) No Food and Drink Restrictions.  How do I prepare for my exam? (Other instructions) Do not use any powder, lotion or deodorant under your arms or on your breast. If you do, we will ask you to remove it before your exam.  What should I wear: Wear comfortable, two-piece clothing.  How long does the exam take: Most scans will take 15 minutes.  What should I bring: Bring any previous mammograms from other facilities or have them mailed to the breast center.  Do I need a :  No  is needed.  What do I need to tell my doctor: If you have any allergies, tell your care team.  What should I do after the exam: No restrictions, You may resume normal activities.  What is this test: This test is an x-ray of the breast to look for breast disease. The breast is pressed between two plates to flatten and spread the tissue. An X-ray is taken of the breast from different angles.  Who should I call with  "questions: If you have any questions, please call the Imaging Department where you will have your exam. Directions, parking instructions, and other information is available on our website, [email or web address].  Other information about my exam Three-dimensional (3D) mammograms are available at [facility] locations in [facility], [facility], [place], [facility] and [place].  Health locations include [place] and the [facility] in [place].  Benefits of 3D mammograms include * Improved rate of cancer detection * Decreases your chance of having to go back for more tests, which means fewer: * \"False-positive\" results (This means that there is an abnormal area but it isn't cancer.) * Invasive testing procedures, such as a biopsy or surgery * Can provide clearer images of the breast if you have dense breast tissue.  *3D mammography is an optional exam that anyone can have with a 2D mammogram. It doesn't replace or take the place of a 2D mammogram. 2D mammograms remain an effective screening test for all women.  Not all insurance companies cover the cost of a 3D mammogram. Check with your insurance. Three-dimensional (3D) mammograms are available at [facility] locations in [facility], [facility], [facility], and [place]. Health locations include [place] and [facility] in [place]. Benefits of 3D mammograms include: - Improved rate of cancer detection - Decreases your chance of having to go back for more tests, which means fewer: - \"False-positive\" results (This means that there is an abnormal area but it isn't cancer.) - Invasive testing procedures, such as a biopsy or surgery - Can provide clearer images of the breast if you have dense breast tissue. 3D mammography is an optional exam that anyone can have with a 2D mammogram. It doesn't replace or take the place of a 2D mammogram. 2D mammograms remain an " "effective screening test for all women.  Not all insurance companies cover the cost of a 3D mammogram. Check with your insurance.            Nov 02, 2018 10:15 AM CDT   (Arrive by 10:00 AM)   Return Visit with Gurpreet Strange MD   [facility] ([facility] [place])    [address] [id number] [phone]              Who to contact     Please call your clinic at [phone] to:    Ask questions about your health    Make or cancel appointments    Discuss your medicines    Learn about your test results    Speak to your doctor            Additional Information About Your Visit        [employer] Information     [employer] gives you secure access to your electronic health record. If you see a primary care provider, you can also send messages to your care team and make appointments. If you have questions, please call your primary care clinic.  If you do not have a primary care provider, please call [phone] and they will assist you.      [employer] is an electronic gateway that provides easy, online access to your medical records. With [employer], you can request a clinic appointment, read your test results, renew a prescription or communicate with your care team.     To access your existing account, please contact your [facility] or call [phone] for assistance.        Care EveryWhere ID     This is your Care EveryWhere ID. This could be used by other organizations to access your [facility] medical records  [id number]        Your Vitals Were     Pulse Height BMI (Body Mass Index)             77 1.664 m (5' 5.51\") 31.96 kg/m2          Blood Pressure from Last 3 Encounters:   10/04/18 143/75   09/25/18 142/83   08/31/18 114/53    Weight from Last 3 Encounters:   10/04/18 88.5 kg (195 lb 1.6 oz)   09/25/18 88 kg (194 lb)   08/28/18 89.8 kg (197 lb 15.6 oz)              Today, you had the following     No orders found for display       "   Today's Medication Changes          These changes are accurate as of 10/4/18  7:44 AM.  If you have any questions, ask your nurse or doctor.               These medicines have changed or have updated prescriptions.        Dose/Directions    alendronate 70 MG tablet   Commonly known as:  FOSAMAX   This may have changed:  additional instructions   Used for:  Senile osteoporosis        Dose:  70 mg   Take 1 tablet (70 mg) by mouth every 7 days Take with over 8 ounces water and stay upright for at least 30 minutes after dose.  Take at least 60 minutes before breakfast   Quantity:  12 tablet   Refills:  3                Primary Care Provider Office Phone # Fax #    Julia Raúl Gifford [phone]       [facility] [address]  [facility] [address]        Equal Access to Services     NICHOLAS JUAREZ : Sam Painter, waaxda luqadaha, qaybta kaalmada ademaritza, noelle mtz. So [facility] [phone].    ATENCIÓN: Si habla español, tiene a nair disposición servicios gratuitos de asistencia lingüística. [facility] [phone].    We comply with applicable federal civil rights laws and [place] laws. We do not discriminate on the basis of race, color, national origin, age, disability, sex, sexual orientation, or gender identity.            Thank you!     Thank you for choosing [facility]UROLOG[facility]  for your care. Our goal is always to provide you with excellent care. Hearing back from our patients is one way we can continue to improve our services. Please take a few minutes to complete the written survey that you may receive in the mail after your visit with us. Thank you!             Your Updated Medication List - Protect others around you: Learn how to safely use, store and throw away your medicines at www.disposemymeds.org.          This list is accurate as of 10/4/18  7:44 AM.  Always use your most recent med list.                   Brand Name Dispense  Instructions for use Diagnosis    alendronate 70 MG tablet    FOSAMAX    12 tablet    Take 1 tablet (70 mg) by mouth every 7 days Take with over 8 ounces water and stay upright for at least 30 minutes after dose.  Take at least 60 minutes before breakfast    Senile osteoporosis       ASPIRIN PO      Take 81 mg by mouth daily        losartan 50 MG tablet    COZAAR    30 tablet    Take 1 tablet (50 mg) by mouth daily    Benign essential hypertension       simvastatin 10 MG tablet    ZOCOR    90 tablet    Take 1 tablet (10 mg) by mouth At Bedtime    Pure hypercholesterolemia

## 2018-10-04 NOTE — LETTER
10/4/2018       RE: Esperanza Gage  [address] [place] [address]     Dear Colleague,    Thank you for referring your patient, Esperanza Gage, to the [facility] NEUROLOGY at [facility]. Please see a copy of my visit note below.    Service Date: 10/04/2018      HISTORY OF PRESENT ILLNESS:  This patient is a 72-year-old right-handed woman seen at the request of Dr. Gifford for neurologic consultation with complaints of left leg numbness.  She reports her symptoms began in 2010.  There was no trauma or illness that had provoked the symptom.  She describes it as an area of hypersensitivity in the medial aspect of the left calf below the knee and also an area of hypoesthesia.  She did see Dr. Aguirre in 2015.  He diagnosed left saphenous neuropathy.  Etiology was never determined.  The symptom was stable.  Then she had knee replacement surgery recently.  The symptoms are escalated now.  She has about a 10 cm area below the knee on the medial aspect that is hyperesthetic and painful.  She also has altered sensation below that, down into the arch of the left foot.  The bottoms of her feet have impaired sensation but that is a longstanding symptom for many years and not worsening.  If she stands on her feet, they feel numb.  She does not have symptoms in the right leg or in the low back.  She has no problem with bowel or bladder control.  She does not have focal symptoms in her arms or hands.  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  She has no problem with vision, hearing, speech or swallowing.      PAST MEDICAL HISTORY:  Significant for high blood pressure.  She does not have diabetes, thyroid or asthma.  She has not had pertinent surgery or trauma to the head, neck or low back.  She does not drink or smoke.        SOCIAL HISTORY:  She is  without children.  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      FAMILY HISTORY:  Noncontributory and negative for diabetes.      PHYSICAL EXAMINATION:  The patient is cooperative and in no distress.  Her blood pressure is 143/75.  There are no carotid bruits.  Auscultation of the heart shows S1 and S2.  On neurologic exam, the patient is alert, oriented and lucid.  Cranial nerve testing shows full visual fields to confrontation.  Funduscopic exam shows sharp discs bilaterally.  Eye movements are complete and conjugate without nystagmus.  Pupils react to light.  Facial sensation is symmetric.  Facies move symmetrically.  Palate elevates in the midline.  Tongue protrudes in the midline.  Motor evaluation shows no pronator drift, normal finger tapping, finger-nose-finger and heel-knee-shin.  Strength is well preserved in the arms, hands, legs and feet.  Muscle stretch reflexes are low amplitude and symmetric including knees and ankles.  Toes are downgoing.  Sensory exam shows hyperesthesia in the proximal saphenous territory and also distally in the saphenous territory with some elements of hypoesthesia to light touch and temperature.  Vibratory sense and temperature sense preserved in the feet.  Modalities in the hands are normal.  She just had knee surgery so is using a cane to walk around.  Her gait is somewhat guarded in that regard.      ASSESSMENT:   1.  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      DISCUSSION:  The patient is seen with sensory disturbance in the medial aspect of the left leg below the knee.  This fits with the saphenous territory.  This was documented by Dr. Aguirre 3 years ago.  It sounds like the symptoms were exacerbated by the knee surgery.  Hopefully, things will settle down as her rehabilitation continuous.  I do note that she had a borderline elevated hemoglobin A1c in May.  Glucose intolerance could have symptoms of  neuropathy associated.  I discussed further workup with the patient including repeat nerve conduction testing.  Given that her exam pretty much reflects the way she has been for the last several years, I doubt much further information will be obtained.  She is in agreement.  She wants to monitor her neurologic status for now.  I will see her in followup on an as-needed basis.  I encouraged her to call if she notices any worsening of the symptoms.      cc:   Julia Gifford MD   [address], [facility] [address]   [place]  [address]         THU WIN MD

## 2018-10-04 NOTE — PROGRESS NOTES
Service Date: 10/04/2018      HISTORY OF PRESENT ILLNESS:  This patient is a 72-year-old right-handed woman seen at the request of Dr. Gifford for neurologic consultation with complaints of left leg numbness.  She reports her symptoms began in 2010.  There was no trauma or illness that had provoked the symptom.  She describes it as an area of hypersensitivity in the medial aspect of the left calf below the knee and also an area of hypoesthesia.  She did see Dr. Aguirre in 2015.  He diagnosed left saphenous neuropathy.  Etiology was never determined.  The symptom was stable.  Then she had knee replacement surgery recently.  The symptoms are escalated now.  She has about a 10 cm area below the knee on the medial aspect that is hyperesthetic and painful.  She also has altered sensation below that, down into the arch of the left foot.  The bottoms of her feet have impaired sensation but that is a longstanding symptom for many years and not worsening.  If she stands on her feet, they feel numb.  She does not have symptoms in the right leg or in the low back.  She has no problem with bowel or bladder control.  She does not have focal symptoms in her arms or hands.  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  She has no problem with vision, hearing, speech or swallowing.      PAST MEDICAL HISTORY:  Significant for high blood pressure.  She does not have diabetes, thyroid or asthma.  She has not had pertinent surgery or trauma to the head, neck or low back.  She does not drink or smoke.        SOCIAL HISTORY:  She is  without children.  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      FAMILY HISTORY:  Noncontributory and negative for diabetes.      PHYSICAL EXAMINATION:  The patient is cooperative and in no distress.  Her blood pressure is 143/75.  There are  no carotid bruits.  Auscultation of the heart shows S1 and S2.  On neurologic exam, the patient is alert, oriented and lucid.  Cranial nerve testing shows full visual fields to confrontation.  Funduscopic exam shows sharp discs bilaterally.  Eye movements are complete and conjugate without nystagmus.  Pupils react to light.  Facial sensation is symmetric.  Facies move symmetrically.  Palate elevates in the midline.  Tongue protrudes in the midline.  Motor evaluation shows no pronator drift, normal finger tapping, finger-nose-finger and heel-knee-shin.  Strength is well preserved in the arms, hands, legs and feet.  Muscle stretch reflexes are low amplitude and symmetric including knees and ankles.  Toes are downgoing.  Sensory exam shows hyperesthesia in the proximal saphenous territory and also distally in the saphenous territory with some elements of hypoesthesia to light touch and temperature.  Vibratory sense and temperature sense preserved in the feet.  Modalities in the hands are normal.  She just had knee surgery so is using a cane to walk around.  Her gait is somewhat guarded in that regard.      ASSESSMENT:   1.  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      DISCUSSION:  The patient is seen with sensory disturbance in the medial aspect of the left leg below the knee.  This fits with the saphenous territory.  This was documented by Dr. Aguirre 3 years ago.  It sounds like the symptoms were exacerbated by the knee surgery.  Hopefully, things will settle down as her rehabilitation continuous.  I do note that she had a borderline elevated hemoglobin A1c in May.  Glucose intolerance could have symptoms of neuropathy associated.  I discussed further workup with the patient including repeat nerve conduction testing.  Given that her exam pretty much reflects the way she has been for the last several years, I doubt much further information will be obtained.  She is in agreement.  She  wants to monitor her neurologic status for now.  I will see her in followup on an as-needed basis.  I encouraged her to call if she notices any worsening of the symptoms.      cc:   Julia Gifford MD   [address] [place] [address]  [address]         THU WIN MD             D: 10/04/2018   T: 10/04/2018   MT: giselle      Name:     MOSHE GARCÍA   MRN:      [id number]        Account:      [id number]   :      1945           Service Date: 10/04/2018      Document: [id number]

## 2018-10-08 ENCOUNTER — OFFICE VISIT - HEALTHEAST (OUTPATIENT)
Dept: PHYSICAL THERAPY | Facility: REHABILITATION | Age: 73
End: 2018-10-08

## 2018-10-08 DIAGNOSIS — R26.9 ABNORMALITY OF GAIT: ICD-10-CM

## 2018-10-08 DIAGNOSIS — Z96.652 TOTAL KNEE REPLACEMENT STATUS, LEFT: ICD-10-CM

## 2018-10-08 DIAGNOSIS — M25.662 DECREASED RANGE OF MOTION OF LEFT KNEE: ICD-10-CM

## 2018-10-15 ENCOUNTER — OFFICE VISIT - HEALTHEAST (OUTPATIENT)
Dept: PHYSICAL THERAPY | Facility: REHABILITATION | Age: 73
End: 2018-10-15

## 2018-10-15 DIAGNOSIS — R26.9 ABNORMALITY OF GAIT: ICD-10-CM

## 2018-10-15 DIAGNOSIS — Z96.652 TOTAL KNEE REPLACEMENT STATUS, LEFT: ICD-10-CM

## 2018-10-15 DIAGNOSIS — M25.662 DECREASED RANGE OF MOTION OF LEFT KNEE: ICD-10-CM

## 2018-10-16 ENCOUNTER — OFFICE VISIT (OUTPATIENT)
Dept: INTERNAL MEDICINE | Facility: CLINIC | Age: 73
End: 2018-10-16
Attending: INTERNAL MEDICINE
Payer: MEDICARE

## 2018-10-16 VITALS
HEART RATE: 74 BPM | WEIGHT: 192 LBS | DIASTOLIC BLOOD PRESSURE: 76 MMHG | SYSTOLIC BLOOD PRESSURE: 132 MMHG | BODY MASS INDEX: 31.45 KG/M2

## 2018-10-16 DIAGNOSIS — I10 BENIGN ESSENTIAL HYPERTENSION: ICD-10-CM

## 2018-10-16 DIAGNOSIS — I10 BENIGN ESSENTIAL HYPERTENSION: Primary | ICD-10-CM

## 2018-10-16 LAB
ANION GAP SERPL CALCULATED.3IONS-SCNC: 7 MMOL/L (ref 3–14)
BUN SERPL-MCNC: 10 MG/DL (ref 7–30)
CALCIUM SERPL-MCNC: 9.2 MG/DL (ref 8.5–10.1)
CHLORIDE SERPL-SCNC: 106 MMOL/L (ref 94–109)
CO2 SERPL-SCNC: 27 MMOL/L (ref 20–32)
CREAT SERPL-MCNC: 0.57 MG/DL (ref 0.52–1.04)
ERYTHROCYTE [DISTWIDTH] IN BLOOD BY AUTOMATED COUNT: 13 % (ref 10–15)
GFR SERPL CREATININE-BSD FRML MDRD: >90 ML/MIN/1.7M2
GLUCOSE SERPL-MCNC: 105 MG/DL (ref 70–99)
HCT VFR BLD AUTO: 38.5 % (ref 35–47)
HGB BLD-MCNC: 11.9 G/DL (ref 11.7–15.7)
MCH RBC QN AUTO: 30.4 PG (ref 26.5–33)
MCHC RBC AUTO-ENTMCNC: 30.9 G/DL (ref 31.5–36.5)
MCV RBC AUTO: 99 FL (ref 78–100)
PLATELET # BLD AUTO: 297 10E9/L (ref 150–450)
POTASSIUM SERPL-SCNC: 4 MMOL/L (ref 3.4–5.3)
RBC # BLD AUTO: 3.91 10E12/L (ref 3.8–5.2)
SODIUM SERPL-SCNC: 140 MMOL/L (ref 133–144)
WBC # BLD AUTO: 6 10E9/L (ref 4–11)

## 2018-10-16 PROCEDURE — G0463 HOSPITAL OUTPT CLINIC VISIT: HCPCS | Mod: ZF

## 2018-10-16 PROCEDURE — 85027 COMPLETE CBC AUTOMATED: CPT | Performed by: INTERNAL MEDICINE

## 2018-10-16 PROCEDURE — 36415 COLL VENOUS BLD VENIPUNCTURE: CPT | Performed by: INTERNAL MEDICINE

## 2018-10-16 PROCEDURE — 80048 BASIC METABOLIC PNL TOTAL CA: CPT | Performed by: INTERNAL MEDICINE

## 2018-10-16 RX ORDER — LOSARTAN POTASSIUM 50 MG/1
50 TABLET ORAL DAILY
Qty: 90 TABLET | Refills: 3 | Status: SHIPPED | OUTPATIENT
Start: 2018-10-16 | End: 2019-03-11

## 2018-10-16 NOTE — LETTER
10/16/2018       RE: Esperanza Gage  [address] [place] [address]     Dear Colleague,    Thank you for referring your patient, Esperanza Gage, to [facility]  at [facility]. Please see a copy of my visit note below.    HPI  Patient is here for follow up. She reports that she has been doing therapy and has been feeling well. She has been keeping a cane with her for stability. She was seen by Neurology for saphenous neuropathy. She has been advised to work on reducing her blood glucose.     Current Outpatient Prescriptions   Medication     alendronate (FOSAMAX) 70 MG tablet     ASPIRIN PO     losartan (COZAAR) 50 MG tablet     simvastatin (ZOCOR) 10 MG tablet     No current facility-administered medications for this visit.      Vitals:    10/16/18 1036 10/16/18 1038 10/16/18 1039 10/16/18 1040   BP: 138/78 132/75 127/75 132/76   Pulse: 74 78 74 74   Weight: 87.1 kg (192 lb)          Physical Exam   Constitutional: She is well-developed, well-nourished, and in no distress.   HENT:   Head: Normocephalic and atraumatic.   Cardiovascular: Normal rate.    Pulmonary/Chest: Effort normal.   Musculoskeletal: She exhibits no edema.   Skin: Skin is warm and dry.   Psychiatric: Mood, memory, affect and judgment normal.   Vitals reviewed.    Assessment and Plan:  Esperanza was seen today for recheck.    Diagnoses and all orders for this visit:    Benign essential hypertension. Blood pressure is within acceptable range today. Recommend to continue with current medical therapy without changes. Patient was encouraged to continue with lifestyle modifications.  -     losartan (COZAAR) 50 MG tablet; Take 1 tablet (50 mg) by mouth daily    Total time spent 15 minutes.  More than 50% of the time spent with Ms. Gage on counseling / coordinating her care    Julia Gifford MD

## 2018-10-16 NOTE — PROGRESS NOTES
HPI  Patient is here for follow up. She reports that she has been doing therapy and has been feeling well. She has been keeping a cane with her for stability. She was seen by Neurology for saphenous neuropathy. She has been advised to work on reducing her blood glucose.     Review of Systems     Constitutional:  Negative for fever, chills and fatigue.   Respiratory:   Negative for cough and dyspnea on exertion.    Cardiovascular:  Negative for chest pain, dyspnea on exertion and edema.   Gastrointestinal:  Negative for nausea, vomiting, abdominal pain, diarrhea, constipation and melena.   Skin:  Negative for itching and rash.   Endo/Heme:  Negative for anemia, swollen glands and bruises/bleeds easily.   Psychiatric/Behavioral:  Negative for depression, decreased concentration, mood swings and panic attacks.    Endocrine:  Negative for altered temperature regulation, polyphagia, polydipsia, unwanted hair growth and change in facial hair.    Current Outpatient Prescriptions   Medication     alendronate (FOSAMAX) 70 MG tablet     ASPIRIN PO     losartan (COZAAR) 50 MG tablet     simvastatin (ZOCOR) 10 MG tablet     No current facility-administered medications for this visit.      Vitals:    10/16/18 1036 10/16/18 1038 10/16/18 1039 10/16/18 1040   BP: 138/78 132/75 127/75 132/76   Pulse: 74 78 74 74   Weight: 87.1 kg (192 lb)          Physical Exam   Constitutional: She is well-developed, well-nourished, and in no distress.   HENT:   Head: Normocephalic and atraumatic.   Cardiovascular: Normal rate.    Pulmonary/Chest: Effort normal.   Musculoskeletal: She exhibits no edema.   Skin: Skin is warm and dry.   Psychiatric: Mood, memory, affect and judgment normal.   Vitals reviewed.    Assessment and Plan:  Esperanza was seen today for recheck.    Diagnoses and all orders for this visit:    Benign essential hypertension. Blood pressure is within acceptable range today. Recommend to continue with current medical therapy without  changes. Patient was encouraged to continue with lifestyle modifications.  -     losartan (COZAAR) 50 MG tablet; Take 1 tablet (50 mg) by mouth daily    Total time spent 15 minutes.  More than 50% of the time spent with Ms. Gage on counseling / coordinating her care    Julia Gifford MD

## 2018-10-16 NOTE — MR AVS SNAPSHOT
After Visit Summary   10/16/2018    Esperanza Gage    MRN: [id number]           Patient Information     Date Of Birth          1945        Visit Information        Provider Department      10/16/2018 10:20 AM Julia Gifford MD [facility]         Today's Diagnoses     Benign essential hypertension    -  1       Follow-ups after your visit        Follow-up notes from your care team     Return in about 3 months (around 1/16/2019).      Your next 10 appointments already scheduled     Nov 02, 2018  9:30 AM CDT   MA SCREENING DIGITAL BILATERAL with [id number]   [facility] ([facility])    [address] 2nd [address]  [facility] [address] [phone]           How do I prepare for my exam? (Food and drink instructions) No Food and Drink Restrictions.  How do I prepare for my exam? (Other instructions) Do not use any powder, lotion or deodorant under your arms or on your breast. If you do, we will ask you to remove it before your exam.  What should I wear: Wear comfortable, two-piece clothing.  How long does the exam take: Most scans will take 15 minutes.  What should I bring: Bring any previous mammograms from other facilities or have them mailed to the breast center.  Do I need a :  No  is needed.  What do I need to tell my doctor: If you have any allergies, tell your care team.  What should I do after the exam: No restrictions, You may resume normal activities.  What is this test: This test is an x-ray of the breast to look for breast disease. The breast is pressed between two plates to flatten and spread the tissue. An X-ray is taken of the breast from different angles.  Who should I call with questions: If you have any questions, please call the Imaging Department where you will have your exam. Directions, parking instructions, and other information is available on our website, [email or web address].  Other  "information about my exam Three-dimensional (3D) mammograms are available at [facility] locations in [facility], [facility], [place], [facility] and [place].  Health locations include [place] and [facility] in [place].  Benefits of 3D mammograms include * Improved rate of cancer detection * Decreases your chance of having to go back for more tests, which means fewer: * \"False-positive\" results (This means that there is an abnormal area but it isn't cancer.) * Invasive testing procedures, such as a biopsy or surgery * Can provide clearer images of the breast if you have dense breast tissue.  *3D mammography is an optional exam that anyone can have with a 2D mammogram. It doesn't replace or take the place of a 2D mammogram. 2D mammograms remain an effective screening test for all women.  Not all insurance companies cover the cost of a 3D mammogram. Check with your insurance. Three-dimensional (3D) mammograms are available at [facility] locations in [facility], [facility], [facility], and [place]. Health locations include [place] and [facility] in [place]. Benefits of 3D mammograms include: - Improved rate of cancer detection - Decreases your chance of having to go back for more tests, which means fewer: - \"False-positive\" results (This means that there is an abnormal area but it isn't cancer.) - Invasive testing procedures, such as a biopsy or surgery - Can provide clearer images of the breast if you have dense breast tissue. 3D mammography is an optional exam that anyone can have with a 2D mammogram. It doesn't replace or take the place of a 2D mammogram. 2D mammograms remain an effective screening test for all women.  Not all insurance companies cover the cost of a 3D mammogram. Check with your insurance.            Nov 02, 2018 10:15 AM CDT   (Arrive by 10:00 AM)   Return Visit with Gurpreet" MD Alie   [facility] ([facility])    [address]  3rd Floor  [facility] [address]   [phone]            Jan 21, 2019 10:00 AM CST   Return Visit with Julia Gifford MD   [facility]  ([facility])    [facility]  3rd Flr,[address]  [address]  [facility]  [facility] [id number] [phone]              Who to contact     Please call your clinic at [phone] to:    Ask questions about your health    Make or cancel appointments    Discuss your medicines    Learn about your test results    Speak to your doctor            Additional Information About Your Visit        [employer] Information     [employer] gives you secure access to your electronic health record. If you see a primary care provider, you can also send messages to your care team and make appointments. If you have questions, please call your primary care clinic.  If you do not have a primary care provider, please call [phone] and they will assist you.      [employer] is an electronic gateway that provides easy, online access to your medical records. With [employer], you can request a clinic appointment, read your test results, renew a prescription or communicate with your care team.     To access your existing account, please contact your [facility] or call [phone] for assistance.        Care EveryWhere ID     This is your Care EveryWhere ID. This could be used by other organizations to access your [facility] medical records  [id number]        Your Vitals Were     Pulse Breastfeeding? BMI (Body Mass Index)             74 No 31.45 kg/m2          Blood Pressure from Last 3 Encounters:   10/16/18 132/76   10/04/18 143/75   09/25/18 142/83    Weight from Last 3 Encounters:   10/16/18 87.1 kg (192 lb)   10/04/18 88.5 kg (195 lb 1.6 oz)   09/25/18 88 kg (194 lb)              Today, you had the following     No orders found  for display         Today's Medication Changes          These changes are accurate as of 10/16/18 11:06 AM.  If you have any questions, ask your nurse or doctor.               These medicines have changed or have updated prescriptions.        Dose/Directions    alendronate 70 MG tablet   Commonly known as:  FOSAMAX   This may have changed:  additional instructions   Used for:  Senile osteoporosis        Dose:  70 mg   Take 1 tablet (70 mg) by mouth every 7 days Take with over 8 ounces water and stay upright for at least 30 minutes after dose.  Take at least 60 minutes before breakfast   Quantity:  12 tablet   Refills:  3            Where to get your medicines      These medications were sent to [facility] [address] [place] [address]), [address] ([place]) [place] [address]     Phone:  [phone]     losartan 50 MG tablet                Primary Care Provider Office Phone # Fax #    Julia Raúl Gifford [phone]       [facility] [address] [facility] [address]        Equal Access to Services     [facility]: Hadii aad ku hadasho Soomaali, waaxda luqadaha, qaybta kaalmada adeegyada, waxay idiin haymayten karina sky . So [facility] [phone].    ATENCIÓN: Si habla español, tiene a nair disposición servicios gratuitos de asistencia lingüística. [facility] [phone].    We comply with applicable federal civil rights laws and [place] laws. We do not discriminate on the basis of race, color, national origin, age, disability, sex, sexual orientation, or gender identity.            Thank you!     Thank you for choosing [facility]   for your care. Our goal is always to provide you with excellent care. Hearing back from our patients is one way we can continue to improve our services. Please take a few minutes to complete the written survey that you may receive in the mail after your visit with us. Thank  you!             Your Updated Medication List - Protect others around you: Learn how to safely use, store and throw away your medicines at www.disposemymeds.org.          This list is accurate as of 10/16/18 11:06 AM.  Always use your most recent med list.                   Brand Name Dispense Instructions for use Diagnosis    alendronate 70 MG tablet    FOSAMAX    12 tablet    Take 1 tablet (70 mg) by mouth every 7 days Take with over 8 ounces water and stay upright for at least 30 minutes after dose.  Take at least 60 minutes before breakfast    Senile osteoporosis       ASPIRIN PO      Take 81 mg by mouth daily        losartan 50 MG tablet    COZAAR    90 tablet    Take 1 tablet (50 mg) by mouth daily    Benign essential hypertension       simvastatin 10 MG tablet    ZOCOR    90 tablet    Take 1 tablet (10 mg) by mouth At Bedtime    Pure hypercholesterolemia

## 2018-10-16 NOTE — NURSING NOTE
Chief Complaint   Patient presents with     RECHECK     Follow-up B/P check   Maria A Mcginnis LPN

## 2018-10-21 ASSESSMENT — ENCOUNTER SYMPTOMS
NAUSEA: 0
POLYPHAGIA: 0
BRUISES/BLEEDS EASILY: 0
INSOMNIA: 0
POLYDIPSIA: 0
PANIC: 0
DYSPNEA ON EXERTION: 0
FATIGUE: 0
NERVOUS/ANXIOUS: 0
ALTERED TEMPERATURE REGULATION: 0
CHILLS: 0
VOMITING: 0
DECREASED CONCENTRATION: 0
ABDOMINAL PAIN: 0
CONSTIPATION: 0
COUGH: 0
FEVER: 0
SWOLLEN GLANDS: 0
DEPRESSION: 0
DIARRHEA: 0

## 2018-10-22 ENCOUNTER — OFFICE VISIT - HEALTHEAST (OUTPATIENT)
Dept: PHYSICAL THERAPY | Facility: REHABILITATION | Age: 73
End: 2018-10-22

## 2018-10-22 DIAGNOSIS — M25.662 DECREASED RANGE OF MOTION OF LEFT KNEE: ICD-10-CM

## 2018-10-22 DIAGNOSIS — R26.9 ABNORMALITY OF GAIT: ICD-10-CM

## 2018-10-22 DIAGNOSIS — Z96.652 TOTAL KNEE REPLACEMENT STATUS, LEFT: ICD-10-CM

## 2018-10-29 ENCOUNTER — OFFICE VISIT - HEALTHEAST (OUTPATIENT)
Dept: PHYSICAL THERAPY | Facility: REHABILITATION | Age: 73
End: 2018-10-29

## 2018-10-29 DIAGNOSIS — R26.9 ABNORMALITY OF GAIT: ICD-10-CM

## 2018-10-29 DIAGNOSIS — M25.662 DECREASED RANGE OF MOTION OF LEFT KNEE: ICD-10-CM

## 2018-10-29 DIAGNOSIS — Z96.652 TOTAL KNEE REPLACEMENT STATUS, LEFT: ICD-10-CM

## 2018-11-02 ENCOUNTER — RADIANT APPOINTMENT (OUTPATIENT)
Dept: MAMMOGRAPHY | Facility: CLINIC | Age: 73
End: 2018-11-02
Payer: MEDICARE

## 2018-11-02 ENCOUNTER — OFFICE VISIT (OUTPATIENT)
Dept: DERMATOLOGY | Facility: CLINIC | Age: 73
End: 2018-11-02
Payer: MEDICARE

## 2018-11-02 DIAGNOSIS — Z12.31 VISIT FOR SCREENING MAMMOGRAM: ICD-10-CM

## 2018-11-02 DIAGNOSIS — L81.4 SOLAR LENTIGO: Primary | ICD-10-CM

## 2018-11-02 DIAGNOSIS — B35.3 TINEA PEDIS OF LEFT FOOT: ICD-10-CM

## 2018-11-02 DIAGNOSIS — D18.01 CHERRY ANGIOMA: ICD-10-CM

## 2018-11-02 DIAGNOSIS — L82.1 SEBORRHEIC KERATOSIS: ICD-10-CM

## 2018-11-02 ASSESSMENT — PAIN SCALES - GENERAL: PAINLEVEL: NO PAIN (0)

## 2018-11-02 NOTE — NURSING NOTE
Dermatology Rooming Note    Esperanza Gage's goals for this visit include:   Chief Complaint   Patient presents with     Skin Check     Esperanza is here today for a skin check- I have a couple of concerns      KEVIN Candelario

## 2018-11-02 NOTE — MR AVS SNAPSHOT
After Visit Summary   11/2/2018    Esperanza Gage    MRN: [id number]           Patient Information     Date Of Birth          1945        Visit Information        Provider Department      11/2/2018 10:15 AM Gurpreet Strange MD [facility]        Today's Diagnoses     Solar lentigo    -  1    Seborrheic keratosis        Cherry angioma        Tinea pedis of left foot           Follow-ups after your visit        Follow-up notes from your care team     Return in about 2 years (around 11/2/2020).      Your next 10 appointments already scheduled     Jan 21, 2019 10:00 AM CST   Return Visit with Julia Gifford MD   [facility]  ([facility])    [address],[facility] [address]  [address]  [address] [address] [phone]              Who to contact     Please call your clinic at [phone] to:    Ask questions about your health    Make or cancel appointments    Discuss your medicines    Learn about your test results    Speak to your doctor            Additional Information About Your Visit        MyChart Information     [employer] gives you secure access to your electronic health record. If you see a primary care provider, you can also send messages to your care team and make appointments. If you have questions, please call your primary care clinic.  If you do not have a primary care provider, please call [phone] and they will assist you.      [employer] is an electronic gateway that provides easy, online access to your medical records. With [employer], you can request a clinic appointment, read your test results, renew a prescription or communicate with your care team.     To access your existing account, please contact your [facility] or call [phone] for assistance.        Care EveryWhere ID     This is your Care EveryWhere ID. This could be used by other organizations to access your [facility]  medical records  [id number]         Blood Pressure from Last 3 Encounters:   10/16/18 132/76   10/04/18 143/75   09/25/18 142/83    Weight from Last 3 Encounters:   10/16/18 87.1 kg (192 lb)   10/04/18 88.5 kg (195 lb 1.6 oz)   09/25/18 88 kg (194 lb)              Today, you had the following     No orders found for display         Today's Medication Changes          These changes are accurate as of 11/2/18 11:59 PM.  If you have any questions, ask your nurse or doctor.               These medicines have changed or have updated prescriptions.        Dose/Directions    alendronate 70 MG tablet   Commonly known as:  FOSAMAX   This may have changed:  additional instructions   Used for:  Senile osteoporosis        Dose:  70 mg   Take 1 tablet (70 mg) by mouth every 7 days Take with over 8 ounces water and stay upright for at least 30 minutes after dose.  Take at least 60 minutes before breakfast   Quantity:  12 tablet   Refills:  3                Primary Care Provider Office Phone # Fax #    Julia Raúl Gifford [phone]       [facility] [address] [facility] [address]        Equal Access to Services     [facility] AH: Hadii oksana tijerina Soashish, waisabelda luchrista, qaybta kaalmada ademaritza, noelle mtz. So [facility] [phone].    ATENCIÓN: Si habla español, tiene a nair disposición servicios gratuitos de asistencia lingüística. Llame al [phone].    We comply with applicable federal civil rights laws and [place] laws. We do not discriminate on the basis of race, color, national origin, age, disability, sex, sexual orientation, or gender identity.            Thank you!     Thank you for choosing [facility]  for your care. Our goal is always to provide you with excellent care. Hearing back from our patients is one way we can continue to improve our services. Please take a few minutes to complete the written survey that you may receive in  the mail after your visit with us. Thank you!             Your Updated Medication List - Protect others around you: Learn how to safely use, store and throw away your medicines at www.disposemymeds.org.          This list is accurate as of 11/2/18 11:59 PM.  Always use your most recent med list.                   Brand Name Dispense Instructions for use Diagnosis    alendronate 70 MG tablet    FOSAMAX    12 tablet    Take 1 tablet (70 mg) by mouth every 7 days Take with over 8 ounces water and stay upright for at least 30 minutes after dose.  Take at least 60 minutes before breakfast    Senile osteoporosis       ASPIRIN PO      Take 81 mg by mouth daily        losartan 50 MG tablet    COZAAR    90 tablet    Take 1 tablet (50 mg) by mouth daily    Benign essential hypertension       simvastatin 10 MG tablet    ZOCOR    90 tablet    Take 1 tablet (10 mg) by mouth At Bedtime    Pure hypercholesterolemia

## 2018-11-02 NOTE — PROGRESS NOTES
Dermatology Clinic Note    Dermatology Problem List:  1. Milia  2. Seborrheic keratoses  3. Hand and foot eruption which resolved with ciclopirox per patient      CC: Patient presents with:  Skin Check: Esperanza is here today for a skin check- I have a couple of concerns       HPI: Esperanza Gage is a 72y F who presents for yearly skin exam. She is concerned about some new and growing spots on her breasts, abdomen, arms, face, legs. They are not painful, do not bleed or itch. She also mentions that she occasionally gets an itchy L foot inbetween her 4th and 5th digits. She'll apply ciclopirox to this area and then the itching subsides.     She has no other skin concerns today and mentions that she just wants a thorough exam because she isn't able to see her back or other areas of her skin. Denies fevers, chills, night sweats, unexplained weight loss, tender/swollen lymph nodes.      Patient Active Problem List   Diagnosis     Vitamin D deficiency     Hyperlipidemia with target LDL less than 130     Osteopenia     Breast signs and symptoms     Chondromalacia of patella     Primary localized osteoarthrosis, lower leg     Pelvic fracture (H)     Medication management     Leg numbness     Seborrheic keratosis     Milia     History of tinea     Status post knee surgery       No Known Allergies      Current Outpatient Prescriptions   Medication     alendronate (FOSAMAX) 70 MG tablet     ASPIRIN PO     losartan (COZAAR) 50 MG tablet     simvastatin (ZOCOR) 10 MG tablet     No current facility-administered medications for this visit.        Family History   Problem Relation Age of Onset     Osteoporosis Mother      Hypertension Brother      Cerebrovascular Disease Brother      Cerebrovascular Disease Brother      C.A.D. No family hx of      Diabetes No family hx of      Hypertension No family hx of      Skin Cancer No family hx of      Melanoma No family hx of      Dementia No family hx of      HEART DISEASE No family hx of       Cerebrovascular Disease No family hx of        Social History     Social History     Marital status: Single     Spouse name: N/A     Number of children: N/A     Years of education: N/A     Occupational History     Not on file.     Social History Main Topics     Smoking status: Former Smoker     Types: Cigarettes     Smokeless tobacco: Never Used     Alcohol use Yes      Comment: Rare     Drug use: No     Sexual activity: No     Other Topics Concern     Not on file     Social History Narrative    Lives in her own home with two flights of stairs.  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  Had two brothers who both  of CVA. Participates in seniors program at [facility] [facility].           ROS: Feeling well without other skin concerns.     EXAM:  There were no vitals taken for this visit.  GEN: Alert, no distress  HEENT: Conjunctiva clear.   PULM: Breathing comfortably on RA  CV: Extrem warm and well perfused  ABD: No distension  SKIN: Total body skin exam completed excluding areas underneath undergarments.   -Hands and feet are clear  -small white papule on left frontal scalp   -multiple waxy stuck on appearing papules on the face, trunk, upper and lower extremities, particularly in areas that she has pointed out that she is concerned about.   -several red dome-shaped papules on the trunk  -white macerated appearance between L 4th and 5th digit of foot  --Scatted brown scaling papules on the temples, arms      Assessment and Plan:  1. Milia of the L frontal scalp. Benign pilar cyst    2. History of hand and foot scaling. Noted that she may have had a tinea infection of the feet that was triggering a hand dermatitis, but both rashes are now clear. To prevent recurrent of tinea pedis, using ciclopirox to feet with good results.     3. Solar lentigenes. Benign nature discussed. Discussed sun protection.    4. Cherry angiomas. Benign nature discussed.     5. Seborrheic keratoses: Benign hyperkeratotic papules and  plaques. No treatment advised unless irritation occurs.      Follow up in 2 years.     Staffed with Dr. Strange.     Reyes Matthew MD, PhD  Medicine-Dermatology PGY-3    I was present during the key portions of the history and physical exam. I verified the history and examined the patient  I agree with the treatment plan. CEASAR Strange MD

## 2018-11-02 NOTE — LETTER
11/2/2018       RE: Esperanza Gage  [address] [place] [address]     Dear Colleague,    Thank you for referring your patient, Esperanza Gage, to the [facility] at [facility]. Please see a copy of my visit note below.    Dermatology Clinic Note    Dermatology Problem List:  1. Milia  2. Seborrheic keratoses  3. Hand and foot eruption which resolved with ciclopirox per patient      CC: Patient presents with:  Skin Check: Esperanza is here today for a skin check- I have a couple of concerns       HPI: Esperanza Gage is a 72y F who presents for yearly skin exam. She is concerned about some new and growing spots on her breasts, abdomen, arms, face, legs. They are not painful, do not bleed or itch. She also mentions that she occasionally gets an itchy L foot inbetween her 4th and 5th digits. She'll apply ciclopirox to this area and then the itching subsides.     She has no other skin concerns today and mentions that she just wants a thorough exam because she isn't able to see her back or other areas of her skin. Denies fevers, chills, night sweats, unexplained weight loss, tender/swollen lymph nodes.      Patient Active Problem List   Diagnosis     Vitamin D deficiency     Hyperlipidemia with target LDL less than 130     Osteopenia     Breast signs and symptoms     Chondromalacia of patella     Primary localized osteoarthrosis, lower leg     Pelvic fracture (H)     Medication management     Leg numbness     Seborrheic keratosis     Milia     History of tinea     Status post knee surgery       No Known Allergies      Current Outpatient Prescriptions   Medication     alendronate (FOSAMAX) 70 MG tablet     ASPIRIN PO     losartan (COZAAR) 50 MG tablet     simvastatin (ZOCOR) 10 MG tablet     No current facility-administered medications for this visit.        Family History   Problem Relation Age of Onset     Osteoporosis Mother      Hypertension Brother      Cerebrovascular  Disease Brother      Cerebrovascular Disease Brother      C.A.D. No family hx of      Diabetes No family hx of      Hypertension No family hx of      Skin Cancer No family hx of      Melanoma No family hx of      Dementia No family hx of      HEART DISEASE No family hx of      Cerebrovascular Disease No family hx of        Social History     Social History     Marital status: Single     Spouse name: N/A     Number of children: N/A     Years of education: N/A     Occupational History     Not on file.     Social History Main Topics     Smoking status: Former Smoker     Types: Cigarettes     Smokeless tobacco: Never Used     Alcohol use Yes      Comment: Rare     Drug use: No     Sexual activity: No     Other Topics Concern     Not on file     Social History Narrative    Lives in her own home with two flights of stairs.  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  Had two brothers who both  of CVA. Participates in seniors program at [facility] [employer].           ROS: Feeling well without other skin concerns.     EXAM:  There were no vitals taken for this visit.  GEN: Alert, no distress  HEENT: Conjunctiva clear.   PULM: Breathing comfortably on RA  CV: Extrem warm and well perfused  ABD: No distension  SKIN: Total body skin exam completed excluding areas underneath undergarments.   -Hands and feet are clear  -small white papule on left frontal scalp   -multiple waxy stuck on appearing papules on the face, trunk, upper and lower extremities, particularly in areas that she has pointed out that she is concerned about.   -several red dome-shaped papules on the trunk  -white macerated appearance between L 4th and 5th digit of foot  --Scatted brown scaling papules on the temples, arms      Assessment and Plan:  1. Milia of the L frontal scalp. Benign pilar cyst    2. History of hand and foot scaling. Noted that she may have had a tinea infection of the feet that was triggering a hand dermatitis, but both rashes are now  clear. To prevent recurrent of tinea pedis, using ciclopirox to feet with good results.     3. Solar lentigenes. Benign nature discussed. Discussed sun protection.    4. Cherry angiomas. Benign nature discussed.     5. Seborrheic keratoses: Benign hyperkeratotic papules and plaques. No treatment advised unless irritation occurs.      Follow up in 2 years.     Staffed with Dr. Strange.     Reyes Matthew MD, PhD  Medicine-Dermatology PGY-3    I was present during the key portions of the history and physical exam. I verified the history and examined the patient  I agree with the treatment plan.       Gurpreet Strange MD

## 2018-11-05 ENCOUNTER — OFFICE VISIT - HEALTHEAST (OUTPATIENT)
Dept: PHYSICAL THERAPY | Facility: REHABILITATION | Age: 73
End: 2018-11-05

## 2018-11-05 DIAGNOSIS — M25.662 DECREASED RANGE OF MOTION OF LEFT KNEE: ICD-10-CM

## 2018-11-05 DIAGNOSIS — Z96.652 TOTAL KNEE REPLACEMENT STATUS, LEFT: ICD-10-CM

## 2018-11-05 DIAGNOSIS — R26.9 ABNORMALITY OF GAIT: ICD-10-CM

## 2018-11-12 ENCOUNTER — OFFICE VISIT - HEALTHEAST (OUTPATIENT)
Dept: PHYSICAL THERAPY | Facility: REHABILITATION | Age: 73
End: 2018-11-12

## 2018-11-12 DIAGNOSIS — Z96.652 TOTAL KNEE REPLACEMENT STATUS, LEFT: ICD-10-CM

## 2018-11-12 DIAGNOSIS — M25.662 DECREASED RANGE OF MOTION OF LEFT KNEE: ICD-10-CM

## 2018-11-12 DIAGNOSIS — R26.9 ABNORMALITY OF GAIT: ICD-10-CM

## 2018-11-19 ENCOUNTER — OFFICE VISIT - HEALTHEAST (OUTPATIENT)
Dept: PHYSICAL THERAPY | Facility: REHABILITATION | Age: 73
End: 2018-11-19

## 2018-11-19 DIAGNOSIS — R26.9 ABNORMALITY OF GAIT: ICD-10-CM

## 2018-11-19 DIAGNOSIS — Z96.652 TOTAL KNEE REPLACEMENT STATUS, LEFT: ICD-10-CM

## 2018-11-19 DIAGNOSIS — M25.662 DECREASED RANGE OF MOTION OF LEFT KNEE: ICD-10-CM

## 2018-12-10 ENCOUNTER — OFFICE VISIT - HEALTHEAST (OUTPATIENT)
Dept: PHYSICAL THERAPY | Facility: REHABILITATION | Age: 73
End: 2018-12-10

## 2018-12-10 DIAGNOSIS — Z96.652 TOTAL KNEE REPLACEMENT STATUS, LEFT: ICD-10-CM

## 2018-12-10 DIAGNOSIS — M25.662 DECREASED RANGE OF MOTION OF LEFT KNEE: ICD-10-CM

## 2018-12-10 DIAGNOSIS — R26.9 ABNORMALITY OF GAIT: ICD-10-CM

## 2018-12-26 ENCOUNTER — OFFICE VISIT (OUTPATIENT)
Dept: INTERNAL MEDICINE | Facility: CLINIC | Age: 73
End: 2018-12-26
Attending: INTERNAL MEDICINE
Payer: MEDICARE

## 2018-12-26 VITALS
BODY MASS INDEX: 32.21 KG/M2 | DIASTOLIC BLOOD PRESSURE: 78 MMHG | SYSTOLIC BLOOD PRESSURE: 125 MMHG | WEIGHT: 196.6 LBS | HEART RATE: 75 BPM

## 2018-12-26 DIAGNOSIS — N30.01 ACUTE CYSTITIS WITH HEMATURIA: Primary | ICD-10-CM

## 2018-12-26 LAB
ALBUMIN UR-MCNC: NEGATIVE MG/DL
APPEARANCE UR: CLEAR
BILIRUB UR QL STRIP: NEGATIVE
COLOR UR AUTO: YELLOW
GLUCOSE UR STRIP-MCNC: NEGATIVE MG/DL
HGB UR QL STRIP: ABNORMAL
KETONES UR STRIP-MCNC: NEGATIVE MG/DL
LEUKOCYTE ESTERASE UR QL STRIP: ABNORMAL
NITRATE UR QL: NEGATIVE
PH UR STRIP: 5 PH (ref 5–7)
SP GR UR STRIP: 1.02 (ref 1–1.03)
UROBILINOGEN UR STRIP-ACNC: 0.2 EU/DL (ref 0.2–1)

## 2018-12-26 PROCEDURE — 87186 SC STD MICRODIL/AGAR DIL: CPT | Performed by: INTERNAL MEDICINE

## 2018-12-26 PROCEDURE — 87086 URINE CULTURE/COLONY COUNT: CPT | Performed by: INTERNAL MEDICINE

## 2018-12-26 PROCEDURE — 81003 URINALYSIS AUTO W/O SCOPE: CPT

## 2018-12-26 PROCEDURE — 87088 URINE BACTERIA CULTURE: CPT | Performed by: INTERNAL MEDICINE

## 2018-12-26 PROCEDURE — G0463 HOSPITAL OUTPT CLINIC VISIT: HCPCS | Mod: ZF

## 2018-12-26 RX ORDER — PHENAZOPYRIDINE HYDROCHLORIDE 200 MG/1
200 TABLET, FILM COATED ORAL 3 TIMES DAILY PRN
Qty: 30 TABLET | Refills: 0 | Status: SHIPPED | OUTPATIENT
Start: 2018-12-26 | End: 2018-12-29

## 2018-12-26 RX ORDER — SULFAMETHOXAZOLE/TRIMETHOPRIM 800-160 MG
1 TABLET ORAL 2 TIMES DAILY
Qty: 28 TABLET | Refills: 0 | Status: SHIPPED | OUTPATIENT
Start: 2018-12-26 | End: 2019-01-09

## 2018-12-26 NOTE — LETTER
1/4/2019         Esperanza Gage   [address] [place] [address]        Dear Ms. Gage:    Bacteria causing your UTI is sensitive to the prescribed antibiotic. No change is needed.     Results for orders placed or performed in visit on 12/26/18   Clinitek Urine Macroscopic POCT   Result Value Ref Range    Color Urine Yellow     Appearance Urine Clear     Glucose Urine Negative NEG^Negative mg/dL    Bilirubin Urine Negative NEG^Negative    Ketones Urine Negative NEG^Negative mg/dL    Specific Gravity Urine 1.025 1.003 - 1.035    Blood Urine Trace (A) NEG^Negative    pH Urine 5.0 5.0 - 7.0 pH    Protein Albumin Urine Negative NEG^Negative mg/dL    Urobilinogen Urine 0.2 0.2 - 1.0 EU/dL    Nitrite Urine Negative NEG^Negative    Leukocyte Esterase Urine Moderate (A) NEG^Negative   Urine Culture Aerobic Bacterial   Result Value Ref Range    Specimen Description Midstream Urine     Special Requests Specimen received in preservative     Culture Micro >100,000 colonies/mL  Escherichia coli   (A)        Susceptibility    Escherichia coli - FELICITY     AMPICILLIN <=2 Sensitive ug/mL     CEFAZOLIN* <=4 Sensitive ug/mL      * Cefazolin FELICITY breakpoints are for the treatment of uncomplicated urinary tract infections.  For the treatment of systemic infections, please contact the laboratory for additional testing.     CEFOXITIN <=4 Sensitive ug/mL     CEFTAZIDIME <=1 Sensitive ug/mL     CEFTRIAXONE <=1 Sensitive ug/mL     CIPROFLOXACIN <=0.25 Sensitive ug/mL     GENTAMICIN <=1 Sensitive ug/mL     LEVOFLOXACIN <=0.12 Sensitive ug/mL     NITROFURANTOIN <=16 Sensitive ug/mL     TOBRAMYCIN <=1 Sensitive ug/mL     Trimethoprim/Sulfa <=1/19 Sensitive ug/mL     AMPICILLIN/SULBACTAM <=2 Sensitive ug/mL     Piperacillin/Tazo <=4 Sensitive ug/mL     CEFEPIME <=1 Sensitive ug/mL         Please note that test explanations are brief and do not reflect all diagnostic uses.  If you have any questions or concerns, please call the clinic  at [phone].      Sincerely,      Maria A Mcginnis sent on behalf of  Julia Gifford MD

## 2018-12-26 NOTE — LETTER
12/26/2018       RE: Esperanza Gage  [address] [place] [address]     Dear Colleague,    Thank you for referring your patient, Esperanza Gage, to the [facility]  at [facility]. Please see a copy of my visit note below.    HPI  Patient is here for evaluation of possible UTI. She reports pain and burning with urination. She states that her symptoms have started on Sunday. SHe denies fever, chills or night sweats.     Review of Systems     Constitutional:  Negative for fever, chills and fatigue.   Eyes:  Negative for decreased vision.   Respiratory:   Negative for cough and dyspnea on exertion.    Cardiovascular:  Negative for chest pain, dyspnea on exertion and edema.   Gastrointestinal:  Negative for nausea, vomiting, abdominal pain, diarrhea and constipation.   Genitourinary:  Positive for dysuria, urgency and nocturia. Negative for bladder incontinence.   Skin:  Negative for itching.   Psychiatric/Behavioral:  Negative for depression, decreased concentration, mood swings and panic attacks.    Endocrine:  Negative for altered temperature regulation, polyphagia, polydipsia, unwanted hair growth and change in facial hair.    Current Outpatient Medications   Medication     alendronate (FOSAMAX) 70 MG tablet     ASPIRIN PO     losartan (COZAAR) 50 MG tablet     simvastatin (ZOCOR) 10 MG tablet     No current facility-administered medications for this visit.      Vitals:    12/26/18 1609 12/26/18 1610 12/26/18 1611 12/26/18 1612   BP: 136/82 118/77 121/77 125/78   BP Location: Left arm Left arm Left arm Left arm   Patient Position: Chair Chair Chair Chair   Pulse:    75   Weight:    89.2 kg (196 lb 9.6 oz)         Physical Exam   Constitutional: She is oriented to person, place, and time. She appears well-developed and well-nourished.   HENT:   Head: Normocephalic and atraumatic.   Eyes: EOM are normal. Pupils are equal, round, and reactive to light.    Cardiovascular: Normal rate and regular rhythm.   Pulmonary/Chest: Effort normal.   Musculoskeletal: She exhibits no edema.   Neurological: She is alert and oriented to person, place, and time.   Vitals reviewed.      Assessment and Plan:  Esperanza was seen today for uti.    Diagnoses and all orders for this visit:    Acute cystitis with hematuria. Dipstick in the office was consistent with infection. Discussed management of UTI with patient. She will contact the clinic if her symptoms are not improving.   -     Clinitek Urine Macroscopic POCT  -     phenazopyridine (PYRIDIUM) 200 MG tablet; Take 1 tablet (200 mg) by mouth 3 times daily as needed for irritation  -     sulfamethoxazole-trimethoprim (BACTRIM DS/SEPTRA DS) 800-160 MG tablet; Take 1 tablet by mouth 2 times daily for 14 days  -     Urine Culture Aerobic Bacterial  -     Urine Culture Aerobic Bacterial    Total time spent 15 minutes.  More than 50% of the time spent with Ms. Gage on counseling / coordinating her care    Julia Gifford MD

## 2018-12-26 NOTE — NURSING NOTE
Chief Complaint   Patient presents with     UTI     Possible UTI--having urgency, burning with urine       See OSCAR Natarajan 12/26/2018

## 2018-12-26 NOTE — LETTER
1/4/2019         Esperanza Gage   [address] [place] [address]        Dear Ms. Gage:    Your urinalysis was consistent with UTI.     Results for orders placed or performed in visit on 12/26/18   Clinitek Urine Macroscopic POCT   Result Value Ref Range    Color Urine Yellow     Appearance Urine Clear     Glucose Urine Negative NEG^Negative mg/dL    Bilirubin Urine Negative NEG^Negative    Ketones Urine Negative NEG^Negative mg/dL    Specific Gravity Urine 1.025 1.003 - 1.035    Blood Urine Trace (A) NEG^Negative    pH Urine 5.0 5.0 - 7.0 pH    Protein Albumin Urine Negative NEG^Negative mg/dL    Urobilinogen Urine 0.2 0.2 - 1.0 EU/dL    Nitrite Urine Negative NEG^Negative    Leukocyte Esterase Urine Moderate (A) NEG^Negative   Urine Culture Aerobic Bacterial   Result Value Ref Range    Specimen Description Midstream Urine     Special Requests Specimen received in preservative     Culture Micro >100,000 colonies/mL  Escherichia coli   (A)        Susceptibility    Escherichia coli - FELICITY     AMPICILLIN <=2 Sensitive ug/mL     CEFAZOLIN* <=4 Sensitive ug/mL      * Cefazolin FELICITY breakpoints are for the treatment of uncomplicated urinary tract infections.  For the treatment of systemic infections, please contact the laboratory for additional testing.     CEFOXITIN <=4 Sensitive ug/mL     CEFTAZIDIME <=1 Sensitive ug/mL     CEFTRIAXONE <=1 Sensitive ug/mL     CIPROFLOXACIN <=0.25 Sensitive ug/mL     GENTAMICIN <=1 Sensitive ug/mL     LEVOFLOXACIN <=0.12 Sensitive ug/mL     NITROFURANTOIN <=16 Sensitive ug/mL     TOBRAMYCIN <=1 Sensitive ug/mL     Trimethoprim/Sulfa <=1/19 Sensitive ug/mL     AMPICILLIN/SULBACTAM <=2 Sensitive ug/mL     Piperacillin/Tazo <=4 Sensitive ug/mL     CEFEPIME <=1 Sensitive ug/mL         Please note that test explanations are brief and do not reflect all diagnostic uses.  If you have any questions or concerns, please call the clinic at [phone].      Sincerely,      Maria A  Rahel sent on behalf of  Julia Gifford MD

## 2018-12-26 NOTE — PROGRESS NOTES
HPI  Patient is here for evaluation of possible UTI. She reports pain and burning with urination. She states that her symptoms have started on Sunday. SHe denies fever, chills or night sweats.     Review of Systems     Constitutional:  Negative for fever, chills and fatigue.   Eyes:  Negative for decreased vision.   Respiratory:   Negative for cough and dyspnea on exertion.    Cardiovascular:  Negative for chest pain, dyspnea on exertion and edema.   Gastrointestinal:  Negative for nausea, vomiting, abdominal pain, diarrhea and constipation.   Genitourinary:  Positive for dysuria, urgency and nocturia. Negative for bladder incontinence.   Skin:  Negative for itching.   Psychiatric/Behavioral:  Negative for depression, decreased concentration, mood swings and panic attacks.    Endocrine:  Negative for altered temperature regulation, polyphagia, polydipsia, unwanted hair growth and change in facial hair.    Current Outpatient Medications   Medication     alendronate (FOSAMAX) 70 MG tablet     ASPIRIN PO     losartan (COZAAR) 50 MG tablet     simvastatin (ZOCOR) 10 MG tablet     No current facility-administered medications for this visit.      Vitals:    12/26/18 1609 12/26/18 1610 12/26/18 1611 12/26/18 1612   BP: 136/82 118/77 121/77 125/78   BP Location: Left arm Left arm Left arm Left arm   Patient Position: Chair Chair Chair Chair   Pulse:    75   Weight:    89.2 kg (196 lb 9.6 oz)         Physical Exam   Constitutional: She is oriented to person, place, and time. She appears well-developed and well-nourished.   HENT:   Head: Normocephalic and atraumatic.   Eyes: EOM are normal. Pupils are equal, round, and reactive to light.   Cardiovascular: Normal rate and regular rhythm.   Pulmonary/Chest: Effort normal.   Musculoskeletal: She exhibits no edema.   Neurological: She is alert and oriented to person, place, and time.   Vitals reviewed.      Assessment and Plan:  Esperanza was seen today for uti.    Diagnoses and all  orders for this visit:    Acute cystitis with hematuria. Dipstick in the office was consistent with infection. Discussed management of UTI with patient. She will contact the clinic if her symptoms are not improving.   -     Clinitek Urine Macroscopic POCT  -     phenazopyridine (PYRIDIUM) 200 MG tablet; Take 1 tablet (200 mg) by mouth 3 times daily as needed for irritation  -     sulfamethoxazole-trimethoprim (BACTRIM DS/SEPTRA DS) 800-160 MG tablet; Take 1 tablet by mouth 2 times daily for 14 days  -     Urine Culture Aerobic Bacterial  -     Urine Culture Aerobic Bacterial      Total time spent 15 minutes.  More than 50% of the time spent with Ms. Gage on counseling / coordinating her care    Julia Gifford MD

## 2018-12-27 LAB
BACTERIA SPEC CULT: ABNORMAL
Lab: ABNORMAL
SPECIMEN SOURCE: ABNORMAL

## 2018-12-30 ASSESSMENT — ENCOUNTER SYMPTOMS
NERVOUS/ANXIOUS: 0
CONSTIPATION: 0
ALTERED TEMPERATURE REGULATION: 0
FATIGUE: 0
DYSPNEA ON EXERTION: 0
VOMITING: 0
PANIC: 0
DECREASED CONCENTRATION: 0
DIARRHEA: 0
POLYPHAGIA: 0
FEVER: 0
CHILLS: 0
ABDOMINAL PAIN: 0
DEPRESSION: 0
INSOMNIA: 0
NAUSEA: 0
DYSURIA: 1
COUGH: 0
POLYDIPSIA: 0

## 2019-02-06 ENCOUNTER — TELEPHONE (OUTPATIENT)
Dept: ORTHOPEDICS | Facility: CLINIC | Age: 74
End: 2019-02-06

## 2019-02-06 DIAGNOSIS — Z96.659 S/P TKR (TOTAL KNEE REPLACEMENT): Primary | ICD-10-CM

## 2019-02-06 RX ORDER — AMOXICILLIN 500 MG/1
TABLET, FILM COATED ORAL
Qty: 4 TABLET | Refills: 4 | Status: SHIPPED | OUTPATIENT
Start: 2019-02-06 | End: 2020-02-07

## 2019-02-06 NOTE — TELEPHONE ENCOUNTER
[facility] Call Center    Phone Message    May a detailed message be left on voicemail: yes    Reason for Call: Medication Question or concern regarding medication   Prescription Clarification  Name of Medication: Antibiotic  Prescribing Provider: Pierre Campos   Pharmacy: [facility] [address] [place] [address]   What on the order needs clarification? Pt needs Rx for Antibiotic for upcoming dental appt on 02/18/2019.          Action Taken: Message routed to:  [facility] ([facility] Ortho

## 2019-03-08 ENCOUNTER — TELEPHONE (OUTPATIENT)
Dept: OBGYN | Facility: CLINIC | Age: 74
End: 2019-03-08

## 2019-03-08 DIAGNOSIS — I10 BENIGN ESSENTIAL HYPERTENSION: Primary | ICD-10-CM

## 2019-03-08 NOTE — TELEPHONE ENCOUNTER
"Received call from patient that she went to  prescription refill from pharmacy (losartan) and was advised the medication has \"been pulled off the shelves\" (recall?).     Call to pharmacy to inquire if they have or will be receiving new supply of medication not effected by recall. Waited on hold for pharmacy at end of day Friday 3/8 with no answer after 15 minutes. Will need to call back Monday and call patient to inform of plan.     Routing back to triage to call pharmacy and patient  "

## 2019-03-11 RX ORDER — AMLODIPINE BESYLATE 5 MG/1
5 TABLET ORAL DAILY
Qty: 90 TABLET | Refills: 0 | Status: SHIPPED | OUTPATIENT
Start: 2019-03-11 | End: 2019-03-15 | Stop reason: ALTCHOICE

## 2019-03-11 RX ORDER — CANDESARTAN 16 MG/1
16 TABLET ORAL DAILY
Qty: 30 TABLET | Refills: 1 | Status: SHIPPED | OUTPATIENT
Start: 2019-03-11 | End: 2019-03-11

## 2019-03-11 NOTE — TELEPHONE ENCOUNTER
Checked with pharmacist. Medication losartan is not available. Needs new order.    Spoke with Dr. Gifford in clinic who agreed to try candesartan 16mg daily. Check back in a month. Patient agreeable to this. Rx sent and patient scheduled for follow up.

## 2019-03-11 NOTE — TELEPHONE ENCOUNTER
After candesartan was sent, pharmacist called back to say that it has also been recalled.  There are no drugs in this class available.     Spoke again with Dr. Gifford who switched again to amlodipine 5mg. Patient agreeable to this. She will start this medication and follow up in one month.

## 2019-03-14 DIAGNOSIS — I10 HTN (HYPERTENSION): Primary | ICD-10-CM

## 2019-03-14 RX ORDER — LOSARTAN POTASSIUM 25 MG/1
25 TABLET ORAL DAILY
Qty: 90 TABLET | Refills: 3 | Status: SHIPPED | OUTPATIENT
Start: 2019-03-14 | End: 2019-08-12

## 2019-03-14 NOTE — TELEPHONE ENCOUNTER
Esperanza called to inform she received call from pharmacy that losartan is OK to refill (they received new lot not affected by recall) Pt has taken 3 doses of amlodipine which was prescribed as an alternative to losartan with the recall. Pt is inquiring if OK to go back to losartan.     Pending to Dr. Gifford for review. Pt does request call back in morning 3/15 so she can take the losartan if it is advised.

## 2019-03-15 NOTE — TELEPHONE ENCOUNTER
Called Esperanza to inform Dr. Gifford advises OK to return to losartan without any other modifications. Left detailed message on phone with this information.

## 2019-08-12 ENCOUNTER — OFFICE VISIT (OUTPATIENT)
Dept: INTERNAL MEDICINE | Facility: CLINIC | Age: 74
End: 2019-08-12
Attending: INTERNAL MEDICINE
Payer: MEDICARE

## 2019-08-12 ENCOUNTER — APPOINTMENT (OUTPATIENT)
Dept: LAB | Facility: CLINIC | Age: 74
End: 2019-08-12
Attending: INTERNAL MEDICINE
Payer: MEDICARE

## 2019-08-12 VITALS
SYSTOLIC BLOOD PRESSURE: 106 MMHG | DIASTOLIC BLOOD PRESSURE: 60 MMHG | HEART RATE: 3 BPM | BODY MASS INDEX: 32.27 KG/M2 | WEIGHT: 197 LBS

## 2019-08-12 DIAGNOSIS — M81.0 SENILE OSTEOPOROSIS: ICD-10-CM

## 2019-08-12 DIAGNOSIS — R21 RASH AND NONSPECIFIC SKIN ERUPTION: Primary | ICD-10-CM

## 2019-08-12 DIAGNOSIS — Z00.00 ENCOUNTER FOR MEDICARE ANNUAL WELLNESS EXAM: ICD-10-CM

## 2019-08-12 DIAGNOSIS — I10 ESSENTIAL HYPERTENSION: ICD-10-CM

## 2019-08-12 DIAGNOSIS — E78.00 PURE HYPERCHOLESTEROLEMIA: ICD-10-CM

## 2019-08-12 LAB
ANION GAP SERPL CALCULATED.3IONS-SCNC: 6 MMOL/L (ref 3–14)
BUN SERPL-MCNC: 11 MG/DL (ref 7–30)
CALCIUM SERPL-MCNC: 8.8 MG/DL (ref 8.5–10.1)
CHLORIDE SERPL-SCNC: 108 MMOL/L (ref 94–109)
CHOLEST SERPL-MCNC: 162 MG/DL
CO2 SERPL-SCNC: 26 MMOL/L (ref 20–32)
CREAT SERPL-MCNC: 0.62 MG/DL (ref 0.52–1.04)
GFR SERPL CREATININE-BSD FRML MDRD: 90 ML/MIN/{1.73_M2}
GLUCOSE SERPL-MCNC: 113 MG/DL (ref 70–99)
HDLC SERPL-MCNC: 72 MG/DL
LDLC SERPL CALC-MCNC: 72 MG/DL
NONHDLC SERPL-MCNC: 90 MG/DL
POTASSIUM SERPL-SCNC: 4.1 MMOL/L (ref 3.4–5.3)
SODIUM SERPL-SCNC: 140 MMOL/L (ref 133–144)
TRIGL SERPL-MCNC: 88 MG/DL

## 2019-08-12 PROCEDURE — 80061 LIPID PANEL: CPT | Performed by: INTERNAL MEDICINE

## 2019-08-12 PROCEDURE — 80048 BASIC METABOLIC PNL TOTAL CA: CPT | Performed by: INTERNAL MEDICINE

## 2019-08-12 PROCEDURE — G0463 HOSPITAL OUTPT CLINIC VISIT: HCPCS | Mod: ZF

## 2019-08-12 PROCEDURE — 36415 COLL VENOUS BLD VENIPUNCTURE: CPT | Performed by: INTERNAL MEDICINE

## 2019-08-12 RX ORDER — ALENDRONATE SODIUM 70 MG/1
70 TABLET ORAL
Qty: 15 TABLET | Refills: 3 | Status: SHIPPED | OUTPATIENT
Start: 2019-08-12 | End: 2020-08-31

## 2019-08-12 RX ORDER — LOSARTAN POTASSIUM 50 MG/1
50 TABLET ORAL DAILY
Qty: 90 TABLET | Refills: 3 | Status: SHIPPED | OUTPATIENT
Start: 2019-08-12 | End: 2020-08-31

## 2019-08-12 RX ORDER — LOSARTAN POTASSIUM 25 MG/1
50 TABLET ORAL DAILY
Status: CANCELLED | OUTPATIENT
Start: 2019-08-12

## 2019-08-12 RX ORDER — CICLOPIROX OLAMINE 7.7 MG/G
CREAM TOPICAL 2 TIMES DAILY
COMMUNITY
End: 2022-01-31

## 2019-08-12 RX ORDER — SIMVASTATIN 10 MG
10 TABLET ORAL AT BEDTIME
Qty: 90 TABLET | Refills: 3 | Status: SHIPPED | OUTPATIENT
Start: 2019-08-12 | End: 2020-08-12

## 2019-08-12 RX ORDER — CICLOPIROX OLAMINE 7.7 MG/G
CREAM TOPICAL 2 TIMES DAILY
Status: CANCELLED | OUTPATIENT
Start: 2019-08-12

## 2019-08-12 RX ORDER — LOSARTAN POTASSIUM 50 MG/1
50 TABLET ORAL DAILY
COMMUNITY
End: 2019-08-12

## 2019-08-12 ASSESSMENT — ANXIETY QUESTIONNAIRES
5. BEING SO RESTLESS THAT IT IS HARD TO SIT STILL: NOT AT ALL
7. FEELING AFRAID AS IF SOMETHING AWFUL MIGHT HAPPEN: NOT AT ALL
3. WORRYING TOO MUCH ABOUT DIFFERENT THINGS: NOT AT ALL
6. BECOMING EASILY ANNOYED OR IRRITABLE: NOT AT ALL
2. NOT BEING ABLE TO STOP OR CONTROL WORRYING: NOT AT ALL
GAD7 TOTAL SCORE: 0
1. FEELING NERVOUS, ANXIOUS, OR ON EDGE: NOT AT ALL

## 2019-08-12 ASSESSMENT — PATIENT HEALTH QUESTIONNAIRE - PHQ9
5. POOR APPETITE OR OVEREATING: NOT AT ALL
SUM OF ALL RESPONSES TO PHQ QUESTIONS 1-9: 2

## 2019-08-12 ASSESSMENT — PAIN SCALES - GENERAL: PAINLEVEL: NO PAIN (0)

## 2019-08-12 NOTE — LETTER
"2019       RE: Esperanza Gage  [address] [place] [address]     Dear Colleague,    Thank you for referring your patient, Esperanza Gage, to the [facility]  at [facility]. Please see a copy of my visit note below.    SUBJECTIVE:   Esperanza Gage is a 73 year old female who presents for Preventive Visit  Are you in the first 12 months of your Medicare Part B coverage?  No    Physical Health:    In general, how would you rate your overall physical health? good    Outside of work, how many days during the week do you exercise? 4-5 days/week    Outside of work, approximately how many minutes a day do you exercise?45-60 minutes    If you drink alcohol do you typically have >3 drinks per day or >7 drinks per week? No    Do you usually eat at least 4 servings of fruit and vegetables a day, include whole grains & fiber and avoid regularly eating high fat or \"junk\" foods? Yes    Do you have any problems taking medications regularly?  No    Do you have any side effects from medications? none    Needs assistance for the following daily activities: no assistance needed    Which of the following safety concerns are present in your home?  none identified     Hearing impairment: No    In the past 6 months, have you been bothered by leaking of urine? no    Mental Health:    In general, how would you rate your overall mental or emotional health? excellent  PHQ-2 Score: 0    Do you feel safe in your environment? Yes    Do you have a Health Care Directive? Yes: Advance Directive has been received and scanned.    Additional concerns to address?  No    Fall risk:  Fallen 2 or more times in the past year?: No  Any fall with injury in the past year?: No    Cognitive Screenin) Repeat 3 items (Leader, Season, Table)    2) Clock draw: NORMAL  3) 3 item recall: Recalls 3 objects  Results: 3 items recalled: COGNITIVE IMPAIRMENT LESS LIKELY    Mini-CogTM Copyright S " [employer]. Licensed by the author for use in [facility]; reprinted with permission (ahsan@[facility]). All rights reserved.      Do you have sleep apnea, excessive snoring or daytime drowsiness?: no        -------------------------------------    Reviewed and updated as needed this visit by clinical staff  Tobacco  Meds         Reviewed and updated as needed this visit by Provider        Social History     Tobacco Use     Smoking status: Former Smoker     Types: Cigarettes     Smokeless tobacco: Never Used   Substance Use Topics     Alcohol use: Yes     Comment: Rare                           Current providers sharing in care for this patient include:   Patient Care Team:  Julia Gifford MD as PCP - General (Internal Medicine)  Julia Gifford MD as MD (Internal Medicine)  Sima Pascal PA-C as Physician Assistant (Family Practice)  Pierre Campos MD as MD (Orthopedics)  Hudson Dey MD as MD (Neurology)    The following health maintenance items are reviewed in Epic and correct as of today:  Health Maintenance   Topic Date Due     ZOSTER IMMUNIZATION (2 of 3) 09/05/2012     MEDICARE ANNUAL WELLNESS VISIT  06/26/2018     FALL RISK ASSESSMENT  06/26/2018     PHQ-2  01/01/2019     INFLUENZA VACCINE (1) 09/01/2019     DTAP/TDAP/TD IMMUNIZATION (2 - Td) 02/08/2020     MAMMO SCREENING  11/02/2020     COLONOSCOPY  08/18/2022     LIPID  05/16/2023     ADVANCE CARE PLANNING  07/30/2023     DEXA  Completed     HEPATITIS C SCREENING  Completed     IPV IMMUNIZATION  Aged Out     MENINGITIS IMMUNIZATION  Aged Out       Mammogram Screening: Mammogram Screening: Patient over age 50, mutual decision to screen reflected in health maintenance.    ROS:  CONSTITUTIONAL: NEGATIVE for fever, chills, change in weight  INTEGUMENTARY/SKIN: NEGATIVE for worrisome rashes, moles or lesions  EYES: NEGATIVE for vision changes or irritation  ENT/MOUTH: NEGATIVE for ear, mouth and throat problems  RESP:  "NEGATIVE for significant cough or SOB  BREAST: NEGATIVE for masses, tenderness or discharge  CV: NEGATIVE for chest pain, palpitations or peripheral edema  GI: NEGATIVE for nausea, abdominal pain, heartburn, or change in bowel habits  : NEGATIVE for frequency, dysuria, or hematuria  MUSCULOSKELETAL: NEGATIVE for significant arthralgias or myalgia  NEURO: NEGATIVE for weakness, dizziness or paresthesias  ENDOCRINE: NEGATIVE for temperature intolerance, skin/hair changes  HEME: NEGATIVE for bleeding problems  PSYCHIATRIC: NEGATIVE for changes in mood or affect    OBJECTIVE:   /60   Pulse (!) 3   Wt 89.4 kg (197 lb)   Breastfeeding? No   BMI 32.27 kg/m    Estimated body mass index is 32.27 kg/m  as calculated from the following:    Height as of 10/4/18: 1.664 m (5' 5.51\").    Weight as of this encounter: 89.4 kg (197 lb).  EXAM:   GENERAL APPEARANCE: healthy, alert and no distress  EYES: Eyes grossly normal to inspection, PERRL and conjunctivae and sclerae normal  HENT: ear canals and TM's normal, nose and mouth without ulcers or lesions, oropharynx clear and oral mucous membranes moist  NECK: no adenopathy, no asymmetry, masses, or scars and thyroid normal to palpation  RESP: lungs clear to auscultation - no rales, rhonchi or wheezes  CV: regular rate and rhythm, normal S1 S2, no S3 or S4, no murmur, click or rub, no peripheral edema and peripheral pulses strong  ABDOMEN: soft, nontender, no hepatosplenomegaly, no masses and bowel sounds normal  MS: no musculoskeletal defects are noted and gait is age appropriate without ataxia  SKIN: no suspicious lesions or rashes  NEURO: Normal strength and tone, sensory exam grossly normal, mentation intact and speech normal  PSYCH: mentation appears normal and affect normal/bright    Diagnostic Test Results:  Labs reviewed in Epic    ASSESSMENT / PLAN:   1. Senile osteoporosis  Discussed management of osteoporosis with patient. Recommend to continue with current " "medical therapy.   - alendronate (FOSAMAX) 70 MG tablet; Take 1 tablet (70 mg) by mouth every 7 days Take 1 tablet once a week with a glass of water 30 minutes prior to breakfast  Dispense: 15 tablet; Refill: 3  - Dexa hip/pelvis/spine; Future    2. Essential hypertension  Blood pressure is within acceptable range, will continue with current plan of care.   - losartan (COZAAR) 50 MG tablet; Take 1 tablet (50 mg) by mouth daily  Dispense: 90 tablet; Refill: 3  - Basic metabolic panel    3. Pure hypercholesterolemia    - simvastatin (ZOCOR) 10 MG tablet; Take 1 tablet (10 mg) by mouth At Bedtime  Dispense: 90 tablet; Refill: 3  - Lipid Profile    4. Rash and nonspecific skin eruption  Recommend evaluation by Dermatology.   - DERMATOLOGY REFERRAL    5. Encounter for Medicare annual wellness exam  Patient is up to date on vaccines. Recommend to continue with mammography.       End of Life Planning:  Patient currently has an advanced directive: No.  I have verified the patient's ablity to prepare an advanced directive/make health care decisions.  Literature was provided to assist patient in preparing an advanced directive.    COUNSELING:  Reviewed preventive health counseling, as reflected in patient instructions       Regular exercise       Healthy diet/nutrition       Vision screening    Estimated body mass index is 32.27 kg/m  as calculated from the following:    Height as of 10/4/18: 1.664 m (5' 5.51\").    Weight as of this encounter: 89.4 kg (197 lb).         reports that she has quit smoking. Her smoking use included cigarettes. She has never used smokeless tobacco.      Appropriate preventive services were discussed with this patient, including applicable screening as appropriate for cardiovascular disease, diabetes, osteopenia/osteoporosis, and glaucoma.  As appropriate for age/gender, discussed screening for colorectal cancer, prostate cancer, breast cancer, and cervical cancer. Checklist reviewing preventive " services available has been given to the patient.    Reviewed patients plan of care and provided an AVS. The Basic Care Plan (routine screening as documented in Health Maintenance) for Esperanza meets the Care Plan requirement. This Care Plan has been established and reviewed with the Patient.    Counseling Resources:  ATP IV Guidelines  Pooled Cohorts Equation Calculator  Breast Cancer Risk Calculator  FRAX Risk Assessment  ICSI Preventive Guidelines  Dietary Guidelines for Americans, 2010  [employer] MyPlate  ASA Prophylaxis  Lung CA Screening    Julia Gifford MD  [facility]

## 2019-08-12 NOTE — PROGRESS NOTES
"SUBJECTIVE:   Esperanza Gage is a 73 year old female who presents for Preventive Visit.      Are you in the first 12 months of your Medicare Part B coverage?  No    Physical Health:    In general, how would you rate your overall physical health? good    Outside of work, how many days during the week do you exercise? 4-5 days/week    Outside of work, approximately how many minutes a day do you exercise?45-60 minutes    If you drink alcohol do you typically have >3 drinks per day or >7 drinks per week? No    Do you usually eat at least 4 servings of fruit and vegetables a day, include whole grains & fiber and avoid regularly eating high fat or \"junk\" foods? Yes    Do you have any problems taking medications regularly?  No    Do you have any side effects from medications? none    Needs assistance for the following daily activities: no assistance needed    Which of the following safety concerns are present in your home?  none identified     Hearing impairment: No    In the past 6 months, have you been bothered by leaking of urine? no    Mental Health:    In general, how would you rate your overall mental or emotional health? excellent  PHQ-2 Score: 0    Do you feel safe in your environment? Yes    Do you have a Health Care Directive? Yes: Advance Directive has been received and scanned.    Additional concerns to address?  No    Fall risk:  Fallen 2 or more times in the past year?: No  Any fall with injury in the past year?: No    Cognitive Screenin) Repeat 3 items (Leader, Season, Table)    2) Clock draw: NORMAL  3) 3 item recall: Recalls 3 objects  Results: 3 items recalled: COGNITIVE IMPAIRMENT LESS LIKELY    Mini-CogTM Copyright MARK Chaudhry. Licensed by the author for use in [facility]; reprinted with permission (ahsan@.[email or web address]). All rights reserved.      Do you have sleep apnea, excessive snoring or daytime drowsiness?: no        -------------------------------------    Reviewed and updated as needed " this visit by clinical staff  Tobacco  Meds         Reviewed and updated as needed this visit by Provider        Social History     Tobacco Use     Smoking status: Former Smoker     Types: Cigarettes     Smokeless tobacco: Never Used   Substance Use Topics     Alcohol use: Yes     Comment: Rare                           Current providers sharing in care for this patient include:   Patient Care Team:  Julia Gifford MD as PCP - General (Internal Medicine)  Julia Gifford MD as MD (Internal Medicine)  Sima Pascal PA-C as Physician Assistant (Family Practice)  Pierre Campos MD as MD (Orthopedics)  Hudson Dey MD as MD (Neurology)    The following health maintenance items are reviewed in Epic and correct as of today:  Health Maintenance   Topic Date Due     ZOSTER IMMUNIZATION (2 of 3) 09/05/2012     MEDICARE ANNUAL WELLNESS VISIT  06/26/2018     FALL RISK ASSESSMENT  06/26/2018     PHQ-2  01/01/2019     INFLUENZA VACCINE (1) 09/01/2019     DTAP/TDAP/TD IMMUNIZATION (2 - Td) 02/08/2020     MAMMO SCREENING  11/02/2020     COLONOSCOPY  08/18/2022     LIPID  05/16/2023     ADVANCE CARE PLANNING  07/30/2023     DEXA  Completed     HEPATITIS C SCREENING  Completed     IPV IMMUNIZATION  Aged Out     MENINGITIS IMMUNIZATION  Aged Out       Mammogram Screening: Mammogram Screening: Patient over age 50, mutual decision to screen reflected in health maintenance.    ROS:  CONSTITUTIONAL: NEGATIVE for fever, chills, change in weight  INTEGUMENTARY/SKIN: NEGATIVE for worrisome rashes, moles or lesions  EYES: NEGATIVE for vision changes or irritation  ENT/MOUTH: NEGATIVE for ear, mouth and throat problems  RESP: NEGATIVE for significant cough or SOB  BREAST: NEGATIVE for masses, tenderness or discharge  CV: NEGATIVE for chest pain, palpitations or peripheral edema  GI: NEGATIVE for nausea, abdominal pain, heartburn, or change in bowel habits  : NEGATIVE for frequency, dysuria, or  "hematuria  MUSCULOSKELETAL: NEGATIVE for significant arthralgias or myalgia  NEURO: NEGATIVE for weakness, dizziness or paresthesias  ENDOCRINE: NEGATIVE for temperature intolerance, skin/hair changes  HEME: NEGATIVE for bleeding problems  PSYCHIATRIC: NEGATIVE for changes in mood or affect    OBJECTIVE:   /60   Pulse (!) 3   Wt 89.4 kg (197 lb)   Breastfeeding? No   BMI 32.27 kg/m   Estimated body mass index is 32.27 kg/m  as calculated from the following:    Height as of 10/4/18: 1.664 m (5' 5.51\").    Weight as of this encounter: 89.4 kg (197 lb).  EXAM:   GENERAL APPEARANCE: healthy, alert and no distress  EYES: Eyes grossly normal to inspection, PERRL and conjunctivae and sclerae normal  HENT: ear canals and TM's normal, nose and mouth without ulcers or lesions, oropharynx clear and oral mucous membranes moist  NECK: no adenopathy, no asymmetry, masses, or scars and thyroid normal to palpation  RESP: lungs clear to auscultation - no rales, rhonchi or wheezes  CV: regular rate and rhythm, normal S1 S2, no S3 or S4, no murmur, click or rub, no peripheral edema and peripheral pulses strong  ABDOMEN: soft, nontender, no hepatosplenomegaly, no masses and bowel sounds normal  MS: no musculoskeletal defects are noted and gait is age appropriate without ataxia  SKIN: no suspicious lesions or rashes  NEURO: Normal strength and tone, sensory exam grossly normal, mentation intact and speech normal  PSYCH: mentation appears normal and affect normal/bright    Diagnostic Test Results:  Labs reviewed in Epic    ASSESSMENT / PLAN:   1. Senile osteoporosis  Discussed management of osteoporosis with patient. Recommend to continue with current medical therapy.   - alendronate (FOSAMAX) 70 MG tablet; Take 1 tablet (70 mg) by mouth every 7 days Take 1 tablet once a week with a glass of water 30 minutes prior to breakfast  Dispense: 15 tablet; Refill: 3  - Dexa hip/pelvis/spine; Future    2. Essential hypertension  Blood " "pressure is within acceptable range, will continue with current plan of care.   - losartan (COZAAR) 50 MG tablet; Take 1 tablet (50 mg) by mouth daily  Dispense: 90 tablet; Refill: 3  - Basic metabolic panel    3. Pure hypercholesterolemia    - simvastatin (ZOCOR) 10 MG tablet; Take 1 tablet (10 mg) by mouth At Bedtime  Dispense: 90 tablet; Refill: 3  - Lipid Profile    4. Rash and nonspecific skin eruption  Recommend evaluation by Dermatology.   - DERMATOLOGY REFERRAL    5. Encounter for Medicare annual wellness exam  Patient is up to date on vaccines. Recommend to continue with mammography.       End of Life Planning:  Patient currently has an advanced directive: No.  I have verified the patient's ablity to prepare an advanced directive/make health care decisions.  Literature was provided to assist patient in preparing an advanced directive.    COUNSELING:  Reviewed preventive health counseling, as reflected in patient instructions       Regular exercise       Healthy diet/nutrition       Vision screening    Estimated body mass index is 32.27 kg/m  as calculated from the following:    Height as of 10/4/18: 1.664 m (5' 5.51\").    Weight as of this encounter: 89.4 kg (197 lb).         reports that she has quit smoking. Her smoking use included cigarettes. She has never used smokeless tobacco.      Appropriate preventive services were discussed with this patient, including applicable screening as appropriate for cardiovascular disease, diabetes, osteopenia/osteoporosis, and glaucoma.  As appropriate for age/gender, discussed screening for colorectal cancer, prostate cancer, breast cancer, and cervical cancer. Checklist reviewing preventive services available has been given to the patient.    Reviewed patients plan of care and provided an AVS. The Basic Care Plan (routine screening as documented in Health Maintenance) for Esperanza meets the Care Plan requirement. This Care Plan has been established and reviewed with the " Patient.    Counseling Resources:  ATP IV Guidelines  Pooled Cohorts Equation Calculator  Breast Cancer Risk Calculator  FRAX Risk Assessment  ICSI Preventive Guidelines  Dietary Guidelines for Americans, 2010  [employer]'s MyPlate  ASA Prophylaxis  Lung CA Screening    Julia Gifford MD  [facility]

## 2019-08-12 NOTE — LETTER
8/14/2019         Esperanza Gage   [address] [place] [address]        Dear Ms. Gage:      Your glucose was mildly elevated.   Other results are within acceptable range    Results for orders placed or performed in visit on 08/12/19   Basic metabolic panel   Result Value Ref Range    Sodium 140 133 - 144 mmol/L    Potassium 4.1 3.4 - 5.3 mmol/L    Chloride 108 94 - 109 mmol/L    Carbon Dioxide 26 20 - 32 mmol/L    Anion Gap 6 3 - 14 mmol/L    Glucose 113 (H) 70 - 99 mg/dL    Urea Nitrogen 11 7 - 30 mg/dL    Creatinine 0.62 0.52 - 1.04 mg/dL    GFR Estimate 90 >60 mL/min/[1.73_m2]    GFR Estimate If Black >90 >60 mL/min/[1.73_m2]    Calcium 8.8 8.5 - 10.1 mg/dL   Lipid Profile   Result Value Ref Range    Cholesterol 162 <200 mg/dL    Triglycerides 88 <150 mg/dL    HDL Cholesterol 72 >49 mg/dL    LDL Cholesterol Calculated 72 <100 mg/dL    Non HDL Cholesterol 90 <130 mg/dL         Please note that test explanations are brief and do not reflect all diagnostic uses.  If you have any questions or concerns, please call the clinic at [phone].      Sincerely,      Maria A Mcginnis sent on behalf of  Julia Gifford MD

## 2019-08-12 NOTE — PATIENT INSTRUCTIONS
Patient Education   Personalized Prevention Plan  You are due for the preventive services outlined below.  Your care team is available to assist you in scheduling these services.  If you have already completed any of these items, please share that information with your care team to update in your medical record.  Health Maintenance Due   Topic Date Due     Zoster (Shingles) Vaccine (2 of 3) 09/05/2012     Annual Wellness Visit  06/26/2018     FALL RISK ASSESSMENT  06/26/2018     PHQ-2  01/01/2019

## 2019-08-13 ASSESSMENT — ANXIETY QUESTIONNAIRES: GAD7 TOTAL SCORE: 0

## 2019-08-31 ENCOUNTER — OFFICE VISIT - HEALTHEAST (OUTPATIENT)
Dept: FAMILY MEDICINE | Facility: CLINIC | Age: 74
End: 2019-08-31

## 2019-08-31 DIAGNOSIS — R39.9 UTI SYMPTOMS: ICD-10-CM

## 2019-08-31 DIAGNOSIS — N30.01 ACUTE CYSTITIS WITH HEMATURIA: ICD-10-CM

## 2019-08-31 LAB
ALBUMIN UR-MCNC: ABNORMAL MG/DL
APPEARANCE UR: CLEAR
BACTERIA #/AREA URNS HPF: ABNORMAL /[HPF]
BILIRUB UR QL STRIP: NEGATIVE
COLOR UR AUTO: YELLOW
GLUCOSE UR STRIP-MCNC: NEGATIVE MG/DL
HGB UR QL STRIP: ABNORMAL
KETONES UR STRIP-MCNC: NEGATIVE MG/DL
LEUKOCYTE ESTERASE UR QL STRIP: ABNORMAL
NITRATE UR QL: NEGATIVE
PH UR STRIP: 6 [PH] (ref 5–8)
RBC #/AREA URNS AUTO: ABNORMAL /[HPF]
SP GR UR STRIP: 1.02 (ref 1–1.03)
SQUAMOUS #/AREA URNS AUTO: ABNORMAL /[HPF]
UROBILINOGEN UR STRIP-ACNC: ABNORMAL
WBC #/AREA URNS AUTO: ABNORMAL /[HPF]

## 2019-09-03 LAB — BACTERIA SPEC CULT: NORMAL

## 2019-09-20 DIAGNOSIS — Z96.652 STATUS POST TOTAL LEFT KNEE REPLACEMENT: Primary | ICD-10-CM

## 2019-09-23 ENCOUNTER — OFFICE VISIT (OUTPATIENT)
Dept: ORTHOPEDICS | Facility: CLINIC | Age: 74
End: 2019-09-23
Payer: MEDICARE

## 2019-09-23 ENCOUNTER — ANCILLARY PROCEDURE (OUTPATIENT)
Dept: GENERAL RADIOLOGY | Facility: CLINIC | Age: 74
End: 2019-09-23
Attending: ORTHOPAEDIC SURGERY
Payer: MEDICARE

## 2019-09-23 DIAGNOSIS — Z96.652 STATUS POST TOTAL LEFT KNEE REPLACEMENT: Primary | ICD-10-CM

## 2019-09-23 DIAGNOSIS — Z96.652 STATUS POST TOTAL LEFT KNEE REPLACEMENT: ICD-10-CM

## 2019-09-23 ASSESSMENT — ACTIVITIES OF DAILY LIVING (ADL): FUNCTION,_DAILY_LIVING_SCORE: 89.7

## 2019-09-23 ASSESSMENT — ENCOUNTER SYMPTOMS
ARTHRALGIAS: 0
JOINT SWELLING: 0
MUSCLE CRAMPS: 0
BACK PAIN: 0
MUSCLE WEAKNESS: 1
MYALGIAS: 0
STIFFNESS: 1
NECK PAIN: 0

## 2019-09-23 ASSESSMENT — KOOS JR: HOW SEVERE IS YOUR KNEE STIFFNESS AFTER FIRST WAKING IN MORNING: MILD

## 2019-09-23 NOTE — LETTER
9/23/2019       RE: Esperanza Gage  [address]  [place] [address]     Dear Colleague,    Thank you for referring your patient, Esperanza Gage, to the [facility] at [facility]. Please see a copy of my visit note below.    [facility]  Orthopaedic Surgery, Joint Replacement Consultation  by Pierre Campos M.D.    Esperanza Gage MRN# [id number]   Age: 73 year old YOB: 1945     Requesting physician: Julia Branham     Background history:  DX:  1. L knee DJD     TREATMENTS:  1. 7/1983, L knee medial meniscectomy. [place]  2. 8/28/18, L TKA (Andres), [facility]    1 YEAR Postoperative knee replacement follow-up clinic visit    Esperanza Gage is doing well after last surgery listed above. Patient reports some difficulty with putting her socks on while standing. She also notes some crepitance without pain while walking down stairs. Otherwise, she is satisfied with her outcome thus far. Continues to exercise twice weekly.    Preoperative knee pain is  Was resolved    EXAM:  Incision healing, clean and dry.  Joint range of motion 0-120 degrees, pain free  Effusion: none  No warmth.   Periarticular swelling or leg edema: None  Peroneal and tibial nerve function: Normal  Gait: Normal      IMAGING:  Radiographs demonstrate the knee implant in satisfactory position without evidence of any complication.    IMPRESSION:  Excellent outcome to date after knee replacement.     PLAN:    No activity restrictions.    I recommend she continue exercising by biking and swimming. She may also try balancing on one leg with her eyes closed to improve her balance.    Follow-up in 5 years.     Total Time = 15 min, 50% of which was spent in counseling and coordination of care as documented above.        Pierre Campos M.D.  [facility] Family Professor  Oncology and Adult Reconstructive Surgery  Dept Orthopaedic Surgery, [facility]  Physicians  [phone] office  [email or web address]    Scribe Disclosure:  I, Terry Jane, am serving as a scribe to document services personally performed by Pierre Campos MD at this visit, based upon the provider's statements to me. All documentation has been reviewed by the aforementioned provider prior to being entered into the official medical record.    Attending MD (Dr. Pierre Campos) Attestation :  This patient was seen and evaluated by me including a history, exam, and interpretation of all imaging and/or lab data.  Either a training physician (resident/fellow), who also saw the patient, or scribe has documented the visit in the attached note.    Pierre Campos MD  [facility] Professor  Oncology and Adult Reconstructive Surgery  Dept Orthopaedic Surgery, [facility]  [phone] office, [phone] pager  [email or web address]        KOOS Knee Survey Assessment    Knee Outcome Survey ADL Scale (Michael, JANAY; Obdulia L; Arben, RS; Helder FH; Bobby, CARL; 1998) 9/23/2019   Pain (ADLS1) -   Stiffness (ADLS2) -   Swelling (ADLS3) -   Giving Way, Buckling or Shifting of Knee (ADLS4) -   Weakness (ADLS5) -   Limping (ADLS6) -   Walk? (ADLS7) -   Go up stairs? (ADLS8) -   Go down stairs? (ADLS9) -   Stand? (ADLS10) -   Kneel on the front of your knee? (ADLS11) -   Squat? (ADLS12) -   Sit with your knee bent? (ADLS13) -   How would you rate the current function of your knee during your usual daily activities on a scale from 0 to 100 with 100 being your level of knee function prior to your injury and 0 being the inability to perform any of your usual daily activities? -   How would you rate the overall function of your knee during your usual daily activities?  (please check the one response that best describes you) -   As a result of your knee injury, how would you rate your current level of daily activity? (please check the one response that best describes you) -   Sum -   Count -   Raw Score -   Knee  Activity of Daily Living Score -   Do you have swelling in your knee? Never   Do you feel grinding, hear clicking or any other type of noise when your knee moves? Sometimes   Does your knee catch or hang up when moving? Never   Can you straighten your knee fully? Always   Can you bend your knee fully? Never   How severe is your knee joint stiffness after first wakening in the morning? Mild   How severe is your knee stiffness after sitting, lying or resting LATER IN THE DAY? Mild   How often do you experience knee pain? Never   Twisting/pivoting on your knee None   Straightening knee fully None   Bending knee fully None   Walking on flat surface None   Going up or down stairs None   At night while in bed None   Sitting or lying None   Standing upright None   Descending stairs Mild   Ascending stairs Mild   Rising from sitting Mild   Standing None   Bending to floor/ an object None   Walking on flat surface None   Getting in/out of car Mild   Going shopping None   Putting on socks/stockings Mild   Rising from bed None   Taking off socks/stockings None   Lying in bed (turning over, maintaining knee position) None   Getting in/out of bath None   Sitting Mild   Getting on/off toilet Mild   Heavy domestic duties (moving heavy boxes, scrubbing floors, etc) None   Light domestic duties (cooking, dusting, etc) None   Squatting Severe   Running Moderate   Jumping Moderate   Twisting/pivoting on your injured knee Mild   Kneeling Severe   How often are you aware of your knee problem? Weekly   Have you modified your life style to avoid potentially damaging activities to your knee? Moderately   How much are you troubled with lack of confidence in your knee? Mildly   In general, how much difficulty do you have with your knee? Mild   Pain Score 100   Symptoms Score 71.42   Function, Daily Living Score 89.7   Sports and Rec Score 45   Quality of Life Score 62.5        Again, thank you for allowing me to participate in the  care of your patient.      Sincerely,    Pierre Campos MD

## 2019-09-23 NOTE — PROGRESS NOTES
[facility]  Orthopaedic Surgery, Joint Replacement Consultation  by Pierre Campos M.D.    Esperanza Gage MRN# [id number]   Age: 73 year old YOB: 1945     Requesting physician: Julia Branham     Background history:  DX:  1. L knee DJD     TREATMENTS:  1. 7/1983, L knee medial meniscectomy. [place], [place]  2. 8/28/18, L TKA (Andres), [facility]    1 YEAR Postoperative knee replacement follow-up clinic visit    Esperanza Gage is doing well after last surgery listed above. Patient reports some difficulty with putting her socks on while standing. She also notes some crepitance without pain while walking down stairs. Otherwise, she is satisfied with her outcome thus far. Continues to exercise twice weekly.    Preoperative knee pain is  Was resolved    EXAM:  Incision healing, clean and dry.  Joint range of motion 0-120 degrees, pain free  Effusion: none  No warmth.   Periarticular swelling or leg edema: None  Peroneal and tibial nerve function: Normal  Gait: Normal      IMAGING:  Radiographs demonstrate the knee implant in satisfactory position without evidence of any complication.    IMPRESSION:  Excellent outcome to date after knee replacement.     PLAN:    No activity restrictions.    I recommend she continue exercising by biking and swimming. She may also try balancing on one leg with her eyes closed to improve her balance.    Follow-up in 5 years.     Total Time = 15 min, 50% of which was spent in counseling and coordination of care as documented above.        Pierre Campos M.D.  Al [employer] Professor  Oncology and Adult Reconstructive Surgery  Dept Orthopaedic Surgery, [facility]  [phone] office  [email or web address]    Scribe Disclosure:  I, Terry Jane, am serving as a scribe to document services personally performed by Pierre Campos MD at this visit, based upon the provider's statements to me. All documentation has been reviewed by the aforementioned  provider prior to being entered into the official medical record.    Attending MD (Dr. Pierre Campos) Attestation :  This patient was seen and evaluated by me including a history, exam, and interpretation of all imaging and/or lab data.  Either a training physician (resident/fellow), who also saw the patient, or scribe has documented the visit in the attached note.    Pierre Campos MD  [facility]  Oncology and Adult Reconstructive Surgery  Dept Orthopaedic Surgery, [facility]  [phone] office, [phone] pager  [email or web address]        KOOS Knee Survey Assessment    Knee Outcome Survey ADL Scale (Michael, JANAY; Obdulia, L; Arben, RS; Helder, FH; Bobby, CARL; 1998) 9/23/2019   Pain (ADLS1) -   Stiffness (ADLS2) -   Swelling (ADLS3) -   Giving Way, Buckling or Shifting of Knee (ADLS4) -   Weakness (ADLS5) -   Limping (ADLS6) -   Walk? (ADLS7) -   Go up stairs? (ADLS8) -   Go down stairs? (ADLS9) -   Stand? (ADLS10) -   Kneel on the front of your knee? (ADLS11) -   Squat? (ADLS12) -   Sit with your knee bent? (ADLS13) -   How would you rate the current function of your knee during your usual daily activities on a scale from 0 to 100 with 100 being your level of knee function prior to your injury and 0 being the inability to perform any of your usual daily activities? -   How would you rate the overall function of your knee during your usual daily activities?  (please check the one response that best describes you) -   As a result of your knee injury, how would you rate your current level of daily activity? (please check the one response that best describes you) -   Sum -   Count -   Raw Score -   Knee Activity of Daily Living Score -   Do you have swelling in your knee? Never   Do you feel grinding, hear clicking or any other type of noise when your knee moves? Sometimes   Does your knee catch or hang up when moving? Never   Can you straighten your knee fully? Always   Can you bend your  knee fully? Never   How severe is your knee joint stiffness after first wakening in the morning? Mild   How severe is your knee stiffness after sitting, lying or resting LATER IN THE DAY? Mild   How often do you experience knee pain? Never   Twisting/pivoting on your knee None   Straightening knee fully None   Bending knee fully None   Walking on flat surface None   Going up or down stairs None   At night while in bed None   Sitting or lying None   Standing upright None   Descending stairs Mild   Ascending stairs Mild   Rising from sitting Mild   Standing None   Bending to floor/ an object None   Walking on flat surface None   Getting in/out of car Mild   Going shopping None   Putting on socks/stockings Mild   Rising from bed None   Taking off socks/stockings None   Lying in bed (turning over, maintaining knee position) None   Getting in/out of bath None   Sitting Mild   Getting on/off toilet Mild   Heavy domestic duties (moving heavy boxes, scrubbing floors, etc) None   Light domestic duties (cooking, dusting, etc) None   Squatting Severe   Running Moderate   Jumping Moderate   Twisting/pivoting on your injured knee Mild   Kneeling Severe   How often are you aware of your knee problem? Weekly   Have you modified your life style to avoid potentially damaging activities to your knee? Moderately   How much are you troubled with lack of confidence in your knee? Mildly   In general, how much difficulty do you have with your knee? Mild   Pain Score 100   Symptoms Score 71.42   Function, Daily Living Score 89.7   Sports and Rec Score 45   Quality of Life Score 62.5

## 2019-09-23 NOTE — NURSING NOTE
Chief Complaint   Patient presents with     Surgical Followup     1yr S/p Left Total Knee Replacement - Left DOS: 08/28/2018     RECHECK     Pt. states that she feels like her Right Ankle feels different than her Left.        73 year old  1945          [facility] - [place], [place] - [address] [place] [address] [address]    No Known Allergies    Current Outpatient Medications   Medication     alendronate (FOSAMAX) 70 MG tablet     ASPIRIN PO     ciclopirox (LOPROX) 0.77 % cream     losartan (COZAAR) 50 MG tablet     simvastatin (ZOCOR) 10 MG tablet     amoxicillin (AMOXIL) 500 MG tablet     No current facility-administered medications for this visit.            Questionnaires:          KOOS Knee Survey Assessment    Knee Outcome Survey ADL Scale (JANAY Rodriguez; MARIAH Steiner; KAILEY Theodore; Helder, JAMES; Bobby, CARL; 1998) 9/23/2019   Pain (ADLS1) -   Stiffness (ADLS2) -   Swelling (ADLS3) -   Giving Way, Buckling or Shifting of Knee (ADLS4) -   Weakness (ADLS5) -   Limping (ADLS6) -   Walk? (ADLS7) -   Go up stairs? (ADLS8) -   Go down stairs? (ADLS9) -   Stand? (ADLS10) -   Kneel on the front of your knee? (ADLS11) -   Squat? (ADLS12) -   Sit with your knee bent? (ADLS13) -   How would you rate the current function of your knee during your usual daily activities on a scale from 0 to 100 with 100 being your level of knee function prior to your injury and 0 being the inability to perform any of your usual daily activities? -   How would you rate the overall function of your knee during your usual daily activities?  (please check the one response that best describes you) -   As a result of your knee injury, how would you rate your current level of daily activity? (please check the one response that best describes you) -   Sum -   Count -   Raw Score -   Knee Activity of Daily Living Score -   Do you have swelling in your knee? Never   Do you feel grinding, hear clicking or any other type of noise when your knee moves?  Sometimes   Does your knee catch or hang up when moving? Never   Can you straighten your knee fully? Always   Can you bend your knee fully? Never   How severe is your knee joint stiffness after first wakening in the morning? Mild   How severe is your knee stiffness after sitting, lying or resting LATER IN THE DAY? Mild   How often do you experience knee pain? Never   Twisting/pivoting on your knee None   Straightening knee fully None   Bending knee fully None   Walking on flat surface None   Going up or down stairs None   At night while in bed None   Sitting or lying None   Standing upright None   Descending stairs Mild   Ascending stairs Mild   Rising from sitting Mild   Standing None   Bending to floor/ an object None   Walking on flat surface None   Getting in/out of car Mild   Going shopping None   Putting on socks/stockings Mild   Rising from bed None   Taking off socks/stockings None   Lying in bed (turning over, maintaining knee position) None   Getting in/out of bath None   Sitting Mild   Getting on/off toilet Mild   Heavy domestic duties (moving heavy boxes, scrubbing floors, etc) None   Light domestic duties (cooking, dusting, etc) None   Squatting Severe   Running Moderate   Jumping Moderate   Twisting/pivoting on your injured knee Mild   Kneeling Severe   How often are you aware of your knee problem? Weekly   Have you modified your life style to avoid potentially damaging activities to your knee? Moderately   How much are you troubled with lack of confidence in your knee? Mildly   In general, how much difficulty do you have with your knee? Mild   Pain Score 100   Symptoms Score 71.42   Function, Daily Living Score 89.7   Sports and Rec Score 45   Quality of Life Score 62.5              Promis 10 Assessment    PROMIS 10 9/23/2019   In general, would you say your health is: Good   In general, would you say your quality of life is: Very good   In general, how would you rate your physical health? Good    In general, how would you rate your mental health, including your mood and your ability to think? Excellent   In general, how would you rate your satisfaction with your social activities and relationships? Excellent   In general, please rate how well you carry out your usual social activities and roles Very good   To what extent are you able to carry out your everyday physical activities such as walking, climbing stairs, carrying groceries, or moving a chair? Completely   How often have you been bothered by emotional problems such as feeling anxious, depressed or irritable? Never   How would you rate your fatigue on average? None   How would you rate your pain on average?   0 = No Pain  to  10 = Worst Imaginable Pain 0   In general, would you say your health is: 3   In general, would you say your quality of life is: 4   In general, how would you rate your physical health? 3   In general, how would you rate your mental health, including your mood and your ability to think? 5   In general, how would you rate your satisfaction with your social activities and relationships? 5   In general, please rate how well you carry out your usual social activities and roles. (This includes activities at home, at work and in your community, and responsibilities as a parent, child, spouse, employee, friend, etc.) 4   To what extent are you able to carry out your everyday physical activities such as walking, climbing stairs, carrying groceries, or moving a chair? 5   In the past 7 days, how often have you been bothered by emotional problems such as feeling anxious, depressed, or irritable? 1   In the past 7 days, how would you rate your fatigue on average? 1   In the past 7 days, how would you rate your pain on average, where 0 means no pain, and 10 means worst imaginable pain? 0   Global Mental Health Score 19   Global Physical Health Score 18   PROMIS TOTAL - SUBSCORES 37   Some recent data might be hidden              Ortho  "Oxford Knee Questionnaire    Oxford Knee Score (  [employer], 1998. All rights reserved) - Problems with knee during last 4 Weeks 9/23/2019   1.  How would you describe the pain you usually have from your knee? None   2.  Have you had any trouble with washing and drying yourself (all over) because of your knee? No trouble at all   3.  Have you had any trouble getting in and out of a car or using public transportation because of your knee? (whichever you would tend to use) Very little trouble   4.  For how long have you been able to walk before pain from you knee becomes severe? (with or without cane) No pain/more than 30 minutes   5.  After a meal (sitting at a table), how painful has it been for you to stand up from a chair because of your knee? Not at all painful   6.  Have you been limping when walking because of your knee? Rarely/never   7.  Could you kneel down and get up again afterwards? With extreme difficulty   8.  Have you been troubled by pain from your knee in bed at night? No nights   9.  How much has pain from your knee interfered with your usual work (including housework)? Not at all   10. Have you felt that your knee might suddenly \"give out\" or let you down? Rarely/never   11. Could you do the grocery shopping on your own? Yes easily   12. Could you walk down one flight of stairs? Yes easily   OXFORD TOTAL SCORE 44                              "

## 2019-10-03 ENCOUNTER — HEALTH MAINTENANCE LETTER (OUTPATIENT)
Age: 74
End: 2019-10-03

## 2019-10-12 ENCOUNTER — OFFICE VISIT (OUTPATIENT)
Dept: URGENT CARE | Facility: URGENT CARE | Age: 74
End: 2019-10-12
Payer: MEDICARE

## 2019-10-12 VITALS
TEMPERATURE: 98.3 F | DIASTOLIC BLOOD PRESSURE: 80 MMHG | OXYGEN SATURATION: 97 % | WEIGHT: 197 LBS | HEART RATE: 80 BPM | BODY MASS INDEX: 32.27 KG/M2 | SYSTOLIC BLOOD PRESSURE: 130 MMHG

## 2019-10-12 DIAGNOSIS — R30.0 DYSURIA: ICD-10-CM

## 2019-10-12 DIAGNOSIS — N30.00 ACUTE CYSTITIS WITHOUT HEMATURIA: Primary | ICD-10-CM

## 2019-10-12 DIAGNOSIS — R82.90 NONSPECIFIC FINDING ON EXAMINATION OF URINE: ICD-10-CM

## 2019-10-12 DIAGNOSIS — J06.9 VIRAL URI WITH COUGH: ICD-10-CM

## 2019-10-12 LAB
ALBUMIN UR-MCNC: NEGATIVE MG/DL
APPEARANCE UR: CLEAR
BACTERIA #/AREA URNS HPF: ABNORMAL /HPF
BILIRUB UR QL STRIP: NEGATIVE
COLOR UR AUTO: YELLOW
GLUCOSE UR STRIP-MCNC: NEGATIVE MG/DL
HGB UR QL STRIP: ABNORMAL
KETONES UR STRIP-MCNC: NEGATIVE MG/DL
LEUKOCYTE ESTERASE UR QL STRIP: ABNORMAL
NITRATE UR QL: NEGATIVE
PH UR STRIP: 7 PH (ref 5–7)
RBC #/AREA URNS AUTO: ABNORMAL /HPF
SOURCE: ABNORMAL
SP GR UR STRIP: 1.01 (ref 1–1.03)
UROBILINOGEN UR STRIP-ACNC: 0.2 EU/DL (ref 0.2–1)
WBC #/AREA URNS AUTO: ABNORMAL /HPF

## 2019-10-12 PROCEDURE — 81001 URINALYSIS AUTO W/SCOPE: CPT | Performed by: INTERNAL MEDICINE

## 2019-10-12 PROCEDURE — 99203 OFFICE O/P NEW LOW 30 MIN: CPT | Performed by: INTERNAL MEDICINE

## 2019-10-12 PROCEDURE — 87086 URINE CULTURE/COLONY COUNT: CPT | Performed by: INTERNAL MEDICINE

## 2019-10-12 RX ORDER — SULFAMETHOXAZOLE/TRIMETHOPRIM 800-160 MG
1 TABLET ORAL 2 TIMES DAILY
Qty: 10 TABLET | Refills: 0 | Status: SHIPPED | OUTPATIENT
Start: 2019-10-12 | End: 2019-10-17

## 2019-10-12 ASSESSMENT — ENCOUNTER SYMPTOMS
SLEEP DISTURBANCE: 1
EYE DISCHARGE: 1
ADENOPATHY: 0
FEVER: 0
CHILLS: 1
HEADACHES: 1
SORE THROAT: 0
COUGH: 0
NAUSEA: 1
VOMITING: 0
MYALGIAS: 0

## 2019-10-12 NOTE — PROGRESS NOTES
SUBJECTIVE:   Esperanza Gage is a 73 year old female presenting with a chief complaint of   Chief Complaint   Patient presents with     Urgent Care     UTI     c/o dysuria for 1 day       She is a new patient of [place].    URI Adult    Onset of cold symptoms was few day(s) ago.  Course of illness is improving.    Current and Associated symptoms: cough - non-productive  Treatment measures tried include Tylenol/Ibuprofen and zycan.  Predisposing factors include - low resistance  Family exposure.      UTI    Onset of symptoms was 1day(s).  Course of illness is worsening    Current and associated symptoms frequency, urgency, burning and voiding in small amounts  Treatment and measures tried None  Predisposing factors include - recent UTI    See culture  Susceptibility     Escherichia coli     Antibiotic Interpretation Sensitivity Method Status   AMPICILLIN Sensitive <=2 ug/mL FELICITY Final   AMPICILLIN/SULBACTAM Sensitive <=2 ug/mL FELICITY Final   CEFAZOLIN Sensitive <=4 ug/mL FELICITY Final    Cefazolin FELICITY breakpoints are for the treatment of uncomplicated urinary tract   infections.  For the treatment of systemic infections, please contact the   laboratory for additional testing.   CEFEPIME Sensitive <=1 ug/mL FELICITY Final   CEFOXITIN Sensitive <=4 ug/mL FELICITY Final   CEFTAZIDIME Sensitive <=1 ug/mL FELICITY Final   CEFTRIAXONE Sensitive <=1 ug/mL FELICITY Final   CIPROFLOXACIN Sensitive <=0.25 ug/mL FELICITY Final   GENTAMICIN Sensitive <=1 ug/mL FELICITY Final   LEVOFLOXACIN Sensitive <=0.12 ug/mL FELICITY Final   NITROFURANTOIN Sensitive <=16 ug/mL FELICITY Final   Piperacillin/Tazo Sensitive <=4 ug/mL FELICITY Final   TOBRAMYCIN Sensitive <=1 ug/mL FELICITY Final   Trimethoprim/Sulfa Sensitive <=1/19              Review of Systems   Constitutional: Positive for chills. Negative for fever.   HENT: Positive for postnasal drip. Negative for sore throat.    Eyes: Positive for discharge.        Resolved    Respiratory: Negative for cough.    Cardiovascular:        Chest pain  with cough   Gastrointestinal: Positive for nausea. Negative for vomiting.   Musculoskeletal: Negative for myalgias.   Skin: Negative for rash.   Neurological: Positive for headaches.   Hematological: Negative for adenopathy.   Psychiatric/Behavioral: Positive for sleep disturbance.       Past Medical History:   Diagnosis Date     Hearing problem 2017     Hyperlipidemia 2005     Hyperlipidemia LDL goal < 130      Hypertension      Osteoporosis, post-menopausal      Pelvic fracture (H)      Family History   Problem Relation Age of Onset     Osteoporosis Mother      Hypertension Brother      Cerebrovascular Disease Brother      Cerebrovascular Disease Brother      C.A.D. No family hx of      Diabetes No family hx of      Skin Cancer No family hx of      Melanoma No family hx of      Dementia No family hx of      Heart Disease No family hx of      Current Outpatient Medications   Medication Sig Dispense Refill     alendronate (FOSAMAX) 70 MG tablet Take 1 tablet (70 mg) by mouth every 7 days Take 1 tablet once a week with a glass of water 30 minutes prior to breakfast 15 tablet 3     amoxicillin (AMOXIL) 500 MG tablet Take 4 tablets by mouth one hour before Dental Appointment. 4 tablet 4     ASPIRIN PO Take 81 mg by mouth daily       ciclopirox (LOPROX) 0.77 % cream Apply topically 2 times daily       losartan (COZAAR) 50 MG tablet Take 1 tablet (50 mg) by mouth daily 90 tablet 3     simvastatin (ZOCOR) 10 MG tablet Take 1 tablet (10 mg) by mouth At Bedtime 90 tablet 3     sulfamethoxazole-trimethoprim (BACTRIM DS/SEPTRA DS) 800-160 MG tablet Take 1 tablet by mouth 2 times daily for 5 days 10 tablet 0     Social History     Tobacco Use     Smoking status: Former Smoker     Packs/day: 0.00     Years: 0.00     Pack years: 0.00     Types: Cigarettes     Smokeless tobacco: Never Used   Substance Use Topics     Alcohol use: Yes     Comment: Rare       OBJECTIVE  /80   Pulse 80   Temp 98.3  F (36.8  C) (Oral)   Wt  89.4 kg (197 lb)   SpO2 97%   BMI 32.27 kg/m      Physical Exam  Vitals signs reviewed.   Constitutional:       Appearance: Normal appearance.   HENT:      Right Ear: Tympanic membrane normal.      Left Ear: Tympanic membrane normal.      Nose: Nose normal.      Mouth/Throat:      Mouth: Mucous membranes are moist.      Pharynx: Oropharynx is clear.   Eyes:      General:         Right eye: No discharge.         Left eye: No discharge.      Conjunctiva/sclera: Conjunctivae normal.   Cardiovascular:      Rate and Rhythm: Normal rate and regular rhythm.      Pulses: Normal pulses.      Heart sounds: Normal heart sounds.   Pulmonary:      Effort: Pulmonary effort is normal.      Breath sounds: Normal breath sounds.   Abdominal:      General: Bowel sounds are normal.      Palpations: Abdomen is soft.      Tenderness: There is no tenderness. There is no right CVA tenderness or left CVA tenderness.   Lymphadenopathy:      Cervical: No cervical adenopathy.   Neurological:      Mental Status: She is alert.   Psychiatric:         Mood and Affect: Mood normal.         Labs:  Results for orders placed or performed in visit on 10/12/19 (from the past 24 hour(s))   *UA reflex to Microscopic and Culture ([facility] (except [place] and [place])   Result Value Ref Range    Color Urine Yellow     Appearance Urine Clear     Glucose Urine Negative NEG^Negative mg/dL    Bilirubin Urine Negative NEG^Negative    Ketones Urine Negative NEG^Negative mg/dL    Specific Gravity Urine 1.010 1.003 - 1.035    Blood Urine Trace (A) NEG^Negative    pH Urine 7.0 5.0 - 7.0 pH    Protein Albumin Urine Negative NEG^Negative mg/dL    Urobilinogen Urine 0.2 0.2 - 1.0 EU/dL    Nitrite Urine Negative NEG^Negative    Leukocyte Esterase Urine Moderate (A) NEG^Negative    Source Midstream Urine    Urine Microscopic   Result Value Ref Range    WBC Urine 10-25 (A) OTO5^0 - 5 /HPF    RBC Urine O - 2 OTO2^O - 2 /HPF    Bacteria Urine Few (A)  "NEG^Negative /HPF           ASSESSMENT:      ICD-10-CM    1. Acute cystitis without hematuria N30.00 sulfamethoxazole-trimethoprim (BACTRIM DS/SEPTRA DS) 800-160 MG tablet   2. Dysuria R30.0 *UA reflex to Microscopic and Culture ([facility] (except [place] and [place])     Urine Microscopic   3. Nonspecific finding on examination of urine R82.90 Urine Culture Aerobic Bacterial   4. Viral URI with cough J06.9     B97.89           PLAN:    URI Adult:  Tylenol, Ibuprofen, Fluids and Rest    Followup:    If not improving or if condition worsens, follow up with your Primary Care Provider    Patient Instructions   Bactrim 2 x day for 5 days  Yogurt    If recurrent UTI, discuss with Gynecology - could be related to menopausal vaginal skin    Fluids  Rest.      Patient Education     Bladder Infection, Female (Adult)    Urine is normally doesn't have any bacteria in it. But bacteria can get into the urinary tract from the skin around the rectum. Or they can travel in the blood from elsewhere in the body. Once they are in your urinary tract, they can cause infection in the urethra (urethritis), the bladder (cystitis), or the kidneys (pyelonephritis).  The most common place for an infection is in the bladder. This is called a bladder infection. This is one of the most common infections in women. Most bladder infections are easily treated. They are not serious unless the infection spreads to the kidney.  The phrases \"bladder infection,\" \"UTI,\" and \"cystitis\" are often used to describe the same thing. But they are not always the same. Cystitis is an inflammation of the bladder. The most common cause of cystitis is an infection.  Symptoms  The infection causes inflammation in the urethra and bladder. This causes many of the symptoms. The most common symptoms of a bladder infection are:    Pain or burning when urinating    Having to urinate more often than usual    Urgent need to urinate    Only a small amount " of urine comes out    Blood in urine    Abdominal discomfort. This is usually in the lower abdomen above the pubic bone.    Cloudy urine    Strong- or bad-smelling urine    Unable to urinate (urinary retention)    Unable to hold urine in (urinary incontinence)    Fever    Loss of appetite    Confusion (in older adults)  Causes  Bladder infections are not contagious. You can't get one from someone else, from a toilet seat, or from sharing a bath.  The most common cause of bladder infections is bacteria from the bowels. The bacteria get onto the skin around the opening of the urethra. From there, they can get into the urine and travel up to the bladder, causing inflammation and infection. This usually happens because of:    Wiping improperly after urinating. Always wipe from front to back.    Bowel incontinence    Pregnancy    Procedures such as having a catheter inserted    Older age    Not emptying your bladder. This can allow bacteria a chance to grow in your urine.    Dehydration    Constipation    Sex    Use of a diaphragm for birth control   Treatment  Bladder infections are diagnosed by a urine test. They are treated with antibiotics and usually clear up quickly without complications. Treatment helps prevent a more serious kidney infection.  Medicines  Medicines can help in the treatment of a bladder infection:    Take antibiotics until they are used up, even if you feel better. It is important to finish them to make sure the infection has cleared.    You can use acetaminophen or ibuprofen for pain, fever, or discomfort, unless another medicine was prescribed. If you have chronic liver or kidney disease, talk with your healthcare provider before using these medicines. Also talk with your provider if you've ever had a stomach ulcer or gastrointestinal bleeding, or are taking blood-thinner medicines.    If you are given phenazopydridine to reduce burning with urination, it will cause your urine to become a bright  orange color. This can stain clothing.  Care and prevention  These self-care steps can help prevent future infections:    Drink plenty of fluids to prevent dehydration and flush out your bladder. Do this unless you must restrict fluids for other health reasons, or your doctor told you not to.    Proper cleaning after going to the bathroom is important. Wipe from front to back after using the toilet to prevent the spread of bacteria.    Urinate more often. Don't try to hold urine in for a long time.    Wear loose-fitting clothes and cotton underwear. Avoid tight-fitting pants.    Improve your diet and prevent constipation. Eat more fresh fruit and vegetables, and fiber, and less junk and fatty foods.    Avoid sex until your symptoms are gone.    Avoid caffeine, alcohol, and spicy foods. These can irritate your bladder.    Urinate right after intercourse to flush out your bladder.    If you use birth control pills and have frequent bladder infections, discuss it with your doctor.  Follow-up care  Call your healthcare provider if all symptoms are not gone after 3 days of treatment. This is especially important if you have repeat infections.  If a culture was done, you will be told if your treatment needs to be changed. If directed, you can call to find out the results.  If X-rays were done, you will be told if the results will affect your treatment.  Call 911  Call 911 if any of the following occur:    Trouble breathing    Hard to wake up or confusion    Fainting or loss of consciousness    Rapid heart rate  When to seek medical advice  Call your healthcare provider right away if any of these occur:    Fever of 100.4 F (38.0 C) or higher, or as directed by your healthcare provider    Symptoms are not better by the third day of treatment    Back or belly (abdominal) pain that gets worse    Repeated vomiting, or unable to keep medicine down    Weakness or dizziness    Vaginal discharge    Pain, redness, or swelling in  the outer vaginal area (labia)  Date Last Reviewed: 10/1/2016    7674-2520 [employer] [facility] [facility]. [address] [place] [address], [place] [address]. All rights reserved. This information is not intended as a substitute for professional medical care. Always follow your healthcare professional's instructions.

## 2019-10-12 NOTE — PATIENT INSTRUCTIONS
"Bactrim 2 x day for 5 days  Yogurt    If recurrent UTI, discuss with Gynecology - could be related to menopausal vaginal skin    Fluids  Rest.      Patient Education     Bladder Infection, Female (Adult)    Urine is normally doesn't have any bacteria in it. But bacteria can get into the urinary tract from the skin around the rectum. Or they can travel in the blood from elsewhere in the body. Once they are in your urinary tract, they can cause infection in the urethra (urethritis), the bladder (cystitis), or the kidneys (pyelonephritis).  The most common place for an infection is in the bladder. This is called a bladder infection. This is one of the most common infections in women. Most bladder infections are easily treated. They are not serious unless the infection spreads to the kidney.  The phrases \"bladder infection,\" \"UTI,\" and \"cystitis\" are often used to describe the same thing. But they are not always the same. Cystitis is an inflammation of the bladder. The most common cause of cystitis is an infection.  Symptoms  The infection causes inflammation in the urethra and bladder. This causes many of the symptoms. The most common symptoms of a bladder infection are:    Pain or burning when urinating    Having to urinate more often than usual    Urgent need to urinate    Only a small amount of urine comes out    Blood in urine    Abdominal discomfort. This is usually in the lower abdomen above the pubic bone.    Cloudy urine    Strong- or bad-smelling urine    Unable to urinate (urinary retention)    Unable to hold urine in (urinary incontinence)    Fever    Loss of appetite    Confusion (in older adults)  Causes  Bladder infections are not contagious. You can't get one from someone else, from a toilet seat, or from sharing a bath.  The most common cause of bladder infections is bacteria from the bowels. The bacteria get onto the skin around the opening of the urethra. From there, they can get into the urine and " travel up to the bladder, causing inflammation and infection. This usually happens because of:    Wiping improperly after urinating. Always wipe from front to back.    Bowel incontinence    Pregnancy    Procedures such as having a catheter inserted    Older age    Not emptying your bladder. This can allow bacteria a chance to grow in your urine.    Dehydration    Constipation    Sex    Use of a diaphragm for birth control   Treatment  Bladder infections are diagnosed by a urine test. They are treated with antibiotics and usually clear up quickly without complications. Treatment helps prevent a more serious kidney infection.  Medicines  Medicines can help in the treatment of a bladder infection:    Take antibiotics until they are used up, even if you feel better. It is important to finish them to make sure the infection has cleared.    You can use acetaminophen or ibuprofen for pain, fever, or discomfort, unless another medicine was prescribed. If you have chronic liver or kidney disease, talk with your healthcare provider before using these medicines. Also talk with your provider if you've ever had a stomach ulcer or gastrointestinal bleeding, or are taking blood-thinner medicines.    If you are given phenazopydridine to reduce burning with urination, it will cause your urine to become a bright orange color. This can stain clothing.  Care and prevention  These self-care steps can help prevent future infections:    Drink plenty of fluids to prevent dehydration and flush out your bladder. Do this unless you must restrict fluids for other health reasons, or your doctor told you not to.    Proper cleaning after going to the bathroom is important. Wipe from front to back after using the toilet to prevent the spread of bacteria.    Urinate more often. Don't try to hold urine in for a long time.    Wear loose-fitting clothes and cotton underwear. Avoid tight-fitting pants.    Improve your diet and prevent constipation. Eat  more fresh fruit and vegetables, and fiber, and less junk and fatty foods.    Avoid sex until your symptoms are gone.    Avoid caffeine, alcohol, and spicy foods. These can irritate your bladder.    Urinate right after intercourse to flush out your bladder.    If you use birth control pills and have frequent bladder infections, discuss it with your doctor.  Follow-up care  Call your healthcare provider if all symptoms are not gone after 3 days of treatment. This is especially important if you have repeat infections.  If a culture was done, you will be told if your treatment needs to be changed. If directed, you can call to find out the results.  If X-rays were done, you will be told if the results will affect your treatment.  Call 911  Call 911 if any of the following occur:    Trouble breathing    Hard to wake up or confusion    Fainting or loss of consciousness    Rapid heart rate  When to seek medical advice  Call your healthcare provider right away if any of these occur:    Fever of 100.4 F (38.0 C) or higher, or as directed by your healthcare provider    Symptoms are not better by the third day of treatment    Back or belly (abdominal) pain that gets worse    Repeated vomiting, or unable to keep medicine down    Weakness or dizziness    Vaginal discharge    Pain, redness, or swelling in the outer vaginal area (labia)  Date Last Reviewed: 10/1/2016    5027-9609 [employer] ([employer]). [address] [place] [address], [place] [address]. All rights reserved. This information is not intended as a substitute for professional medical care. Always follow your healthcare professional's instructions.

## 2019-10-13 LAB
BACTERIA SPEC CULT: NORMAL
SPECIMEN SOURCE: NORMAL

## 2020-02-07 DIAGNOSIS — Z96.659 S/P TKR (TOTAL KNEE REPLACEMENT): ICD-10-CM

## 2020-02-07 RX ORDER — AMOXICILLIN 500 MG/1
TABLET, FILM COATED ORAL
Qty: 4 TABLET | Refills: 4 | Status: SHIPPED | OUTPATIENT
Start: 2020-02-07 | End: 2020-11-16

## 2020-08-09 DIAGNOSIS — E78.00 PURE HYPERCHOLESTEROLEMIA: ICD-10-CM

## 2020-08-12 RX ORDER — SIMVASTATIN 10 MG
TABLET ORAL
Qty: 90 TABLET | Refills: 3 | Status: SHIPPED | OUTPATIENT
Start: 2020-08-12 | End: 2020-08-31

## 2020-08-28 ENCOUNTER — TELEPHONE (OUTPATIENT)
Dept: OBGYN | Facility: CLINIC | Age: 75
End: 2020-08-28

## 2020-08-28 DIAGNOSIS — R30.0 DYSURIA: Primary | ICD-10-CM

## 2020-08-28 LAB
ALBUMIN UR-MCNC: NEGATIVE MG/DL
APPEARANCE UR: CLEAR
BACTERIA #/AREA URNS HPF: ABNORMAL /HPF
BILIRUB UR QL STRIP: NEGATIVE
COLOR UR AUTO: YELLOW
GLUCOSE UR STRIP-MCNC: NEGATIVE MG/DL
HGB UR QL STRIP: NEGATIVE
KETONES UR STRIP-MCNC: NEGATIVE MG/DL
LEUKOCYTE ESTERASE UR QL STRIP: ABNORMAL
NITRATE UR QL: NEGATIVE
NON-SQ EPI CELLS #/AREA URNS LPF: ABNORMAL /LPF
PH UR STRIP: 6.5 PH (ref 5–7)
RBC #/AREA URNS AUTO: ABNORMAL /HPF
SOURCE: ABNORMAL
SP GR UR STRIP: 1.01 (ref 1–1.03)
UROBILINOGEN UR STRIP-ACNC: 0.2 EU/DL (ref 0.2–1)
WBC #/AREA URNS AUTO: ABNORMAL /HPF

## 2020-08-28 PROCEDURE — 81001 URINALYSIS AUTO W/SCOPE: CPT | Performed by: INTERNAL MEDICINE

## 2020-08-28 PROCEDURE — 87086 URINE CULTURE/COLONY COUNT: CPT | Performed by: INTERNAL MEDICINE

## 2020-08-28 RX ORDER — NITROFURANTOIN 25; 75 MG/1; MG/1
100 CAPSULE ORAL 2 TIMES DAILY
Qty: 10 CAPSULE | Refills: 0 | Status: SHIPPED | OUTPATIENT
Start: 2020-08-28 | End: 2020-08-31

## 2020-08-28 NOTE — TELEPHONE ENCOUNTER
Uma Gutiérrez spoke with Dr. Gifford who advised sending macrobid to pharmacy after patient drops off ua/uc with lab. She has follow-up appointment with Dr. Gifford 8/31.    Per Epic, patient presented to lab late afternoon.    Uma left a voicemail with patient and informed her macrobid being sent to pharmacy. If no relief of symptoms over the weekend, call the clinic at [phone] and leave a message for the on-call MD.

## 2020-08-28 NOTE — TELEPHONE ENCOUNTER
Talked to patient today  feeling urinary frequency and burning- symptoms started Wednesday- went away until Thursday.  Will go to lab today for UA uc-

## 2020-08-29 LAB
BACTERIA SPEC CULT: NORMAL
SPECIMEN SOURCE: NORMAL

## 2020-08-31 ENCOUNTER — OFFICE VISIT (OUTPATIENT)
Dept: INTERNAL MEDICINE | Facility: CLINIC | Age: 75
End: 2020-08-31
Attending: INTERNAL MEDICINE
Payer: COMMERCIAL

## 2020-08-31 VITALS
DIASTOLIC BLOOD PRESSURE: 79 MMHG | WEIGHT: 201 LBS | HEIGHT: 66 IN | HEART RATE: 78 BPM | SYSTOLIC BLOOD PRESSURE: 118 MMHG | BODY MASS INDEX: 32.3 KG/M2

## 2020-08-31 DIAGNOSIS — F43.22 ADJUSTMENT DISORDER WITH ANXIOUS MOOD: ICD-10-CM

## 2020-08-31 DIAGNOSIS — R73.01 IMPAIRED FASTING GLUCOSE: ICD-10-CM

## 2020-08-31 DIAGNOSIS — Z00.00 PREVENTATIVE HEALTH CARE: ICD-10-CM

## 2020-08-31 DIAGNOSIS — Z12.31 ENCOUNTER FOR SCREENING MAMMOGRAM FOR MALIGNANT NEOPLASM OF BREAST: ICD-10-CM

## 2020-08-31 DIAGNOSIS — M85.89 OSTEOPENIA OF MULTIPLE SITES: ICD-10-CM

## 2020-08-31 DIAGNOSIS — R20.0 NUMBNESS AND TINGLING OF BOTH FEET: Primary | ICD-10-CM

## 2020-08-31 DIAGNOSIS — M81.0 SENILE OSTEOPOROSIS: ICD-10-CM

## 2020-08-31 DIAGNOSIS — E78.00 PURE HYPERCHOLESTEROLEMIA: ICD-10-CM

## 2020-08-31 DIAGNOSIS — I10 ESSENTIAL HYPERTENSION: ICD-10-CM

## 2020-08-31 DIAGNOSIS — D22.9 CHANGE IN SKIN MOLE: ICD-10-CM

## 2020-08-31 DIAGNOSIS — R20.2 NUMBNESS AND TINGLING OF BOTH FEET: Primary | ICD-10-CM

## 2020-08-31 LAB
ANION GAP SERPL CALCULATED.3IONS-SCNC: 8 MMOL/L (ref 3–14)
BUN SERPL-MCNC: 13 MG/DL (ref 7–30)
CALCIUM SERPL-MCNC: 9.3 MG/DL (ref 8.5–10.1)
CHLORIDE SERPL-SCNC: 109 MMOL/L (ref 94–109)
CHOLEST SERPL-MCNC: 150 MG/DL
CO2 SERPL-SCNC: 25 MMOL/L (ref 20–32)
CREAT SERPL-MCNC: 0.6 MG/DL (ref 0.52–1.04)
GFR SERPL CREATININE-BSD FRML MDRD: 90 ML/MIN/{1.73_M2}
GLUCOSE SERPL-MCNC: 114 MG/DL (ref 70–99)
HBA1C MFR BLD: 6.2 % (ref 0–5.6)
HDLC SERPL-MCNC: 77 MG/DL
LDLC SERPL CALC-MCNC: 57 MG/DL
NONHDLC SERPL-MCNC: 73 MG/DL
POTASSIUM SERPL-SCNC: 4.1 MMOL/L (ref 3.4–5.3)
SODIUM SERPL-SCNC: 142 MMOL/L (ref 133–144)
TRIGL SERPL-MCNC: 80 MG/DL
TSH SERPL DL<=0.005 MIU/L-ACNC: 2.09 MU/L (ref 0.4–4)
VIT B12 SERPL-MCNC: 468 PG/ML (ref 193–986)

## 2020-08-31 PROCEDURE — 82306 VITAMIN D 25 HYDROXY: CPT | Performed by: INTERNAL MEDICINE

## 2020-08-31 PROCEDURE — G0463 HOSPITAL OUTPT CLINIC VISIT: HCPCS | Mod: ZF

## 2020-08-31 PROCEDURE — 84443 ASSAY THYROID STIM HORMONE: CPT | Performed by: INTERNAL MEDICINE

## 2020-08-31 PROCEDURE — 82607 VITAMIN B-12: CPT | Performed by: INTERNAL MEDICINE

## 2020-08-31 PROCEDURE — 80061 LIPID PANEL: CPT | Performed by: INTERNAL MEDICINE

## 2020-08-31 PROCEDURE — 36415 COLL VENOUS BLD VENIPUNCTURE: CPT | Performed by: INTERNAL MEDICINE

## 2020-08-31 PROCEDURE — 80048 BASIC METABOLIC PNL TOTAL CA: CPT | Performed by: INTERNAL MEDICINE

## 2020-08-31 PROCEDURE — 83036 HEMOGLOBIN GLYCOSYLATED A1C: CPT | Performed by: INTERNAL MEDICINE

## 2020-08-31 RX ORDER — SIMVASTATIN 10 MG
TABLET ORAL
Qty: 90 TABLET | Refills: 3 | Status: SHIPPED | OUTPATIENT
Start: 2020-08-31 | End: 2021-11-11

## 2020-08-31 RX ORDER — LOSARTAN POTASSIUM 50 MG/1
50 TABLET ORAL DAILY
Qty: 90 TABLET | Refills: 3 | Status: SHIPPED | OUTPATIENT
Start: 2020-08-31 | End: 2021-09-13

## 2020-08-31 RX ORDER — ALENDRONATE SODIUM 70 MG/1
70 TABLET ORAL
Qty: 15 TABLET | Refills: 3 | Status: SHIPPED | OUTPATIENT
Start: 2020-08-31 | End: 2022-01-31

## 2020-08-31 ASSESSMENT — ENCOUNTER SYMPTOMS
FATIGUE: 1
NERVOUS/ANXIOUS: 0
INSOMNIA: 0
BRUISES/BLEEDS EASILY: 0
POLYDIPSIA: 0
DIARRHEA: 0
EXTREMITY NUMBNESS: 1
DYSURIA: 1
TINGLING: 1
FLANK PAIN: 0
ALTERED TEMPERATURE REGULATION: 0
CONSTIPATION: 0
EYE WATERING: 0
COUGH: 0
VOMITING: 0
ABDOMINAL PAIN: 0
PANIC: 0
FEVER: 0
POLYPHAGIA: 0
NAUSEA: 0
BACK PAIN: 0
DECREASED CONCENTRATION: 0
SWOLLEN GLANDS: 0
DIZZINESS: 0
DEPRESSION: 0
LEG SWELLING: 1
DYSPNEA ON EXERTION: 0

## 2020-08-31 ASSESSMENT — ANXIETY QUESTIONNAIRES
2. NOT BEING ABLE TO STOP OR CONTROL WORRYING: NOT AT ALL
6. BECOMING EASILY ANNOYED OR IRRITABLE: NOT AT ALL
1. FEELING NERVOUS, ANXIOUS, OR ON EDGE: NOT AT ALL
3. WORRYING TOO MUCH ABOUT DIFFERENT THINGS: NOT AT ALL
5. BEING SO RESTLESS THAT IT IS HARD TO SIT STILL: NOT AT ALL
7. FEELING AFRAID AS IF SOMETHING AWFUL MIGHT HAPPEN: NOT AT ALL
GAD7 TOTAL SCORE: 0

## 2020-08-31 ASSESSMENT — PATIENT HEALTH QUESTIONNAIRE - PHQ9
5. POOR APPETITE OR OVEREATING: NOT AT ALL
SUM OF ALL RESPONSES TO PHQ QUESTIONS 1-9: 4

## 2020-08-31 ASSESSMENT — PAIN SCALES - GENERAL: PAINLEVEL: NO PAIN (0)

## 2020-08-31 ASSESSMENT — MIFFLIN-ST. JEOR: SCORE: 1425.54

## 2020-08-31 NOTE — PROGRESS NOTES
HPI  Patient is here to address several issues. She is fasting today and would like to check her cholesterol and blood glucose.   Patient also reports that last Wednesday she woke up with burning with urination. She was concerned about UTI. She called Friday and had urinalysis done. She did not have urgency or other symptoms of UTI. She picked up a prescription, however, she has not started the medication. She currently does not have any symptoms. She has increased her water intake and reports that her symptoms have been under good control.   Patient reports that she has noticed increase in lower extremity edema. She is wondering if the swelling is due to something else. She also has some numbness and stinging in her feet.   She reports that she has been trying to figure out ways to work on mental health hygiene. She reports that a lot of things in media are very stressful. She has been trying to find ways to cope with the situation.     Review of Systems     Constitutional:  Positive for fatigue. Negative for fever.   HENT:  Positive for hearing loss. Negative for tinnitus.    Eyes:  Negative for decreased vision and eye watering.   Respiratory:   Negative for cough and dyspnea on exertion.    Cardiovascular:  Positive for leg swelling and edema. Negative for chest pain and dyspnea on exertion.   Gastrointestinal:  Negative for nausea, vomiting, abdominal pain, diarrhea and constipation.   Genitourinary:  Positive for dysuria. Negative for flank pain.   Musculoskeletal:  Negative for back pain and bone pain.   Skin:  Negative for itching and rash.   Neurological:  Positive for tingling and extremity numbness. Negative for dizziness and difficulty walking.   Endo/Heme:  Negative for anemia, swollen glands and bruises/bleeds easily.   Psychiatric/Behavioral:  Negative for depression, decreased concentration, mood swings and panic attacks.    Endocrine:  Negative for altered temperature regulation, polyphagia,  polydipsia, unwanted hair growth and change in facial hair.    Current Outpatient Medications   Medication     alendronate (FOSAMAX) 70 MG tablet     ASPIRIN PO     ciclopirox (LOPROX) 0.77 % cream     losartan (COZAAR) 50 MG tablet     simvastatin (ZOCOR) 10 MG tablet     amoxicillin (AMOXIL) 500 MG tablet     nitroFURantoin macrocrystal-monohydrate (MACROBID) 100 MG capsule     No current facility-administered medications for this visit.      Past Medical History:   Diagnosis Date     Hearing problem 2017     Hyperlipidemia 2005     Hyperlipidemia LDL goal < 130      Hypertension      Osteoporosis, post-menopausal      Pelvic fracture (H)      Past Surgical History:   Procedure Laterality Date     ARTHROPLASTY KNEE Left 8/28/2018    Procedure: ARTHROPLASTY KNEE;  Left Total Knee Replacement;  Surgeon: Pierre Campos MD;  Location: [facility] OR     KNEE SURGERY       No prior surgeries       ORTHOPEDIC SURGERY       Family History   Problem Relation Age of Onset     Osteoporosis Mother      Hypertension Brother      Cerebrovascular Disease Brother      Cerebrovascular Disease Brother      C.A.D. No family hx of      Diabetes No family hx of      Skin Cancer No family hx of      Melanoma No family hx of      Dementia No family hx of      Heart Disease No family hx of      Social History     Socioeconomic History     Marital status: Single     Spouse name: Not on file     Number of children: Not on file     Years of education: Not on file     Highest education level: Not on file   Occupational History     Not on file   Social Needs     Financial resource strain: Not on file     Food insecurity     Worry: Not on file     Inability: Not on file     Transportation needs     Medical: Not on file     Non-medical: Not on file   Tobacco Use     Smoking status: Former Smoker     Packs/day: 0.00     Years: 0.00     Pack years: 0.00     Types: Cigarettes     Smokeless tobacco: Never Used   Substance and Sexual Activity     Alcohol  "use: Yes     Comment: Rare     Drug use: No     Sexual activity: Not Currently     Partners: Male   Lifestyle     Physical activity     Days per week: Not on file     Minutes per session: Not on file     Stress: Not on file   Relationships     Social connections     Talks on phone: Not on file     Gets together: Not on file     Attends Shinto service: Not on file     Active member of club or organization: Not on file     Attends meetings of clubs or organizations: Not on file     Relationship status: Not on file     Intimate partner violence     Fear of current or ex partner: Not on file     Emotionally abused: Not on file     Physically abused: Not on file     Forced sexual activity: Not on file   Other Topics Concern     Parent/sibling w/ CABG, MI or angioplasty before 65F 55M? Not Asked   Social History Narrative    Lives in her own home with two flights of stairs.  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  Had two brothers who both  of CVA. Participates in seniors program at [facility].           Vitals:    20 0903 20 0915 20 0916 20 0917   BP: 120/78 116/79 118/79 118/79   Pulse: 78 78 78 78   Weight: 91.2 kg (201 lb)      Height: 1.664 m (5' 5.5\")        Physical Exam  Vitals signs and nursing note reviewed.   Constitutional:       Appearance: Normal appearance.   HENT:      Head: Normocephalic and atraumatic.   Eyes:      Pupils: Pupils are equal, round, and reactive to light.   Neck:      Musculoskeletal: Normal range of motion.   Cardiovascular:      Rate and Rhythm: Normal rate.      Pulses: Normal pulses.   Musculoskeletal:         General: Edema present.   Skin:     General: Skin is warm and dry.   Neurological:      Mental Status: She is alert and oriented to person, place, and time.   Psychiatric:         Mood and Affect: Mood normal.         Behavior: Behavior normal.         Thought Content: Thought content normal.         Judgment: Judgment normal. "       Assessment and Plan:  Esperanza was seen today for prenatal care.    Diagnoses and all orders for this visit:    Numbness and tingling of both feet. Discussed possible causes, strongly favor neuropathy. Recommend evaluation by Neurology.   -     NEUROLOGY ADULT REFERRAL  -     TSH with free T4 reflex  -     Vitamin B12    Essential hypertension. Patient has well-controlled hypertension. Recommend to continue with current medical therapy without changes.   -     losartan (COZAAR) 50 MG tablet; Take 1 tablet (50 mg) by mouth daily  -     Basic metabolic panel  -     COMPREHENSIVE WEIGHT MANAGEMENT    Pure hypercholesterolemia. Will check lipid panel today, patient will be advised accordingly.   -     simvastatin (ZOCOR) 10 MG tablet; TAKE 1 TABLET BY MOUTH ONCE DAILY AT BEDTIME  -     Lipid Profile    Senile osteoporosis  -     alendronate (FOSAMAX) 70 MG tablet; Take 1 tablet (70 mg) by mouth every 7 days Take 1 tablet once a week with a glass of water 30 minutes prior to breakfast  -     25 OH Vit D therapy monitoring    Impaired fasting glucose  -     Hemoglobin A1c    Preventative health care  -     Mammogram Screening Bilateral; Future    Osteopenia of multiple sites  -     Dexa Hip, Pelvis, Spine; Future    Encounter for screening mammogram for malignant neoplasm of breast   -     Mammogram Screening Bilateral; Future    Adjustment disorder with anxious mood. Discussed increased stress and anxiety due to changes related to coronavirus pandemic. Recommend counseling, referral was placed today.   -     MENTAL HEALTH REFERRAL  - Adult; Outpatient Treatment; Individual/Couples/Family/Group Therapy/Health Psychology; [facility] [place] [phone]; We will contact you to schedule the appointment or please call with any questions    Change in skin mole  -     DERMATOLOGY ADULT REFERRAL; Future    Total time spent 40 minutes.  More than 50% of the time spent with Ms. Gage on counseling / coordinating  her care    Julia Gifford MD

## 2020-08-31 NOTE — LETTER
8/31/2020       RE: Esperanza Gage  [address] [place] [address]     Dear Colleague,    Thank you for referring your patient, Esperanza Gage, to the [facility]  at [facility]. Please see a copy of my visit note below.    HPI  Patient is here to address several issues. She is fasting today and would like to check her cholesterol and blood glucose.   Patient also reports that last Wednesday she woke up with burning with urination. She was concerned about UTI. She called Friday and had urinalysis done. She did not have urgency or other symptoms of UTI. She picked up a prescription, however, she has not started the medication. She currently does not have any symptoms. She has increased her water intake and reports that her symptoms have been under good control.   Patient reports that she has noticed increase in lower extremity edema. She is wondering if the swelling is due to something else. She also has some numbness and stinging in her feet.   She reports that she has been trying to figure out ways to work on mental health hygiene. She reports that a lot of things in media are very stressful. She has been trying to find ways to cope with the situation.     Review of Systems     Constitutional:  Positive for fatigue. Negative for fever.   HENT:  Positive for hearing loss. Negative for tinnitus.    Eyes:  Negative for decreased vision and eye watering.   Respiratory:   Negative for cough and dyspnea on exertion.    Cardiovascular:  Positive for leg swelling and edema. Negative for chest pain and dyspnea on exertion.   Gastrointestinal:  Negative for nausea, vomiting, abdominal pain, diarrhea and constipation.   Genitourinary:  Positive for dysuria. Negative for flank pain.   Musculoskeletal:  Negative for back pain and bone pain.   Skin:  Negative for itching and rash.   Neurological:  Positive for tingling and extremity numbness. Negative for  dizziness and difficulty walking.   Endo/Heme:  Negative for anemia, swollen glands and bruises/bleeds easily.   Psychiatric/Behavioral:  Negative for depression, decreased concentration, mood swings and panic attacks.    Endocrine:  Negative for altered temperature regulation, polyphagia, polydipsia, unwanted hair growth and change in facial hair.    Current Outpatient Medications   Medication     alendronate (FOSAMAX) 70 MG tablet     ASPIRIN PO     ciclopirox (LOPROX) 0.77 % cream     losartan (COZAAR) 50 MG tablet     simvastatin (ZOCOR) 10 MG tablet     amoxicillin (AMOXIL) 500 MG tablet     nitroFURantoin macrocrystal-monohydrate (MACROBID) 100 MG capsule     No current facility-administered medications for this visit.      Past Medical History:   Diagnosis Date     Hearing problem 2017     Hyperlipidemia 2005     Hyperlipidemia LDL goal < 130      Hypertension      Osteoporosis, post-menopausal      Pelvic fracture (H)      Past Surgical History:   Procedure Laterality Date     ARTHROPLASTY KNEE Left 8/28/2018    Procedure: ARTHROPLASTY KNEE;  Left Total Knee Replacement;  Surgeon: Pierre Campos MD;  [facility]     KNEE SURGERY       No prior surgeries       ORTHOPEDIC SURGERY       Family History   Problem Relation Age of Onset     Osteoporosis Mother      Hypertension Brother      Cerebrovascular Disease Brother      Cerebrovascular Disease Brother      C.A.D. No family hx of      Diabetes No family hx of      Skin Cancer No family hx of      Melanoma No family hx of      Dementia No family hx of      Heart Disease No family hx of      Social History     Socioeconomic History     Marital status: Single     Spouse name: Not on file     Number of children: Not on file     Years of education: Not on file     Highest education level: Not on file   Occupational History     Not on file   Social Needs     Financial resource strain: Not on file     Food insecurity     Worry: Not on file     Inability: Not  "on file     Transportation needs     Medical: Not on file     Non-medical: Not on file   Tobacco Use     Smoking status: Former Smoker     Packs/day: 0.00     Years: 0.00     Pack years: 0.00     Types: Cigarettes     Smokeless tobacco: Never Used   Substance and Sexual Activity     Alcohol use: Yes     Comment: Rare     Drug use: No     Sexual activity: Not Currently     Partners: Male   Lifestyle     Physical activity     Days per week: Not on file     Minutes per session: Not on file     Stress: Not on file   Relationships     Social connections     Talks on phone: Not on file     Gets together: Not on file     Attends Evangelical service: Not on file     Active member of club or organization: Not on file     Attends meetings of clubs or organizations: Not on file     Relationship status: Not on file     Intimate partner violence     Fear of current or ex partner: Not on file     Emotionally abused: Not on file     Physically abused: Not on file     Forced sexual activity: Not on file   Other Topics Concern     Parent/sibling w/ CABG, MI or angioplasty before 65F 55M? Not Asked   Social History Narrative    Lives in her own home with two flights of stairs.  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  Had two brothers who both  of CVA. Participates in seniors program at [facility].           Vitals:    20 0903 20 0915 20 0916 20 0917   BP: 120/78 116/79 118/79 118/79   Pulse: 78 78 78 78   Weight: 91.2 kg (201 lb)      Height: 1.664 m (5' 5.5\")        Physical Exam  Vitals signs and nursing note reviewed.   Constitutional:       Appearance: Normal appearance.   HENT:      Head: Normocephalic and atraumatic.   Eyes:      Pupils: Pupils are equal, round, and reactive to light.   Neck:      Musculoskeletal: Normal range of motion.   Cardiovascular:      Rate and Rhythm: Normal rate.      Pulses: Normal pulses.   Musculoskeletal:         General: Edema present.   Skin:     General: " Skin is warm and dry.   Neurological:      Mental Status: She is alert and oriented to person, place, and time.   Psychiatric:         Mood and Affect: Mood normal.         Behavior: Behavior normal.         Thought Content: Thought content normal.         Judgment: Judgment normal.       Assessment and Plan:  Esperanza was seen today for prenatal care.    Diagnoses and all orders for this visit:    Numbness and tingling of both feet. Discussed possible causes, strongly favor neuropathy. Recommend evaluation by Neurology.   -     NEUROLOGY ADULT REFERRAL  -     TSH with free T4 reflex  -     Vitamin B12    Essential hypertension. Patient has well-controlled hypertension. Recommend to continue with current medical therapy without changes.   -     losartan (COZAAR) 50 MG tablet; Take 1 tablet (50 mg) by mouth daily  -     Basic metabolic panel  -     COMPREHENSIVE WEIGHT MANAGEMENT    Pure hypercholesterolemia. Will check lipid panel today, patient will be advised accordingly.   -     simvastatin (ZOCOR) 10 MG tablet; TAKE 1 TABLET BY MOUTH ONCE DAILY AT BEDTIME  -     Lipid Profile    Senile osteoporosis  -     alendronate (FOSAMAX) 70 MG tablet; Take 1 tablet (70 mg) by mouth every 7 days Take 1 tablet once a week with a glass of water 30 minutes prior to breakfast  -     25 OH Vit D therapy monitoring    Impaired fasting glucose  -     Hemoglobin A1c    Preventative health care  -     Mammogram Screening Bilateral; Future    Osteopenia of multiple sites  -     Dexa Hip, Pelvis, Spine; Future    Encounter for screening mammogram for malignant neoplasm of breast   -     Mammogram Screening Bilateral; Future    Adjustment disorder with anxious mood. Discussed increased stress and anxiety due to changes related to coronavirus pandemic. Recommend counseling, referral was placed today.   -     MENTAL HEALTH REFERRAL  - Adult; Outpatient Treatment; Individual/Couples/Family/Group Therapy/Health Psychology; [facility] [phone]; We will contact you to schedule the appointment or please call with any questions    Change in skin mole  -     DERMATOLOGY ADULT REFERRAL; Future    Total time spent 40 minutes.  More than 50% of the time spent with Ms. Gage on counseling / coordinating her care    Julia Gifford MD

## 2020-09-01 ASSESSMENT — ANXIETY QUESTIONNAIRES: GAD7 TOTAL SCORE: 0

## 2020-09-02 LAB
DEPRECATED CALCIDIOL+CALCIFEROL SERPL-MC: <32 UG/L (ref 20–75)
VITAMIN D2 SERPL-MCNC: <5 UG/L
VITAMIN D3 SERPL-MCNC: 27 UG/L

## 2020-09-14 ENCOUNTER — TELEPHONE (OUTPATIENT)
Dept: ENDOCRINOLOGY | Facility: CLINIC | Age: 75
End: 2020-09-14

## 2020-09-14 ENCOUNTER — PRE VISIT (OUTPATIENT)
Dept: NEUROLOGY | Facility: CLINIC | Age: 75
End: 2020-09-14

## 2020-09-14 NOTE — TELEPHONE ENCOUNTER
[facility]   Melinda Marr, EMT

## 2020-09-14 NOTE — TELEPHONE ENCOUNTER
FUTURE VISIT INFORMATION      FUTURE VISIT INFORMATION:    Date: 9/28/2020    Time: 4pm    Location: [facility] [place]  REFERRAL INFORMATION:    Referring provider:  Dr. Gifford    Referring providers clinic:  [facility]     Reason for visit/diagnosis  Numbness and Tingling of feet     RECORDS REQUESTED FROM:       Clinic name Comments Records Status Imaging Status   Internal Dr. Gifford-8/31/2020, 8/12/2019, 12/26/2018 EPIC N/A

## 2020-09-15 ENCOUNTER — OFFICE VISIT (OUTPATIENT)
Dept: ENDOCRINOLOGY | Facility: CLINIC | Age: 75
End: 2020-09-15
Payer: COMMERCIAL

## 2020-09-15 VITALS
HEIGHT: 66 IN | BODY MASS INDEX: 32.38 KG/M2 | DIASTOLIC BLOOD PRESSURE: 77 MMHG | OXYGEN SATURATION: 98 % | SYSTOLIC BLOOD PRESSURE: 137 MMHG | HEART RATE: 80 BPM | WEIGHT: 201.5 LBS

## 2020-09-15 DIAGNOSIS — E66.811 CLASS 1 OBESITY DUE TO EXCESS CALORIES WITHOUT SERIOUS COMORBIDITY WITH BODY MASS INDEX (BMI) OF 33.0 TO 33.9 IN ADULT: Primary | ICD-10-CM

## 2020-09-15 DIAGNOSIS — E66.09 CLASS 1 OBESITY DUE TO EXCESS CALORIES WITHOUT SERIOUS COMORBIDITY WITH BODY MASS INDEX (BMI) OF 33.0 TO 33.9 IN ADULT: Primary | ICD-10-CM

## 2020-09-15 ASSESSMENT — MIFFLIN-ST. JEOR: SCORE: 1427.81

## 2020-09-15 ASSESSMENT — PAIN SCALES - GENERAL: PAINLEVEL: NO PAIN (0)

## 2020-09-15 NOTE — PATIENT INSTRUCTIONS
"Thank you for allowing us the privilege of caring for you. We hope we provided you with the excellent service you deserve.   Please let us know if there is anything else we can do for you so that we can be sure you are completely satisfied with your care experience.    To ensure the quality of our services you may be receiving a patient satisfaction survey from an independent patient satisfaction monitoring company.    The greatest compliment you can give is a \"Likely to Recommend\"    Your visit was with Sowmya Hammond NP today.    Instructions per today's visit:   The [facility] Diabetes Diet: 2nd Edition: Revised and Updated   I will reach out to Verna about the Diabetes Prevention Program   Try to increase activity level   Consider dietitian follow up - let me know!   Pick small achievable goals     Please call our contact center at [phone] to schedule your next appointments.  To find a lab location near you, please call [phone].  For any nursing questions or concerns call Lisseth Murphy LPN at [phone] or Morenita Malagon RN at [phone]  Please call during clinic hours Monday through Friday 8:00a - 4:00p if you have questions or you can contact us via Periscape at anytime and we will reply during clinic hours.    Lab results will be communicated through My Chart or letter (if My Chart not used). Please call the clinic if you have not received communication after 1 week or if you have any questions.?  Clinic Fax: [phone]  ______________________________________________________________________________________________________________________  Meal Replacement Products:    Here is the link to our new e-store where you can purchase our meal replacement products    [facility] [employer]/store    The one week starter kit is a great way to sample a variety of products and see what works for you.    If you want more information about the product go to: Fresh Steps Meals  " [employer]    If you are an employee or [facility] or [facility] please contact your care team for a 10% estore discount    Free Shipping for orders over $75     Benefits of meal replacements products:    Portion and calorie control  Improved nutrition  Structured eating  Simplified food choices  Avoid contact with trigger foods  _________________________________________________________________________________________________________________________________  Interested in working with a health ?  Health coaches work with you to improve your overall health and wellbeing.  They look at the whole person, and may involve discussion of different areas of life, including, but not limited to the four pillars of health (sleep, exercise, nutrition, and stress management). Discuss with your care team if you would like to start working a health .  Health Coaching-3 Pack: Schedule by calling [phone]    $99 for three health coaching visits    Visits may be done in person or via phone    Coaching is a partnership between the  and the client; Coaches do not prescribe or diagnose    Coaching helps inspire the client to reach his/her personal goals   _________________________________________________________________________________________________________________________________  Healthy Lifestyle Support Group   [facility] Weight Management Program  Virtual Support Group Now Available    60 minutes of small group guided discussion, support and resources    Led by Health , Aminah Cruz    For questions, and to receive the invite information, contact Aminah at [email or web address]    All our welcome!    One Friday per month, 12:30pm to 1:30pm  SELF COMPASSION: July 31st  CREATING MY WELLNESS VISION: August 28th  MINDFULNESS PRACTICES FOR A CALM BODY & MIND: September 25th  MINDFUL EATING TOOLS: October 30th  HEALTHY& HAPPY HOLIDAYS: November 20th  OPEN FORUM:  December 18th  _______________________________________________________________________________________________________________________________  Connections: Bariatric Care support group  Due to the Covid-19 restrictions, we will be doing our support group virtually using Microsoft Teams. You will need to get an invitation to the group from Ton Queen, Ph.D., LP at [email or web address] and to learn about using Microsoft Teams. The next meeting is on Tuesday, July 14 between 6:30 and 8 PM and there will be no formal speaker.    This group meets the second Tuesday of each month from 6:30 to 8 PM and will be held again at [facility] in the [address] [place] [address] (A-B room) when the Covid-19 restrictions are lifted. The group is led by Ton Queen, Ph.D., Licensed Psychologist, [facility] Comprehensive Weight Management Program. There is no cost for group participation and is open to all [facility] (and those external to this program) pre- and post-operative bariatric surgery patients as well as their support system.   _________________________________________________________________________________________________________________________________  [facility] Get Moving Program  Our team of physical therapists is trained to help you understand and take control of your condition. They will perform a thorough evaluation to determine your ability for activity and develop a customized plan to fit your goals and physical ability.  Scheduling: Unsure if the Get Moving program is right for you? Discuss the program with your medical provider or diabetes educator. You can also call us at [phone] to ask questions or schedule an appointment.   [facility] Get Moving Program  ______________________________________________________________________________________________________________________  [facility] Diabetes Prevention Program (DPP)  If you have prediabetes  and Medicare please contact us by LivQuik or phone to learn more about our Diabetes Prevention Program  _______________________________________________________________________________________________________________________  Bluetooth Scale:    We hope to provide you with high quality telephone and virtual healthcare visits while social distancing for COVID-19 is necessary, as well as in the future when virtual visits may be more convenient for you.     Our technology team made it possible for Bluetooth scales to send weight measurements to our electronic medical record. This allows weights from you weighing at home to securely flow into the medical record, which will improve telephone and virtual visits.   Additionally, studies have shown that adults actually lose more weight when their weights are automatically sent to someone else, and also that this process is not stressful for those adults.    Below is a link for purchasing the scale, with a discount code for our patients. You may call your insurance company to see if they will reimburse you for the cost of the scale, as a piece of durable medical equipment. The scales only go up to a weight of 400 pounds. This is an issue and we are working with the developer on increasing this. We found no scales that go over 400lb that have blue-tooth for connecting to Liv[employer]ik.    Scale to purchase: the [employer]  Body  Scale: [email or web address]/[email or web address]=aw.ds    Discount Code: We have a discount code for our patients to bring the cost down to $50, Discount code is: [place]_Scale_20%off  Thank you,   [facility] Comprehensive Weight Management Team                                             Scribe Attestation (For Scribes USE Only)... Scribe Attestation (For Scribes USE Only).../Attending Attestation (For Attendings USE Only)...

## 2020-09-15 NOTE — NURSING NOTE
"Chief Complaint   Patient presents with     Consult     New appointment.       Vitals:    09/15/20 1339   BP: 137/77   BP Location: Left arm   Patient Position: Sitting   Cuff Size: Adult Large   Pulse: 80   SpO2: 98%   Weight: 91.4 kg (201 lb 8 oz)   Height: 1.664 m (5' 5.5\")       Body mass index is 33.02 kg/m .                            QING ODOM, EMT    "

## 2020-09-15 NOTE — LETTER
"9/15/2020       RE: Esperanza Gage  [address] [place] [address]     Dear Colleague,    Thank you for referring your patient, Esperanza Gage, to the [facility] MEDICAL WEIGHT MANAGEMENT at [facility]. Please see a copy of my visit note below.        New Medical Weight Management Consult    PATIENT:  Esperanza Gage  MRN:         [id number]  :         1945  ELIDA:         9/15/2020    Dear Dr. Gifford    I had the pleasure of seeing your patient, Esperanza Gage. Full intake/assessment was done to determine barriers to weight loss success and develop a treatment plan. Esperanza Gage is a 74 year old female interested in treatment of medical problems associated with excess weight. She has a height of 5' 5.5\", a weight of 201 lbs 8 oz, and the calculated Body mass index is 33.02 kg/m .    ASSESSMENT/PLAN:  Esperanza is a patient with early onset obesity with significant element of familial/genetic influence and with current health consequences. She would like to work on her weight to feel healthier and more like herself. She is frustrated with her inability to lose weight despite being highly educated about nutrition.  Esperanza Gage occasionally uses food as mood management.    Her problem is complicated by impaired metabolic perception. Discussed how metabolism goes down with age, menopause, and is also affected by decreased activity level. What worked before may not work now.     Her ability to lose weight is impacted by lack of education on nutrition and dietary needs. While she is highly educated in nutrition, we have identified a gap in knowledge surrounding carbohydrate and pre-diabetes.     PLAN:    Dietician visit of education- patient would prefer to have materials to read for now but would consider dietitian in the future. Discussed how they could help her implement change. For now, she will read the [facility] Diet on Diabetes.     Consider Diabetes Prevention Program " "that is starting in October through [facility]- patient does not have internet at home. She will briefly look at e-mail on breaks at work. This may be a barrier  Increase activity by 15 times per day    Discussed small achievable goals to help gain confidence and energy to continue forward with goal setting.       She has the following co-morbidities:       9/15/2020   I have the following health issues associated with obesity: Pre-Diabetes, High Cholesterol, GERD (Reflux)   I have the following symptoms associated with obesity: Lower Extremity Swelling, Back Pain, Hip Pain     Has had pre-diabetes for a number of years, admits she has never really looked into it much   Patient Goals 9/15/2020   I am interested in having a healthier weight to diminish current health problems: Yes   I am interested in having a healthier weight in order to prevent future health problems: Yes   I am interested in having a healthier weight in order to have a future surgery: No       Referring Provider 9/15/2020   Please name the provider who referred you to Medical Weight Management.  If you do not know, please answer: \"I Don't Know\". self       Weight History 9/15/2020   How concerned are you about your weight? Very Concerned   Would you describe your weight gain as gradual? Yes   I became overweight: As an Adult   The following factors have contributed to my weight gain:  Other   Please list the other factors.  pandemic recently   I have tried the following methods to lose weight: Watching Portions or Calories, Other   Please list the other methods.  pre diabetes class lost 15 lb   My lowest weight since age 19 was: 120   My highest weight since age 19 was: 201.5   The most weight I have ever lost was: (lbs) 15   I have the following family history of obesity/being overweight:  I am the only one in my immediate family who is overweight   Has anyone in your family had weight loss surgery? No   How has your weight changed over the " "last year?  Gained     142 in 1997 when she moved to [place]- mother was ill over 7 years- gained 10lbs around that time   Gradual weight gain over time   Feels like her weight today is \"not who I am\"    Diet Recall Review with Patient 9/15/2020   Do you typically eat breakfast? Yes   If you do eat breakfast, what types of food do you eat? had 80 joaquina seeded bread this morning   Do you typically eat lunch? Yes   If you do eat lunch, what types of food do you typically eat?  fruit, half of pb sandwich, whole is overwhelming- high fiber bread   Do you typically eat supper? Yes   If you do eat supper, what types of food do you typically eat? went to [employer] last night, had brown rice with curried vegetables   Do you typically eat snacks? Yes   If you do snack, what types of food do you typically eat? veggies   Do you like vegetables?  Yes   Do you drink water? Yes   How many glasses of juice do you drink in a typical day? 0   How many of glasses of milk do you drink in a typical day? 0   How many 8oz glasses of sugar containing drinks such as Alexei-Aid/sweet tea do you drink in a day? 0   How many cans/bottles of sugar pop/soda/tea/sports drinks do you drink in a day? 0   How many cans/bottles of diet pop/soda/tea or sports drink do you drink in a day? 0   How often do you have a drink of alcohol? Never     Always chooses water when possible   Juice occasionally - 50/50  Feels like she can note her blood sugars dropping if she goes without eating too long- gets cranky, energy decreased  Tends to look for high fiber foods   Looks for protein sources as well     Eating Habits 9/15/2020   Generally, my meals include foods like these: bread, pasta, rice, potatoes, corn, crackers, sweet dessert, pop, or juice. Everyday   Generally, my meals include foods like these: fried meats, brats, burgers, french fries, pizza, cheese, chips, or ice cream. Never   Eat fast food (like McDonalds, Burger Jeramy, Taco Bell). Never   Eat at a " buffet or sit-down restaurant. Never   Eat most of my meals in front of the TV or computer. Everyday   Often skip meals, eat at random times, have no regular eating times. Never   Rarely sit down for a meal but snack or graze throughout.  Never   Eat extra snacks between meals. Almost Everyday   Eat most of my food at the end of the day. Less Than Weekly   Eat in the middle of the night or wake up at night to eat. Never   Eat extra snacks to prevent or correct low blood sugar. Never   Eat to prevent acid reflux or stomach pain. Never   Worry about not having enough food to eat. Never   Have you been to the food shelf at least a few times this year? No   I eat when I am depressed. Never   I eat when I am stressed. Never   I eat when I am bored. A Few Times a Week   I eat when I am anxious. Never   I eat when I am happy or as a reward. Never   I feel hungry all the time even if I just have eaten. Never   Feeling full is important to me. Never   I finish all the food on my plate even if I am already full. Almost Everyday   I can't resist eating delicious food or walk past the good food/smell. Never   I eat/snack without noticing that I am eating. Never   I eat when I am preparing the meal. Less Than Weekly   I eat more than usual when I see others eating. Once a Week   I have trouble not eating sweets, ice cream, cookies, or chips if they are around the house. A Few Times a Week   I think about food all day. Never   Please list any other foods you crave? carbs       Amount of Food 9/15/2020   I make myself vomit what I have eaten or use laxatives to get rid of food. Never   I eat a large amount of food, like a loaf of bread, a box of cookies, a pint/quart of ice cream, all at once. Never   I eat a large amount of food even when I am not hungry. Never   I eat rapidly. Monthly   I eat alone because I feel embarrassed and do not want others to see how much I have eaten. Never   I eat until I am uncomfortably full. Never    I feel bad, disgusted, or guilty after I overeat. Monthly   I make myself vomit what I have eaten or use laxatives to get rid of food. Never       Activity/Exercise History 9/15/2020   How much of a typical 12 hour day do you spend sitting? Less Than Half the Day   How much of a typical 12 hour day do you spend lying down? Less Than Half the Day   How much of a typical day do you spend walking/standing? Most of the Day   How many hours (not including work) do you spend on the TV/Video Games/Computer/Tablet/Phone? 2-3 Hours   How many times a week are you active for the purpose of exercise? 6-7 Times a Week   What keeps you from being more active? Pain, Other   How many total minutes do you spend doing some activity for the purpose of exercising when you exercise? 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 75%   What is your marital status? Single   If in a relationship, is your significant other overweight? N/A   Do you have children? No   Who do you live with?  self   Are they supportive of your health goals? Yes   Who does the food shopping?  self       Mental Health History Reviewed With Patient 9/15/2020   Have you ever been physically or sexually abused? No   If yes, do you feel that the abuse is affecting your weight? No   If yes, would you like to talk to a counselor about the abuse? No   How often in the past 2 weeks have you felt little interest or pleasure in doing  "things? Not at all   Over the past 2 weeks how often have you felt down, depressed, or hopeless? Not at all       Sleep History Reviewed With Patient 9/15/2020   How many hours do you sleep at night? 8   Do you think that you snore loudly or has anybody ever heard you snore loudly (louder than talking or so loud it can be heard behind a shut door)? No   Has anyone seen or heard you stop breathing during your sleep? No   Do you often feel tired, fatigued, or sleepy during the day? No   Do you have a TV/Computer in your bedroom? No     MEDICATIONS:   Current Outpatient Medications   Medication Sig Dispense Refill     alendronate (FOSAMAX) 70 MG tablet Take 1 tablet (70 mg) by mouth every 7 days Take 1 tablet once a week with a glass of water 30 minutes prior to breakfast 15 tablet 3     amoxicillin (AMOXIL) 500 MG tablet Take 4 tablets by mouth one hour before Dental Appointment. 4 tablet 4     ASPIRIN PO Take 81 mg by mouth daily       ciclopirox (LOPROX) 0.77 % cream Apply topically 2 times daily       losartan (COZAAR) 50 MG tablet Take 1 tablet (50 mg) by mouth daily 90 tablet 3     simvastatin (ZOCOR) 10 MG tablet TAKE 1 TABLET BY MOUTH ONCE DAILY AT BEDTIME 90 tablet 3       ALLERGIES:   No Known Allergies    PHYSICAL EXAM:  /77 (BP Location: Left arm, Patient Position: Sitting, Cuff Size: Adult Large)   Pulse 80   Ht 1.664 m (5' 5.5\")   Wt 91.4 kg (201 lb 8 oz)   SpO2 98%   BMI 33.02 kg/m      Waist circumference: 102 cm    Wt Readings from Last 4 Encounters:   09/15/20 91.4 kg (201 lb 8 oz)   08/31/20 91.2 kg (201 lb)   10/12/19 89.4 kg (197 lb)   08/12/19 89.4 kg (197 lb)     A & O x 3  HEENT: NCAT, mucous membranes moist  Respirations unlabored  Location of obesity: Mixed Obesity    FOLLOW-UP:   12 weeks.    TIME: 30 min spent on evaluation, management, counseling, education, & motivational interviewing with greater than 50 % of the total time was spent on counseling and coordinating " care    Sincerely,    Sowmya Hammond NP

## 2020-09-15 NOTE — PROGRESS NOTES
"    New Medical Weight Management Consult    PATIENT:  Esperanza Gage  MRN:         [id number]  :         1945  ELIDA:         9/15/2020    Dear Dr. Gifford    I had the pleasure of seeing your patient, Esperanza Gage. Full intake/assessment was done to determine barriers to weight loss success and develop a treatment plan. Esperanza Gage is a 74 year old female interested in treatment of medical problems associated with excess weight. She has a height of 5' 5.5\", a weight of 201 lbs 8 oz, and the calculated Body mass index is 33.02 kg/m .    ASSESSMENT/PLAN:  Esperanza is a patient with early onset obesity with significant element of familial/genetic influence and with current health consequences. She would like to work on her weight to feel healthier and more like herself. She is frustrated with her inability to lose weight despite being highly educated about nutrition.  Esperanza Gage occasionally uses food as mood management.    Her problem is complicated by impaired metabolic perception. Discussed how metabolism goes down with age, menopause, and is also affected by decreased activity level. What worked before may not work now.     Her ability to lose weight is impacted by lack of education on nutrition and dietary needs. While she is highly educated in nutrition, we have identified a gap in knowledge surrounding carbohydrate and pre-diabetes.     PLAN:    Dietician visit of education- patient would prefer to have materials to read for now but would consider dietitian in the future. Discussed how they could help her implement change. For now, she will read the [facility] Diet on Diabetes.     Consider Diabetes Prevention Program that is starting in October through [facility]- patient does not have internet at home. She will briefly look at e-mail on breaks at work. This may be a barrier  Increase activity by 15 times per day    Discussed small achievable goals to help gain confidence and energy to continue " "forward with goal setting.       She has the following co-morbidities:       9/15/2020   I have the following health issues associated with obesity: Pre-Diabetes, High Cholesterol, GERD (Reflux)   I have the following symptoms associated with obesity: Lower Extremity Swelling, Back Pain, Hip Pain     Has had pre-diabetes for a number of years, admits she has never really looked into it much   Patient Goals 9/15/2020   I am interested in having a healthier weight to diminish current health problems: Yes   I am interested in having a healthier weight in order to prevent future health problems: Yes   I am interested in having a healthier weight in order to have a future surgery: No       Referring Provider 9/15/2020   Please name the provider who referred you to Medical Weight Management.  If you do not know, please answer: \"I Don't Know\". self       Weight History 9/15/2020   How concerned are you about your weight? Very Concerned   Would you describe your weight gain as gradual? Yes   I became overweight: As an Adult   The following factors have contributed to my weight gain:  Other   Please list the other factors.  pandemic recently   I have tried the following methods to lose weight: Watching Portions or Calories, Other   Please list the other methods.  pre diabetes class lost 15 lb   My lowest weight since age 19 was: 120   My highest weight since age 19 was: 201.5   The most weight I have ever lost was: (lbs) 15   I have the following family history of obesity/being overweight:  I am the only one in my immediate family who is overweight   Has anyone in your family had weight loss surgery? No   How has your weight changed over the last year?  Gained     142 in 1997 when she moved to [place]- mother was ill over 7 years- gained 10lbs around that time   Gradual weight gain over time   Feels like her weight today is \"not who I am\"    Diet Recall Review with Patient 9/15/2020   Do you typically eat breakfast? Yes   If you " do eat breakfast, what types of food do you eat? had 80 joaquina seeded bread this morning   Do you typically eat lunch? Yes   If you do eat lunch, what types of food do you typically eat?  fruit, half of pb sandwich, whole is overwhelming- high fiber bread   Do you typically eat supper? Yes   If you do eat supper, what types of food do you typically eat? went to [employer] last night, had brown rice with curried vegetables   Do you typically eat snacks? Yes   If you do snack, what types of food do you typically eat? veggies   Do you like vegetables?  Yes   Do you drink water? Yes   How many glasses of juice do you drink in a typical day? 0   How many of glasses of milk do you drink in a typical day? 0   How many 8oz glasses of sugar containing drinks such as Alexei-Aid/sweet tea do you drink in a day? 0   How many cans/bottles of sugar pop/soda/tea/sports drinks do you drink in a day? 0   How many cans/bottles of diet pop/soda/tea or sports drink do you drink in a day? 0   How often do you have a drink of alcohol? Never     Always chooses water when possible   Juice occasionally - 50/50  Feels like she can note her blood sugars dropping if she goes without eating too long- gets cranky, energy decreased  Tends to look for high fiber foods   Looks for protein sources as well     Eating Habits 9/15/2020   Generally, my meals include foods like these: bread, pasta, rice, potatoes, corn, crackers, sweet dessert, pop, or juice. Everyday   Generally, my meals include foods like these: fried meats, brats, burgers, french fries, pizza, cheese, chips, or ice cream. Never   Eat fast food (like McDonalds, Bliips, Taco Bell). Never   Eat at a buffet or sit-down restaurant. Never   Eat most of my meals in front of the TV or computer. Everyday   Often skip meals, eat at random times, have no regular eating times. Never   Rarely sit down for a meal but snack or graze throughout.  Never   Eat extra snacks between meals. Almost  Everyday   Eat most of my food at the end of the day. Less Than Weekly   Eat in the middle of the night or wake up at night to eat. Never   Eat extra snacks to prevent or correct low blood sugar. Never   Eat to prevent acid reflux or stomach pain. Never   Worry about not having enough food to eat. Never   Have you been to the food shelf at least a few times this year? No   I eat when I am depressed. Never   I eat when I am stressed. Never   I eat when I am bored. A Few Times a Week   I eat when I am anxious. Never   I eat when I am happy or as a reward. Never   I feel hungry all the time even if I just have eaten. Never   Feeling full is important to me. Never   I finish all the food on my plate even if I am already full. Almost Everyday   I can't resist eating delicious food or walk past the good food/smell. Never   I eat/snack without noticing that I am eating. Never   I eat when I am preparing the meal. Less Than Weekly   I eat more than usual when I see others eating. Once a Week   I have trouble not eating sweets, ice cream, cookies, or chips if they are around the house. A Few Times a Week   I think about food all day. Never   Please list any other foods you crave? carbs       Amount of Food 9/15/2020   I make myself vomit what I have eaten or use laxatives to get rid of food. Never   I eat a large amount of food, like a loaf of bread, a box of cookies, a pint/quart of ice cream, all at once. Never   I eat a large amount of food even when I am not hungry. Never   I eat rapidly. Monthly   I eat alone because I feel embarrassed and do not want others to see how much I have eaten. Never   I eat until I am uncomfortably full. Never   I feel bad, disgusted, or guilty after I overeat. Monthly   I make myself vomit what I have eaten or use laxatives to get rid of food. Never       Activity/Exercise History 9/15/2020   How much of a typical 12 hour day do you spend sitting? Less Than Half the Day   How much of a  typical 12 hour day do you spend lying down? Less Than Half the Day   How much of a typical day do you spend walking/standing? Most of the Day   How many hours (not including work) do you spend on the TV/Video Games/Computer/Tablet/Phone? 2-3 Hours   How many times a week are you active for the purpose of exercise? 6-7 Times a Week   What keeps you from being more active? Pain, Other   How many total minutes do you spend doing some activity for the purpose of exercising when you exercise? 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 75%   What is your marital status? Single   If in a relationship, is your significant other overweight? N/A   Do you have children? No   Who do you live with?  self   Are they supportive of your health goals? Yes   Who does the food shopping?  self       Mental Health History Reviewed With Patient 9/15/2020   Have you ever been physically or sexually abused? No   If yes, do you feel that the abuse is affecting your weight? No   If yes, would you like to talk to a counselor about the abuse? No   How often in the past 2 weeks have you felt little interest or pleasure in doing things? Not at all   Over the past 2 weeks how often have you felt down, depressed, or hopeless? Not at all       Sleep History Reviewed With Patient 9/15/2020   How many hours do you sleep at night? 8   Do you think that you snore loudly or has anybody ever heard you snore loudly  "(louder than talking or so loud it can be heard behind a shut door)? No   Has anyone seen or heard you stop breathing during your sleep? No   Do you often feel tired, fatigued, or sleepy during the day? No   Do you have a TV/Computer in your bedroom? No       MEDICATIONS:   Current Outpatient Medications   Medication Sig Dispense Refill     alendronate (FOSAMAX) 70 MG tablet Take 1 tablet (70 mg) by mouth every 7 days Take 1 tablet once a week with a glass of water 30 minutes prior to breakfast 15 tablet 3     amoxicillin (AMOXIL) 500 MG tablet Take 4 tablets by mouth one hour before Dental Appointment. 4 tablet 4     ASPIRIN PO Take 81 mg by mouth daily       ciclopirox (LOPROX) 0.77 % cream Apply topically 2 times daily       losartan (COZAAR) 50 MG tablet Take 1 tablet (50 mg) by mouth daily 90 tablet 3     simvastatin (ZOCOR) 10 MG tablet TAKE 1 TABLET BY MOUTH ONCE DAILY AT BEDTIME 90 tablet 3       ALLERGIES:   No Known Allergies    PHYSICAL EXAM:  /77 (BP Location: Left arm, Patient Position: Sitting, Cuff Size: Adult Large)   Pulse 80   Ht 1.664 m (5' 5.5\")   Wt 91.4 kg (201 lb 8 oz)   SpO2 98%   BMI 33.02 kg/m      Waist circumference: 102 cm    Wt Readings from Last 4 Encounters:   09/15/20 91.4 kg (201 lb 8 oz)   08/31/20 91.2 kg (201 lb)   10/12/19 89.4 kg (197 lb)   08/12/19 89.4 kg (197 lb)     A & O x 3  HEENT: NCAT, mucous membranes moist  Respirations unlabored  Location of obesity: Mixed Obesity    FOLLOW-UP:   12 weeks.    TIME: 30 min spent on evaluation, management, counseling, education, & motivational interviewing with greater than 50 % of the total time was spent on counseling and coordinating care    Sincerely,    Sowmya Hammond NP      "

## 2020-09-18 ENCOUNTER — OFFICE VISIT (OUTPATIENT)
Dept: SURGERY | Facility: CLINIC | Age: 75
End: 2020-09-18
Payer: COMMERCIAL

## 2020-09-18 DIAGNOSIS — R73.03 PREDIABETES: Primary | ICD-10-CM

## 2020-09-18 PROCEDURE — 99207 ZZC MWM HEALTH COACH NO CHARGE: CPT | Performed by: COMMUNITY HEALTH WORKER

## 2020-09-18 NOTE — PROGRESS NOTES
IZABEL DESIR    Question and Answer Session for October Medicare Cohort (MDPP)    Esperanza Gage          MRN: [id number]  : 1945  ELIDA: 2020    Telephone Visit - Call Office: [phone]    BMI = 33.02 Pt reported History of Pre-Diabetes DX. Clinic notes: Labs tab in Chart Review: patients have Hemoglobin A1c levels that qualify her.    Weight: 201.5 lbs (recent clinic); 201 (self-reported today)    Next Visit Call HOME [phone]  -Mgrdmo explaining DPP program, dial-in process & tracking requirements.   -Review: Pt will meet me in the Loon room (basement of [facility]) at 11 am on  for in-person weigh-in and information session.     *Visit on  will be pts reminder about  meeting; a reminder call will not be necessary.     Location for [place] [address]  *Pt is available for September in person weigh-in.   *Pt is available for telephonic group DPP visits on Monday s at 12:30 pm.   *Pt is Pre-Diabetic.  *Pt is interested in lifestyle changes to prevent risk of Type 2 Diabetes.    FYI:   -Pt mentioned she recently purchased a [facility] book on Diabetes (Diabetes Prevention?).  -Also had participated in 10-12 program in the past at [facility]?  -Pt doesn't have computer so telephonic works great.     PATIENT EDUCATION PROVIDED:  1) Information on Diabetes Prevention Program (DPP)   -Delivered telephonically on Monday s at 12:30 pm.  -Cohort will meet weekly for 16 weeks, and then move to bi-weekly for 2 months and then monthly for last 6 months.  -Start date .    2) Pt understands they will be weighed at the clinic on . And that future weights and minutes of activity will collected through a private conversation prior to each session.     3) Curriculum will be printed and provided to patient on .     4) My contact information  below.     Contact Information.  Verna's direct office line: [phone]  Verna Veronica, CHC, CHES, CPT  Certified Health  and Certified Health   Certified   Lifestyle  with the Diabetes Prevention Program  [facility]    TIME: 15 minutes spent with patient, including charting time.   SIGNATURE:  Verna Veronica, Certified Health  on 9/18/2020 at 3:14 PM

## 2020-09-21 ENCOUNTER — ANCILLARY PROCEDURE (OUTPATIENT)
Dept: BONE DENSITY | Facility: CLINIC | Age: 75
End: 2020-09-21
Attending: INTERNAL MEDICINE
Payer: COMMERCIAL

## 2020-09-21 ENCOUNTER — ANCILLARY PROCEDURE (OUTPATIENT)
Dept: MAMMOGRAPHY | Facility: CLINIC | Age: 75
End: 2020-09-21
Attending: INTERNAL MEDICINE
Payer: COMMERCIAL

## 2020-09-21 ENCOUNTER — OFFICE VISIT (OUTPATIENT)
Dept: SURGERY | Facility: CLINIC | Age: 75
End: 2020-09-21
Payer: COMMERCIAL

## 2020-09-21 DIAGNOSIS — Z00.00 PREVENTATIVE HEALTH CARE: ICD-10-CM

## 2020-09-21 DIAGNOSIS — M85.89 OSTEOPENIA OF MULTIPLE SITES: ICD-10-CM

## 2020-09-21 DIAGNOSIS — Z12.31 ENCOUNTER FOR SCREENING MAMMOGRAM FOR MALIGNANT NEOPLASM OF BREAST: ICD-10-CM

## 2020-09-21 DIAGNOSIS — R73.03 PREDIABETES: Primary | ICD-10-CM

## 2020-09-21 PROCEDURE — 99207 ZZC MWM HEALTH COACH NO CHARGE: CPT | Performed by: COMMUNITY HEALTH WORKER

## 2020-09-21 NOTE — PROGRESS NOTES
IZABEL DESIR       [facility] Review - Reminder Call for In-Person Visit  incl. Masking & Covid Guidelines     Esperanza Gage          MRN: [id number]  : 1945  ELIDA: 2020    Telephone - office [phone] - if no answer leave  HOME [phone]    *See addt'l notes from .    Notes.  Pt will meet me in the Loon room (basement of [facility]) at 11 am on  for in-person weigh-in and information session.     If pt request at that time, I will provide a reminder call for in person visit sometime the week prior to .     Called pt on  regarding meeting location, masking guidelines + covid screening guidelines.     Left vm for pt today, , to review covid screening guidelines.      Location for in-person meeting on .  [address]  [address]    Loon room location: Take elevator to basement floor. It will be the second door on right.     *Pt is available for September in person weigh-in.   *Pt is available for telephonic group DPP visits on Monday s at 12:30 pm.   *Pt is Pre-Diabetic.  *Pt is interested in lifestyle changes to prevent risk of Type 2 Diabetes.    PATIENT EDUCATION PROVIDED:  1) Information on Diabetes Prevention Program (DPP)  (reviewed since call on Friday was quick)  -Delivered telephonically on Monday s at 12:30 pm.  -Cohort will meet weekly for 16 weeks, and then move to bi-weekly for 2 months and then monthly for last 6 months.  -Start date .  -Dial-in information will be provided to pt on .     2) Pt understands they will be weighed at the clinic on . And that future weights and minutes of activity will collected through a private conversation prior to each session. Logging sheets will be provided to pt on .     3) Curriculum will be printed and provided to patient on .      4) Confirmed meeting location for in-person  meeting on 9/28.  [facility]  [address] [place] [address]  [id number]     Go to the Loon room (basement of [facility]). Your introductory visit will begin at 11 am on 9//28. Please arrive early to allow extra time for covid screening processes as you come into the clinic.    5) Discussed data collection + dial-in process & session dates. Handout of all dates will be provided to pt on 9/28.    6) My contact information below.    Contact Information.  Verna's direct office line: [phone]  Verna Veronica, CHC, CHES, CPT  Certified Health  and Certified Health   Certified   Lifestyle  with the Diabetes Prevention Program  [facility]    TIME: 15 minutes spent with patient, including charting time.     SIGNATURE:  Verna Veronica, Certified Health  on 9/21/2020 at 12:55 PM

## 2020-09-21 NOTE — PROGRESS NOTES
"IZABEL DESIR    Question and Answer Session for October Medicare Cohort (MDPP)    Esperanza Gage          MRN: [id number]  : 1945  ELIDA: 2020    Telephone Visit - Call HOME [phone]     BMI = 33.02 kg/m , per 9/15/20 records 5' 5\" & 201.5 lbs.  Pt reported History of Pre-Diabetes DX. Clinic notes: Labs tab in Chart Review: patients have Hemoglobin A1c levels that qualify her: 6.2% on 20.      Weight: 201.5 lbs (recent clinic); 201 (self-reported today)    This Visit Call HOME [phone]  -Finish explaining DPP program, dial-in process & tracking requirements.   -Review: Pt will meet me in the Loon room (basement of [facility]) at 11 am on  for in-person weigh-in and information session.      *Visit today, , will also serve as pts reminder about  meeting; a reminder call will not be necessary. In addition to providing program info and  meeting location, also provided pt masking guidelines + covid screening guidelines for in-person meeting on .     Location for in-person [place] [address]  *Pt is available for September in person weigh-in.   *Pt is available for telephonic group DPP visits on Monday s at 12:30 pm.   *Pt is Pre-Diabetic.  *Pt is interested in lifestyle changes to prevent risk of Type 2 Diabetes.     FYI:   -Pt mentioned she recently purchased a [facility] book on Diabetes (Diabetes Prevention) and reports to be ready to make changes. Book came in and pt already began reading. She is looking forward to getting her curriculum on .  -Also had participated in 10-12 program in the past at [employer].  -Pt doesn't have computer so telephonic works great.      PATIENT EDUCATION PROVIDED:  1) Information on Diabetes Prevention Program (DPP)  (reviewed since call on Friday was quick)  -Delivered telephonically on Monday s at 12:30 pm.  -Cohort will " meet weekly for 16 weeks, and then move to bi-weekly for 2 months and then monthly for last 6 months.  -Start date October 5th.  -Dial-in information will be provided to pt on 9/28.     2) Pt understands they will be weighed at the clinic on September 28th. And that future weights and minutes of activity will collected through a private conversation prior to each session. Logging sheets will be provided to pt on 9/28.     3) Curriculum will be printed and provided to patient on September 28th.      4) Confirmed meeting location for in-person meeting on 9/28.  [facility]  [address]  [address]     Go to the Loon room (basement of [facility]). Your introductory visit will begin at 11 am on 9//28.    5) Discussed data collection + dial-in process & session dates. Handout of all dates will be provided to pt on 9/28.    6) My contact information below.      Contact Information.  Tanya direct office line: [phone]  Verna Veronica, [facility], CHES, CPT  Certified Health  and Certified Health   Certified   Lifestyle  with the Diabetes Prevention Program  [facility]     TIME: 15 minutes spent with patient, including charting time.     SIGNATURE:  Verna Veronica, Certified Health  on 9/21/2020 at 7:59 AM

## 2020-09-25 ENCOUNTER — VIRTUAL VISIT (OUTPATIENT)
Dept: SURGERY | Facility: CLINIC | Age: 75
End: 2020-09-25
Payer: COMMERCIAL

## 2020-09-25 DIAGNOSIS — R73.03 PREDIABETES: Primary | ICD-10-CM

## 2020-09-25 PROCEDURE — 99207 ZZC MWM HEALTH COACH NO CHARGE: CPT | Mod: TEL | Performed by: COMMUNITY HEALTH WORKER

## 2020-09-28 ENCOUNTER — OFFICE VISIT (OUTPATIENT)
Dept: SURGERY | Facility: CLINIC | Age: 75
End: 2020-09-28
Payer: COMMERCIAL

## 2020-09-28 ENCOUNTER — OFFICE VISIT (OUTPATIENT)
Dept: NEUROLOGY | Facility: CLINIC | Age: 75
End: 2020-09-28
Payer: COMMERCIAL

## 2020-09-28 VITALS
SYSTOLIC BLOOD PRESSURE: 163 MMHG | OXYGEN SATURATION: 97 % | DIASTOLIC BLOOD PRESSURE: 90 MMHG | RESPIRATION RATE: 18 BRPM | HEART RATE: 76 BPM

## 2020-09-28 DIAGNOSIS — G62.9 SENSORY NEUROPATHY: Primary | ICD-10-CM

## 2020-09-28 DIAGNOSIS — R73.03 PREDIABETES: ICD-10-CM

## 2020-09-28 DIAGNOSIS — G62.9 SENSORY NEUROPATHY: ICD-10-CM

## 2020-09-28 DIAGNOSIS — R73.03 PREDIABETES: Primary | ICD-10-CM

## 2020-09-28 PROCEDURE — 82784 ASSAY IGA/IGD/IGG/IGM EACH: CPT | Performed by: PSYCHIATRY & NEUROLOGY

## 2020-09-28 PROCEDURE — 86334 IMMUNOFIX E-PHORESIS SERUM: CPT | Performed by: PSYCHIATRY & NEUROLOGY

## 2020-09-28 PROCEDURE — 86235 NUCLEAR ANTIGEN ANTIBODY: CPT | Performed by: PSYCHIATRY & NEUROLOGY

## 2020-09-28 PROCEDURE — 99207 ZZC MWM HEALTH COACH NO CHARGE: CPT | Performed by: COMMUNITY HEALTH WORKER

## 2020-09-28 NOTE — PROGRESS NOTES
Service Date: 09/28/2020      HISTORY OF PRESENT ILLNESS:  Esperanza Gage is a 74-year-old female with a history of idiopathic left saphenous neuropathy who returns for neurologic reevaluation due to new symptoms.      The patient did see Dr. Aguirre in the past.  This was back in 2015.  She reported at that time the onset of numbness around 2010 in the distribution of the left saphenous nerve without any clear provocative factor.  She had electrodiagnostic testing done at that time that revealed absent sensory nerve potentials of the left saphenous and left superficial peroneal nerves.  There was also low-voltage but present nerve action potentials of the left medial plantar nerve and low-voltage right saphenous and right superficial peroneal nerve action potentials with normal motor conduction studies and normal needle examination.      Her symptoms remained stable and restricted to the distribution of the left saphenous nerve.  However, she did undergo a knee surgery in 2018 and the left saphenous neuropathy seemed to have gotten worse after that.  She did see Dr. Hudson Dey on 10/04/2018, who concluded that she had evidence of a saphenous neuropathy on examination, but otherwise her examination was negative for a more widespread polyneuropathy.  He did note that she had a borderline elevated hemoglobin A1c.      Since seeing Dr. Dey 2 years ago, she has developed new symptoms.  She tells me both of her feet feel numb and funny on the soles.  There can also be a hot burning, stinging sensation, particularly when she is at rest.  She denies any weakness.  She denies any radicular pain into the legs.      She did see Dr. Gifford concerning this and she had a normal B12 level of 468.  Normal TSH and basic metabolic panel.  Her hemoglobin A1c was elevated further at 6.2.  She is now undertaking some classes for management of prediabetes.  In the past, she had negative hepatitis C and tissue transglutaminase  antibodies.      PAST MEDICAL HISTORY:  Notable for hypertension, hyperlipidemia, osteoporosis and left knee replacement.      CURRENT MEDICATIONS:   1.  Alendronate   2.  Aspirin.   3.  Loprox topical.   4.  Cozaar.   5.  Simvastatin.   6.  She also takes Amoxicillin prior to dental procedures because of her left knee replacement.      SOCIAL HISTORY:  She does not smoke or use alcohol.      PHYSICAL EXAMINATION:   VITAL SIGNS:  Heart rate 76.  Blood pressure 164/102.  It was rechecked and it was 163/90.      Pupils are equal, round and react well to light.  She has full extraocular motility, and otherwise cranial nerves II-XII are intact.  Motor examination reveals normal strength.  On sensory testing, she does have an area of heightened sensitivity to touch and pinprick conforming to the left saphenous nerve.  In addition, she has a symmetric moderate impairment of vibratory sense in the toes.  She reports a heightened sensitivity to pinprick over the distal dorsal aspects of both feet and reduced sensitivity involving the distal sole of both feet.  There is no loss of cold appreciation.  There is an equivocal decrease in position sense.  Her gait is unremarkable.  Reflexes are 2+ except for the ankle jerks which are 1+.  Plantar responses are flexor.      IMPRESSION:   1.  Longstanding left saphenous neuropathy.   2.  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   3.  Prediabetes.      PLAN:  I discussed the above with the patient.  It is possible her prediabetes is a relevant factor.  Lab work so far has been unrevealing.      I am going to check a serum immunofixation as well as Sjogren antibodies.      I discussed symptomatic treatment for the painful aspects of her presumed neuropathy, but she declined.      I will contact her with the lab test results.  I also want to continue to monitor her course and will see her back in 4 months.     ADDENDUM: IEP: SS-A, SS-B are normal. Discussed  with patient.     MD THEA Mayer MD             D: 2020   T: 2020   MT: FARSHAD      Name:     MOSHE GARCÍA   MRN:      [id number]        Account:      [id number]   :      1945           Service Date: 2020      Document: [id number]

## 2020-09-28 NOTE — LETTER
9/28/2020       RE: Esperanza Gage  [address] [place] [address]     Dear Colleague,    Thank you for referring your patient, Esperanza Gage, to the [facility] NEUROLOGY at [facility]. Please see a copy of my visit note below.    Service Date: 09/28/2020      HISTORY OF PRESENT ILLNESS:  Esperanza Gage is a 74-year-old female with a history of idiopathic left saphenous neuropathy who returns for neurologic reevaluation due to new symptoms.      The patient did see Dr. Aguirre in the past.  This was back in 2015.  She reported at that time the onset of numbness around 2010 in the distribution of the left saphenous nerve without any clear provocative factor.  She had electrodiagnostic testing done at that time that revealed absent sensory nerve potentials of the left saphenous and left superficial peroneal nerves.  There was also low-voltage but present nerve action potentials of the left medial plantar nerve and low-voltage right saphenous and right superficial peroneal nerve action potentials with normal motor conduction studies and normal needle examination.      Her symptoms remained stable and restricted to the distribution of the left saphenous nerve.  However, she did undergo a knee surgery in 2018 and the left saphenous neuropathy seemed to have gotten worse after that.  She did see Dr. Hudson Dey on 10/04/2018, who concluded that she had evidence of a saphenous neuropathy on examination, but otherwise her examination was negative for a more widespread polyneuropathy.  He did note that she had a borderline elevated hemoglobin A1c.      Since seeing Dr. Dey 2 years ago, she has developed new symptoms.  She tells me both of her feet feel numb and funny on the soles.  There can also be a hot burning, stinging sensation, particularly when she is at rest.  She denies any weakness.  She denies any radicular pain into the legs.      She did see Dr. Gifford concerning  this and she had a normal B12 level of 468.  Normal TSH and basic metabolic panel.  Her hemoglobin A1c was elevated further at 6.2.  She is now undertaking some classes for management of prediabetes.  In the past, she had negative hepatitis C and tissue transglutaminase antibodies.      PAST MEDICAL HISTORY:  Notable for hypertension, hyperlipidemia, osteoporosis and left knee replacement.      CURRENT MEDICATIONS:   1.  Alendronate   2.  Aspirin.   3.  Loprox topical.   4.  Cozaar.   5.  Simvastatin.   6.  She also takes Amoxicillin prior to dental procedures because of her left knee replacement.      SOCIAL HISTORY:  She does not smoke or use alcohol.      PHYSICAL EXAMINATION:   VITAL SIGNS:  Heart rate 76.  Blood pressure 164/102.  It was rechecked and it was 163/90.      Pupils are equal, round and react well to light.  She has full extraocular motility, and otherwise cranial nerves II-XII are intact.  Motor examination reveals normal strength.  On sensory testing, she does have an area of heightened sensitivity to touch and pinprick conforming to the left saphenous nerve.  In addition, she has a symmetric moderate impairment of vibratory sense in the toes.  She reports a heightened sensitivity to pinprick over the distal dorsal aspects of both feet and reduced sensitivity involving the distal sole of both feet.  There is no loss of cold appreciation.  There is an equivocal decrease in position sense.  Her gait is unremarkable.  Reflexes are 2+ except for the ankle jerks which are 1+.  Plantar responses are flexor.      IMPRESSION:   1.  Longstanding left saphenous neuropathy.   2.  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   3.  Prediabetes.      PLAN:  I discussed the above with the patient.  It is possible her prediabetes is a relevant factor.  Lab work so far has been unrevealing.      I am going to check a serum immunofixation as well as Sjogren antibodies.      I discussed  symptomatic treatment for the painful aspects of her presumed neuropathy, but she declined.      I will contact her with the lab test results.  I also want to continue to monitor her course and will see her back in 4 months.     ADDENDUM: IEP: SS-A, SS-B are normal. Discussed with patient.     Virgilio Shah MD                 D: 2020   T: 2020   MT: FARSHAD      Name:     MOSHE GARCÍA   MRN:      [id number]        Account:      [id number]   :      1945           Service Date: 2020      Document: [id number]

## 2020-09-29 LAB
ENA SS-A IGG SER IA-ACNC: <0.2 AI (ref 0–0.9)
ENA SS-B IGG SER IA-ACNC: <0.2 AI (ref 0–0.9)
IGA SERPL-MCNC: 165 MG/DL (ref 84–499)
IGG SERPL-MCNC: 1180 MG/DL (ref 610–1616)
IGM SERPL-MCNC: 98 MG/DL (ref 35–242)
PROT PATTERN SERPL IFE-IMP: NORMAL

## 2020-09-29 NOTE — PROGRESS NOTES
Medicare Diabetes Prevention Program Introduction    Esperanza Gage presents for [facility] Introduction session.    Discussed program logistics and answered program related questions.     Patient to follow-up next week at [facility] Session 1.       Verna Veronica

## 2020-10-02 ENCOUNTER — OFFICE VISIT (OUTPATIENT)
Dept: DERMATOLOGY | Facility: CLINIC | Age: 75
End: 2020-10-02
Attending: INTERNAL MEDICINE
Payer: COMMERCIAL

## 2020-10-02 DIAGNOSIS — L82.1 SEBORRHEIC KERATOSIS: ICD-10-CM

## 2020-10-02 DIAGNOSIS — D22.9 CHANGE IN SKIN MOLE: ICD-10-CM

## 2020-10-02 DIAGNOSIS — L81.4 SOLAR LENTIGO: Primary | ICD-10-CM

## 2020-10-02 DIAGNOSIS — B35.3 TINEA PEDIS OF BOTH FEET: ICD-10-CM

## 2020-10-02 PROCEDURE — 99213 OFFICE O/P EST LOW 20 MIN: CPT | Mod: GC | Performed by: DERMATOLOGY

## 2020-10-02 ASSESSMENT — PAIN SCALES - GENERAL: PAINLEVEL: NO PAIN (0)

## 2020-10-02 NOTE — NURSING NOTE
Dermatology Rooming Note    Esperanza Mauroes's goals for this visit include:   Chief Complaint   Patient presents with     Derm Problem     Hand and foot eruption - has been using the ciclopirox     Skin Check     Esperanza has a few spots of concern today.     Leola Adler, CMA

## 2020-10-02 NOTE — PROGRESS NOTES
[facility] Dermatology Note      Dermatology Problem List:  1. Milia  2. Seborrheic keratoses  3. Hand and foot eruption which resolved with ciclopirox per patient    Encounter Date: Oct 2, 2020    CC:   Chief Complaint   Patient presents with     Derm Problem     Hand and foot eruption - has been using the ciclopirox     Skin Check     Esperanza has a few spots of concern today.         History of Present Illness:  Ms. Esperanza Gage is a 74 year old female who presents as a follow-up for yearly skin check. The patient was last seen 11/20/2018. She has no skin concerns, but does point out that the irritation she gets on her feet is now present and it hasn't been present during previous Derm appointments. She says it is in between her 4th and 5th toes bilaterally. It gets itchy sometimes. She thinks it is from moisture because it seem to do okay if she is vigilant about drying her feet--uses a hair dryer for this. She uses ciclopirox twice daily when it gets irritated, which also helps. No additional concerns today.      Past Medical History:   Patient Active Problem List   Diagnosis     Vitamin D deficiency     Hyperlipidemia with target LDL less than 130     Osteopenia     Breast signs and symptoms     Chondromalacia of patella     Primary localized osteoarthrosis, lower leg     Pelvic fracture (H)     Medication management     Leg numbness     Seborrheic keratosis     Milia     History of tinea     Status post knee surgery     Past Medical History:   Diagnosis Date     Hearing problem 2017     Hyperlipidemia 2005     Hyperlipidemia LDL goal < 130      Hypertension      Osteoporosis, post-menopausal      Pelvic fracture (H)      Past Surgical History:   Procedure Laterality Date     ARTHROPLASTY KNEE Left 8/28/2018    Procedure: ARTHROPLASTY KNEE;  Left Total Knee Replacement;  Surgeon: Pierre Campos MD;  Location: [facility] OR     KNEE SURGERY       No prior surgeries       ORTHOPEDIC SURGERY          Social History:  Patient reports that she has quit smoking. Her smoking use included cigarettes. She smoked 0.00 packs per day for 0.00 years. She has never used smokeless tobacco. She reports current alcohol use. She reports that she does not use drugs.    Family History:  Family History   Problem Relation Age of Onset     Osteoporosis Mother      Hypertension Brother      Cerebrovascular Disease Brother      Cerebrovascular Disease Brother      C.A.D. No family hx of      Diabetes No family hx of      Skin Cancer No family hx of      Melanoma No family hx of      Dementia No family hx of      Heart Disease No family hx of        Medications:  Current Outpatient Medications   Medication Sig Dispense Refill     alendronate (FOSAMAX) 70 MG tablet Take 1 tablet (70 mg) by mouth every 7 days Take 1 tablet once a week with a glass of water 30 minutes prior to breakfast 15 tablet 3     ASPIRIN PO Take 81 mg by mouth daily       ciclopirox (LOPROX) 0.77 % cream Apply topically 2 times daily       losartan (COZAAR) 50 MG tablet Take 1 tablet (50 mg) by mouth daily 90 tablet 3     simvastatin (ZOCOR) 10 MG tablet TAKE 1 TABLET BY MOUTH ONCE DAILY AT BEDTIME 90 tablet 3     amoxicillin (AMOXIL) 500 MG tablet Take 4 tablets by mouth one hour before Dental Appointment. 4 tablet 4        No Known Allergies      Review of Systems:  -As per HPI  -Constitutional: Otherwise feeling well today, in usual state of health.  -HEENT: Patient denies nonhealing oral sores.  -Skin: As above in HPI. No additional skin concerns.    Physical exam:  Vitals: There were no vitals taken for this visit.  GEN: This is a well developed, well-nourished female in no acute distress, in a pleasant mood.    SKIN: Total skin excluding the undergarment areas was performed. The exam included the head/face, lips, eyelids, conjunctivae, neck, both arms, chest, back, abdomen, both legs, thighs,  digits and/or nails.   -Whittaker skin type: II  -There  are dome shaped bright red papules on the trunk.   -Multiple regular brown pigmented macules and papules are identified on the trunk and arms.   -Scattered brown macules on sun exposed areas.  -On bilateral feet between the 4th and 5th toes there are white macerated plaques, they do not fluoresce under woods lamp  -No other lesions of concern on areas examined.     Impression/Plan:  1. Tinea pedis:   - start using OTC Lotrimin Ultra (butenafine) twice daily x 2-3 weeks.  - continue excellent foot cares including keeping feet dry    2. Cherry angiomas  3. Solar Lentigines  4. Multiple benign appearing nevi  - benign nature discussed, patient reassured    CC Julia Gifford MD  [address] [place] [address] on close of this encounter.  Follow-up in 2 years, earlier for new or changing lesions.       Dr. Strange staffed the patient.    Staff Involved:  Resident(Vipul)/Staff    Reyes Matthew MD, PhD  Medicine-Dermatology PGY-5  I was present during the key portions of the history and physical exam. I reviewed the history, examined the patient and edited this chart note. I agree with the treatment plan. CEASAR Strange MD

## 2020-10-02 NOTE — PATIENT INSTRUCTIONS
For your feet:  - use Lotrimin Ultra over the counter medication (it needs to be the Ultra form)

## 2020-10-02 NOTE — LETTER
10/2/2020       RE: Esperanza Gage  [address] [place] [address]     Dear Colleague,    Thank you for referring your patient, Esperanza Gage, to the [facility]ERMATOLOGY [facility] [place] at [facility]. Please see a copy of my visit note below.    [facility] Dermatology Note      Dermatology Problem List:  1. Milia  2. Seborrheic keratoses  3. Hand and foot eruption which resolved with ciclopirox per patient    Encounter Date: Oct 2, 2020    CC:   Chief Complaint   Patient presents with     Derm Problem     Hand and foot eruption - has been using the ciclopirox     Skin Check     Esperanza has a few spots of concern today.         History of Present Illness:  Ms. Esperanza Gage is a 74 year old female who presents as a follow-up for yearly skin check. The patient was last seen 11/20/2018. She has no skin concerns, but does point out that the irritation she gets on her feet is now present and it hasn't been present during previous Derm appointments. She says it is in between her 4th and 5th toes bilaterally. It gets itchy sometimes. She thinks it is from moisture because it seem to do okay if she is vigilant about drying her feet--uses a hair dryer for this. She uses ciclopirox twice daily when it gets irritated, which also helps. No additional concerns today.      Past Medical History:   Patient Active Problem List   Diagnosis     Vitamin D deficiency     Hyperlipidemia with target LDL less than 130     Osteopenia     Breast signs and symptoms     Chondromalacia of patella     Primary localized osteoarthrosis, lower leg     Pelvic fracture (H)     Medication management     Leg numbness     Seborrheic keratosis     Milia     History of tinea     Status post knee surgery     Past Medical History:   Diagnosis Date     Hearing problem 2017     Hyperlipidemia 2005     Hyperlipidemia LDL goal < 130      Hypertension      Osteoporosis, post-menopausal       Pelvic fracture (H)      Past Surgical History:   Procedure Laterality Date     ARTHROPLASTY KNEE Left 8/28/2018    Procedure: ARTHROPLASTY KNEE;  Left Total Knee Replacement;  Surgeon: Pierre Campos MD;  Location: [facility] OR     KNEE SURGERY       No prior surgeries       ORTHOPEDIC SURGERY         Social History:  Patient reports that she has quit smoking. Her smoking use included cigarettes. She smoked 0.00 packs per day for 0.00 years. She has never used smokeless tobacco. She reports current alcohol use. She reports that she does not use drugs.    Family History:  Family History   Problem Relation Age of Onset     Osteoporosis Mother      Hypertension Brother      Cerebrovascular Disease Brother      Cerebrovascular Disease Brother      C.A.D. No family hx of      Diabetes No family hx of      Skin Cancer No family hx of      Melanoma No family hx of      Dementia No family hx of      Heart Disease No family hx of        Medications:  Current Outpatient Medications   Medication Sig Dispense Refill     alendronate (FOSAMAX) 70 MG tablet Take 1 tablet (70 mg) by mouth every 7 days Take 1 tablet once a week with a glass of water 30 minutes prior to breakfast 15 tablet 3     ASPIRIN PO Take 81 mg by mouth daily       ciclopirox (LOPROX) 0.77 % cream Apply topically 2 times daily       losartan (COZAAR) 50 MG tablet Take 1 tablet (50 mg) by mouth daily 90 tablet 3     simvastatin (ZOCOR) 10 MG tablet TAKE 1 TABLET BY MOUTH ONCE DAILY AT BEDTIME 90 tablet 3     amoxicillin (AMOXIL) 500 MG tablet Take 4 tablets by mouth one hour before Dental Appointment. 4 tablet 4        No Known Allergies      Review of Systems:  -As per HPI  -Constitutional: Otherwise feeling well today, in usual state of health.  -HEENT: Patient denies nonhealing oral sores.  -Skin: As above in HPI. No additional skin concerns.    Physical exam:  Vitals: There were no vitals taken for this visit.  GEN: This is a well developed, well-nourished  female in no acute distress, in a pleasant mood.    SKIN: Total skin excluding the undergarment areas was performed. The exam included the head/face, lips, eyelids, conjunctivae, neck, both arms, chest, back, abdomen, both legs, thighs,  digits and/or nails.   -Whittaker skin type: II  -There are dome shaped bright red papules on the trunk.   -Multiple regular brown pigmented macules and papules are identified on the trunk and arms.   -Scattered brown macules on sun exposed areas.  -On bilateral feet between the 4th and 5th toes there are white macerated plaques, they do not fluoresce under woods lamp  -No other lesions of concern on areas examined.     Impression/Plan:  1. Tinea pedis:   - start using OTC Lotrimin Ultra (butenafine) twice daily x 2-3 weeks.  - continue excellent foot cares including keeping feet dry    2. Cherry angiomas  3. Solar Lentigines  4. Multiple benign appearing nevi  - benign nature discussed, patient reassured    CC Julia Gifford MD  [address] [place] [address] on close of this encounter.  Follow-up in 2 years, earlier for new or changing lesions.       Dr. Strange staffed the patient.    Staff Involved:  Resident(Vipul)/Staff    Reyes Matthew MD, PhD  Medicine-Dermatology PGY-5  I was present during the key portions of the history and physical exam. I reviewed the history, examined the patient and edited this chart note. I agree with the treatment plan. CEASAR Strange MD

## 2020-10-05 ENCOUNTER — ALLIED HEALTH/NURSE VISIT (OUTPATIENT)
Dept: EDUCATION SERVICES | Facility: CLINIC | Age: 75
End: 2020-10-05
Payer: COMMERCIAL

## 2020-10-05 ENCOUNTER — OFFICE VISIT (OUTPATIENT)
Dept: INTERNAL MEDICINE | Facility: CLINIC | Age: 75
End: 2020-10-05
Attending: INTERNAL MEDICINE
Payer: COMMERCIAL

## 2020-10-05 VITALS
DIASTOLIC BLOOD PRESSURE: 74 MMHG | HEART RATE: 72 BPM | BODY MASS INDEX: 32.28 KG/M2 | SYSTOLIC BLOOD PRESSURE: 112 MMHG | WEIGHT: 197 LBS

## 2020-10-05 VITALS — WEIGHT: 203.4 LBS | BODY MASS INDEX: 33.33 KG/M2

## 2020-10-05 DIAGNOSIS — R73.03 PREDIABETES: Primary | ICD-10-CM

## 2020-10-05 DIAGNOSIS — R35.0 URINARY FREQUENCY: Primary | ICD-10-CM

## 2020-10-05 DIAGNOSIS — I10 ESSENTIAL HYPERTENSION: ICD-10-CM

## 2020-10-05 LAB
ALBUMIN UR-MCNC: 30 MG/DL
ALBUMIN UR-MCNC: 30 MG/DL
APPEARANCE UR: ABNORMAL
APPEARANCE UR: ABNORMAL
BACTERIA #/AREA URNS HPF: ABNORMAL /HPF
BILIRUB UR QL STRIP: NEGATIVE
BILIRUB UR QL STRIP: NEGATIVE
COLOR UR AUTO: ABNORMAL
COLOR UR AUTO: YELLOW
GLUCOSE UR STRIP-MCNC: NEGATIVE MG/DL
GLUCOSE UR STRIP-MCNC: NEGATIVE MG/DL
HGB UR QL STRIP: ABNORMAL
HGB UR QL STRIP: ABNORMAL
KETONES UR STRIP-MCNC: 5 MG/DL
KETONES UR STRIP-MCNC: ABNORMAL MG/DL
LEUKOCYTE ESTERASE UR QL STRIP: ABNORMAL
LEUKOCYTE ESTERASE UR QL STRIP: ABNORMAL
MUCOUS THREADS #/AREA URNS LPF: PRESENT /LPF
NITRATE UR QL: POSITIVE
NITRATE UR QL: POSITIVE
PH UR STRIP: 6 PH (ref 5–7)
PH UR STRIP: 6 PH (ref 5–7)
RBC #/AREA URNS AUTO: 5 /HPF (ref 0–2)
SOURCE: ABNORMAL
SP GR UR STRIP: 1.02 (ref 1–1.03)
SP GR UR STRIP: 1.02 (ref 1–1.03)
UROBILINOGEN UR STRIP-ACNC: 0.2 EU/DL (ref 0.2–1)
UROBILINOGEN UR STRIP-MCNC: NORMAL MG/DL (ref 0–2)
WBC #/AREA URNS AUTO: >182 /HPF (ref 0–5)
WBC CLUMPS #/AREA URNS HPF: PRESENT /HPF

## 2020-10-05 PROCEDURE — 87086 URINE CULTURE/COLONY COUNT: CPT | Performed by: INTERNAL MEDICINE

## 2020-10-05 PROCEDURE — 81003 URINALYSIS AUTO W/O SCOPE: CPT

## 2020-10-05 PROCEDURE — 99214 OFFICE O/P EST MOD 30 MIN: CPT | Performed by: INTERNAL MEDICINE

## 2020-10-05 PROCEDURE — 81001 URINALYSIS AUTO W/SCOPE: CPT | Performed by: INTERNAL MEDICINE

## 2020-10-05 RX ORDER — SULFAMETHOXAZOLE/TRIMETHOPRIM 800-160 MG
1 TABLET ORAL 2 TIMES DAILY
Qty: 10 TABLET | Refills: 0 | Status: SHIPPED | OUTPATIENT
Start: 2020-10-05 | End: 2020-10-12

## 2020-10-05 ASSESSMENT — ENCOUNTER SYMPTOMS
POLYPHAGIA: 0
FEVER: 0
NERVOUS/ANXIOUS: 0
INSOMNIA: 0
FATIGUE: 0
DEPRESSION: 0
ALTERED TEMPERATURE REGULATION: 0
DYSURIA: 1
SINUS CONGESTION: 0
COUGH: 0
DECREASED CONCENTRATION: 0
DYSPNEA ON EXERTION: 0
PANIC: 0
POLYDIPSIA: 0

## 2020-10-05 ASSESSMENT — PAIN SCALES - GENERAL: PAINLEVEL: NO PAIN (0)

## 2020-10-05 NOTE — LETTER
10/5/2020       RE: Esperanza Gage  [address] [place] [address]     Dear Colleague,    Thank you for referring your patient, Esperanza Gage, to the [facility] at [facility]. Please see a copy of my visit note below.    HPI  Patient is here for follow up. She reports that she has been dealing with significant urinary urgency and frequency. She is worried about another bladder infection. She denies fever, chills or night sweats.     Review of Systems     Constitutional:  Negative for fever and fatigue.   HENT:  Negative for sinus congestion.    Respiratory:   Negative for cough and dyspnea on exertion.    Cardiovascular:  Negative for chest pain, dyspnea on exertion and edema.   Genitourinary:  Positive for dysuria and urgency. Negative for voiding less frequently.   Skin:  Negative for itching and rash.   Psychiatric/Behavioral:  Negative for depression, decreased concentration, mood swings and panic attacks.    Endocrine:  Negative for altered temperature regulation, polyphagia, polydipsia, unwanted hair growth and change in facial hair.    Current Outpatient Medications   Medication     alendronate (FOSAMAX) 70 MG tablet     amoxicillin (AMOXIL) 500 MG tablet     ASPIRIN PO     ciclopirox (LOPROX) 0.77 % cream     losartan (COZAAR) 50 MG tablet     simvastatin (ZOCOR) 10 MG tablet     No current facility-administered medications for this visit.      Vitals:    10/05/20 0915 10/05/20 0925 10/05/20 0926 10/05/20 0927   BP: 113/75 114/75 110/73 112/74   Pulse: 72 72 72 72   Weight: 89.4 kg (197 lb)        Physical Exam  Vitals signs and nursing note reviewed.   Constitutional:       Appearance: Normal appearance.   HENT:      Head: Normocephalic and atraumatic.   Eyes:      Pupils: Pupils are equal, round, and reactive to light.   Cardiovascular:      Rate and Rhythm: Normal rate.   Pulmonary:      Effort: Pulmonary effort is normal.    Musculoskeletal:         General: No edema.   Neurological:      General: No focal deficit present.      Mental Status: She is alert and oriented to person, place, and time.   Psychiatric:         Mood and Affect: Mood normal.         Behavior: Behavior normal.         Thought Content: Thought content normal.         Judgment: Judgment normal.     Assessment and Plan:  Esperanza was seen today for recheck.    Diagnoses and all orders for this visit:    Urinary frequency. UA is concerning for UTI. Recommend treatment of acute UTI with antibiotic therapy and evaluation by Urology for recurrent infections.   -     Routine UA with micro reflex to culture  -     UROLOGY ADULT REFERRAL; Future  -     [place] Urine Macroscopic POCT  -     sulfamethoxazole-trimethoprim (BACTRIM DS) 800-160 MG tablet; Take 1 tablet by mouth 2 times daily    Essential hypertension. Blood pressure is within acceptable range. Recommend to continue with current therapy without changes.       Total time spent 25 minutes.  More than 50% of the time spent with Ms. Gage on counseling / coordinating her care    Julia Gifford MD

## 2020-10-05 NOTE — PROGRESS NOTES
HPI  Patient is here for follow up. She reports that she has been dealing with significant urinary urgency and frequency. She is worried about another bladder infection. She denies fever, chills or night sweats.     Review of Systems     Constitutional:  Negative for fever and fatigue.   HENT:  Negative for sinus congestion.    Respiratory:   Negative for cough and dyspnea on exertion.    Cardiovascular:  Negative for chest pain, dyspnea on exertion and edema.   Genitourinary:  Positive for dysuria and urgency. Negative for voiding less frequently.   Skin:  Negative for itching and rash.   Psychiatric/Behavioral:  Negative for depression, decreased concentration, mood swings and panic attacks.    Endocrine:  Negative for altered temperature regulation, polyphagia, polydipsia, unwanted hair growth and change in facial hair.      Current Outpatient Medications   Medication     alendronate (FOSAMAX) 70 MG tablet     amoxicillin (AMOXIL) 500 MG tablet     ASPIRIN PO     ciclopirox (LOPROX) 0.77 % cream     losartan (COZAAR) 50 MG tablet     simvastatin (ZOCOR) 10 MG tablet     No current facility-administered medications for this visit.      Vitals:    10/05/20 0915 10/05/20 0925 10/05/20 0926 10/05/20 0927   BP: 113/75 114/75 110/73 112/74   Pulse: 72 72 72 72   Weight: 89.4 kg (197 lb)            Physical Exam  Vitals signs and nursing note reviewed.   Constitutional:       Appearance: Normal appearance.   HENT:      Head: Normocephalic and atraumatic.   Eyes:      Pupils: Pupils are equal, round, and reactive to light.   Cardiovascular:      Rate and Rhythm: Normal rate.   Pulmonary:      Effort: Pulmonary effort is normal.   Musculoskeletal:         General: No edema.   Neurological:      General: No focal deficit present.      Mental Status: She is alert and oriented to person, place, and time.   Psychiatric:         Mood and Affect: Mood normal.         Behavior: Behavior normal.         Thought Content: Thought  content normal.         Judgment: Judgment normal.         Assessment and Plan:  Esperanza was seen today for recheck.    Diagnoses and all orders for this visit:    Urinary frequency. UA is concerning for UTI. Recommend treatment of acute UTI with antibiotic therapy and evaluation by Urology for recurrent infections.   -     Routine UA with micro reflex to culture  -     UROLOGY ADULT REFERRAL; Future  -     [place] Urine Macroscopic POCT  -     sulfamethoxazole-trimethoprim (BACTRIM DS) 800-160 MG tablet; Take 1 tablet by mouth 2 times daily    Essential hypertension. Blood pressure is within acceptable range. Recommend to continue with current therapy without changes.       Total time spent 25 minutes.  More than 50% of the time spent with Ms. Gage on counseling / coordinating her care    Julia Gifford MD

## 2020-10-05 NOTE — PROGRESS NOTES
Medicare Diabetes Prevention Program: Session 1    Esperanza Gage presents for MDPP Session 1: Introduction to the Program    Subjective/Objective:    Wt 92.3 kg (203 lb 6.4 oz)   BMI 33.33 kg/m     In-Clinic Weigh-In at [facility]    Minutes spent exercising over the past week: 25 minutes (movement is sporadic 20 -105 minutes/wk)      Intervention/Plan:    Topics discussed today include information to meet the following learning objectives:    1) Identify the goals and structure of Prevent T2   2) Identify the basics of type 2 diabetes   3) Explain how to make an action plan   4) Set goals and plan basic actions    Patient to follow-up next week at MDPP Session 2.       Verna Veronica

## 2020-10-06 ENCOUNTER — VIRTUAL VISIT (OUTPATIENT)
Dept: PSYCHOLOGY | Facility: CLINIC | Age: 75
End: 2020-10-06
Attending: INTERNAL MEDICINE
Payer: COMMERCIAL

## 2020-10-06 DIAGNOSIS — F43.20 ADJUSTMENT DISORDER, UNSPECIFIED TYPE: Primary | ICD-10-CM

## 2020-10-06 LAB
BACTERIA SPEC CULT: NORMAL
BACTERIA SPEC CULT: NORMAL
Lab: NORMAL
SPECIMEN SOURCE: NORMAL

## 2020-10-06 PROCEDURE — 99207 PR NO BILLABLE SERVICE THIS VISIT: CPT | Performed by: COUNSELOR

## 2020-10-06 ASSESSMENT — COLUMBIA-SUICIDE SEVERITY RATING SCALE - C-SSRS
4. HAVE YOU HAD THESE THOUGHTS AND HAD SOME INTENTION OF ACTING ON THEM?: NO
5. HAVE YOU STARTED TO WORK OUT OR WORKED OUT THE DETAILS OF HOW TO KILL YOURSELF? DO YOU INTEND TO CARRY OUT THIS PLAN?: NO
5. HAVE YOU STARTED TO WORK OUT OR WORKED OUT THE DETAILS OF HOW TO KILL YOURSELF? DO YOU INTEND TO CARRY OUT THIS PLAN?: NO
4. HAVE YOU HAD THESE THOUGHTS AND HAD SOME INTENTION OF ACTING ON THEM?: NO
2. HAVE YOU ACTUALLY HAD ANY THOUGHTS OF KILLING YOURSELF LIFETIME?: NO
2. HAVE YOU ACTUALLY HAD ANY THOUGHTS OF KILLING YOURSELF?: NO
3. HAVE YOU BEEN THINKING ABOUT HOW YOU MIGHT KILL YOURSELF?: NO
1. IN THE PAST MONTH, HAVE YOU WISHED YOU WERE DEAD OR WISHED YOU COULD GO TO SLEEP AND NOT WAKE UP?: NO
1. IN THE PAST MONTH, HAVE YOU WISHED YOU WERE DEAD OR WISHED YOU COULD GO TO SLEEP AND NOT WAKE UP?: NO

## 2020-10-06 ASSESSMENT — PATIENT HEALTH QUESTIONNAIRE - PHQ9
SUM OF ALL RESPONSES TO PHQ QUESTIONS 1-9: 1
5. POOR APPETITE OR OVEREATING: SEVERAL DAYS

## 2020-10-06 ASSESSMENT — ANXIETY QUESTIONNAIRES
7. FEELING AFRAID AS IF SOMETHING AWFUL MIGHT HAPPEN: NOT AT ALL
6. BECOMING EASILY ANNOYED OR IRRITABLE: NOT AT ALL
IF YOU CHECKED OFF ANY PROBLEMS ON THIS QUESTIONNAIRE, HOW DIFFICULT HAVE THESE PROBLEMS MADE IT FOR YOU TO DO YOUR WORK, TAKE CARE OF THINGS AT HOME, OR GET ALONG WITH OTHER PEOPLE: NOT DIFFICULT AT ALL
GAD7 TOTAL SCORE: 1
1. FEELING NERVOUS, ANXIOUS, OR ON EDGE: NOT AT ALL
5. BEING SO RESTLESS THAT IT IS HARD TO SIT STILL: NOT AT ALL
2. NOT BEING ABLE TO STOP OR CONTROL WORRYING: NOT AT ALL
3. WORRYING TOO MUCH ABOUT DIFFERENT THINGS: NOT AT ALL

## 2020-10-06 NOTE — LETTER
"    10/6/2020         RE: Esperanza Gage  [address] [place] [address]        Dear Colleague,    Thank you for referring your patient, Esperanza Gage, to the [facility] [place] [facility]. Please see a copy of my visit note below.                                               Progress Note    Patient Name: Esperanza Gage  Date: 10/6/2020         Service Type: Individual      Session Start Time: 930  Session End Time: 915     Session Length: 45    Session #: 1    Attendees: Client attended alone    Service Modality:  Phone Visit:    The patient has been notified of the following:      \"We have found that certain health care needs can be provided without the need for a face to face visit.  This service lets us provide the care you need with a phone conversation.       I will have full access to your [facility] medical record during this entire phone call.   I will be taking notes for your medical record.      Since this is like an office visit, we will bill your insurance company for this service.       There are potential benefits and risks of telephone visits (e.g. limits to patient confidentiality) that differ from in-person visits.?  Confidentiality still applies for telephone services, and nobody will record the visit.  It is important to be in a quiet, private space that is free of distractions (including cell phone or other devices) during the visit.??      If during the course of the call I believe a telephone visit is not appropriate, you will not be charged for this service\"     Consent has been obtained for this service by care team member: Yes      Treatment Plan Last Reviewed: NA  PHQ-9 / DAVID-7 : yes  PHQ 8/12/2019 8/31/2020 10/6/2020   PHQ-9 Total Score 2 4 1   Q9: Thoughts of better off dead/self-harm past 2 weeks Not at all Not at all Not at all     DAVID-7 SCORE 8/12/2019 8/31/2020 10/6/2020   Total Score 0 0 1           DATA  Interactive Complexity: " No  Crisis: No       Progress Since Last Session (Related to Symptoms / Goals / Homework):   Symptoms: first session She reported issues with concentration, trouble relaxing, task learning, and limited socialization.     Homework: NA      Episode of Care Goals: Minimal progress - PREPARATION (Decided to change - considering how); Intervened by negotiating a change plan and determining options / strategies for behavior change, identifying triggers, exploring social supports, and working towards setting a date to begin behavior change     Current / Ongoing Stressors and Concerns:  Current-work, adjusting to COVI and it limits to socializing    Writer and patient met to completed intake forms and to determine if there were any safety concerns. She denied any history of suicidal ideation and current suicidal ideation. Patient reported that she was referred to therapy by her PCP. She indicated that COVID-19 has limited her social interactions and altered the way she works. Patient denied any homicidal ideation. She seemed interested in starting therapy and open to the new experience.       Ongoing-COVID, limited socialization, work     Treatment Objective(s) Addressed in This Session:   build rapport, completed assessments       Intervention:   Motivational Interviewing    MI Intervention: Expressed Empathy/Understanding, Supported Autonomy, Collaboration, Evocation and Open-ended questions     Change Talk Expressed by the Patient: Desire to change    Provider Response to Change Talk: E - Evoked more info from patient about behavior change, A - Affirmed patient's thoughts, decisions, or attempts at behavior change and R - Reflected patient's change talk          ASSESSMENT: Current Emotional / Mental Status (status of significant symptoms):   Risk status (Self / Other harm or suicidal ideation)   Patient denies current fears or concerns for personal safety.   Patient denies current or recent suicidal ideation or  behaviors.   Patient denies current or recent homicidal ideation or behaviors.   Patient denies current or recent self injurious behavior or ideation.   Patient denies other safety concerns.   Patient reports there has been no change in risk factors since their last session.     Patient reports there has been no change in protective factors since their last session.     Recommended that patient call 911 or go to the local ED should there be a change in any of these risk factors.     Appearance:   unable to assess due to telephone visit    Eye Contact:   unable to assess due to telephone visit    Psychomotor Behavior: unable to assess due to telephone visit    Attitude:   Cooperative  Interested Friendly   Orientation:   All   Speech    Rate / Production: Normal     Volume:  Normal    Mood:    Normal   Affect:    unable to assess due to telephone call    Thought Content:  Clear    Thought Form:  Coherent  Logical    Insight:    Good      Medication Review:   No current psychiatric medications prescribed     Medication Compliance:   NA     Changes in Health Issues:   None reported     Chemical Use Review:   Substance Use: Chemical use reviewed, no active concerns identified      Tobacco Use: No current tobacco use.      Diagnosis:  No diagnosis found.    Collateral Reports Completed:   Routed note to PCP    PLAN: (Patient Tasks / Therapist Tasks / Other)  Patient will review the adult intake form.        DOLORES Raygoza LPCC  October 6, 2020        Again, thank you for allowing me to participate in the care of your patient.        Sincerely,         DOLORES Raygoza

## 2020-10-06 NOTE — PROGRESS NOTES
"                                           Progress Note    Patient Name: Esperanza Gage  Date: 10/6/2020         Service Type: Individual      Session Start Time: 930  Session End Time: 915     Session Length: 45    Session #: 1    Attendees: Client attended alone    Service Modality:  Phone Visit:    The patient has been notified of the following:      \"We have found that certain health care needs can be provided without the need for a face to face visit.  This service lets us provide the care you need with a phone conversation.       I will have full access to your [facility] medical record during this entire phone call.   I will be taking notes for your medical record.      Since this is like an office visit, we will bill your insurance company for this service.       There are potential benefits and risks of telephone visits (e.g. limits to patient confidentiality) that differ from in-person visits.?  Confidentiality still applies for telephone services, and nobody will record the visit.  It is important to be in a quiet, private space that is free of distractions (including cell phone or other devices) during the visit.??      If during the course of the call I believe a telephone visit is not appropriate, you will not be charged for this service\"     Consent has been obtained for this service by care team member: Yes      Treatment Plan Last Reviewed: NA  PHQ-9 / DAVID-7 : yes  PHQ 8/12/2019 8/31/2020 10/6/2020   PHQ-9 Total Score 2 4 1   Q9: Thoughts of better off dead/self-harm past 2 weeks Not at all Not at all Not at all     DAVID-7 SCORE 8/12/2019 8/31/2020 10/6/2020   Total Score 0 0 1           DATA  Interactive Complexity: No  Crisis: No       Progress Since Last Session (Related to Symptoms / Goals / Homework):   Symptoms: first session She reported issues with concentration, trouble relaxing, task learning, and limited socialization.     Homework: NA      Episode of Care Goals: Minimal progress - " PREPARATION (Decided to change - considering how); Intervened by negotiating a change plan and determining options / strategies for behavior change, identifying triggers, exploring social supports, and working towards setting a date to begin behavior change     Current / Ongoing Stressors and Concerns:  Current-work, adjusting to COVI and it limits to socializing    Writer and patient met to completed intake forms and to determine if there were any safety concerns. She denied any history of suicidal ideation and current suicidal ideation. Patient reported that she was referred to therapy by her PCP. She indicated that COVID-19 has limited her social interactions and altered the way she works. Patient denied any homicidal ideation. She seemed interested in starting therapy and open to the new experience.       Ongoing-COVID, limited socialization, work     Treatment Objective(s) Addressed in This Session:   build rapport, completed assessments       Intervention:   Motivational Interviewing    MI Intervention: Expressed Empathy/Understanding, Supported Autonomy, Collaboration, Evocation and Open-ended questions     Change Talk Expressed by the Patient: Desire to change    Provider Response to Change Talk: E - Evoked more info from patient about behavior change, A - Affirmed patient's thoughts, decisions, or attempts at behavior change and R - Reflected patient's change talk          ASSESSMENT: Current Emotional / Mental Status (status of significant symptoms):   Risk status (Self / Other harm or suicidal ideation)   Patient denies current fears or concerns for personal safety.   Patient denies current or recent suicidal ideation or behaviors.   Patient denies current or recent homicidal ideation or behaviors.   Patient denies current or recent self injurious behavior or ideation.   Patient denies other safety concerns.   Patient reports there has been no change in risk factors since their last session.     Patient  reports there has been no change in protective factors since their last session.     Recommended that patient call 911 or go to the local ED should there be a change in any of these risk factors.     Appearance:   unable to assess due to telephone visit    Eye Contact:   unable to assess due to telephone visit    Psychomotor Behavior: unable to assess due to telephone visit    Attitude:   Cooperative  Interested Friendly   Orientation:   All   Speech    Rate / Production: Normal     Volume:  Normal    Mood:    Normal   Affect:    unable to assess due to telephone call    Thought Content:  Clear    Thought Form:  Coherent  Logical    Insight:    Good      Medication Review:   No current psychiatric medications prescribed     Medication Compliance:   NA     Changes in Health Issues:   None reported     Chemical Use Review:   Substance Use: Chemical use reviewed, no active concerns identified      Tobacco Use: No current tobacco use.      Diagnosis:  No diagnosis found.    Collateral Reports Completed:   Routed note to PCP    PLAN: (Patient Tasks / Therapist Tasks / Other)  Patient will review the adult intake form.        DOLORES Raygoza LPCC  October 6, 2020

## 2020-10-07 ASSESSMENT — ANXIETY QUESTIONNAIRES: GAD7 TOTAL SCORE: 1

## 2020-10-12 ENCOUNTER — ALLIED HEALTH/NURSE VISIT (OUTPATIENT)
Dept: EDUCATION SERVICES | Facility: CLINIC | Age: 75
End: 2020-10-12
Payer: COMMERCIAL

## 2020-10-12 ENCOUNTER — OFFICE VISIT (OUTPATIENT)
Dept: UROLOGY | Facility: CLINIC | Age: 75
End: 2020-10-12
Attending: INTERNAL MEDICINE
Payer: COMMERCIAL

## 2020-10-12 VITALS
HEART RATE: 73 BPM | WEIGHT: 197.2 LBS | BODY MASS INDEX: 31.69 KG/M2 | SYSTOLIC BLOOD PRESSURE: 129 MMHG | HEIGHT: 66 IN | DIASTOLIC BLOOD PRESSURE: 81 MMHG

## 2020-10-12 VITALS — WEIGHT: 196 LBS | BODY MASS INDEX: 32.12 KG/M2

## 2020-10-12 DIAGNOSIS — N95.2 VAGINAL ATROPHY: ICD-10-CM

## 2020-10-12 DIAGNOSIS — R73.03 PREDIABETES: Primary | ICD-10-CM

## 2020-10-12 DIAGNOSIS — N39.0 RECURRENT UTI: Primary | ICD-10-CM

## 2020-10-12 PROCEDURE — 99204 OFFICE O/P NEW MOD 45 MIN: CPT | Mod: 26 | Performed by: OBSTETRICS & GYNECOLOGY

## 2020-10-12 PROCEDURE — G9891 EM SESSION REPORTING: HCPCS

## 2020-10-12 PROCEDURE — G0463 HOSPITAL OUTPT CLINIC VISIT: HCPCS | Mod: 25

## 2020-10-12 PROCEDURE — 51701 INSERT BLADDER CATHETER: CPT | Performed by: OBSTETRICS & GYNECOLOGY

## 2020-10-12 PROCEDURE — 87086 URINE CULTURE/COLONY COUNT: CPT | Performed by: OBSTETRICS & GYNECOLOGY

## 2020-10-12 RX ORDER — ESTRADIOL 0.1 MG/G
CREAM VAGINAL
Qty: 42.5 G | Refills: 3 | Status: SHIPPED | OUTPATIENT
Start: 2020-10-12 | End: 2020-11-23

## 2020-10-12 ASSESSMENT — PAIN SCALES - GENERAL: PAINLEVEL: NO PAIN (0)

## 2020-10-12 ASSESSMENT — MIFFLIN-ST. JEOR: SCORE: 1408.3

## 2020-10-12 NOTE — PROGRESS NOTES
Medicare Diabetes Prevention Program: Session 2    Esperanza Gage presents for MDP Session 2: Get Active to Prevent T2    Subjective/Objective:    Wt 88.9 kg (196 lb)   BMI 32.12 kg/m      Minutes spent exercising over the past week: 230      Intervention/Plan:    Topics discussed today include information to meet the following learning objectives:    1) Identify some benefits of getting active   2) Identify some ways to get active       Patient to follow-up next week at MDPP Session 3.     Verna Veronica

## 2020-10-12 NOTE — LETTER
10/12/2020       RE: Esperanza Gage  [address] [place] [address]     Dear Colleague,    Thank you for referring your patient, Esperanza Gage, to the [facility] [employer] [facility]. Please see a copy of my visit note below.    October 12, 2020    Referring Provider: No referring provider defined for this encounter.    Primary Care Provider: Julia Gifford    CC: urinary urgency and frequency    HPI:  Esperanza Gage is a 74 year old female who presents for evaluation of persistent irritative bladder symptoms and likely recurrent UTI. She was evaluated by Dr Gifford on 10/15/20 for her urinary urgency/frequency/dysuria and treated with Bactrim. She says she is symptom free today.  Her UA was positive for LE and Nitrites but her urine culture showed mixed mark and was not tested for sensitivity as a result.      UTI hx over the past 2 years ( She also reports 2 other utis that were treated outside in urgent care prior to 10/12/19)  10.5.20: >100k mixed mark (no sensitivity available), positive LE and Nitrites. Treated with Bactrim  08/28/20: >100k mixed mark (no sensitivity available), Not treated  10/12/19: 10k mixed mark ( no sensitivity): treated with bactrim  12.26.18: >100k ecoli, pansensitive; treated with bactrim    Pelvic Organ Prolapse Symptoms  Denies vaginal bulge or pressure sensation.       Urinary Incontinence Symptoms  Today she Denies bothersome stress or urgency leakage. Has rare ANOOP with sneezing for which she wears a pantiliner.  Denies urinary urgency, frequency, nocturia ( unless she has an infection). Deniesgross hematuria.       Voiding function:   Denies urinary hesitency, of difficulty with emptying her bladder.      Gastrointestinal Symptoms:  Denies chronic diarrhea, constipation. Denies bothersome fecal or flatal incontinence.     Sexual function/Pelvic floor pain:  Not sexually active ( no partner). Denies  vaginal or vulvar irritation, abnormal discharge or bleeding. No pelvic pain    Relevant Medical History:    Diabetes? No, recent hgA1c 8/20 is 6.2  High Blood pressure? Yes, on cozaar     Recurrent UTIs? Likely  Sleep Apnea? No  Obesity? No  History of Blood clots? No  Other medical problems: hyperlipidemia    Surgical History:      Past Surgical History:   Procedure Laterality Date     ARTHROPLASTY KNEE Left 8/28/2018    Procedure: ARTHROPLASTY KNEE;  Left Total Knee Replacement;  Surgeon: Pierre Campos MD;  Location: [facility] OR     KNEE SURGERY       No prior surgeries       ORTHOPEDIC SURGERY         OB/Gyn History:  OB History   No obstetric history on file.       Medications/Vitamins/Supplements:   Current Outpatient Medications   Medication     alendronate (FOSAMAX) 70 MG tablet     ASPIRIN PO     ciclopirox (LOPROX) 0.77 % cream     losartan (COZAAR) 50 MG tablet     simvastatin (ZOCOR) 10 MG tablet     amoxicillin (AMOXIL) 500 MG tablet     No current facility-administered medications for this visit.          Medical History:      Past Medical History:   Diagnosis Date     Hearing problem 2017     Hyperlipidemia 2005     Hyperlipidemia LDL goal < 130      Hypertension      Osteoporosis, post-menopausal      Pelvic fracture (H)      ROS  Social History    Social History     Socioeconomic History     Marital status: Single     Spouse name: Not on file     Number of children: Not on file     Years of education: Not on file     Highest education level: Not on file   Occupational History     Not on file   Social Needs     Financial resource strain: Not on file     Food insecurity     Worry: Not on file     Inability: Not on file     Transportation needs     Medical: Not on file     Non-medical: Not on file   Tobacco Use     Smoking status: Former Smoker     Packs/day: 0.00     Years: 0.00     Pack years: 0.00     Types: Cigarettes     Smokeless tobacco: Never Used   Substance and Sexual Activity     Alcohol use:  Yes     Comment: Rare     Drug use: No     Sexual activity: Not Currently     Partners: Male   Lifestyle     Physical activity     Days per week: Not on file     Minutes per session: Not on file     Stress: Not on file   Relationships     Social connections     Talks on phone: Not on file     Gets together: Not on file     Attends Synagogue service: Not on file     Active member of club or organization: Not on file     Attends meetings of clubs or organizations: Not on file     Relationship status: Not on file     Intimate partner violence     Fear of current or ex partner: Not on file     Emotionally abused: Not on file     Physically abused: Not on file     Forced sexual activity: Not on file   Other Topics Concern     Parent/sibling w/ CABG, MI or angioplasty before 65F 55M? Not Asked   Social History Narrative    Lives in her own home with two flights of stairs.  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  Had two brothers who both  of CVA. Participates in seniors program at [facility].         Family History  Family History   Problem Relation Age of Onset     Osteoporosis Mother      Hypertension Brother      Cerebrovascular Disease Brother      Cerebrovascular Disease Brother      C.A.D. No family hx of      Diabetes No family hx of      Skin Cancer No family hx of      Melanoma No family hx of      Dementia No family hx of      Heart Disease No family hx of        Allergy    No Known Allergies    Current Outpatient Medications   Medication     alendronate (FOSAMAX) 70 MG tablet     amoxicillin (AMOXIL) 500 MG tablet     ASPIRIN PO     ciclopirox (LOPROX) 0.77 % cream     losartan (COZAAR) 50 MG tablet     simvastatin (ZOCOR) 10 MG tablet     sulfamethoxazole-trimethoprim (BACTRIM DS) 800-160 MG tablet     No current facility-administered medications for this visit.        Physical Exam:   There were no vitals taken for this visit. No LMP recorded. Patient is postmenopausal. There is no height or  weight on file to calculate BMI.    Gen:  is alert, comfortable in no acute distress  Abdomen: Abdomen is soft, non-tender, non-distended, no CVAT.    Lungs: , non-labored breathing.   Lower extremity: no edema  Neuro: normal anal reflex    Pelvic Exam:   Normal external female genitalia. The urethra was irritated and dry but  No carruncle.    Vagina: dry, atrophic, no abnormal discharge or bleeding. Normal support.   Uterus: Normal size, non tender   Ovaries: Not palpable, no adnexal mass palpated  Vulva: atrophic, no vestibulodynia  Rectal: deferred    Pelvic floor strength: 4/5 kegels.        POPQ EXAM FOR PROLAPSE SEVERITY    No prolapse      Voiding trial:    VOID 2 ml  PVR 3 mL in and out cath  Leak with Cough stress test : Not done    Clean cath specimen collection  Patient verbally consented for cath collection of urine. External urethra cleaned with iodine swabs. A small 14 F cath used to collect urine for culture.     Labs:   Color Urine (no units)   Date Value   10/05/2020 Other     Appearance Urine (no units)   Date Value   10/05/2020 Cloudy     Glucose Urine (mg/dL)   Date Value   10/05/2020 Negative     Bilirubin Urine (no units)   Date Value   10/05/2020 Negative     Ketones Urine (mg/dL)   Date Value   10/05/2020 Trace (A)     Specific Gravity Urine (no units)   Date Value   10/05/2020 1.025     pH Urine (pH)   Date Value   10/05/2020 6.0     Protein Albumin Urine (mg/dL)   Date Value   10/05/2020 30 (A)     Urobilinogen Urine (EU/dL)   Date Value   10/05/2020 0.2     Nitrite Urine (no units)   Date Value   10/05/2020 Positive (A)     Leukocyte Esterase Urine (no units)   Date Value   10/05/2020 Small (A)     CBC RESULTS:   Recent Labs   Lab Test 10/16/18  1011   WBC 6.0   RBC 3.91   HGB 11.9   HCT 38.5   MCV 99   MCH 30.4   MCHC 30.9*   RDW 13.0        @lastbmp@    A/P: Esperanza Gage is a 74 year old F with history of recurrent UTI and vaginal atrophy on exam.   Atrophy likely a risk  factor  Normal voiding function based on normal PVR today    Plan      Today we discussed the following    -Treatment of your vaginal atrophy with vaginal estrace cream. I have prescribed this for you at your preferred pharmacy. Use it daily in the evening for two weeks, then twice a week after that. Continue using it for the long run. If you develop vaginal bleeding or breast cancer, good to stop the estrogen cream and be evaluated before continuing.     -If you develop UTI symptoms ( pain with urination, urgency, frequency, fevers, chills, flank pain), please give us a call or call your primary care provider so they can check your urine for infection. Drink ample water to flush the urinary symptom. In your case, given your recurrent infections with contaminated bacteria in the past, best to get a cath specimen in the clinics by nurses so we can get a clean specimen, identify the bacteria and treat with appropriate antibiotics    -I have send a cath urine specimen today. This is simply to check if you cleared your recent infection and if not, what kind of bacteria it is and to see if you were treated with the right antibiotics. As long as you don't have symptoms anymore, we will not treat you with more antibiotics even if the culture comes back positive. We will only treat you further if you start having symptoms again. This exercise is just to let us know if your usual treatment was effective in clearing what ever bacteria you were growing.         A total of 45 minutes were spent with the patient today managing her recurrent UTI, > 50% in counseling and coordination of care    Marika Pérez MD, [facility]  , Department of OBGYN  Female Pelvic Medicine and Reconstructive Surgery ( Urogynecology)  CC  Patient Care Team:  Julia Gifford MD as PCP - General (Internal Medicine)  Julia Gifford MD as MD (Internal Medicine)  Sima Pascal PA-C as Physician Assistant (Family  Practice)  Pierre Campos MD as MD (Orthopedics)  Hudson Dey MD as MD (Neurology)  Julia Gifford MD as Referring Physician (Internal Medicine)  Gurpreet Strange MD as MD (Dermatology)

## 2020-10-12 NOTE — PROGRESS NOTES
October 12, 2020    Referring Provider: No referring provider defined for this encounter.    Primary Care Provider: Julia Gifford    CC: urinary urgency and frequency    HPI:  Esperanza Gage is a 74 year old female who presents for evaluation of persistent irritative bladder symptoms and likely recurrent UTI. She was evaluated by Dr Gifford on 10/15/20 for her urinary urgency/frequency/dysuria and treated with Bactrim. She says she is symptom free today.  Her UA was positive for LE and Nitrites but her urine culture showed mixed mark and was not tested for sensitivity as a result.      UTI hx over the past 2 years ( She also reports 2 other utis that were treated outside in urgent care prior to 10/12/19)  10.5.20: >100k mixed mark (no sensitivity available), positive LE and Nitrites. Treated with Bactrim  08/28/20: >100k mixed mark (no sensitivity available), Not treated  10/12/19: 10k mixed mark ( no sensitivity): treated with bactrim  12.26.18: >100k ecoli, pansensitive; treated with bactrim    Pelvic Organ Prolapse Symptoms  Denies vaginal bulge or pressure sensation.       Urinary Incontinence Symptoms  Today she Denies bothersome stress or urgency leakage. Has rare ANOOP with sneezing for which she wears a pantiliner.  Denies urinary urgency, frequency, nocturia ( unless she has an infection). Deniesgross hematuria.       Voiding function:   Denies urinary hesitency, of difficulty with emptying her bladder.      Gastrointestinal Symptoms:  Denies chronic diarrhea, constipation. Denies bothersome fecal or flatal incontinence.     Sexual function/Pelvic floor pain:  Not sexually active ( no partner). Denies vaginal or vulvar irritation, abnormal discharge or bleeding. No pelvic pain    Relevant Medical History:    Diabetes? No, recent hgA1c 8/20 is 6.2  High Blood pressure? Yes, on cozaar     Recurrent UTIs? Likely  Sleep Apnea? No  Obesity? No  History of Blood clots? No  Other medical problems:  hyperlipidemia    Surgical History:      Past Surgical History:   Procedure Laterality Date     ARTHROPLASTY KNEE Left 8/28/2018    Procedure: ARTHROPLASTY KNEE;  Left Total Knee Replacement;  Surgeon: Pierre Campos MD;  Location: [facility]     KNEE SURGERY       No prior surgeries       ORTHOPEDIC SURGERY         OB/Gyn History:  OB History   No obstetric history on file.       Medications/Vitamins/Supplements:   Current Outpatient Medications   Medication     alendronate (FOSAMAX) 70 MG tablet     ASPIRIN PO     ciclopirox (LOPROX) 0.77 % cream     losartan (COZAAR) 50 MG tablet     simvastatin (ZOCOR) 10 MG tablet     amoxicillin (AMOXIL) 500 MG tablet     No current facility-administered medications for this visit.          Medical History:      Past Medical History:   Diagnosis Date     Hearing problem 2017     Hyperlipidemia 2005     Hyperlipidemia LDL goal < 130      Hypertension      Osteoporosis, post-menopausal      Pelvic fracture (H)      ROS  Social History    Social History     Socioeconomic History     Marital status: Single     Spouse name: Not on file     Number of children: Not on file     Years of education: Not on file     Highest education level: Not on file   Occupational History     Not on file   Social Needs     Financial resource strain: Not on file     Food insecurity     Worry: Not on file     Inability: Not on file     Transportation needs     Medical: Not on file     Non-medical: Not on file   Tobacco Use     Smoking status: Former Smoker     Packs/day: 0.00     Years: 0.00     Pack years: 0.00     Types: Cigarettes     Smokeless tobacco: Never Used   Substance and Sexual Activity     Alcohol use: Yes     Comment: Rare     Drug use: No     Sexual activity: Not Currently     Partners: Male   Lifestyle     Physical activity     Days per week: Not on file     Minutes per session: Not on file     Stress: Not on file   Relationships     Social connections     Talks on phone: Not on file      Gets together: Not on file     Attends [employer] service: Not on file     Active member of club or organization: Not on file     Attends meetings of clubs or organizations: Not on file     Relationship status: Not on file     Intimate partner violence     Fear of current or ex partner: Not on file     Emotionally abused: Not on file     Physically abused: Not on file     Forced sexual activity: Not on file   Other Topics Concern     Parent/sibling w/ CABG, MI or angioplasty before 65F 55M? Not Asked   Social History Narrative    Lives in her own home with two flights of stairs.  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  Had two brothers who both  of CVA. Participates in seniors program [facility].         Family History  Family History   Problem Relation Age of Onset     Osteoporosis Mother      Hypertension Brother      Cerebrovascular Disease Brother      Cerebrovascular Disease Brother      C.A.D. No family hx of      Diabetes No family hx of      Skin Cancer No family hx of      Melanoma No family hx of      Dementia No family hx of      Heart Disease No family hx of        Allergy    No Known Allergies    Current Outpatient Medications   Medication     alendronate (FOSAMAX) 70 MG tablet     amoxicillin (AMOXIL) 500 MG tablet     ASPIRIN PO     ciclopirox (LOPROX) 0.77 % cream     losartan (COZAAR) 50 MG tablet     simvastatin (ZOCOR) 10 MG tablet     sulfamethoxazole-trimethoprim (BACTRIM DS) 800-160 MG tablet     No current facility-administered medications for this visit.        Physical Exam:   There were no vitals taken for this visit. No LMP recorded. Patient is postmenopausal. There is no height or weight on file to calculate BMI.    Gen:  is alert, comfortable in no acute distress  Abdomen: Abdomen is soft, non-tender, non-distended, no CVAT.    Lungs: , non-labored breathing.   Lower extremity: no edema  Neuro: normal anal reflex    Pelvic Exam:   Normal external female genitalia. The  urethra was irritated and dry but  No carruncle.    Vagina: dry, atrophic, no abnormal discharge or bleeding. Normal support.   Uterus: Normal size, non tender   Ovaries: Not palpable, no adnexal mass palpated  Vulva: atrophic, no vestibulodynia  Rectal: deferred    Pelvic floor strength: 4/5 kegels.        POPQ EXAM FOR PROLAPSE SEVERITY    No prolapse      Voiding trial:    VOID 2 ml  PVR 3 mL in and out cath  Leak with Cough stress test : Not done    Clean cath specimen collection  Patient verbally consented for cath collection of urine. External urethra cleaned with iodine swabs. A small 14 F cath used to collect urine for culture.     Labs:   Color Urine (no units)   Date Value   10/05/2020 Other     Appearance Urine (no units)   Date Value   10/05/2020 Cloudy     Glucose Urine (mg/dL)   Date Value   10/05/2020 Negative     Bilirubin Urine (no units)   Date Value   10/05/2020 Negative     Ketones Urine (mg/dL)   Date Value   10/05/2020 Trace (A)     Specific Gravity Urine (no units)   Date Value   10/05/2020 1.025     pH Urine (pH)   Date Value   10/05/2020 6.0     Protein Albumin Urine (mg/dL)   Date Value   10/05/2020 30 (A)     Urobilinogen Urine (EU/dL)   Date Value   10/05/2020 0.2     Nitrite Urine (no units)   Date Value   10/05/2020 Positive (A)     Leukocyte Esterase Urine (no units)   Date Value   10/05/2020 Small (A)     CBC RESULTS:   Recent Labs   Lab Test 10/16/18  1011   WBC 6.0   RBC 3.91   HGB 11.9   HCT 38.5   MCV 99   MCH 30.4   MCHC 30.9*   RDW 13.0        @lastp@    A/P: Esperanza Gage is a 74 year old F with history of recurrent UTI and vaginal atrophy on exam.   Atrophy likely a risk factor  Normal voiding function based on normal PVR today    Plan      Today we discussed the following    -Treatment of your vaginal atrophy with vaginal estrace cream. I have prescribed this for you at your preferred pharmacy. Use it daily in the evening for two weeks, then twice a week after that.  Continue using it for the long run. If you develop vaginal bleeding or breast cancer, good to stop the estrogen cream and be evaluated before continuing.     -If you develop UTI symptoms ( pain with urination, urgency, frequency, fevers, chills, flank pain), please give us a call or call your primary care provider so they can check your urine for infection. Drink ample water to flush the urinary symptom. In your case, given your recurrent infections with contaminated bacteria in the past, best to get a cath specimen in the clinics by nurses so we can get a clean specimen, identify the bacteria and treat with appropriate antibiotics    -I have send a cath urine specimen today. This is simply to check if you cleared your recent infection and if not, what kind of bacteria it is and to see if you were treated with the right antibiotics. As long as you don't have symptoms anymore, we will not treat you with more antibiotics even if the culture comes back positive. We will only treat you further if you start having symptoms again. This exercise is just to let us know if your usual treatment was effective in clearing what ever bacteria you were growing.         A total of 45 minutes were spent with the patient today managing her recurrent UTI, > 50% in counseling and coordination of care    Marika Pérez MD, [facility]  , Department of OBGYN  Female Pelvic Medicine and Reconstructive Surgery ( Urogynecology)  CC  Patient Care Team:  Julia Gifford MD as PCP - General (Internal Medicine)  Julia Gifford MD as MD (Internal Medicine)  Sima Pascal PA-C as Physician Assistant (Family Practice)  Pierre Campos MD as MD (Orthopedics)  Hudson Dey MD as MD (Neurology)  Julia Gifford MD as Referring Physician (Internal Medicine)  Gurpreet Strange MD as MD (Dermatology)

## 2020-10-12 NOTE — PATIENT INSTRUCTIONS
Dear Esperanza, great to meet you.   As we discussed, you do have vaginal dryness/atrophy which can put you at risk for recurrent urinary tract infection although this is not the only risk factor.     Today we discussed the following    -Treatment of your vaginal atrophy with vaginal estrace cream. I have prescribed this for you at your preferred pharmacy. Use it daily in the evening for two weeks, then twice a week after that. Continue using it for the long run. If you develop vaginal bleeding or breast cancer, good to stop the estrogen cream and be evaluated before continuing.     -If you develop UTI symptoms ( pain with urination, urgency, frequency, fevers, chills, flank pain), please give us a call or call your primary care provider so they can check your urine for infection. Drink ample water to flush the urinary symptom. In your case, given your recurrent infections with contaminated bacteria in the past, best to get a cath specimen in the clinics by nurses so we can get a clean specimen, identify the bacteria and treat with appropriate antibiotics    -I have send a cath urine specimen today. This is simply to check if you cleared your recent infection and if not, what kind of bacteria it is and to see if you were treated with the right antibiotics. As long as you don't have symptoms anymore, we will not treat you with more antibiotics even if the culture comes back positive. We will only treat you further if you start having symptoms again. This exercise is just to let us know if your usual treatment was effective in clearing what ever bacteria you were growing.     Feel free to call us or email via My Chart with any questions    Marika Pérez MD, MCR    Division of Female Pelvic Medicine and Reconstructive Surgery

## 2020-10-13 LAB
BACTERIA SPEC CULT: NO GROWTH
SPECIMEN SOURCE: NORMAL

## 2020-10-19 ENCOUNTER — VIRTUAL VISIT (OUTPATIENT)
Dept: PSYCHOLOGY | Facility: CLINIC | Age: 75
End: 2020-10-19
Payer: COMMERCIAL

## 2020-10-19 DIAGNOSIS — F43.20 ADJUSTMENT DISORDER, UNSPECIFIED TYPE: Primary | ICD-10-CM

## 2020-10-19 PROCEDURE — 90834 PSYTX W PT 45 MINUTES: CPT | Mod: 95 | Performed by: COUNSELOR

## 2020-10-19 NOTE — PROGRESS NOTES
"                                           Progress Note    Patient Name: Esperanza Gage  Date: 10/19/2020         Service Type: Individual      Session Start Time: 1000  Session End Time: 1050     Session Length: 50    Session #: 2    Attendees: Client attended alone    Service Modality:  Phone Visit:    The patient has been notified of the following:      \"We have found that certain health care needs can be provided without the need for a face to face visit.  This service lets us provide the care you need with a phone conversation.       I will have full access to your [facility] medical record during this entire phone call.   I will be taking notes for your medical record.      Since this is like an office visit, we will bill your insurance company for this service.       There are potential benefits and risks of telephone visits (e.g. limits to patient confidentiality) that differ from in-person visits.?  Confidentiality still applies for telephone services, and nobody will record the visit.  It is important to be in a quiet, private space that is free of distractions (including cell phone or other devices) during the visit.??      If during the course of the call I believe a telephone visit is not appropriate, you will not be charged for this service\"     Consent has been obtained for this service by care team member: Yes      Treatment Plan Last Reviewed: JULIO  PHQ-9 / DAVID-7 : 1,1        DATA  Interactive Complexity: No  Crisis: No       Progress Since Last Session (Related to Symptoms / Goals / Homework):   Symptoms: No change since last session She reported issues with concentration, trouble relaxing, task learning, and limited socialization.     Homework: Achieved / completed to satisfaction      Episode of Care Goals: Minimal progress - PREPARATION (Decided to change - considering how); Intervened by negotiating a change plan and determining options / strategies for behavior change, identifying triggers, " exploring social supports, and working towards setting a date to begin behavior change     Current / Ongoing Stressors and Concerns:  Current-work, adjusting to COVID and it limits to socializing    Writer and patient continued to work on gathering background information to complete intake. Patient stated that she has been busy expanding her social network since the last session. She noted that her mood has improved since August and is getting creative about how to socialize with keep COVID guidelines intact.     Ongoing-COVID, limited socialization, work     Treatment Objective(s) Addressed in This Session:   build rapport       Intervention:   Motivational Interviewing    MI Intervention: Expressed Empathy/Understanding, Supported Autonomy, Collaboration, Evocation and Open-ended questions     Change Talk Expressed by the Patient: Desire to change    Provider Response to Change Talk: E - Evoked more info from patient about behavior change, A - Affirmed patient's thoughts, decisions, or attempts at behavior change and R - Reflected patient's change talk          ASSESSMENT: Current Emotional / Mental Status (status of significant symptoms):   Risk status (Self / Other harm or suicidal ideation)   Patient denies current fears or concerns for personal safety.   Patient denies current or recent suicidal ideation or behaviors.   Patient denies current or recent homicidal ideation or behaviors.   Patient denies current or recent self injurious behavior or ideation.   Patient denies other safety concerns.   Patient reports there has been no change in risk factors since their last session.     Patient reports there has been no change in protective factors since their last session.     Recommended that patient call 911 or go to the local ED should there be a change in any of these risk factors.     Appearance:   unable to assess due to telephone visit    Eye Contact:   unable to assess due to telephone visit    Psychomotor  Behavior: unable to assess due to telephone visit    Attitude:   Cooperative  Interested Friendly   Orientation:   All   Speech    Rate / Production: Normal     Volume:  Normal    Mood:    Normal   Affect:    unable to assess due to telephone call    Thought Content:  Clear    Thought Form:  Coherent  Logical    Insight:    Good      Medication Review:   No current psychiatric medications prescribed     Medication Compliance:   NA     Changes in Health Issues:   None reported     Chemical Use Review:   Substance Use: Chemical use reviewed, no active concerns identified      Tobacco Use: No current tobacco use.      Diagnosis:  1. Adjustment disorder, unspecified type        Collateral Reports Completed:   Not Applicable    PLAN: (Patient Tasks / Therapist Tasks / Other)  Patient will create an imagery script and create 3 tx plan goals.         DOLORES Raygoza

## 2020-10-26 ENCOUNTER — ALLIED HEALTH/NURSE VISIT (OUTPATIENT)
Dept: EDUCATION SERVICES | Facility: CLINIC | Age: 75
End: 2020-10-26
Payer: COMMERCIAL

## 2020-10-26 VITALS — WEIGHT: 195.4 LBS | BODY MASS INDEX: 32.02 KG/M2

## 2020-10-26 DIAGNOSIS — R73.03 PREDIABETES: Primary | ICD-10-CM

## 2020-10-26 PROCEDURE — G9874 4 EM CORE SESSIONS: HCPCS

## 2020-10-26 NOTE — PROGRESS NOTES
Medicare Diabetes Prevention Program: Session 4    Esperanza Gage presents for MDP Session 4: Eat Well to Prevent T2    Subjective/Objective:    Wt 88.6 kg (195 lb 6.4 oz)   BMI 32.02 kg/m      Minutes spent exercising over the past week: 445      Intervention/Plan:    Topics discussed today include information to meet the following learning objectives:    1) Explain how to eat well to prevent or delay type 2 diabetes  2) Explain how to build a healthy meal  3) Identify the items in each food group      Patient to follow-up next week at MDPP Session 5.     Verna Veronica

## 2020-10-28 ENCOUNTER — VIRTUAL VISIT (OUTPATIENT)
Dept: PSYCHOLOGY | Facility: CLINIC | Age: 75
End: 2020-10-28
Payer: COMMERCIAL

## 2020-10-28 DIAGNOSIS — F43.22 ADJUSTMENT DISORDER WITH ANXIETY: Primary | ICD-10-CM

## 2020-10-28 PROCEDURE — 90791 PSYCH DIAGNOSTIC EVALUATION: CPT | Mod: 95 | Performed by: COUNSELOR

## 2020-10-28 NOTE — PROGRESS NOTES
"  [facility]  Evaluator Name:  Diana Alas MA [facility]    PATIENT'S NAME: Esperanza Gage  PREFERRED NAME: Esperanza  PRONOUNS:       MRN:   [id number]  :   1945   ACCT. NUMBER: [id number]  DATE OF SERVICE: 10/28/20  START TIME: 300  END TIME: 353  PREFERRED PHONE: [phone]  May we leave a program related message: Yes  SERVICE MODALITY:  Phone Visit:    The patient has been notified of the following:      \"We have found that certain health care needs can be provided without the need for a face to face visit.  This service lets us provide the care you need with a phone conversation.       I will have full access to your [place] medical record during this entire phone call.   I will be taking notes for your medical record.      Since this is like an office visit, we will bill your insurance company for this service.       There are potential benefits and risks of telephone visits (e.g. limits to patient confidentiality) that differ from in-person visits.?  Confidentiality still applies for telephone services, and nobody will record the visit.  It is important to be in a quiet, private space that is free of distractions (including cell phone or other devices) during the visit.??      If during the course of the call I believe a telephone visit is not appropriate, you will not be charged for this service\"     Consent has been obtained for this service by care team member: Yes     UNIVERSAL ADULT DIAGNOSTIC ASSESSMENT      Identifying Information:  Patient is a 74 year old, .  The pronoun use throughout this assessment reflects the patient's chosen pronoun.  Patient was referred for an assessment by primary care provider.  Patient attended the session alone.     Chief Complaint:   The reason for seeking services at this time is: \"mental side of all of this and not being around people physically due to COVID and I went 100 days without a hug, and was referred by her primary care provider, I " "think things are have turned around since August, I joined my book club again, and needed to expand my social circles. \"   The problem(s) began 2020. Patient has not attempted to resolve these concerns in the past.     Patient reported that she joined a walking club, has been in a book club with her family, connecting with family members, and having dinner together with friends, and it is helping her manage her mood. She noted losing 5 pounds from walking and monitoring her nutrition. 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      Social/Family History:  Patient reported they grew up in [place], [place]. She reported that she moved to [place], [place] and returned to [place] after her father and second brother , and came back to help her mother due to her memory concerns. She noted being close to her mother.  They were raised by biological parents.  Parents stayed . Father  in , 1 brother  in late  and her other brother  in , and her mother in .   Patient reported that their childhood was \"thais, peaceful house, mom and dad would talk if they had issues away from us, dad was an  and mom was a , had 2 older brothers, lived in safe neighborhood and everyone knew each other, we always took trips and we were always learning, both were very loving, and my mom did a good job.\"  She has a nephew that returned to [place], and does not see them very often. She also noted having a niece that lives in [place], [place]. Patient reported that her family of origin are decreased.    The patient describes their cultural background as \"Mother had a Hungarian Mom and Spanish dad, and my dad was Citizen of [place], English and dad's mother was Hebrew.\"  Cultural influences and impact on patient's life structure, values, norms, and healthcare: Hardworking.  Contextual influences on patient's health include: Societal Factors COVID.    These factors will " "be addressed in the Preliminary Treatment plan.  Patient identified their preferred language to be English. Patient reported they does not need the assistance of an  or other support involved in therapy.     Patient reported had no significant delays in developmental tasks.   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 She noted that she is creating her own course and curriculum of the information she is learning about. Patient identified the following learning problems: none reported.  Modifications will not be used to assist communication in therapy.   Patient reports they are able to understand written materials.    Patient reported the following relationship history \"distant, in the past, its been a lot of years.\"  Patient's current relationship status is single for 35 years.   Patient identified their sexual orientation as heterosexual.  Patient reported having zero child(maria teresa). Patient identified friends, co-worker and nephew and his family, niece, neighbors, cousin as part of their support system.  Patient identified the quality of these relationships as fair. She reported that her close friend is back in [place] and they are great friends.    Patient's current living/housing situation involves staying in own home/apartment. She currently lives in her childhood home.  They live by herself and they report that housing is stable.     Patient is currently employed part time and reports they are able to function appropriately at work.  Patient reports their finances are obtained through employment.  Patient does not identify finances as a current stressor. 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    Patient reported that they have not been involved with the legal " system.   Patient denies being on probation / parole / under the jurisdiction of the court.    Patient's Strengths and Limitations:  Patient identified the following strengths or resources that will help them succeed in treatment: commitment to health and well being, exercise routine, friends / good social support, family support, insight, intelligence, positive work environment, motivation, sense of humor, strong social skills and work ethic. Things that may interfere with the patient's success in treatment include: none identified.     Personal and Family Medical History:   Patient does report a family history of mental health concerns. She reported that her father's sister has depression. Patient reports family history includes Cerebrovascular Disease in her brother and brother; Heart Disease in her father; Hypertension in her brother; Osteoporosis in her mother.    Patient does not report Mental Health Diagnosis or Treatment.      Patient has had a physical exam to rule out medical causes for current symptoms.  Date of last physical exam was within the past year. Symptoms have developed since last physical exam and client was encouraged to follow up with PCP.  . The patient has a [place] Primary Care Provider, who is named Julia Gifford..  Patient reports no current medical concerns.  There are significant appetite / nutritional concerns / weight changes. She noted since her knee surgery 2 years ago her weight has been increasing, and she is working on eating healthy and walking and has lost 7 lbs.  Patient does not report a history of head injury / trauma / cognitive impairment.      Patient reports current meds as:   Outpatient Medications Marked as Taking for the 10/28/20 encounter (Appointment) with Diana Alas [facility]   Medication Sig     alendronate (FOSAMAX) 70 MG tablet Take 1 tablet (70 mg) by mouth every 7 days Take 1 tablet once a week with a glass of water 30 minutes prior to breakfast      ASPIRIN PO Take 81 mg by mouth daily     ciclopirox (LOPROX) 0.77 % cream Apply topically 2 times daily     estradiol (ESTRACE) 0.1 MG/GM vaginal cream Place 1 g vaginally once a week for 14 days, THEN 1 g twice a week. Continue using estrace cream twice a week as maintenance treatment     losartan (COZAAR) 50 MG tablet Take 1 tablet (50 mg) by mouth daily     simvastatin (ZOCOR) 10 MG tablet TAKE 1 TABLET BY MOUTH ONCE DAILY AT BEDTIME       Medication Adherence:  Patient reports taking prescribed medications as prescribed.    Patient Allergies:  No Known Allergies    Medical History:    Past Medical History:   Diagnosis Date     Hearing problem 2017     Hyperlipidemia 2005     Hyperlipidemia LDL goal < 130      Hypertension      Osteoporosis, post-menopausal      Pelvic fracture (H)          Current Mental Status Exam:   Appearance:  unable to assess due telephone visit    Eye Contact:  unable to assess   Psychomotor:  unable to assess       Gait / station:  Unable to assess  Attitude / Demeanor: Cooperative  Interested Friendly  Speech      Rate / Production: Normal/ Responsive      Volume:  Normal  volume      Language:  intact  Mood:   Anxious   Affect:   unable to assess    Thought Content: Clear   Thought Process: Coherent  Logical       Associations: No loosening of associations  Insight:   Good   Judgment:  Intact   Orientation:  All  Attention/concentration: Fair    Rating Scales:    PHQ9:    PHQ-9 SCORE 8/12/2019 8/31/2020 10/6/2020   PHQ-9 Total Score 2 4 1   ;    GAD7:    DAVID-7 SCORE 8/12/2019 8/31/2020 10/6/2020   Total Score 0 0 1     CGI:     First:Considering your total clinical experience with this particular patient population, how severe are the patient's symptoms at this time?: 2 (10/6/2020 10:03 AM)  ;    Most recentCompared to the patient's condition at the START of treatment, this patient's condition is: 2 (10/6/2020 10:03 AM)      Substance Use:  Patient did report a family history of  substance use concerns; see medical history section for details. Patient stated her older brother used cannabis when he returned home from the [employer] [place] in the 60's and her other brother used alcohol.  Patient has not received chemical dependency treatment in the past.  Patient has not ever been to detox.      Patient is not currently receiving any chemical dependency treatment. Patient reported the following problems as a result of their substance use: NA.    Patient denies using alcohol.  Patient denies using tobacco.  Patient denies using marijuana.  Patient reports using caffeine 7 times per week and drinks 1 at a time. Patient started using caffeine at age 19.  Patient reports using/abusing the following substance(s). Patient reported no other substance use.     CAGE- AID:    CAGE-AID Total Score 10/6/2020   Total Score 0       Substance Use: No symptoms    Based on the negative CAGE score and clinical interview there  are not indications of drug or alcohol abuse.    Significant Losses / Trauma / Abuse / Neglect Issues:   Patient did not serve in the .  There are indications or report of significant loss, trauma, abuse or neglect issues related to: death of family members, parents and brothers and ambiguous losses.  Concerns for possible neglect are not present.     Safety Assessment:   Current Safety Concerns:  Warren Suicide Severity Rating Scale (Lifetime/Recent)  Warren Suicide Severity Rating (Lifetime/Recent) 10/6/2020   1. Wish to be Dead (Lifetime) No   1. Wish to be Dead (Recent) No   2. Non-Specific Active Suicidal Thoughts (Lifetime) No   2. Non-Specific Active Suicidal Thoughts (Recent) No   3. Active Suicidal Ideation with any Methods (Not Plan) Without Intent to Act (Lifetime) No   3. Active Sucidal Ideation with any Methods (Not Plan) Without Intent to Act (Recent) No   4. Active Suicidal Ideation with Some Intent to Act, Without Specific Plan (Lifetime) No   4. Active Suicidal  Ideation with Some Intent to Act, Without Specific Plan (Recent) No   5. Active Suicidal Ideation with Specific Plan and Intent (Lifetime) No   5. Active Suicidal Ideation with Specific Plan and Intent (Recent) No     Patient denies current homicidal ideation and behaviors.  Patient denies current self-injurious ideation and behaviors.    Patient denied risk behaviors associated with substance use.  Patient denies any high risk behaviors associated with mental health symptoms.  Patient reports the following current concerns for their personal safety: None.  Patient reports there are not  firearms in the house. NA.     History of Safety Concerns:  Patient denied a history of homicidal ideation.     Patient denied a history of personal safety concerns.    Patient denied a history of assaultive behaviors.    Patient denied a history of sexual assault behaviors.     Patient denied a history of risk behaviors associated with substance use.  Patient denies any history of high risk behaviors associated with mental health symptoms.  Patient reports the following protective factors: spirituality, positive relationships positive social network, forward/future oriented thinking, dedication to family/friends, safe and stable environment, regular physical activity, secure attachment, abstinence from substances, adherence with prescribed medication, daily obligations, structured day, effective problem-solving skills, committment to well-being, sense of meaning and positive social skills    Risk Plan:  See Recommendations for Safety and Risk Management Plan    Review of Symptoms per patient report:  Depression: No symptoms  Gena:  No Symptoms  Psychosis: No Symptoms  Anxiety: Nervousness, Ruminations and worry, frustration, concern about not being able to socialize  Panic:  No symptoms  Post Traumatic Stress Disorder:  No Symptoms   Eating Disorder: No Symptoms  ADD / ADHD:  No symptoms  Conduct Disorder: No symptoms  Autism  Spectrum Disorder: No symptoms  Obsessive Compulsive Disorder: No Symptoms    Patient reports the following compulsive behaviors and treatment history: NA.      Diagnostic Criteria:   A. The development of emotional or behavioral symptoms in response to an identifiable stressor(s) occurring within 3 months of the onset of the stressor(s)  B. These symptoms or behaviors are clinically significant, as evidenced by one or both of the following:       - Significant impairment in social, occupational, or other important areas of functioning  C. The stress-related disturbance does not meet criteria for another disorder & is not not an exacerbation of another mental disorder  D. The symptoms do not represent normal bereavement  E. Once the stressor or its consequences have terminated, the symptoms do not persist for more than an additional 6 months       * Adjustment Disorder with Anxiety: The predominant manfestations are symptoms such as nervousness, worry, or jitteriness, or, in children separation anxiety from major attachment figures    Functional Status:  Patient reports the following functional impairments: social interactions due to covid-19 restrictions     WHODAS:   WHODAS 2.0 Total Score 10/6/2020   Total Score 17       Clinical Summary:  1. Reason for assessment: referred by PCP  .  2. Psychosocial, Cultural and Contextual Factors: COVID  3. Principal DSM5 Diagnoses  (Sustained by DSM5 Criteria Listed Above):   Adjustment Disorders  309.24 (F43.22) With anxiety.  4. Other Diagnoses that is relevant to services: none at this time  5. Provisional Diagnosis: none at this time  6. Prognosis: Expect Improvement.  7. Likely consequences of symptoms if not treated: increased symptoms.  8. Client strengths include:  caring, creative, educated, empathetic, employed, goal-focused, good listener, insightful, intelligent, motivated and supportive .     Recommendations:     1. Plan for Safety and Risk  Management:   Recommended that patient call 911 or go to the local ED should there be a change in any of these risk factors..          Report to child / adult protection services was NA.     2. Patient denied any cultural concerns.    3. Initial Treatment will focus on:    Adjustment Difficulties related to: COVID .     4. Resources/Service Plan:    services are not indicated.   Modifications to assist communication are not indicated.   Additional disability accommodations are not indicated.      5. Collaboration:   Collaboration / coordination of treatment will be initiated with the following  support professionals: primary care physician.      6.  Referrals:   The following referral(s) will be initiated: Outpatient Mental Deny Therapy. Next Scheduled Appointment: patient will schedule a f/u appt.     A Release of Information has been obtained for the following: NA.    7. ROSEANNE:    ROSEANNE:  Discussed the general effects of drugs and alcohol on health and well-being. Provider gave patient printed information about the effects of chemical use on their health and well being. Recommendations:  NA .     8. Records:    were reviewed at time of assessment.   Information in this assessment was obtained from the medical record and  provided by patient who is a good historian.    Patient will have open access to their mental health medical record.      Eval type:  Mental Health    Provider Name/ Credentials:  Diana Alas MA [facility]    October 19, 2020

## 2020-11-02 ENCOUNTER — ALLIED HEALTH/NURSE VISIT (OUTPATIENT)
Dept: EDUCATION SERVICES | Facility: CLINIC | Age: 75
End: 2020-11-02
Payer: COMMERCIAL

## 2020-11-02 VITALS — WEIGHT: 195.4 LBS | BODY MASS INDEX: 32.02 KG/M2

## 2020-11-02 DIAGNOSIS — R73.03 PREDIABETES: Primary | ICD-10-CM

## 2020-11-02 PROCEDURE — G9891 EM SESSION REPORTING: HCPCS

## 2020-11-03 NOTE — PROGRESS NOTES
Medicare Diabetes Prevention Program: Session 5    Esperanza Gage presents for [facility] Session 5: Track Your Food    Subjective/Objective:    Wt 88.6 kg (195 lb 6.4 oz)   BMI 32.02 kg/m      Minutes spent exercising over the past week: 560       Intervention/Plan:    Topics discussed today include information to meet the following learning objectives:    1) Identify the purpose of tracking their food  2) Explain how to track their food  3) Explain how to use Nutrition Facts labels      Patient to follow-up next week at [facility] Session 6.     Verna Veronica

## 2020-11-07 ENCOUNTER — HEALTH MAINTENANCE LETTER (OUTPATIENT)
Age: 75
End: 2020-11-07

## 2020-11-09 ENCOUNTER — ALLIED HEALTH/NURSE VISIT (OUTPATIENT)
Dept: EDUCATION SERVICES | Facility: CLINIC | Age: 75
End: 2020-11-09
Payer: COMMERCIAL

## 2020-11-09 VITALS — BODY MASS INDEX: 31.96 KG/M2 | WEIGHT: 195 LBS

## 2020-11-09 DIAGNOSIS — R73.03 PREDIABETES: Primary | ICD-10-CM

## 2020-11-09 PROCEDURE — G9891 EM SESSION REPORTING: HCPCS

## 2020-11-10 NOTE — PROGRESS NOTES
Medicare Diabetes Prevention Program: Session 6    Esperanza Gage presents for [facility] Session 6: Get More Active    Subjective/Objective:    Wt 88.5 kg (195 lb)   BMI 31.96 kg/m      Minutes spent exercising over the past week: 440      Intervention/Plan:    Topics discussed today include information to meet the following learning objectives:    1) Identify the purpose of getting more active  2) Identify some ways to get more active  3) Explain how to track more details about their fitness      Patient to follow-up next week at [facility] Session 7.     Verna Veronica

## 2020-11-15 NOTE — PROGRESS NOTES
Esperanza is a  74 year old female post menopausal] [GR0, P0] that presents today for osteoporosis follow up patient was last seen 2013. At that time she had been on alendronate for 5 yrs. She took a holiday 2012-13 off alendronate and fell and fractured her pelvis. forteo was suggested but she didn't like daily injections.  Prolia was discussed.  But She has since resumed alendronate 70 mg/wk.  She had a recent DXA and comes in to discuss it.  Referring Physician: Julia Gifford MD    HPI     Have you ever had a bone density test? Yes  Where = [facility]  When = 9/2020 2013, 2012, 2009  Spine Tscore = L1-2  -2.8 loss  4.6%  Z== -2.4 for L2  Left neck Tscore = -1.8  Total left hip Tscore = -1.7  Right neck Tscore = -1.3  Total Right hip Tscore = -1.1  Gain 3.7%  Have you received any x-ray dye or contrast in the last ten days? No  How many servings of dairy products do you consume per day? 2 Type: milk - 1-2 glasses, yogurt, green vegetables   Do you take a multi-vitamin daily? Yes  Do you take a vitamin D supplement? Yes 1000IU/day  Do you take a calcium supplement daily?  Calcium citrate  - 2 pills = 500mg  - takes 2-3/day    Do you take a supplement containing strontium? No  Are you exposed to natural sunlight at least 20 minutes three times a week? Yes    Social History   reports that she has quit smoking. Her smoking use included cigarettes. She smoked 0.00 packs per day for 0.00 years. She has never used smokeless tobacco. She reports current alcohol use. She reports that she does not use drugs.  Do you smoke cigarettes? Reformed smoker   Do you exercise? Yes. Details: walking 1 hr/day 6 d/wk  -  Does some weights.    Do you drink alcohol? Yes. Details: 2 glasses wine/wk     Medication History  Have you used any of the following medications?   Actonel (Risedronate): No   Aredia (Pamidronate): No   Boniva (Ibindronate): No   Didronil (Etidronate): No   Evista (Raloxifene): No   Fosamax (Alendronate): on this  currently - started 35 mg/wk 10 yrs ago  and 9487-1116 took a drug holiday and fell and fractured pelvis - then resumed alendronate  70mg/wk    Forteo (Parathyroid hormone) injections: No   HCTZ (Thiazide): No   Calcitonin nasal spray: No   Reclast or Zometa (Zolendronate): No   Prolia (Denosumab): No    Current Outpatient Medications   Medication Sig Dispense Refill     alendronate (FOSAMAX) 70 MG tablet Take 1 tablet (70 mg) by mouth every 7 days Take 1 tablet once a week with a glass of water 30 minutes prior to breakfast 15 tablet 3     amoxicillin (AMOXIL) 500 MG tablet Take 4 tablets by mouth one hour before Dental Appointment. (Patient not taking: Reported on 10/12/2020) 4 tablet 4     ASPIRIN PO Take 81 mg by mouth daily       ciclopirox (LOPROX) 0.77 % cream Apply topically 2 times daily       estradiol (ESTRACE) 0.1 MG/GM vaginal cream Place 1 g vaginally once a week for 14 days, THEN 1 g twice a week. Continue using estrace cream twice a week as maintenance treatment 42.5 g 3     losartan (COZAAR) 50 MG tablet Take 1 tablet (50 mg) by mouth daily 90 tablet 3     simvastatin (ZOCOR) 10 MG tablet TAKE 1 TABLET BY MOUTH ONCE DAILY AT BEDTIME 90 tablet 3        No Known Allergies    Past Medical History    Family History   Problem Relation Age of Onset     Osteoporosis Mother      Hypertension Brother      Cerebrovascular Disease Brother      Cerebrovascular Disease Brother      Heart Disease Father      C.A.D. No family hx of      Diabetes No family hx of      Skin Cancer No family hx of      Melanoma No family hx of      Dementia No family hx of      Heart Disease No family hx of        ROS:  General: none  Head/Eyes: none  Ears/Nose/Throat: none  Cardiovascular: high blood pressure  Respiratory: none  Gastrointestinal: none  Breast: none  Genitourinary: urinary infections  Sexual Function: none  Musculoskeletal: stiffness  Skin: none  Neurological: numbness/tingling  Mental Health: none PHQ9=0  DAVID  "=0  Endocrine: none    Clinic Measurements  Vitals: /82   Pulse 67   Ht 1.664 m (5' 5.5\")   Wt 88 kg (194 lb)   Breastfeeding No   BMI 31.79 kg/m    BMI= Body mass index is 31.79 kg/m .    Physical exam  Constitutional: Well appearing woman in no acute distress.   Psychological: appropriate mood.  Neck: No thyroidmegaly.  no carotid bruits.  Cardiovascular: regular rate and rhythm, normal S1 and S2, no murmurs, rubs or gallops, peripheral pulses full and symmetric   Respiratory: clear to auscultation, no wheezes or crackles, normal breath sounds.  Musculoskeletal: good range of motion, no edema and motor strength is equal in the upper and lower extremities    Spine: Straight, not tender, Flexion good, Extension good, Lateral movement good, Rotational movement good  Skin: no concerning lesions, no jaundice.  Neurological: cranial nerves intact, normal strength, reflexes at patella and biceps normal, normal gait, no tremor.     LAB  Vertebra; Fracture Assessment: 2013           grade 1 (mild) compression fracture at T10           grade 2 (moderate) biconcave fracture at T11           grade 1 (mild) biconcave fracture at T12.  Ordered VFA   Vit D =27 8/2020  DEXA 2020     FRAX Assessment Tool: N/A,   Risk Factors: +FHx - PM, age,  T scores  Compression fractures   Reformed smoker     ASSESSMENT:  This is a 74-year-old postmenopausal female who has osteoporosis by T-scores, compression fractures and hx of pelvic fracture.  She has been on alendronate for 7 years.  Her most recent DEXA shows that her  hips are stable but she has had bone loss in the spine. Her Z score also suggests a secondary cause.  She has had some recent labs but will recheck more labs for a secondary cause.   We discussed calcium and vitamin D.  I would suggest a holiday off alendronate for a year.  Then I would consider Prolia or revisit Tymlos or Forteo.    PLAN:  Take a drug holiday  - stop alendronate end of Dec 2020  Get blood work " today  Get a vertebral fracture assessment - you schedule this at DEXA place at [facility] on [address]  Collect urine for 24 hours and bring container back and take to the lab   DEXA 9/2022   see me 6/2021 to consider an analog - like Forteo or Tymlos   Increase vit D to 2000IU/day  Patient should take 1200mg of calcium/day in divided doses   Dietary calcium is best  1 glass of milk 8 oz = 300mg of calcium         I spent a total of 30 minutes face-to-face with the patient during today's office visit.  Over 50% of this time was spent counseling the patient and/or coordinating care regarding review of DEXA, medication, secondary cause and calcium and vit D.  See note for details.    Thank you for allowing me to participate in the care of your patient.  Please do not hesitate to call with questions or concerns.    Sincerely,    Yin Srivastava MD, PhD

## 2020-11-16 ENCOUNTER — OFFICE VISIT (OUTPATIENT)
Dept: FAMILY MEDICINE | Facility: CLINIC | Age: 75
End: 2020-11-16
Attending: FAMILY MEDICINE
Payer: COMMERCIAL

## 2020-11-16 ENCOUNTER — ALLIED HEALTH/NURSE VISIT (OUTPATIENT)
Dept: EDUCATION SERVICES | Facility: CLINIC | Age: 75
End: 2020-11-16
Payer: COMMERCIAL

## 2020-11-16 VITALS — BODY MASS INDEX: 31.79 KG/M2 | WEIGHT: 194 LBS

## 2020-11-16 VITALS
SYSTOLIC BLOOD PRESSURE: 131 MMHG | HEART RATE: 67 BPM | BODY MASS INDEX: 31.18 KG/M2 | WEIGHT: 194 LBS | DIASTOLIC BLOOD PRESSURE: 82 MMHG | HEIGHT: 66 IN

## 2020-11-16 DIAGNOSIS — R73.03 PREDIABETES: Primary | ICD-10-CM

## 2020-11-16 DIAGNOSIS — M81.0 SENILE OSTEOPOROSIS: Primary | ICD-10-CM

## 2020-11-16 LAB
MAGNESIUM SERPL-MCNC: 2.2 MG/DL (ref 1.6–2.3)
PHOSPHATE SERPL-MCNC: 3.8 MG/DL (ref 2.5–4.5)
PTH-INTACT SERPL-MCNC: 45 PG/ML (ref 18–80)

## 2020-11-16 PROCEDURE — G0463 HOSPITAL OUTPT CLINIC VISIT: HCPCS

## 2020-11-16 PROCEDURE — 84080 ASSAY ALKALINE PHOSPHATASES: CPT | Performed by: FAMILY MEDICINE

## 2020-11-16 PROCEDURE — 83970 ASSAY OF PARATHORMONE: CPT | Performed by: FAMILY MEDICINE

## 2020-11-16 PROCEDURE — 83735 ASSAY OF MAGNESIUM: CPT | Performed by: FAMILY MEDICINE

## 2020-11-16 PROCEDURE — G9891 EM SESSION REPORTING: HCPCS

## 2020-11-16 PROCEDURE — 84100 ASSAY OF PHOSPHORUS: CPT | Performed by: FAMILY MEDICINE

## 2020-11-16 PROCEDURE — 36415 COLL VENOUS BLD VENIPUNCTURE: CPT | Performed by: FAMILY MEDICINE

## 2020-11-16 PROCEDURE — 99203 OFFICE O/P NEW LOW 30 MIN: CPT | Performed by: FAMILY MEDICINE

## 2020-11-16 ASSESSMENT — PAIN SCALES - GENERAL: PAINLEVEL: NO PAIN (0)

## 2020-11-16 ASSESSMENT — MIFFLIN-ST. JEOR: SCORE: 1393.79

## 2020-11-16 NOTE — LETTER
11/16/2020       RE: Esperanza Gage  [address] [place] [address]     Dear Colleague,    Thank you for referring your patient, Esperanza Gage, to the [facility] [place] at [facility]. Please see a copy of my visit note below.    Esperanza is a  74 year old female post menopausal] [GR0, P0] that presents today for osteoporosis follow up patient was last seen 2013. At that time she had been on alendronate for 5 yrs. She took a holiday 2012-13 off alendronate and fell and fractured her pelvis. forteo was suggested but she didn't like daily injections.  Prolia was discussed.  But She has since resumed alendronate 70 mg/wk.  She had a recent DXA and comes in to discuss it.  Referring Physician: Julia Gifford MD    HPI     Have you ever had a bone density test? Yes  Where = [facility]  When = 9/2020 2013, 2012, 2009  Spine Tscore = L1-2  -2.8 loss  4.6%  Z== -2.4 for L2  Left neck Tscore = -1.8  Total left hip Tscore = -1.7  Right neck Tscore = -1.3  Total Right hip Tscore = -1.1  Gain 3.7%  Have you received any x-ray dye or contrast in the last ten days? No  How many servings of dairy products do you consume per day? 2 Type: milk - 1-2 glasses, yogurt, green vegetables   Do you take a multi-vitamin daily? Yes  Do you take a vitamin D supplement? Yes 1000IU/day  Do you take a calcium supplement daily?  Calcium citrate  - 2 pills = 500mg  - takes 2-3/day    Do you take a supplement containing strontium? No  Are you exposed to natural sunlight at least 20 minutes three times a week? Yes    Social History   reports that she has quit smoking. Her smoking use included cigarettes. She smoked 0.00 packs per day for 0.00 years. She has never used smokeless tobacco. She reports current alcohol use. She reports that she does not use drugs.  Do you smoke cigarettes? Reformed smoker   Do you exercise? Yes. Details: walking 1 hr/day 6 d/wk  -  Does some  weights.    Do you drink alcohol? Yes. Details: 2 glasses wine/wk     Medication History  Have you used any of the following medications?   Actonel (Risedronate): No   Aredia (Pamidronate): No   Boniva (Ibindronate): No   Didronil (Etidronate): No   Evista (Raloxifene): No   Fosamax (Alendronate): on this currently - started 35 mg/wk 10 yrs ago  and [id number] took a drug holiday and fell and fractured pelvis - then resumed alendronate  70mg/wk    Forteo (Parathyroid hormone) injections: No   HCTZ (Thiazide): No   Calcitonin nasal spray: No   Reclast or Zometa (Zolendronate): No   Prolia (Denosumab): No    Current Outpatient Medications   Medication Sig Dispense Refill     alendronate (FOSAMAX) 70 MG tablet Take 1 tablet (70 mg) by mouth every 7 days Take 1 tablet once a week with a glass of water 30 minutes prior to breakfast 15 tablet 3     amoxicillin (AMOXIL) 500 MG tablet Take 4 tablets by mouth one hour before Dental Appointment. (Patient not taking: Reported on 10/12/2020) 4 tablet 4     ASPIRIN PO Take 81 mg by mouth daily       ciclopirox (LOPROX) 0.77 % cream Apply topically 2 times daily       estradiol (ESTRACE) 0.1 MG/GM vaginal cream Place 1 g vaginally once a week for 14 days, THEN 1 g twice a week. Continue using estrace cream twice a week as maintenance treatment 42.5 g 3     losartan (COZAAR) 50 MG tablet Take 1 tablet (50 mg) by mouth daily 90 tablet 3     simvastatin (ZOCOR) 10 MG tablet TAKE 1 TABLET BY MOUTH ONCE DAILY AT BEDTIME 90 tablet 3        No Known Allergies    Past Medical History    Family History   Problem Relation Age of Onset     Osteoporosis Mother      Hypertension Brother      Cerebrovascular Disease Brother      Cerebrovascular Disease Brother      Heart Disease Father      C.A.D. No family hx of      Diabetes No family hx of      Skin Cancer No family hx of      Melanoma No family hx of      Dementia No family hx of      Heart Disease No family hx of        ROS:  General:  "none  Head/Eyes: none  Ears/Nose/Throat: none  Cardiovascular: high blood pressure  Respiratory: none  Gastrointestinal: none  Breast: none  Genitourinary: urinary infections  Sexual Function: none  Musculoskeletal: stiffness  Skin: none  Neurological: numbness/tingling  Mental Health: none PHQ9=0  DAVID =0  Endocrine: none    Clinic Measurements  Vitals: /82   Pulse 67   Ht 1.664 m (5' 5.5\")   Wt 88 kg (194 lb)   Breastfeeding No   BMI 31.79 kg/m    BMI= Body mass index is 31.79 kg/m .    Physical exam  Constitutional: Well appearing woman in no acute distress.   Psychological: appropriate mood.  Neck: No thyroidmegaly.  no carotid bruits.  Cardiovascular: regular rate and rhythm, normal S1 and S2, no murmurs, rubs or gallops, peripheral pulses full and symmetric   Respiratory: clear to auscultation, no wheezes or crackles, normal breath sounds.  Musculoskeletal: good range of motion, no edema and motor strength is equal in the upper and lower extremities    Spine: Straight, not tender, Flexion good, Extension good, Lateral movement good, Rotational movement good  Skin: no concerning lesions, no jaundice.  Neurological: cranial nerves intact, normal strength, reflexes at patella and biceps normal, normal gait, no tremor.     LAB  Vertebra; Fracture Assessment: 2013           grade 1 (mild) compression fracture at T10           grade 2 (moderate) biconcave fracture at T11           grade 1 (mild) biconcave fracture at T12.  Ordered VFA   Vit D =27 8/2020  DEXA 2020     FRAX Assessment Tool: N/A,   Risk Factors: +FHx - PM, age,  T scores  Compression fractures   Reformed smoker     ASSESSMENT:  This is a 74-year-old postmenopausal female who has osteoporosis by T-scores, compression fractures and hx of pelvic fracture.  She has been on alendronate for 7 years.  Her most recent DEXA shows that her  hips are stable but she has had bone loss in the spine. Her Z score also suggests a secondary cause.  She has " had some recent labs but will recheck more labs for a secondary cause.   We discussed calcium and vitamin D.  I would suggest a holiday off alendronate for a year.  Then I would consider Prolia or revisit Tymlos or Forteo.    PLAN:  Take a drug holiday  - stop alendronate end of Dec 2020  Get blood work today  Get a vertebral fracture assessment - you schedule this at DEXA place at [facility] on [address]  Collect urine for 24 hours and bring container back and take to the lab   DEXA 9/2022   see me 6/2021 to consider an analog - like Forteo or Tymlos   Increase vit D to 2000IU/day  Patient should take 1200mg of calcium/day in divided doses   Dietary calcium is best  1 glass of milk 8 oz = 300mg of calcium         I spent a total of 30 minutes face-to-face with the patient during today's office visit.  Over 50% of this time was spent counseling the patient and/or coordinating care regarding review of DEXA, medication, secondary cause and calcium and vit D.  See note for details.    Thank you for allowing me to participate in the care of your patient.  Please do not hesitate to call with questions or concerns.    Sincerely,    Yin Srivastava MD, PhD

## 2020-11-16 NOTE — PATIENT INSTRUCTIONS
Take a drug holiday  - stop alendronate end of Dec 2020  Get blood work today  Get a vertebral fracture assessment - you schedule this at DEXA place at [facility] on [address]  Collect urine for 24 hours and bring container back and take to the lab   DEXA 9/2022   see me 1/2022   Increase vit D to 2000IU/day  Patient should take 1200mg of calcium/day in divided doses   Dietary calcium is best  1 glass of milk 8 oz = 300mg of calcium

## 2020-11-17 NOTE — PROGRESS NOTES
Medicare Diabetes Prevention Program: Session 7    Esperanza Gage presents for MDPP Session 7: Burn More Calories Than You Take In    Subjective/Objective:    Wt 88 kg (194 lb)   BMI 31.79 kg/m      Minutes spent exercising over the past week: 255      Intervention/Plan:    Topics discussed today include information to meet the following learning objectives:    1) Recognize the link between calories and weight  2) Explain how to track the calories they take in  3) Explain how to track the calories they burn  4) Explain how to burn more calories than they take in      Patient to follow-up next week at MDPP Session 8.     Verna Veronica

## 2020-11-18 LAB — ALP BONE SERPL-MCNC: 9.3 UG/L

## 2020-11-20 ENCOUNTER — ANCILLARY PROCEDURE (OUTPATIENT)
Dept: BONE DENSITY | Facility: CLINIC | Age: 75
End: 2020-11-20
Attending: FAMILY MEDICINE
Payer: COMMERCIAL

## 2020-11-20 DIAGNOSIS — M81.0 SENILE OSTEOPOROSIS: ICD-10-CM

## 2020-11-20 PROCEDURE — 77086 VRT FRACTURE ASSMT VIA DXA: CPT | Performed by: INTERNAL MEDICINE

## 2020-11-22 ENCOUNTER — HOSPITAL ENCOUNTER (OUTPATIENT)
Facility: CLINIC | Age: 75
Setting detail: SPECIMEN
Discharge: HOME | End: 2020-11-22
Admitting: FAMILY MEDICINE
Payer: COMMERCIAL

## 2020-11-22 PROCEDURE — 81050 URINALYSIS VOLUME MEASURE: CPT | Performed by: FAMILY MEDICINE

## 2020-11-22 PROCEDURE — 82340 ASSAY OF CALCIUM IN URINE: CPT | Performed by: FAMILY MEDICINE

## 2020-11-23 ENCOUNTER — TELEPHONE (OUTPATIENT)
Dept: OBGYN | Facility: CLINIC | Age: 75
End: 2020-11-23

## 2020-11-23 ENCOUNTER — OFFICE VISIT (OUTPATIENT)
Dept: INTERNAL MEDICINE | Facility: CLINIC | Age: 75
End: 2020-11-23
Attending: INTERNAL MEDICINE
Payer: COMMERCIAL

## 2020-11-23 ENCOUNTER — ALLIED HEALTH/NURSE VISIT (OUTPATIENT)
Dept: EDUCATION SERVICES | Facility: CLINIC | Age: 75
End: 2020-11-23
Payer: COMMERCIAL

## 2020-11-23 VITALS
HEART RATE: 71 BPM | WEIGHT: 194 LBS | BODY MASS INDEX: 31.79 KG/M2 | SYSTOLIC BLOOD PRESSURE: 112 MMHG | DIASTOLIC BLOOD PRESSURE: 76 MMHG

## 2020-11-23 VITALS — WEIGHT: 194 LBS | BODY MASS INDEX: 31.79 KG/M2

## 2020-11-23 DIAGNOSIS — N39.0 RECURRENT UTI: ICD-10-CM

## 2020-11-23 DIAGNOSIS — R73.01 IMPAIRED FASTING GLUCOSE: Primary | ICD-10-CM

## 2020-11-23 DIAGNOSIS — I10 ESSENTIAL HYPERTENSION: ICD-10-CM

## 2020-11-23 DIAGNOSIS — M81.0 SENILE OSTEOPOROSIS: ICD-10-CM

## 2020-11-23 DIAGNOSIS — N95.2 VAGINAL ATROPHY: ICD-10-CM

## 2020-11-23 DIAGNOSIS — R73.03 PREDIABETES: Primary | ICD-10-CM

## 2020-11-23 DIAGNOSIS — N95.2 ATROPHIC VAGINITIS: ICD-10-CM

## 2020-11-23 LAB
CALCIUM 24H UR-MRATE: 0.19 G/24 H (ref 0.1–0.3)
CALCIUM UR-MCNC: 8.1 MG/DL
CALCIUM/CREAT UR: 0.18 G/G CR
COLLECT DURATION TIME UR: 24 H
CREAT 24H UR-MRATE: 1.06 G/(24.H) (ref 0.8–1.8)
CREAT UR-MCNC: 45 MG/DL
SPECIMEN VOL UR: 2350 ML

## 2020-11-23 PROCEDURE — 99214 OFFICE O/P EST MOD 30 MIN: CPT | Performed by: INTERNAL MEDICINE

## 2020-11-23 PROCEDURE — G9891 EM SESSION REPORTING: HCPCS

## 2020-11-23 PROCEDURE — G0463 HOSPITAL OUTPT CLINIC VISIT: HCPCS

## 2020-11-23 RX ORDER — ESTRADIOL 0.1 MG/G
CREAM VAGINAL
Qty: 42.5 G | Refills: 4 | Status: SHIPPED | OUTPATIENT
Start: 2020-11-23 | End: 2021-12-02

## 2020-11-23 ASSESSMENT — ENCOUNTER SYMPTOMS
COUGH: 0
INSOMNIA: 0
SINUS CONGESTION: 0
DYSPNEA ON EXERTION: 0
NERVOUS/ANXIOUS: 0
PANIC: 0
POLYDIPSIA: 0
DECREASED CONCENTRATION: 0
POLYPHAGIA: 0
ALTERED TEMPERATURE REGULATION: 0
FATIGUE: 0
DEPRESSION: 0
FEVER: 0

## 2020-11-23 ASSESSMENT — PAIN SCALES - GENERAL: PAINLEVEL: NO PAIN (0)

## 2020-11-23 NOTE — PROGRESS NOTES
HPI  Patient reports that she has been increasing her physical activity. She is currently in diabetes prevention counseling group. She was also seen by Dr. Srivastava. She is wondering about instructions for use of vaginal estrogen recommended by Urogynecology.     Review of Systems     Constitutional:  Negative for fever and fatigue.   HENT:  Negative for sinus congestion.    Eyes:  Negative for decreased vision.   Respiratory:   Negative for cough and dyspnea on exertion.    Cardiovascular:  Negative for chest pain, dyspnea on exertion and edema.   Skin:  Negative for itching and rash.   Psychiatric/Behavioral:  Negative for depression, decreased concentration, mood swings and panic attacks.    Endocrine:  Negative for altered temperature regulation, polyphagia, polydipsia, unwanted hair growth and change in facial hair.    Current Outpatient Medications   Medication     alendronate (FOSAMAX) 70 MG tablet     ASPIRIN PO     ciclopirox (LOPROX) 0.77 % cream     estradiol (ESTRACE) 0.1 MG/GM vaginal cream     losartan (COZAAR) 50 MG tablet     simvastatin (ZOCOR) 10 MG tablet     No current facility-administered medications for this visit.      Vitals:    11/23/20 0835 11/23/20 0845 11/23/20 0846 11/23/20 0847   BP: 113/78 113/76 108/74 112/76   Pulse: 71 71 71 71   Weight: 88 kg (194 lb)          Physical Exam  Vitals signs and nursing note reviewed.   Constitutional:       Appearance: Normal appearance.   HENT:      Head: Normocephalic and atraumatic.   Eyes:      Pupils: Pupils are equal, round, and reactive to light.   Cardiovascular:      Rate and Rhythm: Normal rate and regular rhythm.      Pulses: Normal pulses.   Pulmonary:      Effort: Pulmonary effort is normal.   Abdominal:      General: Abdomen is flat.   Musculoskeletal:         General: No edema.   Skin:     General: Skin is warm and dry.   Neurological:      General: No focal deficit present.      Mental Status: She is alert and oriented to person, place,  and time.   Psychiatric:         Mood and Affect: Mood normal.         Behavior: Behavior normal.         Thought Content: Thought content normal.         Judgment: Judgment normal.       Assessment and Plan:  Esperanza was seen today for follow up.    Diagnoses and all orders for this visit:    Impaired fasting glucose. Patient was advised to continue with lifestyle modifications as she has been doing.   -     Hemoglobin A1c    Essential hypertension. Blood pressure is within acceptable range. Recommend to continue with current medical therapy as she has been able to tolerate it well.     Atrophic vaginitis. Discussed administration of topical estrogen with patient.     Total time spent 25 minutes.  More than 50% of the time spent with Ms. Gage on counseling / coordinating her care    Julia Gifford MD

## 2020-11-23 NOTE — LETTER
11/23/2020       RE: Esperanza Gage  [address] [place] [address]     Dear Colleague,    Thank you for referring your patient, Esperanza Gage, to the [facility] [place] [facility]. Please see a copy of my visit note below.    HPI  Patient reports that she has been increasing her physical activity. She is currently in diabetes prevention counseling group. She was also seen by Dr. Srivastava. She is wondering about instructions for use of vaginal estrogen recommended by Urogynecology.     Review of Systems     Constitutional:  Negative for fever and fatigue.   HENT:  Negative for sinus congestion.    Eyes:  Negative for decreased vision.   Respiratory:   Negative for cough and dyspnea on exertion.    Cardiovascular:  Negative for chest pain, dyspnea on exertion and edema.   Skin:  Negative for itching and rash.   Psychiatric/Behavioral:  Negative for depression, decreased concentration, mood swings and panic attacks.    Endocrine:  Negative for altered temperature regulation, polyphagia, polydipsia, unwanted hair growth and change in facial hair.    Current Outpatient Medications   Medication     alendronate (FOSAMAX) 70 MG tablet     ASPIRIN PO     ciclopirox (LOPROX) 0.77 % cream     estradiol (ESTRACE) 0.1 MG/GM vaginal cream     losartan (COZAAR) 50 MG tablet     simvastatin (ZOCOR) 10 MG tablet     No current facility-administered medications for this visit.      Vitals:    11/23/20 0835 11/23/20 0845 11/23/20 0846 11/23/20 0847   BP: 113/78 113/76 108/74 112/76   Pulse: 71 71 71 71   Weight: 88 kg (194 lb)          Physical Exam  Vitals signs and nursing note reviewed.   Constitutional:       Appearance: Normal appearance.   HENT:      Head: Normocephalic and atraumatic.   Eyes:      Pupils: Pupils are equal, round, and reactive to light.   Cardiovascular:      Rate and Rhythm: Normal rate and regular rhythm.      Pulses: Normal pulses.    Pulmonary:      Effort: Pulmonary effort is normal.   Abdominal:      General: Abdomen is flat.   Musculoskeletal:         General: No edema.   Skin:     General: Skin is warm and dry.   Neurological:      General: No focal deficit present.      Mental Status: She is alert and oriented to person, place, and time.   Psychiatric:         Mood and Affect: Mood normal.         Behavior: Behavior normal.         Thought Content: Thought content normal.         Judgment: Judgment normal.       Assessment and Plan:  Esperanza was seen today for follow up.    Diagnoses and all orders for this visit:    Impaired fasting glucose. Patient was advised to continue with lifestyle modifications as she has been doing.   -     Hemoglobin A1c    Essential hypertension. Blood pressure is within acceptable range. Recommend to continue with current medical therapy as she has been able to tolerate it well.     Atrophic vaginitis. Discussed administration of topical estrogen with patient.     Total time spent 25 minutes.  More than 50% of the time spent with Ms. Gage on counseling / coordinating her care    Julia Gifford MD

## 2020-11-24 NOTE — PROGRESS NOTES
Medicare Diabetes Prevention Program: Session 8    Esperanza Gage presents for MDPP Session 8: Shop and Cook to Prevent T2    Subjective/Objective:    Wt 88 kg (194 lb)   BMI 31.79 kg/m      Minutes spent exercising over the past week: 300      Intervention/Plan:    Topics discussed today include information to meet the following learning objectives:    1) Identify healthy food  2) Explain how to shop for healthy food  3) Explain how to cook healthy food      Patient to follow-up next week at MDPP Session 9.     Verna Veronica

## 2020-11-30 ENCOUNTER — ALLIED HEALTH/NURSE VISIT (OUTPATIENT)
Dept: EDUCATION SERVICES | Facility: CLINIC | Age: 75
End: 2020-11-30
Payer: COMMERCIAL

## 2020-11-30 VITALS — BODY MASS INDEX: 31.76 KG/M2 | WEIGHT: 193.8 LBS

## 2020-11-30 DIAGNOSIS — R73.03 PREDIABETES: Primary | ICD-10-CM

## 2020-11-30 PROCEDURE — G9875 9 EM CORE SESSIONS: HCPCS

## 2020-12-01 NOTE — PROGRESS NOTES
Medicare Diabetes Prevention Program: Session 9    Esperanza Gage presents for [facility] Session 9: Manage Stress    Subjective/Objective:    Wt 87.9 kg (193 lb 12.8 oz)   BMI 31.76 kg/m      Minutes spent exercising over the past week: 300      Intervention/Plan:    Topics discussed today include information to meet the following learning objectives:    1) Some causes of stress  2) The link between stress and type 2 diabetes  3) Some ways to reduce stress  4) Some healthy ways to cope with stress      Patient to follow-up next week at [facility] Session 10.     Verna Veronica

## 2020-12-07 ENCOUNTER — ALLIED HEALTH/NURSE VISIT (OUTPATIENT)
Dept: EDUCATION SERVICES | Facility: CLINIC | Age: 75
End: 2020-12-07
Payer: COMMERCIAL

## 2020-12-07 VITALS — BODY MASS INDEX: 31.56 KG/M2 | WEIGHT: 192.6 LBS

## 2020-12-07 DIAGNOSIS — R73.03 PREDIABETES: Primary | ICD-10-CM

## 2020-12-07 PROCEDURE — G9891 EM SESSION REPORTING: HCPCS

## 2020-12-07 PROCEDURE — G9880 EM 5 PERCENT WL: HCPCS

## 2020-12-08 NOTE — PROGRESS NOTES
Medicare Diabetes Prevention Program: Session 10    Esperanza Gage presents for [facility] Session 10: Find Time for Fitness    Subjective/Objective:    Wt 87.4 kg (192 lb 9.6 oz)   BMI 31.56 kg/m      Minutes spent exercising over the past week: 290       Intervention/Plan:    Topics discussed today include information to meet the following learning objectives:    1) Identify some benefits of being active  2) Recognize the challenge of fitting in fitness  3) Explain how to find time for fitness      Patient to follow-up next week at [facility] Session 11.     Verna Veronica

## 2020-12-14 ENCOUNTER — ALLIED HEALTH/NURSE VISIT (OUTPATIENT)
Dept: EDUCATION SERVICES | Facility: CLINIC | Age: 75
End: 2020-12-14
Payer: COMMERCIAL

## 2020-12-14 VITALS — WEIGHT: 194.8 LBS | BODY MASS INDEX: 31.92 KG/M2

## 2020-12-14 DIAGNOSIS — R73.03 PREDIABETES: Primary | ICD-10-CM

## 2020-12-14 PROCEDURE — G9891 EM SESSION REPORTING: HCPCS

## 2020-12-15 NOTE — PROGRESS NOTES
Medicare Diabetes Prevention Program: Session 11    Esperanza Gage presents for MDPP Session 11: [place] with Triggers  Subjective/Objective:    Wt 88.4 kg (194 lb 12.8 oz)   BMI 31.92 kg/m      Minutes spent exercising over the past week: 210       Intervention/Plan:    Topics discussed today include information to meet the following learning objectives:    1) Some unhealthy food shopping triggers and ways to cope with them  2) Some unhealthy eating triggers and ways to cope with them  3) Some triggers of sitting still and ways to cope with them      Patient to follow-up next week at MDPP Session 12.     Verna Veronica

## 2020-12-21 ENCOUNTER — ALLIED HEALTH/NURSE VISIT (OUTPATIENT)
Dept: EDUCATION SERVICES | Facility: CLINIC | Age: 75
End: 2020-12-21
Payer: COMMERCIAL

## 2020-12-21 VITALS — WEIGHT: 195 LBS | BODY MASS INDEX: 31.96 KG/M2

## 2020-12-21 DIAGNOSIS — R73.03 PREDIABETES: Primary | ICD-10-CM

## 2020-12-21 PROCEDURE — G9891 EM SESSION REPORTING: HCPCS

## 2020-12-22 NOTE — PROGRESS NOTES
Medicare Diabetes Prevention Program: Session 12    Esperanza Gage presents for [facility] Session 12: Keep Your Heart Healthy  Subjective/Objective:    Wt 88.5 kg (195 lb)   BMI 31.96 kg/m      Minutes spent exercising over the past week: 120      Intervention/Plan:    Topics discussed today include information to meet the following learning objectives:    1) Explain why heart health matters  2) Explain how to keep your heart healthy  3) Explain how to be heart smart about fats      Patient to follow-up next week at [facility] Session 13.     Verna Veronica

## 2021-01-04 ENCOUNTER — ALLIED HEALTH/NURSE VISIT (OUTPATIENT)
Dept: EDUCATION SERVICES | Facility: CLINIC | Age: 76
End: 2021-01-04
Payer: COMMERCIAL

## 2021-01-04 VITALS — BODY MASS INDEX: 32.12 KG/M2 | WEIGHT: 196 LBS

## 2021-01-04 DIAGNOSIS — R73.03 PREDIABETES: Primary | ICD-10-CM

## 2021-01-04 PROCEDURE — G9891 EM SESSION REPORTING: HCPCS

## 2021-01-04 NOTE — PROGRESS NOTES
Medicare Diabetes Prevention Program: Session 13    Esperanza Gage presents for [facility] Session 13: Take Charge of Your Thoughts  Subjective/Objective:    Wt 88.9 kg (196 lb)   BMI 32.12 kg/m      Minutes spent exercising over the past week: 150      Intervention/Plan:    Topics discussed today include information to meet the following learning objectives:    1) Recognize the difference between helpful and harmful thoughts  2) Explain how to replace harmful thoughts with helpful thoughts        Patient to follow-up next week at [facility] Session 14.     Verna Veronica

## 2021-01-11 ENCOUNTER — ALLIED HEALTH/NURSE VISIT (OUTPATIENT)
Dept: EDUCATION SERVICES | Facility: CLINIC | Age: 76
End: 2021-01-11
Payer: COMMERCIAL

## 2021-01-11 VITALS — BODY MASS INDEX: 31.92 KG/M2 | WEIGHT: 194.8 LBS

## 2021-01-11 DIAGNOSIS — R73.03 PREDIABETES: Primary | ICD-10-CM

## 2021-01-11 PROCEDURE — G9891 EM SESSION REPORTING: HCPCS

## 2021-01-12 NOTE — PROGRESS NOTES
Medicare Diabetes Prevention Program: Session 14    Esepranza Gage presents for [facility] Session 14: Get Support  Subjective/Objective:    Wt 88.4 kg (194 lb 12.8 oz)   BMI 31.92 kg/m      Minutes spent exercising over the past week: 165       Intervention/Plan:    Topics discussed today include information to meet the following learning objectives:    1) By the end of the session, participants will explain how to get support from:  -Family, friends and co-workers  -Groups, classes and clubs  -Professionals      Patient to follow-up next week at [facility] Session 15.     Verna Veronica

## 2021-01-18 ENCOUNTER — ALLIED HEALTH/NURSE VISIT (OUTPATIENT)
Dept: EDUCATION SERVICES | Facility: CLINIC | Age: 76
End: 2021-01-18
Payer: COMMERCIAL

## 2021-01-18 VITALS — BODY MASS INDEX: 31.83 KG/M2 | WEIGHT: 194.2 LBS

## 2021-01-18 DIAGNOSIS — R73.03 PREDIABETES: Primary | ICD-10-CM

## 2021-01-18 PROCEDURE — G9891 EM SESSION REPORTING: HCPCS

## 2021-01-19 NOTE — PROGRESS NOTES
Medicare Diabetes Prevention Program: Session 15    Esperanza Gage presents for [facility] Session 15: Eat Well Away from Home  Subjective/Objective:    Wt 88.1 kg (194 lb 3.2 oz)   BMI 31.83 kg/m      Minutes spent exercising over the past week: 140      Intervention/Plan:    Topics discussed today include information to meet the following learning objectives:    1) Identify some challenges of eating well at restaurants and social events  2) Explain how to plan for and cope with these challenges        Patient to follow-up next week at [facility] Session 16.     Verna Veronica

## 2021-01-25 ENCOUNTER — ALLIED HEALTH/NURSE VISIT (OUTPATIENT)
Dept: EDUCATION SERVICES | Facility: CLINIC | Age: 76
End: 2021-01-25
Payer: COMMERCIAL

## 2021-01-25 VITALS — BODY MASS INDEX: 31.69 KG/M2 | WEIGHT: 193.4 LBS

## 2021-01-25 DIAGNOSIS — R73.03 PREDIABETES: Primary | ICD-10-CM

## 2021-01-25 PROCEDURE — G9891 EM SESSION REPORTING: HCPCS

## 2021-01-26 NOTE — PROGRESS NOTES
Medicare Diabetes Prevention Program: Session 16    Espreanza Gage presents for [facility] Session 16: Stay Motivated to Prevent T2  Subjective/Objective:    Wt 87.7 kg (193 lb 6.4 oz)   BMI 31.69 kg/m      Minutes spent exercising over the past week: 305      Intervention/Plan:    Topics discussed today include information to meet the following learning objectives:    1) By the end of the session, participants will:  -Reflect on how far they've come since they started this program  -Identify group's next steps  -Set their goals for the next 6 months        Patient to follow-up next week at [facility] Session 17.     Verna Veronica

## 2021-02-01 ENCOUNTER — OFFICE VISIT (OUTPATIENT)
Dept: NEUROLOGY | Facility: CLINIC | Age: 76
End: 2021-02-01
Payer: COMMERCIAL

## 2021-02-01 VITALS
DIASTOLIC BLOOD PRESSURE: 77 MMHG | HEART RATE: 73 BPM | RESPIRATION RATE: 16 BRPM | OXYGEN SATURATION: 96 % | SYSTOLIC BLOOD PRESSURE: 123 MMHG

## 2021-02-01 DIAGNOSIS — R20.0 SENSORY LOSS: Primary | ICD-10-CM

## 2021-02-01 PROCEDURE — 99213 OFFICE O/P EST LOW 20 MIN: CPT | Performed by: PSYCHIATRY & NEUROLOGY

## 2021-02-01 NOTE — LETTER
2/1/2021       RE: Esperanza Gage  [address] [place] [address]     Dear Colleague,    Thank you for referring your patient, Esperanza Gage, to the [facility] [place] [facility]. Please see a copy of my visit note below.    Service Date: 02/01/2021      HISTORY OF PRESENT ILLNESS:  Esperanza Gage returns for neurologic followup.  She is a patient who has a longstanding left saphenous neuropathy.  Over the past 2-3 years, she developed sensory symptoms bilaterally suggesting a sensory neuropathy and also has been diagnosed with prediabetes.      I saw her for the first time on 09/28/2020.  I did check a couple other laboratory tests which included a serum immunofixation and Sjogren antibodies.  These proved negative.      Since I saw her, she has modified her diet to help with her prediabetes.  She is also walking at the mall.  She indicates her feet are no worse.  It is mainly the soles of her feet.  It feels like there is some sort of film on them.  She is not really having a lot of pain, just some discomfort.  It actually feels better when she is wearing her shoes and socks compared to being barefooted.      PHYSICAL EXAMINATION:  Examination today reveals her heart rate is 73.  Blood pressure 123/77.  Motor examination reveals normal strength.  On sensory testing, she has an equivocal impairment of position sense in the toes.  She has a moderate impairment of vibratory sense in the toes.  She is hypersensitive to pinprick over the dorsum of the feet and has diminished sensation involving the soles of her feet.  Her gait is unremarkable.  Reflexes are 2+ except for the ankle jerks which is absent on the right and 1+ on the left.      She complains of decreased sensation to touch involving the distribution of the left saphenous nerve.      IMPRESSION:   1.  Chronic left saphenous neuropathy.   2.  Mild sensory neuropathy which appears  stable.   3.  Prediabetes.      PLAN:  I did reassure her I do not appreciate any significant change since I saw her in September.      The plan now will be for her to monitor her symptoms.  If she notes the sensory loss is progressing, develops pain or develops weakness, I want to see her back.  She is in agreement with this plan.      ADDENDUM:  Total visit time 20 minutes.  This included history, examination, counseling and documentation.      Virgilio Shah MD              D: 2021   T: 2021   MT: al      Name:     MOSHE GARCÍA   MRN:      [id number]        Account:      [id number]   :      1945           Service Date: 2021      Document: [id number]

## 2021-02-01 NOTE — PROGRESS NOTES
Service Date: 02/01/2021      HISTORY OF PRESENT ILLNESS:  Esperanza Gage returns for neurologic followup.  She is a patient who has a longstanding left saphenous neuropathy.  Over the past 2-3 years, she developed sensory symptoms bilaterally suggesting a sensory neuropathy and also has been diagnosed with prediabetes.      I saw her for the first time on 09/28/2020.  I did check a couple other laboratory tests which included a serum immunofixation and Sjogren antibodies.  These proved negative.      Since I saw her, she has modified her diet to help with her prediabetes.  She is also walking at the mall.  She indicates her feet are no worse.  It is mainly the soles of her feet.  It feels like there is some sort of film on them.  She is not really having a lot of pain, just some discomfort.  It actually feels better when she is wearing her shoes and socks compared to being barefooted.      PHYSICAL EXAMINATION:  Examination today reveals her heart rate is 73.  Blood pressure 123/77.  Motor examination reveals normal strength.  On sensory testing, she has an equivocal impairment of position sense in the toes.  She has a moderate impairment of vibratory sense in the toes.  She is hypersensitive to pinprick over the dorsum of the feet and has diminished sensation involving the soles of her feet.  Her gait is unremarkable.  Reflexes are 2+ except for the ankle jerks which is absent on the right and 1+ on the left.      She complains of decreased sensation to touch involving the distribution of the left saphenous nerve.      IMPRESSION:   1.  Chronic left saphenous neuropathy.   2.  Mild sensory neuropathy which appears stable.   3.  Prediabetes.      PLAN:  I did reassure her I do not appreciate any significant change since I saw her in September.      The plan now will be for her to monitor her symptoms.  If she notes the sensory loss is progressing, develops pain or develops weakness, I want to see her back.  She is in  agreement with this plan.      ADDENDUM:  Total visit time 20 minutes.  This included history, examination, counseling and documentation.      MD THEA Mayer MD             D: 2021   T: 2021   MT: al      Name:     MOSHE GARCÍA   MRN:      [id number]        Account:      [id number]   :      1945           Service Date: 2021      Document: [id number]

## 2021-02-08 ENCOUNTER — ALLIED HEALTH/NURSE VISIT (OUTPATIENT)
Dept: EDUCATION SERVICES | Facility: CLINIC | Age: 76
End: 2021-02-08
Payer: COMMERCIAL

## 2021-02-08 VITALS — BODY MASS INDEX: 31.53 KG/M2 | WEIGHT: 192.4 LBS

## 2021-02-08 DIAGNOSIS — R73.03 PREDIABETES: Primary | ICD-10-CM

## 2021-02-08 PROCEDURE — G9891 EM SESSION REPORTING: HCPCS

## 2021-02-08 NOTE — PROGRESS NOTES
Medicare Diabetes Prevention Program: Session 17    Esperanza Gage presents for [facility] Session 17: When Weight Loss Stalls  Subjective/Objective:    Wt 87.3 kg (192 lb 6.4 oz)   BMI 31.53 kg/m      Minutes spent exercising over the past week: 190       Intervention/Plan:    Topics discussed today include information to meet the following learning objectives:    1) By the end of the session, participants will:  -Explain why weight loss can stall  -Explain how to start losing weight again        Patient to follow-up next week at [facility] Session 18.     Verna Veronica

## 2021-02-22 ENCOUNTER — ALLIED HEALTH/NURSE VISIT (OUTPATIENT)
Dept: EDUCATION SERVICES | Facility: CLINIC | Age: 76
End: 2021-02-22
Payer: COMMERCIAL

## 2021-02-22 VITALS — WEIGHT: 193 LBS | BODY MASS INDEX: 31.63 KG/M2

## 2021-02-22 DIAGNOSIS — R73.03 PREDIABETES: Primary | ICD-10-CM

## 2021-02-22 PROCEDURE — G9891 EM SESSION REPORTING: HCPCS

## 2021-02-22 NOTE — PROGRESS NOTES
Medicare Diabetes Prevention Program: Session 18    Esperanza Gage presents for [facility] Session 18: Take a Fitness Break  Subjective/Objective:    Wt 87.5 kg (193 lb)   BMI 31.63 kg/m      Minutes spent exercising over the past week: 120      Intervention/Plan:    Topics discussed today include information to meet the following learning objectives:    1) By the end of the session, participants will:  -Recognize the link between sitting still and Type 2 Diabetes  -Identify some challenges of taking fitness breaks and ways to cope with them        Patient to follow-up next week at [facility] Session 19.     Verna Veronica

## 2021-03-08 ENCOUNTER — ALLIED HEALTH/NURSE VISIT (OUTPATIENT)
Dept: EDUCATION SERVICES | Facility: CLINIC | Age: 76
End: 2021-03-08
Payer: COMMERCIAL

## 2021-03-08 VITALS — WEIGHT: 190.6 LBS | BODY MASS INDEX: 31.24 KG/M2

## 2021-03-08 DIAGNOSIS — R73.03 PREDIABETES: Primary | ICD-10-CM

## 2021-03-08 PROCEDURE — G9891 EM SESSION REPORTING: HCPCS

## 2021-03-09 NOTE — PROGRESS NOTES
Medicare Diabetes Prevention Program: Session 19    Esperanza Gage presents for [facility] Session 19: Stay Active to Prevent T2  Subjective/Objective:    Wt 86.5 kg (190 lb 9.6 oz)   BMI 31.24 kg/m      Minutes spent exercising over the past week: 300      Intervention/Plan:    Topics discussed today include information to meet the following learning objectives:    1) By the end of the session, participants will:  -Identify some benefits of staying active  -Identify some challenges of staying active and ways to cope with them  -Reflect on how far they've come since they started this program        Patient to follow-up next week at [facility] Session 20.     Verna Veronica

## 2021-03-22 ENCOUNTER — ALLIED HEALTH/NURSE VISIT (OUTPATIENT)
Dept: EDUCATION SERVICES | Facility: CLINIC | Age: 76
End: 2021-03-22
Payer: COMMERCIAL

## 2021-03-22 VITALS — WEIGHT: 191.4 LBS | BODY MASS INDEX: 31.37 KG/M2

## 2021-03-22 DIAGNOSIS — R73.03 PREDIABETES: Primary | ICD-10-CM

## 2021-03-22 PROCEDURE — G9891 EM SESSION REPORTING: HCPCS

## 2021-03-22 NOTE — PROGRESS NOTES
Medicare Diabetes Prevention Program: Session 20    Esperanza Gage presents for [facility] Session 20: Stay Active Away from Home  Subjective/Objective:    Wt 86.8 kg (191 lb 6.4 oz)   BMI 31.37 kg/m      Minutes spent exercising over the past week: 210       Intervention/Plan:    Topics discussed today include information to meet the following learning objectives:    1) By the end of the session, participants will:  -Identify some challenges of staying active away from home, and ways to cope with them          Patient to follow-up next week at [facility] Session 21.     Verna Veronica

## 2021-04-03 DIAGNOSIS — Z96.659 S/P TKR (TOTAL KNEE REPLACEMENT): ICD-10-CM

## 2021-04-05 DIAGNOSIS — Z96.652 STATUS POST TOTAL LEFT KNEE REPLACEMENT: Primary | ICD-10-CM

## 2021-04-05 RX ORDER — AMOXICILLIN 500 MG/1
CAPSULE ORAL
Qty: 4 CAPSULE | Refills: 3 | Status: SHIPPED | OUTPATIENT
Start: 2021-04-05 | End: 2022-05-31

## 2021-04-05 RX ORDER — AMOXICILLIN 500 MG/1
TABLET, FILM COATED ORAL
Qty: 4 TABLET | Refills: 0 | OUTPATIENT
Start: 2021-04-05

## 2021-04-05 NOTE — PROGRESS NOTES
Reached out to Esperanza to confirm she needs an antibiotic refill.  Esperanza confirmed she has not developed any new allergies.  Esperanza confirmed she has a dental cleaning next week.  Esperanza confirmed she had a left TKA with Dr. Campos in 2018.    All questions answered.    María Lobo, BSN, RN  RN Care Coordinator, Dr. Campos  [facility]

## 2021-04-12 ENCOUNTER — ALLIED HEALTH/NURSE VISIT (OUTPATIENT)
Dept: EDUCATION SERVICES | Facility: CLINIC | Age: 76
End: 2021-04-12
Payer: COMMERCIAL

## 2021-04-12 VITALS — WEIGHT: 189.4 LBS | BODY MASS INDEX: 31.04 KG/M2

## 2021-04-12 DIAGNOSIS — R73.03 PREDIABETES: Primary | ICD-10-CM

## 2021-04-12 PROCEDURE — G9891 EM SESSION REPORTING: HCPCS

## 2021-04-13 NOTE — PROGRESS NOTES
Medicare Diabetes Prevention Program: Session 21    Esperanza Gage presents for [facility] Session 21: More About T2  Subjective/Objective:    Wt 85.9 kg (189 lb 6.4 oz)   BMI 31.04 kg/m      Minutes spent exercising over the past week: 280      Intervention/Plan:    Topics discussed today include information to meet the following learning objectives:    1) By the end of the session, participants will:  -Identify the basics of Type 2 Diabetes  -Explain how to find out if you have Type 2 Diabetes  -Explain how to manage Type 2 Diabetes        Patient to follow-up next week at [facility] Session 22.     Verna Veronica

## 2021-05-10 ENCOUNTER — ALLIED HEALTH/NURSE VISIT (OUTPATIENT)
Dept: EDUCATION SERVICES | Facility: CLINIC | Age: 76
End: 2021-05-10
Payer: COMMERCIAL

## 2021-05-10 VITALS — BODY MASS INDEX: 31.6 KG/M2 | WEIGHT: 192.8 LBS

## 2021-05-10 DIAGNOSIS — R73.03 PREDIABETES: Primary | ICD-10-CM

## 2021-05-10 PROCEDURE — G9878 2 EM CORE MS MO 7-9 WL: HCPCS

## 2021-05-10 PROCEDURE — 99207 PR MWM HEALTH COACH NO CHARGE: CPT

## 2021-05-11 NOTE — PROGRESS NOTES
Medicare Diabetes Prevention Program: Session 22    Esperanza Gage presents for [facility] Session 22: More About Carbs  Subjective/Objective:    Wt 87.5 kg (192 lb 12.8 oz)   BMI 31.60 kg/m      Minutes spent exercising over the past week: 310      Intervention/Plan:    Topics discussed today include information to meet the following learning objectives:    1) By the end of the session, participants will:  -Recognize the link between carbs and Type 2 Diabetes  -Identify the various types of carbs  -Describe a healthy approach to carbs  -Explain how to find the amount of carbs in food        Patient to follow-up next week at [facility] Session 23.     Verna Veronica     altered mental status

## 2021-06-01 VITALS — WEIGHT: 195.8 LBS | BODY MASS INDEX: 31.47 KG/M2 | HEIGHT: 66 IN

## 2021-06-02 VITALS — BODY MASS INDEX: 31.63 KG/M2 | WEIGHT: 196.8 LBS | HEIGHT: 66 IN

## 2021-06-02 VITALS — BODY MASS INDEX: 31.47 KG/M2 | WEIGHT: 195.8 LBS | HEIGHT: 66 IN

## 2021-06-03 VITALS — WEIGHT: 200.56 LBS | BODY MASS INDEX: 32.86 KG/M2

## 2021-06-14 ENCOUNTER — ALLIED HEALTH/NURSE VISIT (OUTPATIENT)
Dept: EDUCATION SERVICES | Facility: CLINIC | Age: 76
End: 2021-06-14
Payer: COMMERCIAL

## 2021-06-14 VITALS — WEIGHT: 192.6 LBS | BODY MASS INDEX: 31.56 KG/M2

## 2021-06-14 DIAGNOSIS — R73.03 PREDIABETES: Primary | ICD-10-CM

## 2021-06-14 PROCEDURE — G9891 EM SESSION REPORTING: HCPCS

## 2021-06-14 NOTE — PROGRESS NOTES
Medicare Diabetes Prevention Program: Session 23    Esperanza Gage presents for [facility] Session 23: Have Healthy Food You Enjoy  Subjective/Objective:    Wt 87.4 kg (192 lb 9.6 oz)   BMI 31.56 kg/m      Minutes spent exercising over the past week: 90      Intervention/Plan:    Topics discussed today include information to meet the following learning objectives:    1) By the end of the session, participants will:  -How to take a healthy approach to eating  -How to make healthy choices  -How to have healthy food that they enjoy        Patient to follow-up next week at [facility] Session 24.     Verna Veronica

## 2021-06-17 NOTE — PATIENT INSTRUCTIONS - HE
"Patient Instructions by Ton Kim DO at 8/31/2019  3:20 PM     Author: Ton Kim DO Service: -- Author Type: Physician    Filed: 8/31/2019  4:14 PM Encounter Date: 8/31/2019 Status: Addendum    : Ton Kim DO (Physician)    Related Notes: Original Note by Ton Kim DO (Physician) filed at 8/31/2019  4:14 PM       We will notify you if the urine culture shows your treatment needs changing.     Patient Education     Bladder Infection, Female (Adult)    Urine is normally doesn't have any bacteria in it. But bacteria can get into the urinary tract from the skin around the rectum. Or they can travel in the blood from elsewhere in the body. Once they are in your urinary tract, they can cause infection in the urethra (urethritis), the bladder (cystitis), or the kidneys (pyelonephritis).  The most common place for an infection is in the bladder. This is called a bladder infection. This is one of the most common infections in women. Most bladder infections are easily treated. They are not serious unless the infection spreads to the kidney.  The phrases \"bladder infection,\" \"UTI,\" and \"cystitis\" are often used to describe the same thing. But they are not always the same. Cystitis is an inflammation of the bladder. The most common cause of cystitis is an infection.  Symptoms  The infection causes inflammation in the urethra and bladder. This causes many of the symptoms. The most common symptoms of a bladder infection are:    Pain or burning when urinating    Having to urinate more often than usual    Urgent need to urinate    Only a small amount of urine comes out    Blood in urine    Abdominal discomfort. This is usually in the lower abdomen above the pubic bone.    Cloudy urine    Strong- or bad-smelling urine    Unable to urinate (urinary retention)    Unable to hold urine in (urinary incontinence)    Fever    Loss of appetite    Confusion (in older adults)  Causes  Bladder infections are not " contagious. You can't get one from someone else, from a toilet seat, or from sharing a bath.  The most common cause of bladder infections is bacteria from the bowels. The bacteria get onto the skin around the opening of the urethra. From there, they can get into the urine and travel up to the bladder, causing inflammation and infection. This usually happens because of:    Wiping improperly after urinating. Always wipe from front to back.    Bowel incontinence    Pregnancy    Procedures such as having a catheter inserted    Older age    Not emptying your bladder. This can allow bacteria a chance to grow in your urine.    Dehydration    Constipation    Sex    Use of a diaphragm for birth control   Treatment  Bladder infections are diagnosed by a urine test. They are treated with antibiotics and usually clear up quickly without complications. Treatment helps prevent a more serious kidney infection.  Medicines  Medicines can help in the treatment of a bladder infection:    Take antibiotics until they are used up, even if you feel better. It is important to finish them to make sure the infection has cleared.    You can use acetaminophen or ibuprofen for pain, fever, or discomfort, unless another medicine was prescribed. If you have chronic liver or kidney disease, talk with your healthcare provider before using these medicines. Also talk with your provider if you've ever had a stomach ulcer or gastrointestinal bleeding, or are taking blood-thinner medicines.    If you are given phenazopydridine to reduce burning with urination, it will cause your urine to become a bright orange color. This can stain clothing.  Care and prevention  These self-care steps can help prevent future infections:    Drink plenty of fluids to prevent dehydration and flush out your bladder. Do this unless you must restrict fluids for other health reasons, or your doctor told you not to.    Proper cleaning after going to the bathroom is important.  Wipe from front to back after using the toilet to prevent the spread of bacteria.    Urinate more often. Don't try to hold urine in for a long time.    Wear loose-fitting clothes and cotton underwear. Avoid tight-fitting pants.    Improve your diet and prevent constipation. Eat more fresh fruit and vegetables, and fiber, and less junk and fatty foods.    Avoid sex until your symptoms are gone.    Avoid caffeine, alcohol, and spicy foods. These can irritate your bladder.    Urinate right after intercourse to flush out your bladder.    If you use birth control pills and have frequent bladder infections, discuss it with your doctor.  Follow-up care  Call your healthcare provider if all symptoms are not gone after 3 days of treatment. This is especially important if you have repeat infections.  If a culture was done, you will be told if your treatment needs to be changed. If directed, you can call to find out the results.  If X-rays were done, you will be told if the results will affect your treatment.  Call 911  Call 911 if any of the following occur:    Trouble breathing    Hard to wake up or confusion    Fainting or loss of consciousness    Rapid heart rate  When to seek medical advice  Call your healthcare provider right away if any of these occur:    Fever of 100.4 F (38.0 C) or higher, or as directed by your healthcare provider    Symptoms are not better by the third day of treatment    Back or belly (abdominal) pain that gets worse    Repeated vomiting, or unable to keep medicine down    Weakness or dizziness    Vaginal discharge    Pain, redness, or swelling in the outer vaginal area (labia)  Date Last Reviewed: 10/1/2016    1762-0916 [employer]. [address] [place] [address], [place] [address]. All rights reserved. This information is not intended as a substitute for professional medical care. Always follow your healthcare professional's instructions.

## 2021-06-20 NOTE — PROGRESS NOTES
[facility] Daily Progress     Patient Name: Esperanza Gage  Date: 9/19/2018  Visit #: 5/12  PTA visit #:  0  Referral Diagnosis:Knee joint replacement status [Z96.659]  - Primary   Referring provider:  Tiffany Brunner MD  Visit Diagnosis:     ICD-10-CM    1. Total knee replacement status, left Z96.652    2. Decreased range of motion of left knee M25.662    3. Abnormality of gait R26.9          Assessment:   Pt became teary and emotional at the end of session. Reassured pt that is is progressing normally and making progress during every session.  Educated pt that some days will be better then others and she should not feel discouraged if she has having a bad day, overall she is improving.  Increased time needed throughout session for extra practice, cueing and encouragement.     From Eval: Pt is a 72 y.o. year old female s/p left total knee arthroplasty on 8/28/18.  Patient has difficulty with stairs, ambulating, housework and changing sleeping positions secondary to L knee limited ROM, pain and swelling. Findings are consistent with TKA.  Patient appears motivated to participate in Physical Therapy and present with a good Physical Therapy prognosis for resolution of activities limitations.     Patient demonstrates understanding/independence with home program.  Patient is benefitting from skilled physical therapy and is making steady progress toward functional goals.  Patient is appropriate to continue with skilled physical therapy intervention, as indicated by initial plan of care.    Goal Status:  Pt. will demonstrate/verbalize independence in self-management of condition in : 4 weeks  Pt. will be independent with home exercise program in : 4 weeks  Pt. will be able to walk : 2 blocks;for exercise/recreation;in 12 weeks;Comment  Comment:: w/o AD  Pt will: increase knee ROM (flexion >100 degrees, extension < lacking 4 degrees)     Plan / Patient Education:     Continue with initial plan of  "care.  Progress with home program as tolerated.     Plan for next session: SLR and LAQ with 1-2#weight,  SAQ, sit<>stand without UE support from 17\", continue to progress descending stairs, reassess SKTC    Subjective:   Pt has been walking .5 mile in sections. Surgeon said knee should bend 105-110 degrees flexion.     Objective:   Knee ROM                                         Date:  9/17/18 9/24/18 9/27/18    AROM in degrees  Right   Left  Right   Left  Right   Left       Knee Flexion  (130 )   132   82      88   81 sitting 90 in supine             Knee Extension  (0 )    0 with active straightening   9      lacking 6   lacking 10         PROM in degrees  Right   Left  Right   Left  Right   Left       Knee Flexion  (130 )                         Knee Extension  (0 )                        10/1/18: knee flexion 90 degrees AROM and PROM  Knee extension lacking 8 degrees AROM  LE Strength                  Date:  9/24/18   Strength (MMT/5)  Right   Left  Right   Left  Right   Left       Hip Flexion   5   4                   Hip Abduction                         Hip Adduction                         Hip Extension                         Hip Internal Rotation                         Hip External Rotation                         Knee Extension   5   4                   Knee Flexion   5   5                   Ankle Dorsiflexion   5   5                   Ankle Plantarflexion                         Exercises:  Exercise #1: quad set with leg extended hold 5 sec   Comment #1: heel slide x15  Exercise #2: LAQ x10   Comment #2: standing knee extension with green theraband just proximal to knee joint   Exercise #3: knee extnsion in sitting with heel on opposite chair (20-30 sec than 5-6 heel slides - repeat)  Comment #3: HS curl in standing and prone (use leg  in prone) x10   Exercise #4: SKTC  - hold behind thigh and allow for knee flexion   Comment #4: SLR 2x10 due cues to avoid quad lag (recommended " "1# weight at home)   Exercise #5: sit<>stand from 17\" surface x10 with cues         Treatment Today     TREATMENT MINUTES COMMENTS   Evaluation     Self-care/ Home management  Recommend ice (pt is still elevating)    Manual therapy     Neuromuscular Re-education     Therapeutic Activity      Therapeutic Exercises 30     See ex's flow sheet    Gait training     Modality__________________                Total 30    Blank areas are intentional and mean the treatment did not include these items.       Cielo Burns, PT, DPT  9/19/2018    "

## 2021-06-20 NOTE — PROGRESS NOTES
[facility]   Knee Initial Evaluation    Patient Name: Esperanza Gage  Date of evaluation: 9/18/2018  Referral Diagnosis: Knee joint replacement status [Z96.659]  - Primary   Referring provider: Tiffany Brunner MD  Visit Diagnosis:     ICD-10-CM    1. Total knee replacement status, left Z96.652    2. Decreased range of motion of left knee M25.662    3. Abnormality of gait R26.9      Precautions: Osteoporosis, Pelvic fracture 5 years ago    Assessment:   Pt is a 72 y.o. year old female s/p left total knee arthroplasty on 8/28/18.  Patient has difficulty with stairs, ambulating, housework and changing sleeping positions secondary to L knee limited ROM, pain and swelling. Findings are consistent with TKA.  Patient appears motivated to participate in Physical Therapy and present with a good Physical Therapy prognosis for resolution of activities limitations.        Pt. is appropriate for skilled PT intervention as outlined in the Plan of Care (POC).  Pt. is a good candidate for skilled PT services to improve pain levels and function.    Goals:  Pt. will demonstrate/verbalize independence in self-management of condition in : 4 weeks  Pt. will be independent with home exercise program in : 4 weeks  Pt. will be able to walk : 2 blocks;for exercise/recreation;in 12 weeks;Comment  Comment:: w/o AD  Pt will: increase knee ROM (flexion >100 degrees, extension < lacking 4 degrees)     Patient's goals: L knee works as well as R knee    Goals and plan of care were set in collaboration with the patient.    Patient's expectations/goals are realistic.    Barriers to Learning or Achieving Goals:  No Barriers.       Plan / Patient Instructions:      Plan of Care:   Authorization / Certification Start Date: 09/17/18  Authorization / Certification End Date: 11/12/18  Authorization / Certification Number of Visits: 12  Communication with: Referral Source  Patient Related Instruction: Nature of Condition;Treatment plan and  rationale;Basis of treatment  Times per Week: 1-2  Number of Weeks: 8  Number of Visits: 12  Discharge Planning: independent wt HEP  Therapeutic Exercise: ROM;Stretching;Strengthening  Neuromuscular Reeducation: kinesio tape;posture;balance/proprioception  Manual Therapy: soft tissue mobilization;myofascial release;joint mobilization  Modalities: electrical stimulation;ultrasound;cold pack  Equipment: theraband    Treatment techniques, plan of care, and goals were discussed with the patient. The patient agrees to the plan as outlined. The plan of care is dynamic and will be modified on an ongoing basis.  Plan for next visit: SLR, SAQ, sit<>stand, stairs      Subjective:        Social information:   Living Situation:single family home - 2 story house (14 stairs up and 12 stairs down) step-to gait pattern    Occupation: administration    Work Status:Working part time   Equipment Available:  cane    History of Present Illness:    Esperanza is a 72 y.o. female who presents to therapy today s/p TKA on L on 8/28/18. Date of onset/duration of symptoms is 8/28/18. Pt was in rehab for 2 weeks.  Pt transitioned to cane in the last couple days.  Onset was sudden. Symptoms are constant (stiffness).  She denies history of similar symptoms. She describes their previous level of function as not limited   September 24th has a appointment with surgeon.     Pain Rating:3  Pain rating at best: 3  Pain rating at worst: sharp with increased ROM  Pain description:stiffness    Functional limitations are described as occurring with:   ambulating, squatting, housework     Patient reports benefit from:  ex's     Ex's: leg curls in prone, side hip abduction, toe raises, etc...    Pt has been walking around her block.        Objective:      Note: Items left blank indicates the item was not performed or not indicated at the time of the evaluation.    Patient Outcome Measures :    Lower Extremity Functional Scale (_/80): 30   Scores range from  0-80, where a score of 80 represents maximum function. The minimal clinically important difference is a positive change of 9 points.    Knee Examination  1. Total knee replacement status, left     2. Decreased range of motion of left knee     3. Abnormality of gait       Precautions/Restrictions:  None  Involved Side: Left  Posture Observation:      General sitting posture is  fair.  Assistive Device: SEC     Gait Observation: ambulates with SEC on R     Knee ROM     Date:  9/17/18    AROM in degrees  Right   Left  Right   Left  Right   Left       Knee Flexion  (130 )   132   82                   Knee Extension  (0 )    0 with active straightening   9                 PROM in degrees  Right   Left  Right   Left  Right   Left       Knee Flexion  (130 )                         Knee Extension  (0 )                       LE Strength                  Date:  Will assess next session    Strength (MMT/5)  Right   Left  Right   Left  Right   Left       Hip Flexion                         Hip Abduction                         Hip Adduction                         Hip Extension                         Hip Internal Rotation                         Hip External Rotation                         Knee Extension                         Knee Flexion                         Ankle Dorsiflexion                         Ankle Plantarflexion                         Palpation:  Warmth on L knee and LE, swelling   Bandage on L incision - no redness or other signs of infection.      Treatment Today     TREATMENT MINUTES COMMENTS   Evaluation 13 -knee pain   Self-care/ Home management  Recommended ice and elevate above heart.    Manual therapy     Neuromuscular Re-education     Therapeutic Activity     Therapeutic Exercises 23 -see exercise flow sheet  -educated on POC, diagnosis, relevant anatomy and basis for treatment.  Handouts provided for HEP.    Gait training     Modality__________________                Total 36    Blank areas are  intentional and mean the treatment did not include these items.     PT Evaluation Code: (Please list factors)  Patient History/Comorbidities: see above   Examination: LE  Clinical Presentation: stable   Clinical Decision Making: low    Patient History/  Comorbidities Examination  (body structures and functions, activity limitations, and/or participation restrictions) Clinical Presentation Clinical Decision Making (Complexity)   No documented Comorbidities or personal factors 1-2 Elements Stable and/or uncomplicated Low   1-2 documented comorbidities or personal factor 3 Elements Evolving clinical presentation with changing characteristics Moderate   3-4 documented comorbidities or personal factors 4 or more Unstable and unpredictable High                Cielo Burns, PT, DPT  9/18/2018  2:13 PM

## 2021-06-20 NOTE — PROGRESS NOTES
[facility] Daily Progress     Patient Name: Esperanza Gage  Date: 9/19/2018  Visit #: 3/12  PTA visit #:  0  Referral Diagnosis:Knee joint replacement status [Z96.659]  - Primary   Referring provider:  Tiffany Brunner MD  Visit Diagnosis:     ICD-10-CM    1. Total knee replacement status, left Z96.652    2. Decreased range of motion of left knee M25.662    3. Abnormality of gait R26.9          Assessment:   Pt continues to have very limited ROM with knee flex and ext.  Progressed ex's.  Swelling decreased as pt is compliant with elevation/ice.     From Eval: Pt is a 72 y.o. year old female s/p left total knee arthroplasty on 8/28/18.  Patient has difficulty with stairs, ambulating, housework and changing sleeping positions secondary to L knee limited ROM, pain and swelling. Findings are consistent with TKA.  Patient appears motivated to participate in Physical Therapy and present with a good Physical Therapy prognosis for resolution of activities limitations.     Patient demonstrates understanding/independence with home program.  Patient is benefitting from skilled physical therapy and is making steady progress toward functional goals.  Patient is appropriate to continue with skilled physical therapy intervention, as indicated by initial plan of care.    Goal Status:  Pt. will demonstrate/verbalize independence in self-management of condition in : 4 weeks  Pt. will be independent with home exercise program in : 4 weeks  Pt. will be able to walk : 2 blocks;for exercise/recreation;in 12 weeks;Comment  Comment:: w/o AD  Pt will: increase knee ROM (flexion >100 degrees, extension < lacking 4 degrees)     Plan / Patient Education:     Continue with initial plan of care.  Progress with home program as tolerated.     Plan for next session: SLR, SAQ, sit<>stand, stairs     Subjective:   Pt has been walking .5 mile in sections. Surgeon said knee should bend 105-110 degrees flexion.     Objective:   Knee ROM                                          Date:  9/17/18 9/24/18    AROM in degrees  Right   Left  Right   Left  Right   Left       Knee Flexion  (130 )   132   82      88             Knee Extension  (0 )    0 with active straightening   9      lacking 6           PROM in degrees  Right   Left  Right   Left  Right   Left       Knee Flexion  (130 )                         Knee Extension  (0 )                        LE Strength                  Date:  9/24/18   Strength (MMT/5)  Right   Left  Right   Left  Right   Left       Hip Flexion   5   4                   Hip Abduction                         Hip Adduction                         Hip Extension                         Hip Internal Rotation                         Hip External Rotation                         Knee Extension   5   4                   Knee Flexion   5   5                   Ankle Dorsiflexion   5   5                   Ankle Plantarflexion                       Exercises:  Exercise #1: quad set with towel under knee/ with towel under heel   Comment #1: heel slide   Exercise #2: LAQ x10   Comment #2: standing knee extension with green theraband just proximal to knee joint   Exercise #3: knee extnsion in sitting with heel on opposite chair (20-30 sec than 5-6 heel slides - repeat)  Comment #3: HS curl in standing and prone (use leg  in prone) x10     Treatment Today     TREATMENT MINUTES COMMENTS   Evaluation     Self-care/ Home management     Manual therapy 20 Knee flexion PROM in supine   Tibiofemoral: Posterior Glide on L femur to improve extension   Grade II sustained 5 sec x4  STM L quad    Neuromuscular Re-education     Therapeutic Activity 25 See ex's flow sheet for advancement of HEP with print-outs   Sit<>stand with cues for symmetrical foot alignment and even weight distrubution    Therapeutic Exercises     Gait training     Modality__________________                Total 35    Blank areas are intentional and mean the treatment  did not include these items.       Cielo Burns, PT, DPT  9/19/2018

## 2021-06-20 NOTE — PROGRESS NOTES
[facility]    Name:   Esperanza Gage  : 1945  Facility:   [facility] [[id number]]   Room:   Code Status: FULL CODE -   Fac type:   SNF (Skilled Nursing Facility, TCU) -     CHIEF COMPLAINT / REASON FOR VISIT:  Chief Complaint   Patient presents with     Review Of Multiple Medical Conditions     TCU follow-up after hospitalization for left total knee arthroplasty.     [facility] from 18 until 18.    HPI: Esperanza is a 72 y.o. female with diagnoses of essential hypertension, osteoporosis (age-related without pathologic fractures), hyperlipidemia, and osteoarthritis, who underwent elective left total knee arthroplasty on 18.  There were no perioperative complications.  She has been placed on 325 mg of aspirin twice daily for BTE prophylaxis to continue for 26 days, at which time she will resume her home 81 mg daily dose.      CURRENT ISSUES    She tells me the incision site stings a little, and it is achy in the popliteal fossa.  She is still having some trouble picking up her left foot, and she feels her stamina is down.  She is also feeling a little constipated.  Currently, senna S is twice daily as needed, and we will change that to scheduled dosing.  Otherwise, she tells me she has not been taking the oxycodone but is taking 650 mg of acetaminophen 3 times daily, and she feels that that works fine.    She does have an aphthous ulcer that is bothering her a bit.  She does have something to treat it with.  She tells me she gets these every time she is stressed and assumes surgery to be sufficiently stressful.    Labs ordered today, including a basic metabolic profile (slightly elevated glucose) and hemoglobin (currently 9.5).  I do not know her baselines.    ROS: No headaches or chest pains, coughing or congestion, nausea or vomiting, dizziness or dyspnea, dysuria, difficulty chewing or swallowing, integumentary issues, or problems with  appetite or sleep.    Past Medical History:   Diagnosis Date     Acute blood loss anemia      Breast signs and symptoms      Chondromalacia of patella      DJD (degenerative joint disease)     CMC right     HTN (hypertension)      Hyperlipidemia      Hypertension      Knee pain      Lateral epicondylitis      Leg numbness      Medication management      Milia      Muscle weakness      Osteoarthritis     left knee     Osteopenia      Osteoporosis     post menopausal     Pain of right thumb      Pelvic fracture (H)      Problems with hearing      S/P knee surgery 08/28/2018    Left TKA     Seborrheic keratosis      Stress incontinence      Tinea      Vitamin D deficiency               Family History   Problem Relation Age of Onset     Osteoporosis Mother      Other Brother      cerebrovascular disease     Hypertension Brother      Coronary artery disease Neg Hx      Diabetes Neg Hx      Melanoma Neg Hx      Skin cancer Neg Hx      Social History     Social History     Marital status: Single     Spouse name: N/A     Number of children: N/A     Years of education: N/A     Social History Main Topics     Smoking status: Former Smoker     Types: Cigarettes     Smokeless tobacco: Never Used     Alcohol use Yes     Drug use: Not on file     Sexual activity: Not on file     Other Topics Concern     Not on file     Social History Narrative     No narrative on file       MEDICATIONS: Reviewed from the MAR, physician orders, and/or earlier progress notes.  Updated by me today (09/05/18) with a change in senna S from as needed to scheduled reflected below.  Current Outpatient Prescriptions   Medication Sig     acetaminophen (TYLENOL) 325 MG tablet Take 650 mg by mouth every 4 (four) hours as needed for pain.     alendronate (FOSAMAX) 70 MG tablet Take 70 mg by mouth every 7 days. Alendronate Sodium Tablet 70 MG Give 1 tablet by  mouth one time a day every Fri related to AGE  RELATED  OSTEOPOROSIS WITHOUT CURRENT  PATHOLOGICAL  "FRACTURE (M81.0) GIVE WITH  OVER 8 OUNCES WATER AND KEEP UPRIGHT  FOR AT LEAST 30 MINUTES AFTER DOSE. GIVE  AT LEAST 60 MINUTES BEFORE BREAKFAST     aspirin 325 MG EC tablet Take 325 mg by mouth 2 (two) times a day. For 26 days     aspirin 81 MG EC tablet Take 81 mg by mouth daily.     ciclopirox (LOPROX) 0.77 % cream Apply topically 2 (two) times a day. Ciclopirox Olamine Cream 0.77 % Apply to TO BOTH  FEET topically two times a day related to TINEA  PEDIS     losartan (COZAAR) 25 MG tablet Take 25 mg by mouth daily.     oxyCODONE (ROXICODONE) 5 MG immediate release tablet Take 2.5-5 mg by mouth every 4 (four) hours as needed for pain.     senna-docusate (SENNA PLUS) 8.6-50 mg tablet Take 1 tablet by mouth 2 (two) times a day.      simvastatin (ZOCOR) 10 MG tablet Take 10 mg by mouth daily.     ALLERGIES: No Known Allergies    DIET: Regular, regular texture, thin liquids.    Vitals:    09/05/18 1656   BP: 113/63   Pulse: 78   Resp: 20   Temp: 98.7  F (37.1  C)   SpO2: 98%   Weight: 195 lb 12.8 oz (88.8 kg)   Height: 5' 5.5\" (1.664 m)     Body mass index is 32.09 kg/(m^2).    EXAMINATION:   General: Pleasant, alert, conversant middle-aged female, sitting comfortably, in no apparent distress.  Head: Normocephalic and atraumatic.   Eyes: PERRLA, sclerae clear.   ENT: Moist oral mucosa.  She has her own teeth.  No rhinorrhea or nasal discharge.  Hearing is unimpaired.  Cardiovascular: Regular rate and rhythm.  No appreciable murmur.  Respiratory: Lungs clear to auscultation bilaterally.   Abdomen: Soft and nontender.   Musculoskeletal/Extremities: Only slight lower extremity edema on the surgical leg.  Integument: No rashes, clinically significant lesions, or skin breakdown.   Cognitive/Psychiatric: Alert and oriented ×3.  Affect is euthymic.    DIAGNOSTICS:   Results for orders placed or performed in visit on 09/04/18   Basic Metabolic Panel   Result Value Ref Range    Sodium 139 136 - 145 mmol/L    Potassium 3.8 3.5 " - 5.0 mmol/L    Chloride 106 98 - 107 mmol/L    CO2 24 22 - 31 mmol/L    Anion Gap, Calculation 9 5 - 18 mmol/L    Glucose 156 (H) 70 - 125 mg/dL    Calcium 9.3 8.5 - 10.5 mg/dL    BUN 13 8 - 28 mg/dL    Creatinine 0.60 0.60 - 1.10 mg/dL    GFR MDRD Af Amer >60 >60 mL/min/1.73m2    GFR MDRD Non Af Amer >60 >60 mL/min/1.73m2     Lab Results   Component Value Date    HGB 9.5 (L) 09/04/2018     Estimated Creatinine Clearance: 71.7 mL/min (by C-G formula based on Cr of 0.6).       ASSESSMENT/Plan:      ICD-10-CM    1. Essential hypertension I10    2. Presence of artificial knee joint, left Z96.652    3. Hyperlipidemia E78.5    4. Pain management R52    5. Ulcer aphthous oral K12.0    6. Osteoporosis M81.0    7. Drug-induced constipation K59.03      CHANGES:    Change senna S from 1 tab twice daily as needed to 1 tablet twice daily scheduled.    CARE PLAN:    The care plan has been reviewed and all orders signed. Changes to care plan, if any, as noted. Otherwise, continue current plan of care.  Time spent with this patient was approximately 35 minutes, with greater than 50% spent in counseling and coordination of care that included a review of hospital records and medications.    The above has been created using voice recognition software. Please be aware that this may unintentionally  produce inaccuracies and/or nonsensical sentences.      Electronically signed by: Ge Kennedy CNP

## 2021-06-20 NOTE — PROGRESS NOTES
[facility]    Name:   Esperanza Gage  : 1945  Facility:   [facility] [[id number]]   Room:   Code Status: FULL CODE -   Fac type:   SNF (Skilled Nursing Facility, TCU) -     CHIEF COMPLAINT / REASON FOR VISIT:  Chief Complaint   Patient presents with     Discharge Summary     TCU discharge after hospitalization for left total knee arthroplasty.     [facility] from 18 until 18.   [facility] from 18 until 18    HPI: Esperanza is a 72 y.o. female with diagnoses of essential hypertension, osteoporosis (age-related without pathologic fractures), hyperlipidemia, and osteoarthritis, who underwent elective left total knee arthroplasty on 18.  There were no perioperative complications.  She has been placed on 325 mg of aspirin twice daily for BTE prophylaxis to continue for 26 days, at which time she will resume her home 81 mg daily dose.      CURRENT ISSUES    Insomnia: She is sleeping better now that she has her fleece blanket from home and is closing the bathroom door to block out all the noises.    Pain: He still has a bit of soreness.  She tells me that she is taking oxycodone about once every 8 hours and particularly to help her sleep at night.  Otherwise, she is taking 650 mg of scheduled acetaminophen 3 times daily.  She is still having some trouble picking up/dorsiflexing her left foot.  The superior aspect of the gastrocnemius is purplish in color.  She also still has some puffiness.      Constipation: She has been feeling a little constipated.  She was on senna S is twice daily as needed, and we changed that to scheduled dosing.  She now has daily bowel movements.    Discharge planning: She will be discharging on 18.  She will not need home care, as she is not homebound.  She will be going to [facility] the [facility].      ROS: No headaches or chest pains,  coughing or congestion, nausea or vomiting, dizziness or dyspnea, dysuria, difficulty chewing or swallowing, integumentary issues, or problems with appetite or sleep.    Past Medical History:   Diagnosis Date     Acute blood loss anemia      Breast signs and symptoms      Chondromalacia of patella      DJD (degenerative joint disease)     CMC right     HTN (hypertension)      Hyperlipidemia      Hypertension      Knee pain      Lateral epicondylitis      Leg numbness      Medication management      Milia      Muscle weakness      Osteoarthritis     left knee     Osteopenia      Osteoporosis     post menopausal     Pain of right thumb      Pelvic fracture (H)      Problems with hearing      S/P knee surgery 08/28/2018    Left TKA     Seborrheic keratosis      Stress incontinence      Tinea      Vitamin D deficiency               Family History   Problem Relation Age of Onset     Osteoporosis Mother      Other Brother      cerebrovascular disease     Hypertension Brother      Coronary artery disease Neg Hx      Diabetes Neg Hx      Melanoma Neg Hx      Skin cancer Neg Hx      Social History     Social History     Marital status: Single     Spouse name: N/A     Number of children: N/A     Years of education: N/A     Social History Main Topics     Smoking status: Former Smoker     Types: Cigarettes     Smokeless tobacco: Never Used     Alcohol use Yes     Drug use: Not on file     Sexual activity: Not on file     Other Topics Concern     Not on file     Social History Narrative     No narrative on file       MEDICATIONS: Reviewed from the MAR, physician orders, and/or earlier progress notes.  Half tablet (2.5 mg) of oxycodone was discontinued.  She can take 5 mg as needed.  Current Outpatient Prescriptions   Medication Sig     acetaminophen (TYLENOL) 325 MG tablet Take 650 mg by mouth every 4 (four) hours as needed for pain.     alendronate (FOSAMAX) 70 MG tablet Take 70 mg by mouth every 7 days. Alendronate Sodium  "Tablet 70 MG Give 1 tablet by  mouth one time a day every Fri related to AGE  RELATED  OSTEOPOROSIS WITHOUT CURRENT  PATHOLOGICAL FRACTURE (M81.0) GIVE WITH  OVER 8 OUNCES WATER AND KEEP UPRIGHT  FOR AT LEAST 30 MINUTES AFTER DOSE. GIVE  AT LEAST 60 MINUTES BEFORE BREAKFAST     aspirin 325 MG EC tablet Take 325 mg by mouth 2 (two) times a day. For 26 days     aspirin 81 MG EC tablet Take 81 mg by mouth daily.     ciclopirox (LOPROX) 0.77 % cream Apply topically 2 (two) times a day. Ciclopirox Olamine Cream 0.77 % Apply to TO BOTH  FEET topically two times a day related to TINEA  PEDIS     losartan (COZAAR) 25 MG tablet Take 25 mg by mouth daily.     oxyCODONE (ROXICODONE) 5 MG immediate release tablet Take 5 mg by mouth every 4 (four) hours as needed for pain.      senna-docusate (SENNA PLUS) 8.6-50 mg tablet Take 1 tablet by mouth 2 (two) times a day.      simvastatin (ZOCOR) 10 MG tablet Take 10 mg by mouth daily.     ALLERGIES: No Known Allergies    DIET: Regular, regular texture, thin liquids.    Vitals:    09/12/18 1122   BP: 125/73   Pulse: 79   Resp: 18   Temp: 98.5  F (36.9  C)   SpO2: 97%   Weight: 196 lb 12.8 oz (89.3 kg)   Height: 5' 5.5\" (1.664 m)     Body mass index is 32.25 kg/(m^2).    EXAMINATION:   General: Pleasant, alert, conversant middle-aged female, sitting in a recliner, in no apparent distress.  Head: Normocephalic and atraumatic.   Eyes: PERRLA, sclerae clear.   ENT: Moist oral mucosa.  She has her own teeth.  No rhinorrhea or nasal discharge.  Hearing is unimpaired.  Cardiovascular: Regular rate and rhythm.  No appreciable murmur.  Respiratory: Lungs clear to auscultation bilaterally.   Abdomen: Soft and nontender.   Musculoskeletal/Extremities: Left lower extremity is still puffy.  Integument: No rashes, clinically significant lesions, or skin breakdown.   Cognitive/Psychiatric: Alert and oriented ×3.  Affect is euthymic.    DIAGNOSTICS:   Results for orders placed or performed in visit on " 09/04/18   Basic Metabolic Panel   Result Value Ref Range    Sodium 139 136 - 145 mmol/L    Potassium 3.8 3.5 - 5.0 mmol/L    Chloride 106 98 - 107 mmol/L    CO2 24 22 - 31 mmol/L    Anion Gap, Calculation 9 5 - 18 mmol/L    Glucose 156 (H) 70 - 125 mg/dL    Calcium 9.3 8.5 - 10.5 mg/dL    BUN 13 8 - 28 mg/dL    Creatinine 0.60 0.60 - 1.10 mg/dL    GFR MDRD Af Amer >60 >60 mL/min/1.73m2    GFR MDRD Non Af Amer >60 >60 mL/min/1.73m2     Lab Results   Component Value Date    HGB 9.5 (L) 09/04/2018     CrCl cannot be calculated (Patient's most recent sCr result is older than the maximum 5 days allowed.).       ASSESSMENT/Plan:      ICD-10-CM    1. S/P total knee arthroplasty, left Z96.652    2. Essential hypertension I10    3. Drug-induced constipation K59.03    4. Osteoporosis M81.0      CHANGES:    None.    CARE PLAN:    The care plan has been reviewed and all orders signed.  She will be attending outpatient physical therapy services.    The above has been created using voice recognition software. Please be aware that this may unintentionally  produce inaccuracies and/or nonsensical sentences.      Electronically signed by: Ge Kennedy CNP

## 2021-06-20 NOTE — PROGRESS NOTES
"[facility] Daily Progress     Patient Name: Esperanza Gage  Date: 9/19/2018  Visit #: 6/12  PTA visit #:  0  Referral Diagnosis:Knee joint replacement status [Z96.659]  - Primary   Referring provider:  Tiffany Brunner MD  Visit Diagnosis:     ICD-10-CM    1. Total knee replacement status, left Z96.652    2. Decreased range of motion of left knee M25.662    3. Abnormality of gait R26.9          Assessment:   Increased PROM knee flexion to 100 degrees today.      From Eval: Pt is a 72 y.o. year old female s/p left total knee arthroplasty on 8/28/18.  Patient has difficulty with stairs, ambulating, housework and changing sleeping positions secondary to L knee limited ROM, pain and swelling. Findings are consistent with TKA.  Patient appears motivated to participate in Physical Therapy and present with a good Physical Therapy prognosis for resolution of activities limitations.     Patient demonstrates understanding/independence with home program.  Patient is benefitting from skilled physical therapy and is making steady progress toward functional goals.  Patient is appropriate to continue with skilled physical therapy intervention, as indicated by initial plan of care.    Goal Status:  Pt. will demonstrate/verbalize independence in self-management of condition in : 4 weeks  Pt. will be independent with home exercise program in : 4 weeks  Pt. will be able to walk : 2 blocks;for exercise/recreation;in 12 weeks;Comment  Comment:: w/o AD  Pt will: increase knee ROM (flexion >100 degrees, extension < lacking 4 degrees)     Plan / Patient Education:     Continue with initial plan of care.  Progress with home program as tolerated.     Plan for next session: SAQ, sit<>stand without UE support from 17\", continue to progress descending stairs    Subjective:   Reports she is now able to ascend and descend stairs     Objective:   Knee ROM                                         Date:  9/17/18 9/24/18    " " 9/27/18    AROM in degrees  Right   Left  Right   Left  Right   Left       Knee Flexion  (130 )   132   82      88        81 sitting 90 in supine        Knee Extension  (0 )    0 with active straightening   9      lacking 6      lacking 10      AROM in degrees  Left 10/1/18   Left date: 10/8/18  Right   Left  Right   Left       Knee Flexion  (130 )   90 AROM and PROM     90 AROM  100 PROM post-session                   Knee Extension  (0 )   lacking 8 degrees   lacking 5 degrees                       LE Strength                  Date:  9/24/18   Strength (MMT/5)  Right   Left  Right   Left  Right   Left       Hip Flexion   5   4                   Hip Abduction                         Hip Adduction                         Hip Extension                         Hip Internal Rotation                         Hip External Rotation                         Knee Extension   5   4                   Knee Flexion   5   5                   Ankle Dorsiflexion   5   5                   Ankle Plantarflexion                         Exercises:  Exercise #1: quad set with leg extended hold 5 sec   Comment #1: heel slide x15  Exercise #2: LAQ x10   Comment #2: standing knee extension with green theraband just proximal to knee joint   Exercise #3: knee extnsion in sitting with heel on opposite chair (20-30 sec than 5-6 heel slides - repeat)  Comment #3: HS curl in standing and prone (use leg  in prone) x10   Exercise #4: SKTC  - hold behind thigh and allow for knee flexion   Comment #4: SLR 2x10 due cues to avoid quad lag (recommended 1# weight at home)   Exercise #5: sit<>stand from 17\" surface x10 with cues         Treatment Today     TREATMENT MINUTES COMMENTS   Evaluation     Self-care/ Home management     Manual therapy     Neuromuscular Re-education     Therapeutic Activity      Therapeutic Exercises 30 Pt demonstrated stairs today. Step over step.  Increased lean on R railing with descending.     See ex's flow sheet  "   Gait training     Modality__________________                Total 30    Blank areas are intentional and mean the treatment did not include these items.       Cielo Burns, PT, DPT  9/19/2018

## 2021-06-20 NOTE — PROGRESS NOTES
[facility] Daily Progress     Patient Name: Esperanza Gage  Date: 9/19/2018  Visit #: 2/12  PTA visit #:  0  Referral Diagnosis:Knee joint replacement status [Z96.659]  - Primary   Referring provider:  Tiffany Brunner MD  Visit Diagnosis:     ICD-10-CM    1. Total knee replacement status, left Z96.652    2. Decreased range of motion of left knee M25.662    3. Abnormality of gait R26.9          Assessment:   Pt continues to have very limited ROM with knee flex and ext.  Progressed ex's.  Swelling decreased as pt is compliant with elevation/ice.     From Eval: Pt is a 72 y.o. year old female s/p left total knee arthroplasty on 8/28/18.  Patient has difficulty with stairs, ambulating, housework and changing sleeping positions secondary to L knee limited ROM, pain and swelling. Findings are consistent with TKA.  Patient appears motivated to participate in Physical Therapy and present with a good Physical Therapy prognosis for resolution of activities limitations.     Patient demonstrates understanding/independence with home program.  Patient is benefitting from skilled physical therapy and is making steady progress toward functional goals.  Patient is appropriate to continue with skilled physical therapy intervention, as indicated by initial plan of care.    Goal Status:  Pt. will demonstrate/verbalize independence in self-management of condition in : 4 weeks  Pt. will be independent with home exercise program in : 4 weeks  Pt. will be able to walk : 2 blocks;for exercise/recreation;in 12 weeks;Comment  Comment:: w/o AD  Pt will: increase knee ROM (flexion >100 degrees, extension < lacking 4 degrees)     Plan / Patient Education:     Continue with initial plan of care.  Progress with home program as tolerated.     Plan for next session: SLR, SAQ, sit<>stand, stairs     Subjective:     Pt overdid ex's and had increased pain in knee. Has been icing and elevating     Objective:     Gait: stiff gait with  limited knee flexion on L.    Exercises:  Exercise #1: quad set with towel under knee/ with towel under heel   Comment #1: heel slide   Exercise #2: LAQ x10   Comment #2: standing knee extension with green theraband just proximal to knee joint   Exercise #3: knee extnsion in sitting with heel on opposite chair (20-30 sec than 5-6 heel slides - repeat)  Comment #3: HS curl in standing and prone (use leg  in prone) x10     Treatment Today     TREATMENT MINUTES COMMENTS   Evaluation     Self-care/ Home management     Manual therapy     Neuromuscular Re-education     Therapeutic Activity 28 See ex's flow sheet for advancement of HEP with print-outs    Therapeutic Exercises     Gait training     Modality__________________                Total 28    Blank areas are intentional and mean the treatment did not include these items.       Cielo Burns, PT, DPT  9/19/2018

## 2021-06-20 NOTE — PROGRESS NOTES
[facility]      Facility:    [facility] [[id number]]    Code Status: FULL CODE   [facility] 8/28 -  8/31/18      Chief Complaint/Reason for Visit:  Chief Complaint   Patient presents with     H & P     L TKA       HPI:   Esperanza is a 72 y.o. female who had a planned left total knee arthroplasty for primary osteoarthritis.  Surgery was done by Dr. Valenzuela , surgery was uncomplicated.  She did have acute blood loss anemia with her hemoglobin to 8.8 she was discharged from the hospital on oral pain medications.  CPM was prescribed at discharge  DVT prophylaxis with aspirin 325 mg twice daily for 4 weeks.  After that time she will resume her aspirin 81 mg daily.  Plan was to follow-up with Dr. Valenzuela at 2 weeks and 6 weeks postoperatively.    Other medical history:  1. hypertension stable  2.  Hyperlipidemia on a statin  3.  Obesity BMI 32  4.  Pelvic fracture 5 years ago    Past Medical History:  Past Medical History:   Diagnosis Date     Acute blood loss anemia      Breast signs and symptoms      Chondromalacia of patella      DJD (degenerative joint disease)     CMC right     HTN (hypertension)      Hyperlipidemia      Hypertension      Knee pain      Lateral epicondylitis      Leg numbness      Medication management      Milia      Muscle weakness      Osteoarthritis     left knee     Osteopenia      Osteoporosis     post menopausal     Pain of right thumb      Pelvic fracture (H)      Problems with hearing      S/P knee surgery 08/28/2018    Left TKA     Seborrheic keratosis      Stress incontinence      Tinea      Vitamin D deficiency            Surgical History:  Past Surgical History:   Procedure Laterality Date     TOTAL KNEE ARTHROPLASTY Left 08/28/2018       Family History:   Family History   Problem Relation Age of Onset     Osteoporosis Mother      Other Brother      cerebrovascular disease     Hypertension Brother      Coronary artery disease Neg Hx       Diabetes Neg Hx      Melanoma Neg Hx      Skin cancer Neg Hx        Social History:    Social History     Social History     Marital status: Single     Spouse name: N/A     Number of children: N/A     Years of education: N/A     Social History Main Topics     Smoking status: Former Smoker     Types: Cigarettes     Smokeless tobacco: Never Used     Alcohol use Yes     Drug use: Not on file     Sexual activity: Not on file         Review of Systems   Some constipation in the hospital, has been taking senna twice daily and did have a bowel movement  Oxycodone made her feel weird, she gets good relief from Tylenol  She wakes up in pain, so far her pain meds have just been as requested.  We discussed scheduling them for in the morning to cover morning therapy at lunch cover afternoon therapy, and at bedtime to help cover further into the sleeping hours.  She tells me her CPM got up to 80  yesterday, on her own she can flex to about 65 .  The remainder of the comprehensive review of systems is negative    Blood pressure 120/76, pulse 84, temperature 97.3  F (36.3  C), resp. rate 20, SpO2 98 %.          Physical Exam   Constitutional: She is oriented to person, place, and time. She appears well-nourished. No distress.   Pleasant obese  female   HENT:   Nose: Nose normal.   Mouth/Throat: Oropharynx is clear and moist.   Right upper lip apthous ulcer   Eyes: Conjunctivae and EOM are normal. No scleral icterus.   Cardiovascular: Regular rhythm.    Murmur heard.  Pulmonary/Chest: She has no wheezes. She has no rales.   Abdominal: Soft. Bowel sounds are normal. There is no tenderness. There is no rebound.   Musculoskeletal: She exhibits edema and tenderness. She exhibits no deformity.   Left knee bandage dry, wound not undressed  Surrounding expected soft tissue swelling distal thigh superior calf  Motion sensation intact to the left   Lymphadenopathy:     She has no cervical adenopathy.   Neurological: She is alert  and oriented to person, place, and time. She exhibits normal muscle tone. Coordination normal.   Skin: Skin is warm and dry. No rash noted. No erythema. No pallor.   Psychiatric: She has a normal mood and affect. Her behavior is normal. Thought content normal.       Medication List:  Current Outpatient Prescriptions   Medication Sig     acetaminophen (TYLENOL) 325 MG tablet Take 650 mg by mouth every 4 (four) hours as needed for pain.     alendronate (FOSAMAX) 70 MG tablet Take 70 mg by mouth every 7 days. Alendronate Sodium Tablet 70 MG Give 1 tablet by  mouth one time a day every Fri related to AGE  RELATED  OSTEOPOROSIS WITHOUT CURRENT  PATHOLOGICAL FRACTURE (M81.0) GIVE WITH  OVER 8 OUNCES WATER AND KEEP UPRIGHT  FOR AT LEAST 30 MINUTES AFTER DOSE. GIVE  AT LEAST 60 MINUTES BEFORE BREAKFAST     aspirin 325 MG EC tablet Take 325 mg by mouth 2 (two) times a day. For 26 days     aspirin 81 MG EC tablet Take 81 mg by mouth daily.     ciclopirox (LOPROX) 0.77 % cream Apply topically 2 (two) times a day. Ciclopirox Olamine Cream 0.77 % Apply to TO BOTH  FEET topically two times a day related to TINEA  PEDIS     losartan (COZAAR) 25 MG tablet Take 25 mg by mouth daily.     oxyCODONE (ROXICODONE) 5 MG immediate release tablet Take 2.5-5 mg by mouth every 4 (four) hours as needed for pain.     senna-docusate (SENNA PLUS) 8.6-50 mg tablet Take 1 tablet by mouth 2 (two) times a day.      simvastatin (ZOCOR) 10 MG tablet Take 10 mg by mouth daily.       Labs:  8/13 (preop) :  WBC = 5.8, Hb = 13.4, PLT = 211  NA = 140, K = 4.4, CL = 106, CO2 = 27, BUN = 10, CR = 0.61      8/31: Hb = 8.8    9/4:  Hb = 9.5  NA =139, K = 3.8, CL = 106, CO2 = 24, BUN = 13, CR = 0.6    Assessment / Plan:    ICD-10-CM    1. S/P total knee arthroplasty, left Z96.652   twice daily for DVT prophylaxis   2. Pain management R52  schedule Tylenol 650 mg at 6 AM, 2 PM, 10 PM.   3. Blood loss anemia D50.0    4. Essential hypertension I10  losartan    5. Hyperlipidemia E78.5 Zocor     On alendronate for osteoporosis      Electronically signed by: Tiffany Brunner MD

## 2021-06-20 NOTE — PROGRESS NOTES
"[facility]    Name:   Esperanza Gage  : 1945  Facility:   [facility] [[id number]]   Room:   Code Status: FULL CODE -   Fac type:   SNF (Skilled Nursing Facility, TCU) -     CHIEF COMPLAINT / REASON FOR VISIT:  Chief Complaint   Patient presents with     Review Of Multiple Medical Conditions     TCU follow-up after hospitalization for left total knee arthroplasty.     [facility] from 18 until 18.     Patient was last seen by me on 18 and subsequently seen by Dr. Brunner on 18.    HPI: Esperanza is a 72 y.o. female with diagnoses of essential hypertension, osteoporosis (age-related without pathologic fractures), hyperlipidemia, and osteoarthritis, who underwent elective left total knee arthroplasty on 18.  There were no perioperative complications.  She has been placed on 325 mg of aspirin twice daily for BTE prophylaxis to continue for 26 days, at which time she will resume her home 81 mg daily dose.      CURRENT ISSUES    Insomnia: She is sleeping better now that she has her fleece blanket from home, and she is closing the bathroom door to block out all the noises.    Pain: Today, she is a bit sore.  She tells me, \"it's so bad, it's not fun to stand on it.\"  She last told me the incision site stung a little, and it was achy in the popliteal fossa.  She is still having some trouble picking up/dorsiflexing her left foot.  The superior aspect of the gastrocnemius is purplish in color.  She also still has some puffiness.  Otherwise, she tells me she has not been taking the oxycodone but is taking 650 mg of acetaminophen scheduled 3 times daily, and she feels that that works fine.  She did walk a therapy this morning.    Constipation: She has been feeling a little constipated.  She was on senna S is twice daily as needed, and we changed that to scheduled dosing.  She now has daily bowel movements.    Apthous ulcer: She last " complained of an aphthous ulcer that was bothering her a bit.  She did have something to treat it with.  She told me she gets these every time she is stressed and assumes surgery to be sufficiently stressful.  It is almost resolved.      ROS: No headaches or chest pains, coughing or congestion, nausea or vomiting, dizziness or dyspnea, dysuria, difficulty chewing or swallowing, integumentary issues, or problems with appetite or sleep.    Past Medical History:   Diagnosis Date     Acute blood loss anemia      Breast signs and symptoms      Chondromalacia of patella      DJD (degenerative joint disease)     CMC right     HTN (hypertension)      Hyperlipidemia      Hypertension      Knee pain      Lateral epicondylitis      Leg numbness      Medication management      Milia      Muscle weakness      Osteoarthritis     left knee     Osteopenia      Osteoporosis     post menopausal     Pain of right thumb      Pelvic fracture (H)      Problems with hearing      S/P knee surgery 08/28/2018    Left TKA     Seborrheic keratosis      Stress incontinence      Tinea      Vitamin D deficiency               Family History   Problem Relation Age of Onset     Osteoporosis Mother      Other Brother      cerebrovascular disease     Hypertension Brother      Coronary artery disease Neg Hx      Diabetes Neg Hx      Melanoma Neg Hx      Skin cancer Neg Hx      Social History     Social History     Marital status: Single     Spouse name: N/A     Number of children: N/A     Years of education: N/A     Social History Main Topics     Smoking status: Former Smoker     Types: Cigarettes     Smokeless tobacco: Never Used     Alcohol use Yes     Drug use: Not on file     Sexual activity: Not on file     Other Topics Concern     Not on file     Social History Narrative     No narrative on file       MEDICATIONS: Reviewed from the MAR, physician orders, and/or earlier progress notes.    Current Outpatient Prescriptions   Medication Sig      "acetaminophen (TYLENOL) 325 MG tablet Take 650 mg by mouth every 4 (four) hours as needed for pain.     alendronate (FOSAMAX) 70 MG tablet Take 70 mg by mouth every 7 days. Alendronate Sodium Tablet 70 MG Give 1 tablet by  mouth one time a day every Fri related to AGE  RELATED  OSTEOPOROSIS WITHOUT CURRENT  PATHOLOGICAL FRACTURE (M81.0) GIVE WITH  OVER 8 OUNCES WATER AND KEEP UPRIGHT  FOR AT LEAST 30 MINUTES AFTER DOSE. GIVE  AT LEAST 60 MINUTES BEFORE BREAKFAST     aspirin 325 MG EC tablet Take 325 mg by mouth 2 (two) times a day. For 26 days     aspirin 81 MG EC tablet Take 81 mg by mouth daily.     ciclopirox (LOPROX) 0.77 % cream Apply topically 2 (two) times a day. Ciclopirox Olamine Cream 0.77 % Apply to TO BOTH  FEET topically two times a day related to TINEA  PEDIS     losartan (COZAAR) 25 MG tablet Take 25 mg by mouth daily.     oxyCODONE (ROXICODONE) 5 MG immediate release tablet Take 2.5-5 mg by mouth every 4 (four) hours as needed for pain.     senna-docusate (SENNA PLUS) 8.6-50 mg tablet Take 1 tablet by mouth 2 (two) times a day.      simvastatin (ZOCOR) 10 MG tablet Take 10 mg by mouth daily.     ALLERGIES: No Known Allergies    DIET: Regular, regular texture, thin liquids.    Vitals:    09/10/18 1602   BP: 119/74   Pulse: 84   Resp: 16   Temp: 98.2  F (36.8  C)   SpO2: 96%   Weight: 195 lb 12.8 oz (88.8 kg)   Height: 5' 5.5\" (1.664 m)     Body mass index is 32.09 kg/(m^2).    EXAMINATION:   General: Pleasant, alert, conversant middle-aged female, sitting in a recliner and clearly looking like she is in a bit of pain.  Head: Normocephalic and atraumatic.   Eyes: PERRLA, sclerae clear.   ENT: Moist oral mucosa.  She has her own teeth.  No rhinorrhea or nasal discharge.  Hearing is unimpaired.  Cardiovascular: Regular rate and rhythm.  No appreciable murmur.  Respiratory: Lungs clear to auscultation bilaterally.   Abdomen: Soft and nontender.   Musculoskeletal/Extremities: Left lower extremity is still " puffy.  Integument: No rashes, clinically significant lesions, or skin breakdown.   Cognitive/Psychiatric: Alert and oriented ×3.  Affect is euthymic.    DIAGNOSTICS:   Results for orders placed or performed in visit on 09/04/18   Basic Metabolic Panel   Result Value Ref Range    Sodium 139 136 - 145 mmol/L    Potassium 3.8 3.5 - 5.0 mmol/L    Chloride 106 98 - 107 mmol/L    CO2 24 22 - 31 mmol/L    Anion Gap, Calculation 9 5 - 18 mmol/L    Glucose 156 (H) 70 - 125 mg/dL    Calcium 9.3 8.5 - 10.5 mg/dL    BUN 13 8 - 28 mg/dL    Creatinine 0.60 0.60 - 1.10 mg/dL    GFR MDRD Af Amer >60 >60 mL/min/1.73m2    GFR MDRD Non Af Amer >60 >60 mL/min/1.73m2     Lab Results   Component Value Date    HGB 9.5 (L) 09/04/2018     CrCl cannot be calculated (Patient's most recent sCr result is older than the maximum 5 days allowed.).       ASSESSMENT/Plan:      ICD-10-CM    1. S/P total knee arthroplasty, left Z96.652    2. Pain management R52    3. Essential hypertension I10    4. Drug-induced constipation K59.03    5. Osteoporosis M81.0    6. Ulcer aphthous oral K12.0      CHANGES:    None.    CARE PLAN:    The care plan has been reviewed and all orders signed. Changes to care plan, if any, as noted. Otherwise, continue current plan of care.      The above has been created using voice recognition software. Please be aware that this may unintentionally  produce inaccuracies and/or nonsensical sentences.      Electronically signed by: Ge Kennedy, CNP

## 2021-06-20 NOTE — PROGRESS NOTES
[facility] Daily Progress     Patient Name: Esperanza Gage  Date: 9/19/2018  Visit #: 4/12  PTA visit #:  0  Referral Diagnosis:Knee joint replacement status [Z96.659]  - Primary   Referring provider:  Tiffany Brunner MD  Visit Diagnosis:     ICD-10-CM    1. Total knee replacement status, left Z96.652    2. Decreased range of motion of left knee M25.662    3. Abnormality of gait R26.9          Assessment:   Focused on stairs today.  Pt able to perform step over gait pattern and up with the L LE for the first time.  Also reach 90 degrees knee flexion post-session.     From Eval: Pt is a 72 y.o. year old female s/p left total knee arthroplasty on 8/28/18.  Patient has difficulty with stairs, ambulating, housework and changing sleeping positions secondary to L knee limited ROM, pain and swelling. Findings are consistent with TKA.  Patient appears motivated to participate in Physical Therapy and present with a good Physical Therapy prognosis for resolution of activities limitations.     Patient demonstrates understanding/independence with home program.  Patient is benefitting from skilled physical therapy and is making steady progress toward functional goals.  Patient is appropriate to continue with skilled physical therapy intervention, as indicated by initial plan of care.    Goal Status:  Pt. will demonstrate/verbalize independence in self-management of condition in : 4 weeks  Pt. will be independent with home exercise program in : 4 weeks  Pt. will be able to walk : 2 blocks;for exercise/recreation;in 12 weeks;Comment  Comment:: w/o AD  Pt will: increase knee ROM (flexion >100 degrees, extension < lacking 4 degrees)     Plan / Patient Education:     Continue with initial plan of care.  Progress with home program as tolerated.     Plan for next session: SLR, SAQ, sit<>stand, stairs     Subjective:   Pt has been walking .5 mile in sections. Surgeon said knee should bend 105-110 degrees flexion.  "    Objective:   Knee ROM                                         Date:  9/17/18 9/24/18 9/27/18    AROM in degrees  Right   Left  Right   Left  Right   Left       Knee Flexion  (130 )   132   82      88   81 sitting 90 in supine             Knee Extension  (0 )    0 with active straightening   9      lacking 6   lacking 10         PROM in degrees  Right   Left  Right   Left  Right   Left       Knee Flexion  (130 )                         Knee Extension  (0 )                        LE Strength                  Date:  9/24/18   Strength (MMT/5)  Right   Left  Right   Left  Right   Left       Hip Flexion   5   4                   Hip Abduction                         Hip Adduction                         Hip Extension                         Hip Internal Rotation                         Hip External Rotation                         Knee Extension   5   4                   Knee Flexion   5   5                   Ankle Dorsiflexion   5   5                   Ankle Plantarflexion                       Exercises:  Exercise #1: quad set with towel under knee/ with towel under heel   Comment #1: heel slide   Exercise #2: LAQ x10   Comment #2: standing knee extension with green theraband just proximal to knee joint   Exercise #3: knee extnsion in sitting with heel on opposite chair (20-30 sec than 5-6 heel slides - repeat)  Comment #3: HS curl in standing and prone (use leg  in prone) x10     Treatment Today     TREATMENT MINUTES COMMENTS   Evaluation     Self-care/ Home management     Manual therapy 2 PROM knee flexion   Neuromuscular Re-education     Therapeutic Activity      Therapeutic Exercises 28 Pt has been performing step to gait pattern at home up with R down with L. Railing on R (switches to L half way up)   Stairs: 30 steps (5.5\")   Ascend: up with L; step to gait pattern with cues to avoid circumduction on L LE and focus on knee bend.    Descend: observed down with L.  Pt able to perform " "down with R while holding on to both railings. Unable to perform while holding on to one railing and demonstrated with use of cane (unsafe).      Recommended pt perform at home (pt reports 7-8\" step) on first step up to L - hold and stretch   At this point continue to perform down with L and we will work towards down with R in future sessions.    See ex's flow sheet    Gait training     Modality__________________                Total 30    Blank areas are intentional and mean the treatment did not include these items.       Cielo Burns, PT, DPT  9/19/2018    "

## 2021-06-21 NOTE — PROGRESS NOTES
"[facility] Daily Progress     Patient Name: Esperanza Gage  Date: 9/19/2018  Visit #: 12/12  PTA visit #:  0  Referral Diagnosis:Knee joint replacement status [Z96.659]  - Primary   Referring provider:  Tiffany Brunner MD  Visit Diagnosis:     ICD-10-CM    1. Total knee replacement status, left Z96.652    2. Decreased range of motion of left knee M25.662    3. Abnormality of gait R26.9          Assessment:   Reviewed and progressed HEP. Pt will return in 3 weeks for final appointment     From Eval: Pt is a 72 y.o. year old female s/p left total knee arthroplasty on 8/28/18.  Patient has difficulty with stairs, ambulating, housework and changing sleeping positions secondary to L knee limited ROM, pain and swelling. Findings are consistent with TKA.  Patient appears motivated to participate in Physical Therapy and present with a good Physical Therapy prognosis for resolution of activities limitations.     Patient demonstrates understanding/independence with home program.  Patient is benefitting from skilled physical therapy and is making steady progress toward functional goals.  Patient is appropriate to continue with skilled physical therapy intervention, as indicated by initial plan of care.    Goal Status:  Pt. will demonstrate/verbalize independence in self-management of condition in : 4 weeks Progressing  Pt. will be independent with home exercise program in : 4 weeks Progressing  Pt. will be able to walk : 2 blocks;for exercise/recreation;in 12 weeks;Comment  Comment:: w/o AD Progressing MET  Pt will: increase knee ROM (flexion >100 degrees, extension < lacking 4 degrees) MET    Plan / Patient Education:     Continue with initial plan of care.  Progress with home program as tolerated.     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     Objective:   Knee ROM                                         Date:  9/17/18 9/24/18 9/27/18    AROM in degrees  Right   Left  Right   Left  Right   Left       Knee Flexion  (130 )   132   82      88        81 sitting 90 in supine        Knee Extension  (0 )    0 with active straightening   9      lacking 6      lacking 10      AROM in degrees  Left 10/1/18   Left date: 10/8/18     Left  Right   Left       Knee Flexion  (130 )   90 AROM and PROM     90 AROM  100 PROM post-session                   Knee Extension  (0 )   lacking 8 degrees   lacking 5 degrees                     Past session:   Knee extension on L: lacking 5-6 degrees AROM   AROM 106 knee flexion   PROM 108 knee flexion     10/22/18  Knee extension on L: lacking 5 degrees AROM (lacking 2-3 degrees AROM on R)     10/29/18: L knee  Flexion 102     11/5/18: L knee flexion 114-115     11/12/18: L knee flexion: 106 pre treatment / 108 post treatment   L knee extension: 0     11/19/18: L knee flexion 106 pre treatment / 108 post-treatment     LE Strength                  Date:  9/24/18   Strength (MMT/5)  Right   Left  Right   Left  Right   Left       Hip Flexion   5   4                   Hip Abduction                         Hip Adduction                         Hip Extension                         Hip Internal Rotation                         Hip External Rotation                         Knee Extension   5   4                   Knee Flexion   5   5                   Ankle Dorsiflexion   5   5                   Ankle Plantarflexion                       Exercises:  Exercise #1: quad set with leg extended hold 5 sec   Comment #1: heel slides x8   Exercise #2: LAQ x10 with 2# weight   Comment #2: standing knee extension with green theraband just proximal to knee joint   Exercise #3: knee extnsion in sitting with heel on opposite chair (20-30 sec than 5-6 heel slides - repeat)  Comment #3: HS curl in  "standing and prone (use leg  in prone) x10   Exercise #4: SKTC  - hold just below knee   Comment #4: SLR x10 with 2#   Exercise #5: sit<>stand from 17\" surface 2 x10 with cues (L foot back) - cues slow eccentric control   Comment #5: bridge x10 - with cues to increased knee flexion to allow for increased hip elevation   Exercise #6: SAQ x10   Comment #6: Quad stretch supine off edge of bed wtih belt - discontinued for now due to quad pain   Exercise #7: Clams 2x10 -  yellow band  Comment #7: HS curl in prone added 1# weight  Exercise #8: prone hip extension x10   Comment #8: s/l hip abduction x10  Exercise #9: lunge - kneeling on R  Comment #9: step down on R from 4\" step / lateral step down on R from 2\" step (trialed with 4\" step)       Treatment Today     TREATMENT MINUTES COMMENTS   Evaluation     Self-care/ Home management     Manual therapy 23 STM L knee  PROM knee flexion/ext   Patellar mobs: medial/lateral WFL  Limited superior/posterior glide   Demonstrated how to perform self-mobs for patella    Neuromuscular Re-education     Therapeutic Activity      Therapeutic Exercises 30 See ex's flow sheet.   Stairs 3x16' step through gait pattern with alternating railing - minimal UE support   Cues to avoid L lateral lean with ascend railing on L      Gait training     Modality__________________                Total 53    Blank areas are intentional and mean the treatment did not include these items.       Cielo Burns, PT, DPT  9/19/2018    "

## 2021-06-21 NOTE — PROGRESS NOTES
"[facility] Daily Progress     Patient Name: Esperanza Gage  Date: 9/19/2018  Visit #: 10/12  PTA visit #:  0  Referral Diagnosis:Knee joint replacement status [Z96.659]  - Primary   Referring provider:  Tiffany Brunner MD  Visit Diagnosis:     ICD-10-CM    1. Total knee replacement status, left Z96.652    2. Decreased range of motion of left knee M25.662    3. Abnormality of gait R26.9          Assessment:   Reviewed and progressed HEP.     From Eval: Pt is a 72 y.o. year old female s/p left total knee arthroplasty on 8/28/18.  Patient has difficulty with stairs, ambulating, housework and changing sleeping positions secondary to L knee limited ROM, pain and swelling. Findings are consistent with TKA.  Patient appears motivated to participate in Physical Therapy and present with a good Physical Therapy prognosis for resolution of activities limitations.     Patient demonstrates understanding/independence with home program.  Patient is benefitting from skilled physical therapy and is making steady progress toward functional goals.  Patient is appropriate to continue with skilled physical therapy intervention, as indicated by initial plan of care.    Goal Status:  Pt. will demonstrate/verbalize independence in self-management of condition in : 4 weeks Progressing  Pt. will be independent with home exercise program in : 4 weeks Progressing  Pt. will be able to walk : 2 blocks;for exercise/recreation;in 12 weeks;Comment  Comment:: w/o AD Progressing   Pt will: increase knee ROM (flexion >100 degrees, extension < lacking 4 degrees) Progressing    Plan / Patient Education:     Continue with initial plan of care.  Progress with home program as tolerated.     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 " "    Objective:   Knee ROM                                         Date:  9/17/18 9/24/18 9/27/18    AROM in degrees  Right   Left  Right   Left  Right   Left       Knee Flexion  (130 )   132   82      88        81 sitting 90 in supine        Knee Extension  (0 )    0 with active straightening   9      lacking 6      lacking 10      AROM in degrees  Left 10/1/18   Left date: 10/8/18     Left  Right   Left       Knee Flexion  (130 )   90 AROM and PROM     90 AROM  100 PROM post-session                   Knee Extension  (0 )   lacking 8 degrees   lacking 5 degrees                     Past session:   Knee extension on L: lacking 5-6 degrees AROM   AROM 106 knee flexion   PROM 108 knee flexion     10/22/18  Knee extension on L: lacking 5 degrees AROM (lacking 2-3 degrees AROM on R)     10/29/18: L knee  Flexion 102     11/5/18: L knee flexion 114-115     LE Strength                  Date:  9/24/18   Strength (MMT/5)  Right   Left  Right   Left  Right   Left       Hip Flexion   5   4                   Hip Abduction                         Hip Adduction                         Hip Extension                         Hip Internal Rotation                         Hip External Rotation                         Knee Extension   5   4                   Knee Flexion   5   5                   Ankle Dorsiflexion   5   5                   Ankle Plantarflexion                       Exercises:  Exercise #1: quad set with leg extended hold 5 sec   Comment #1: heel slide x15  Exercise #2: LAQ x10 with 1# weight   Comment #2: standing knee extension with green theraband just proximal to knee joint   Exercise #3: knee extnsion in sitting with heel on opposite chair (20-30 sec than 5-6 heel slides - repeat)  Comment #3: HS curl in standing and prone (use leg  in prone) x10   Exercise #4: SKTC  - hold just below knee   Comment #4: SLR x10 with 2#   Exercise #5: sit<>stand from 17\" surface 2 x10 with cues (L foot back) " - cues slow eccentric control   Comment #5: bridge x10  Exercise #6: SAQ x10   Comment #6: Quad stretch supine off edge of bed wtih belt - discontinued for now due to quad pain   Exercise #7: Clams 2x10 - added yellow band  Comment #7: HS curl in prone   Exercise #8: prone hip extension x10   Comment #8: s/l hip abduction x10  Exercise #9: lunge - kneeling on R    Treatment Today     TREATMENT MINUTES COMMENTS   Evaluation     Self-care/ Home management     Manual therapy     Neuromuscular Re-education     Therapeutic Activity      Therapeutic Exercises 30 See ex's flow sheet. Pt demonstrated getting onto floor (yoga mat) for ex's - performed with lunge.    Gait training     Modality__________________                Total 30    Blank areas are intentional and mean the treatment did not include these items.       Cielo Burns, PT, DPT  9/19/2018     negative

## 2021-06-21 NOTE — PROGRESS NOTES
"[facility] Daily Progress     Patient Name: Esperanza Gage  Date: 9/19/2018  Visit #: 11/12  PTA visit #:  0  Referral Diagnosis:Knee joint replacement status [Z96.659]  - Primary   Referring provider:  Tiffany Brunner MD  Visit Diagnosis:     ICD-10-CM    1. Total knee replacement status, left Z96.652    2. Decreased range of motion of left knee M25.662    3. Abnormality of gait R26.9          Assessment:   Reviewed and progressed HEP.     From Eval: Pt is a 72 y.o. year old female s/p left total knee arthroplasty on 8/28/18.  Patient has difficulty with stairs, ambulating, housework and changing sleeping positions secondary to L knee limited ROM, pain and swelling. Findings are consistent with TKA.  Patient appears motivated to participate in Physical Therapy and present with a good Physical Therapy prognosis for resolution of activities limitations.     Patient demonstrates understanding/independence with home program.  Patient is benefitting from skilled physical therapy and is making steady progress toward functional goals.  Patient is appropriate to continue with skilled physical therapy intervention, as indicated by initial plan of care.    Goal Status:  Pt. will demonstrate/verbalize independence in self-management of condition in : 4 weeks Progressing  Pt. will be independent with home exercise program in : 4 weeks Progressing  Pt. will be able to walk : 2 blocks;for exercise/recreation;in 12 weeks;Comment  Comment:: w/o AD Progressing   Pt will: increase knee ROM (flexion >100 degrees, extension < lacking 4 degrees) Progressing    Plan / Patient Education:     Continue with initial plan of care.  Progress with home program as tolerated.     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 "     Objective:   Knee ROM                                         Date:  9/17/18 9/24/18 9/27/18    AROM in degrees  Right   Left  Right   Left  Right   Left       Knee Flexion  (130 )   132   82      88        81 sitting 90 in supine        Knee Extension  (0 )    0 with active straightening   9      lacking 6      lacking 10      AROM in degrees  Left 10/1/18   Left date: 10/8/18     Left  Right   Left       Knee Flexion  (130 )   90 AROM and PROM     90 AROM  100 PROM post-session                   Knee Extension  (0 )   lacking 8 degrees   lacking 5 degrees                     Past session:   Knee extension on L: lacking 5-6 degrees AROM   AROM 106 knee flexion   PROM 108 knee flexion     10/22/18  Knee extension on L: lacking 5 degrees AROM (lacking 2-3 degrees AROM on R)     10/29/18: L knee  Flexion 102     11/5/18: L knee flexion 114-115     11/12/18: L knee flexion: 106 pre treatment / 108 post treatment   L knee extension: 0     LE Strength                  Date:  9/24/18   Strength (MMT/5)  Right   Left  Right   Left  Right   Left       Hip Flexion   5   4                   Hip Abduction                         Hip Adduction                         Hip Extension                         Hip Internal Rotation                         Hip External Rotation                         Knee Extension   5   4                   Knee Flexion   5   5                   Ankle Dorsiflexion   5   5                   Ankle Plantarflexion                       Exercises:  Exercise #1: quad set with leg extended hold 5 sec   Comment #1: heel slides x8   Exercise #2: LAQ x10 with 2# weight   Comment #2: standing knee extension with green theraband just proximal to knee joint   Exercise #3: knee extnsion in sitting with heel on opposite chair (20-30 sec than 5-6 heel slides - repeat)  Comment #3: HS curl in standing and prone (use leg  in prone) x10   Exercise #4: SKTC  - hold just below knee   Comment  "#4: SLR x10 with 2#   Exercise #5: sit<>stand from 17\" surface 2 x10 with cues (L foot back) - cues slow eccentric control   Comment #5: bridge x10 - with cues to increased knee flexion to allow for increased hip elevation   Exercise #6: SAQ x10   Comment #6: Quad stretch supine off edge of bed wtih belt - discontinued for now due to quad pain   Exercise #7: Clams 2x10 -  yellow band  Comment #7: HS curl in prone added 1# weight  Exercise #8: prone hip extension x10   Comment #8: s/l hip abduction x10  Exercise #9: lunge - kneeling on R    Treatment Today     TREATMENT MINUTES COMMENTS   Evaluation     Self-care/ Home management     Manual therapy 25 Scar mobilization on L knee, focus on superior and inferior scar.  PROM knee flexion/ext   Patellar mobs: medial/lateral WFL  Limited superior/posterior glide    Neuromuscular Re-education     Therapeutic Activity      Therapeutic Exercises 30 See ex's flow sheet.    Gait training     Modality__________________                Total 55    Blank areas are intentional and mean the treatment did not include these items.       Cielo Burns, PT, DPT  9/19/2018    "

## 2021-06-21 NOTE — PROGRESS NOTES
"[facility] Daily Progress     Patient Name: Esperanza Gage  Date: 9/19/2018  Visit #: 8/12  PTA visit #:  0  Referral Diagnosis:Knee joint replacement status [Z96.659]  - Primary   Referring provider:  Tiffany Brunner MD  Visit Diagnosis:     ICD-10-CM    1. Total knee replacement status, left Z96.652    2. Decreased range of motion of left knee M25.662    3. Abnormality of gait R26.9          Assessment:   Pt reported pain in lateral L ankle. Manual therapy performed to ankle and foot on L.  Progressed HEP.     From Eval: Pt is a 72 y.o. year old female s/p left total knee arthroplasty on 8/28/18.  Patient has difficulty with stairs, ambulating, housework and changing sleeping positions secondary to L knee limited ROM, pain and swelling. Findings are consistent with TKA.  Patient appears motivated to participate in Physical Therapy and present with a good Physical Therapy prognosis for resolution of activities limitations.     Patient demonstrates understanding/independence with home program.  Patient is benefitting from skilled physical therapy and is making steady progress toward functional goals.  Patient is appropriate to continue with skilled physical therapy intervention, as indicated by initial plan of care.    Goal Status:  Pt. will demonstrate/verbalize independence in self-management of condition in : 4 weeks Progressing  Pt. will be independent with home exercise program in : 4 weeks Progressing  Pt. will be able to walk : 2 blocks;for exercise/recreation;in 12 weeks;Comment  Comment:: w/o AD Progressing   Pt will: increase knee ROM (flexion >100 degrees, extension < lacking 4 degrees) Progressing    Plan / Patient Education:     Continue with initial plan of care.  Progress with home program as tolerated.     Plan for next session: review and reprint HEP: sit<>stand without UE support from 17\", continue to progress descending stairs    Subjective:   Pt is 8 weeks out of surgery and returns " to work tomorrow.     Objective:   Knee ROM                                         Date:  9/17/18 9/24/18 9/27/18    AROM in degrees  Right   Left  Right   Left  Right   Left       Knee Flexion  (130 )   132   82      88        81 sitting 90 in supine        Knee Extension  (0 )    0 with active straightening   9      lacking 6      lacking 10      AROM in degrees  Left 10/1/18   Left date: 10/8/18  Right   Left  Right   Left       Knee Flexion  (130 )   90 AROM and PROM     90 AROM  100 PROM post-session                   Knee Extension  (0 )   lacking 8 degrees   lacking 5 degrees                     Past session:   Knee extension on L: lacking 5-6 degrees AROM   AROM 106 knee flexion   PROM 108 knee flexion     10/22/18  Knee extension on L: lacking 5 degrees AROM (lacking 2-3 degrees AROM on R)     LE Strength                  Date:  9/24/18   Strength (MMT/5)  Right   Left  Right   Left  Right   Left       Hip Flexion   5   4                   Hip Abduction                         Hip Adduction                         Hip Extension                         Hip Internal Rotation                         Hip External Rotation                         Knee Extension   5   4                   Knee Flexion   5   5                   Ankle Dorsiflexion   5   5                   Ankle Plantarflexion                       Exercises:  Exercise #1: quad set with leg extended hold 5 sec   Comment #1: heel slide x15  Exercise #2: LAQ x10 with 1# weight   Comment #2: standing knee extension with green theraband just proximal to knee joint   Exercise #3: knee extnsion in sitting with heel on opposite chair (20-30 sec than 5-6 heel slides - repeat)  Comment #3: HS curl in standing and prone (use leg  in prone) x10   Exercise #4: SKTC  - hold behind thigh and allow for knee flexion   Comment #4: SLR 2x10 with 1# weight increased to 2# 2x10 (encouraged her to hold 2-3 sec at home)   Exercise #5: sit<>stand  "from 17\" surface 2 x10 with cues (L foot back) - cues slow eccentric control   Comment #5: bridge 2x10 (performed heel slides first to loosen up)   Exercise #6: SAQ x10   Comment #6: Quad stretch supine off edge of bed wtih belt   Exercise #7: Clams 2x10   Comment #7: HS curl in prone   Exercise #8: prone hip extension 2x10     Treatment Today     TREATMENT MINUTES COMMENTS   Evaluation     Self-care/ Home management     Manual therapy 23 In prone: sustained tibiofemoral traction grade I in open packed position   Sustained talocrural traction grade I-II in open packed position, talocrural inversion and eversion mobs oscillation grade III   STM and stretching to plantar fascia   Mobs to metatarsal, tarsal and phalanges    Neuromuscular Re-education     Therapeutic Activity      Therapeutic Exercises 26 See ex's flow sheet   Gait training     Modality__________________                Total 49    Blank areas are intentional and mean the treatment did not include these items.       Cielo Burns, PT, DPT  9/19/2018    "

## 2021-06-21 NOTE — PROGRESS NOTES
"[facility] Daily Progress     Patient Name: Esperanza Gage  Date: 9/19/2018  Visit #: 7/12  PTA visit #:  0  Referral Diagnosis:Knee joint replacement status [Z96.659]  - Primary   Referring provider:  Tiffany Brunner MD  Visit Diagnosis:     ICD-10-CM    1. Total knee replacement status, left Z96.652    2. Decreased range of motion of left knee M25.662    3. Abnormality of gait R26.9          Assessment:   Increased PROM knee flexion to 108 degrees today.      From Eval: Pt is a 72 y.o. year old female s/p left total knee arthroplasty on 8/28/18.  Patient has difficulty with stairs, ambulating, housework and changing sleeping positions secondary to L knee limited ROM, pain and swelling. Findings are consistent with TKA.  Patient appears motivated to participate in Physical Therapy and present with a good Physical Therapy prognosis for resolution of activities limitations.     Patient demonstrates understanding/independence with home program.  Patient is benefitting from skilled physical therapy and is making steady progress toward functional goals.  Patient is appropriate to continue with skilled physical therapy intervention, as indicated by initial plan of care.    Goal Status:  Pt. will demonstrate/verbalize independence in self-management of condition in : 4 weeks Progressing  Pt. will be independent with home exercise program in : 4 weeks Progressing  Pt. will be able to walk : 2 blocks;for exercise/recreation;in 12 weeks;Comment  Comment:: w/o AD Progressing   Pt will: increase knee ROM (flexion >100 degrees, extension < lacking 4 degrees) Progressing    Plan / Patient Education:     Continue with initial plan of care.  Progress with home program as tolerated.     Plan for next session: bridge, sit<>stand without UE support from 17\", continue to progress descending stairs, assess MMT hip abduction and extension     Subjective:   Pt reports decreased swelling and pain.     Objective:   Knee ROM   " "                                      Date:  9/17/18 9/24/18 9/27/18    AROM in degrees  Right   Left  Right   Left  Right   Left       Knee Flexion  (130 )   132   82      88        81 sitting 90 in supine        Knee Extension  (0 )    0 with active straightening   9      lacking 6      lacking 10      AROM in degrees  Left 10/1/18   Left date: 10/8/18  Right   Left  Right   Left       Knee Flexion  (130 )   90 AROM and PROM     90 AROM  100 PROM post-session                   Knee Extension  (0 )   lacking 8 degrees   lacking 5 degrees                     Knee extension on L: lacking 5-6 degrees AROM   AROM 106 knee flexion   PROM 108 knee flexion     LE Strength                  Date:  9/24/18   Strength (MMT/5)  Right   Left  Right   Left  Right   Left       Hip Flexion   5   4                   Hip Abduction                         Hip Adduction                         Hip Extension                         Hip Internal Rotation                         Hip External Rotation                         Knee Extension   5   4                   Knee Flexion   5   5                   Ankle Dorsiflexion   5   5                   Ankle Plantarflexion                       Exercises:  Exercise #1: quad set with leg extended hold 5 sec   Comment #1: heel slide x15  Exercise #2: LAQ x10 with 1# weight   Comment #2: standing knee extension with green theraband just proximal to knee joint   Exercise #3: knee extnsion in sitting with heel on opposite chair (20-30 sec than 5-6 heel slides - repeat)  Comment #3: HS curl in standing and prone (use leg  in prone) x10   Exercise #4: SKTC  - hold behind thigh and allow for knee flexion   Comment #4: SLR 2x10 with 1# weight increased to 2# 2x10 (encouraged her to hold 2-3 sec at home)   Exercise #5: sit<>stand from 17\" surface 2 x10 with cues (L foot back) - cues slow eccentric control   Exercise #6: SAQ x10   Comment #6: Quad stretch supine off edge of bed " wtih belt     Treatment Today     TREATMENT MINUTES COMMENTS   Evaluation     Self-care/ Home management     Manual therapy     Neuromuscular Re-education     Therapeutic Activity      Therapeutic Exercises 41 Pt demonstrated stairs today. Step over step.  Increased lean on R railing with descending. Unable to perform with L rail only and no AD.  Ascending with L rail - moderate use of rail with L LE ascend.     See ex's flow sheet and ROM measurements above    Gait training     Modality__________________                Total 41    Blank areas are intentional and mean the treatment did not include these items.       Cielo Burns, PT, DPT  9/19/2018

## 2021-06-21 NOTE — PROGRESS NOTES
"[facility] Daily Progress     Patient Name: Esperanza Gage  Date: 9/19/2018  Visit #: 9/12  PTA visit #:  0  Referral Diagnosis:Knee joint replacement status [Z96.659]  - Primary   Referring provider:  Tiffany Brunner MD  Visit Diagnosis:     ICD-10-CM    1. Total knee replacement status, left Z96.652    2. Decreased range of motion of left knee M25.662    3. Abnormality of gait R26.9          Assessment:   Reviewed and progressed HEP.     From Eval: Pt is a 72 y.o. year old female s/p left total knee arthroplasty on 8/28/18.  Patient has difficulty with stairs, ambulating, housework and changing sleeping positions secondary to L knee limited ROM, pain and swelling. Findings are consistent with TKA.  Patient appears motivated to participate in Physical Therapy and present with a good Physical Therapy prognosis for resolution of activities limitations.     Patient demonstrates understanding/independence with home program.  Patient is benefitting from skilled physical therapy and is making steady progress toward functional goals.  Patient is appropriate to continue with skilled physical therapy intervention, as indicated by initial plan of care.    Goal Status:  Pt. will demonstrate/verbalize independence in self-management of condition in : 4 weeks Progressing  Pt. will be independent with home exercise program in : 4 weeks Progressing  Pt. will be able to walk : 2 blocks;for exercise/recreation;in 12 weeks;Comment  Comment:: w/o AD Progressing   Pt will: increase knee ROM (flexion >100 degrees, extension < lacking 4 degrees) Progressing    Plan / Patient Education:     Continue with initial plan of care.  Progress with home program as tolerated.     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     Objective:   Knee ROM       " "                                  Date:  9/17/18 9/24/18 9/27/18    AROM in degrees  Right   Left  Right   Left  Right   Left       Knee Flexion  (130 )   132   82      88        81 sitting 90 in supine        Knee Extension  (0 )    0 with active straightening   9      lacking 6      lacking 10      AROM in degrees  Left 10/1/18   Left date: 10/8/18  Right   Left  Right   Left       Knee Flexion  (130 )   90 AROM and PROM     90 AROM  100 PROM post-session                   Knee Extension  (0 )   lacking 8 degrees   lacking 5 degrees                     Past session:   Knee extension on L: lacking 5-6 degrees AROM   AROM 106 knee flexion   PROM 108 knee flexion     10/22/18  Knee extension on L: lacking 5 degrees AROM (lacking 2-3 degrees AROM on R)     10/29/18: L knee 102     LE Strength                  Date:  9/24/18   Strength (MMT/5)  Right   Left  Right   Left  Right   Left       Hip Flexion   5   4                   Hip Abduction                         Hip Adduction                         Hip Extension                         Hip Internal Rotation                         Hip External Rotation                         Knee Extension   5   4                   Knee Flexion   5   5                   Ankle Dorsiflexion   5   5                   Ankle Plantarflexion                       Exercises:  Exercise #1: quad set with leg extended hold 5 sec   Comment #1: heel slide x15  Exercise #2: LAQ x10 with 1# weight   Comment #2: standing knee extension with green theraband just proximal to knee joint   Exercise #3: knee extnsion in sitting with heel on opposite chair (20-30 sec than 5-6 heel slides - repeat)  Comment #3: HS curl in standing and prone (use leg  in prone) x10   Exercise #4: SKTC  - hold behind thigh and allow for knee flexion   Comment #4: SLR 2x10 with 1# weight increased to 2# 2x10 (encouraged her to hold 2-3 sec at home)   Exercise #5: sit<>stand from 17\" surface 2 x10 " with cues (L foot back) - cues slow eccentric control   Comment #5: bridge 2x10 (performed heel slides first to loosen up)   Exercise #6: SAQ x10   Comment #6: Quad stretch supine off edge of bed wtih belt   Exercise #7: Clams 2x10   Comment #7: HS curl in prone   Exercise #8: prone hip extension 2x10     Treatment Today     TREATMENT MINUTES COMMENTS   Evaluation     Self-care/ Home management     Manual therapy     Neuromuscular Re-education     Therapeutic Activity      Therapeutic Exercises 28 See ex's flow sheet   Gait training     Modality__________________                Total 28    Blank areas are intentional and mean the treatment did not include these items.       Cielo Burns, PT, DPT  9/19/2018     unk

## 2021-06-22 NOTE — PROGRESS NOTES
[facility] Daily Progress     Patient Name: Esperanza Gage  Date:12/10/18  Visit #: 13/13  PTA visit #:  0  Referral Diagnosis:Knee joint replacement status [Z96.659]  - Primary   Referring provider:  Tiffany Brunner MD  Visit Diagnosis:     ICD-10-CM    1. Total knee replacement status, left Z96.652    2. Decreased range of motion of left knee M25.662    3. Abnormality of gait R26.9          Assessment:   Reassessed MMT and ROM.  D/C today. Pt meet all her goals.     From Eval: Pt is a 72 y.o. year old female s/p left total knee arthroplasty on 8/28/18.  Patient has difficulty with stairs, ambulating, housework and changing sleeping positions secondary to L knee limited ROM, pain and swelling. Findings are consistent with TKA.  Patient appears motivated to participate in Physical Therapy and present with a good Physical Therapy prognosis for resolution of activities limitations.     Patient demonstrates understanding/independence with home program.    Goal Status:  Pt. will demonstrate/verbalize independence in self-management of condition in : 4 weeks MET  Pt. will be independent with home exercise program in : 4 weeks MET  Pt. 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     Objective:   Knee ROM                            Date:  9/17/18 9/24/18 9/27/18    AROM in degrees  Right   Left  Right   Left  Right   Left       Knee Flexion  (130 )   132   82      88        81 sitting 90 in supine        Knee Extension  (0 )    0 with active straightening   9      lacking 6      lacking 10      AROM in degrees  Left 10/1/18   Left date: 10/8/18     Left  Right   Left       Knee Flexion  (130 )   90 AROM and PROM  "    90 AROM  100 PROM post-session                   Knee Extension  (0 )   lacking 8 degrees   lacking 5 degrees                     12/10/18  L knee extension lacking 2-3 degrees   L knee flexion 116 degrees    LE Strength                  Date:  12/10/18   Strength (MMT/5)  Right   Left  Right   Left  Right   Left       Hip Flexion   5   5                   Hip Abduction   5   5                   Hip Adduction                         Hip Extension   5   5                   Hip Internal Rotation                         Hip External Rotation                         Knee Extension   5   5                   Knee Flexion   5   5                   Ankle Dorsiflexion   5   5                   Ankle Plantarflexion                       Exercises:  Exercise #1: quad set with leg extended hold 5 sec   Comment #1: heel slides x8   Exercise #2: LAQ x10 with 2# weight   Comment #2: standing knee extension with green theraband just proximal to knee joint   Exercise #3: knee extnsion in sitting with heel on opposite chair (20-30 sec than 5-6 heel slides - repeat)  Comment #3: HS curl in standing and prone (use leg  in prone) x10   Exercise #4: SKTC  - hold just below knee   Comment #4: SLR x10 with 2#   Exercise #5: sit<>stand from 17\" surface 2 x10 with cues (L foot back) - cues slow eccentric control   Comment #5: bridge x10 - with cues to increased knee flexion to allow for increased hip elevation   Exercise #6: SAQ x10   Comment #6: Quad stretch supine off edge of bed wtih belt - discontinued for now due to quad pain   Exercise #7: Clams 2x10 -  yellow band  Comment #7: HS curl in prone added 1# weight  Exercise #8: prone hip extension x10   Comment #8: s/l hip abduction x10  Exercise #9: lunge - kneeling on R  Comment #9: step down on R from 4\" step / lateral step down on R from 2\" step (trialed with 4\" step)       Treatment Today     TREATMENT MINUTES COMMENTS   Evaluation     Self-care/ Home management     Manual " therapy 23 STM L knee, lower L leg  Pt reports numbness in saphenous nerve in medial lower leg that has been happening since 2010.    PROM knee flexion/ext   Patellar mobs: medial/lateral WFL   Neuromuscular Re-education     Therapeutic Activity      Therapeutic Exercises 30 See ex's flow sheet.   Stairs 16' step through gait pattern with alternating railing - minimal UE support with descend no UE support with ascend.  Pt report no pain in lower L LE       Gait training     Modality__________________                Total 53    Blank areas are intentional and mean the treatment did not include these items.       Cielo Burns, PT, DPT  12/9/18    [facility] Discharge Summary  Patient Name: Esperanza Gage  Date: 12/10/2018  Referral Diagnosis:Knee joint replacement status [Z96.659]  - Primary   Referring provider: Tiffany Brunner MD   Visit Diagnosis:   1. Total knee replacement status, left     2. Decreased range of motion of left knee     3. Abnormality of gait       LEF increased from 30/80 to 55/80 at discharge     Goals:  Pt. will demonstrate/verbalize independence in self-management of condition in : 4 weeks MET  Pt. will be independent with home exercise program in : 4 weeks MET  Pt. will be able to walk : 2 blocks;for exercise/recreation;in 12 weeks;Comment  Comment:: w/o AD MET  Pt will: increase knee ROM (flexion >100 degrees, extension < lacking 4 degrees) MET      Patient was seen for 13 visits from 9/17/18 to 12/10/18 with 0 missed appointments.  The patient met goals and has demonstrated understanding of and independence in the home program for self-care, and progression to next steps.  She will initiate contact if questions or concerns arise.    Therapy will be discontinued at this time.  The patient will need a new referral to resume.    Thank you for your referral.  Cielo Burns, PT, DPT  12/10/2018  7:34 AM

## 2021-06-27 NOTE — PROGRESS NOTES
Progress Notes by Ton Kim DO at 8/31/2019  3:20 PM     Author: Ton Kim DO Service: -- Author Type: Physician    Filed: 9/2/2019  7:40 AM Encounter Date: 8/31/2019 Status: Signed    : Ton Kim DO (Physician)       Chief Complaint   Patient presents with   ? Urinary Tract Infection     frequency urination and burning sensation when urinated.         History of Present Illness: Rooming staff notes reviewed.  Patient is seen for chief concern of possible urinary tract infection.  Symptoms started last night. No recent fevers, or new back pain.    Review of systems: See history of present illness, all others negative.     Current Outpatient Medications   Medication Sig Dispense Refill   ? acetaminophen (TYLENOL) 325 MG tablet Take 650 mg by mouth every 4 (four) hours as needed for pain.     ? alendronate (FOSAMAX) 70 MG tablet Take 70 mg by mouth every 7 days. Alendronate Sodium Tablet 70 MG Give 1 tablet by  mouth one time a day every Fri related to AGE  RELATED  OSTEOPOROSIS WITHOUT CURRENT  PATHOLOGICAL FRACTURE (M81.0) GIVE WITH  OVER 8 OUNCES WATER AND KEEP UPRIGHT  FOR AT LEAST 30 MINUTES AFTER DOSE. GIVE  AT LEAST 60 MINUTES BEFORE BREAKFAST     ? aspirin 81 MG EC tablet Take 81 mg by mouth daily.     ? ciclopirox (LOPROX) 0.77 % cream Apply topically 2 (two) times a day. Ciclopirox Olamine Cream 0.77 % Apply to TO BOTH  FEET topically two times a day related to TINEA  PEDIS     ? losartan (COZAAR) 25 MG tablet Take 50 mg by mouth daily.            ? simvastatin (ZOCOR) 10 MG tablet Take 10 mg by mouth daily.     ? sulfamethoxazole-trimethoprim (SEPTRA DS) 800-160 mg per tablet Take 1 tablet by mouth 2 (two) times a day for 7 days. 14 tablet 0     No current facility-administered medications for this visit.      Past Medical History:   Diagnosis Date   ? Acute blood loss anemia    ? Breast signs and symptoms    ? Chondromalacia of patella    ? DJD (degenerative joint disease)     CMC  right   ? HTN (hypertension)    ? Hyperlipidemia    ? Hypertension    ? Knee pain    ? Lateral epicondylitis    ? Leg numbness    ? Medication management    ? Milia    ? Muscle weakness    ? Osteoarthritis     left knee   ? Osteopenia    ? Osteoporosis     post menopausal   ? Pain of right thumb    ? Pelvic fracture (H)    ? Problems with hearing    ? S/P knee surgery 08/28/2018    Left TKA   ? Seborrheic keratosis    ? Stress incontinence    ? Tinea    ? Vitamin D deficiency       Past Surgical History:   Procedure Laterality Date   ? TOTAL KNEE ARTHROPLASTY Left 08/28/2018      Social History     Tobacco Use   ? Smoking status: Former Smoker     Types: Cigarettes   ? Smokeless tobacco: Never Used   Substance Use Topics   ? Alcohol use: Yes   ? Drug use: Not on file        Family History   Problem Relation Age of Onset   ? Osteoporosis Mother    ? Other Brother         cerebrovascular disease   ? Hypertension Brother    ? Coronary artery disease Neg Hx    ? Diabetes Neg Hx    ? Melanoma Neg Hx    ? Skin cancer Neg Hx        Vitals:    08/31/19 1534   BP: 135/72   Patient Site: Right Arm   Patient Position: Sitting   Cuff Size: Adult Large   Pulse: 86   Resp: 20   Temp: 98.1  F (36.7  C)   TempSrc: Oral   SpO2: 95%   Weight: 200 lb 9 oz (91 kg)       EXAM:   General: Vital signs reviewed. Patient is in no acute appearing distress. Breathing is non labored appearing. Patient is alert and oriented x 3.     Back exam: No flank tenderness.     Recent Results (from the past 48 hour(s))   Urinalysis-UC if Indicated   Result Value Ref Range    Color, UA Yellow Colorless, Yellow, Straw, Light Yellow    Clarity, UA Clear Clear    Glucose, UA Negative Negative    Bilirubin, UA Negative Negative    Ketones, UA Negative Negative    Specific Gravity, UA 1.025 1.005 - 1.030    Blood, UA Trace (!) Negative    pH, UA 6.0 5.0 - 8.0    Protein, UA 30 mg/dL (!) Negative mg/dL    Urobilinogen, UA 1.0 E.U./dL 0.2 E.U./dL, 1.0 E.U./dL     "Nitrite, UA Negative Negative    Leukocytes, UA Moderate (!) Negative    Bacteria, UA      RBC, UA      WBC, UA      Squam Epithel, UA     Results from exam reviewed with patient.  The urine sample was of insufficient amount to do a microscopic study.  The microscopic study showed increased leukocyte esterase and blood.    Assessment/Plan   1. UTI symptoms  Urinalysis-UC if Indicated    Culture, Urine   2. Acute cystitis with hematuria  sulfamethoxazole-trimethoprim (SEPTRA DS) 800-160 mg per tablet       Patient Instructions   We will notify you if the urine culture shows your treatment needs changing.     Patient Education     Bladder Infection, Female (Adult)    Urine is normally doesn't have any bacteria in it. But bacteria can get into the urinary tract from the skin around the rectum. Or they can travel in the blood from elsewhere in the body. Once they are in your urinary tract, they can cause infection in the urethra (urethritis), the bladder (cystitis), or the kidneys (pyelonephritis).  The most common place for an infection is in the bladder. This is called a bladder infection. This is one of the most common infections in women. Most bladder infections are easily treated. They are not serious unless the infection spreads to the kidney.  The phrases \"bladder infection,\" \"UTI,\" and \"cystitis\" are often used to describe the same thing. But they are not always the same. Cystitis is an inflammation of the bladder. The most common cause of cystitis is an infection.  Symptoms  The infection causes inflammation in the urethra and bladder. This causes many of the symptoms. The most common symptoms of a bladder infection are:    Pain or burning when urinating    Having to urinate more often than usual    Urgent need to urinate    Only a small amount of urine comes out    Blood in urine    Abdominal discomfort. This is usually in the lower abdomen above the pubic bone.    Cloudy urine    Strong- or bad-smelling " urine    Unable to urinate (urinary retention)    Unable to hold urine in (urinary incontinence)    Fever    Loss of appetite    Confusion (in older adults)  Causes  Bladder infections are not contagious. You can't get one from someone else, from a toilet seat, or from sharing a bath.  The most common cause of bladder infections is bacteria from the bowels. The bacteria get onto the skin around the opening of the urethra. From there, they can get into the urine and travel up to the bladder, causing inflammation and infection. This usually happens because of:    Wiping improperly after urinating. Always wipe from front to back.    Bowel incontinence    Pregnancy    Procedures such as having a catheter inserted    Older age    Not emptying your bladder. This can allow bacteria a chance to grow in your urine.    Dehydration    Constipation    Sex    Use of a diaphragm for birth control   Treatment  Bladder infections are diagnosed by a urine test. They are treated with antibiotics and usually clear up quickly without complications. Treatment helps prevent a more serious kidney infection.  Medicines  Medicines can help in the treatment of a bladder infection:    Take antibiotics until they are used up, even if you feel better. It is important to finish them to make sure the infection has cleared.    You can use acetaminophen or ibuprofen for pain, fever, or discomfort, unless another medicine was prescribed. If you have chronic liver or kidney disease, talk with your healthcare provider before using these medicines. Also talk with your provider if you've ever had a stomach ulcer or gastrointestinal bleeding, or are taking blood-thinner medicines.    If you are given phenazopydridine to reduce burning with urination, it will cause your urine to become a bright orange color. This can stain clothing.  Care and prevention  These self-care steps can help prevent future infections:    Drink plenty of fluids to prevent  dehydration and flush out your bladder. Do this unless you must restrict fluids for other health reasons, or your doctor told you not to.    Proper cleaning after going to the bathroom is important. Wipe from front to back after using the toilet to prevent the spread of bacteria.    Urinate more often. Don't try to hold urine in for a long time.    Wear loose-fitting clothes and cotton underwear. Avoid tight-fitting pants.    Improve your diet and prevent constipation. Eat more fresh fruit and vegetables, and fiber, and less junk and fatty foods.    Avoid sex until your symptoms are gone.    Avoid caffeine, alcohol, and spicy foods. These can irritate your bladder.    Urinate right after intercourse to flush out your bladder.    If you use birth control pills and have frequent bladder infections, discuss it with your doctor.  Follow-up care  Call your healthcare provider if all symptoms are not gone after 3 days of treatment. This is especially important if you have repeat infections.  If a culture was done, you will be told if your treatment needs to be changed. If directed, you can call to find out the results.  If X-rays were done, you will be told if the results will affect your treatment.  Call 911  Call 911 if any of the following occur:    Trouble breathing    Hard to wake up or confusion    Fainting or loss of consciousness    Rapid heart rate  When to seek medical advice  Call your healthcare provider right away if any of these occur:    Fever of 100.4 F (38.0 C) or higher, or as directed by your healthcare provider    Symptoms are not better by the third day of treatment    Back or belly (abdominal) pain that gets worse    Repeated vomiting, or unable to keep medicine down    Weakness or dizziness    Vaginal discharge    Pain, redness, or swelling in the outer vaginal area (labia)  Date Last Reviewed: 10/1/2016    5795-1426 [employer] [address] [place] [address], [place] [address]. All rights  reserved. This information is not intended as a substitute for professional medical care. Always follow your healthcare professional's instructions.              Ton Kim, DO

## 2021-07-12 ENCOUNTER — ALLIED HEALTH/NURSE VISIT (OUTPATIENT)
Dept: EDUCATION SERVICES | Facility: CLINIC | Age: 76
End: 2021-07-12
Payer: COMMERCIAL

## 2021-07-12 VITALS — WEIGHT: 190.8 LBS | BODY MASS INDEX: 31.27 KG/M2

## 2021-07-12 DIAGNOSIS — R73.03 PREDIABETES: Primary | ICD-10-CM

## 2021-07-12 PROCEDURE — G9891 EM SESSION REPORTING: HCPCS

## 2021-07-12 NOTE — PROGRESS NOTES
Medicare Diabetes Prevention Program: Session 24    Esperanza Gage presents for [facility] Session 24: Get Enough Sleep  Subjective/Objective:    Wt 86.5 kg (190 lb 12.8 oz)   BMI 31.27 kg/m      Minutes spent exercising over the past week: 380      Intervention/Plan:    Topics discussed today include information to meet the following learning objectives:    1) By the end of the session, participants will:  -Explain why sleep matters  -Identify some challenges of getting enough sleep and ways to cope with them        Patient to follow-up next month at [facility] Session 25.     Verna Veronica

## 2021-08-09 ENCOUNTER — ALLIED HEALTH/NURSE VISIT (OUTPATIENT)
Dept: EDUCATION SERVICES | Facility: CLINIC | Age: 76
End: 2021-08-09
Payer: COMMERCIAL

## 2021-08-09 VITALS — WEIGHT: 192.4 LBS | BODY MASS INDEX: 31.53 KG/M2

## 2021-08-09 DIAGNOSIS — R73.03 PREDIABETES: Primary | ICD-10-CM

## 2021-08-09 PROCEDURE — G9879 2 EM CORE MS MO 10-12 WL: HCPCS

## 2021-08-09 NOTE — PROGRESS NOTES
Medicare Diabetes Prevention Program: Session 25    Esperanza Gage presents for [facility] Session 25: Get Back on Track  Subjective/Objective:    Wt 87.3 kg (192 lb 6.4 oz)   BMI 31.53 kg/m      Minutes spent exercising over the past week: 80      Intervention/Plan:    Topics discussed today include information to meet the following learning objectives:    1) By the end of the session, participants will:  -Staying positive  -Following the five steps of problem solving        Patient to follow-up next week at [facility] Session 26.     Verna Veronica

## 2021-09-05 ENCOUNTER — HEALTH MAINTENANCE LETTER (OUTPATIENT)
Age: 76
End: 2021-09-05

## 2021-09-09 DIAGNOSIS — I10 ESSENTIAL HYPERTENSION: ICD-10-CM

## 2021-09-13 ENCOUNTER — ALLIED HEALTH/NURSE VISIT (OUTPATIENT)
Dept: EDUCATION SERVICES | Facility: CLINIC | Age: 76
End: 2021-09-13
Payer: COMMERCIAL

## 2021-09-13 VITALS — WEIGHT: 192.8 LBS | BODY MASS INDEX: 31.6 KG/M2

## 2021-09-13 DIAGNOSIS — R73.03 PREDIABETES: Primary | ICD-10-CM

## 2021-09-13 PROCEDURE — G9891 EM SESSION REPORTING: HCPCS

## 2021-09-13 RX ORDER — LOSARTAN POTASSIUM 50 MG/1
50 TABLET ORAL DAILY
Qty: 90 TABLET | Refills: 0 | Status: SHIPPED | OUTPATIENT
Start: 2021-09-13 | End: 2021-12-22

## 2021-09-13 NOTE — TELEPHONE ENCOUNTER
Losartan Potassium 50 MG Oral Tablet  Last Written Prescription Date:  8/31/2020  Last Fill Quantity: 90,   # refills: 3  Last Office Visit : 11/23/2020  Future Office visit:  None  90 Tabs, sent to pharm 9/13/2021      Rubina Salcedo RN  Central Triage Red Flags/Med Refills

## 2021-09-13 NOTE — PROGRESS NOTES
Medicare Diabetes Prevention Program: Session 26    Esperanza Gage presents for [facility] Session 26: Prevent T2--for Life!  Subjective/Objective:    Wt 87.5 kg (192 lb 12.8 oz)   BMI 31.60 kg/m      Minutes spent exercising over the past week: 315      Intervention/Plan:    Topics discussed today include information to meet the following learning objectives:    1) By the end of the session, participants will:  -Reflect on how far they've come since they started this program  -Explain how to keep their healthy lifestyle going once this program ends  -Set their goals for the next six months        Patient has completed the final class of the Diabetes Prevention Program!  Patient to follow-up next month at [facility] Monthly Maintenance Session 1.    Verna Veronica

## 2021-10-12 ENCOUNTER — ALLIED HEALTH/NURSE VISIT (OUTPATIENT)
Dept: EDUCATION SERVICES | Facility: CLINIC | Age: 76
End: 2021-10-12
Payer: COMMERCIAL

## 2021-10-12 VITALS — WEIGHT: 194.4 LBS | BODY MASS INDEX: 31.86 KG/M2

## 2021-10-12 DIAGNOSIS — R73.03 PREDIABETES: Primary | ICD-10-CM

## 2021-10-12 PROCEDURE — G9891 EM SESSION REPORTING: HCPCS

## 2021-10-12 NOTE — PROGRESS NOTES
Medicare Diabetes Prevention Program: Session 10    Esperanza Gage presents for [facility] Session 10: Find Time for Fitness    Subjective/Objective:    Wt 88.2 kg (194 lb 6.4 oz)   BMI 31.86 kg/m      Minutes spent exercising over the past week: 300      Intervention/Plan:    Topics discussed today include information to meet the following learning objectives:    1) Identify some benefits of being active  2) Recognize the challenge of fitting in fitness  3) Explain how to find time for fitness      Patient to follow-up next month for Session 2 of Maintenance Cohort.    Verna Veronica

## 2021-11-04 ENCOUNTER — OFFICE VISIT (OUTPATIENT)
Dept: OTHER | Facility: CLINIC | Age: 76
End: 2021-11-04
Payer: COMMERCIAL

## 2021-11-04 VITALS
RESPIRATION RATE: 18 BRPM | OXYGEN SATURATION: 95 % | BODY MASS INDEX: 32.32 KG/M2 | SYSTOLIC BLOOD PRESSURE: 148 MMHG | HEIGHT: 65 IN | DIASTOLIC BLOOD PRESSURE: 86 MMHG | WEIGHT: 194 LBS | TEMPERATURE: 98.5 F

## 2021-11-04 DIAGNOSIS — Z00.00 ENCOUNTER FOR MEDICARE ANNUAL WELLNESS EXAM: Primary | ICD-10-CM

## 2021-11-04 PROCEDURE — G0438 PPPS, INITIAL VISIT: HCPCS | Performed by: FAMILY MEDICINE

## 2021-11-04 RX ORDER — ZINC GLUCONATE 50 MG
50 TABLET ORAL DAILY
COMMUNITY

## 2021-11-04 RX ORDER — CYANOCOBALAMIN (VITAMIN B-12) 500 MCG
400 LOZENGE ORAL DAILY
COMMUNITY

## 2021-11-04 RX ORDER — MULTIVIT-MIN/IRON/FOLIC ACID/K 18-600-40
CAPSULE ORAL
COMMUNITY

## 2021-11-04 RX ORDER — OMEGA-3 FATTY ACIDS/FISH OIL 300-1000MG
CAPSULE ORAL
COMMUNITY

## 2021-11-04 RX ORDER — MULTIPLE VITAMINS W/ MINERALS TAB 9MG-400MCG
1 TAB ORAL DAILY
COMMUNITY

## 2021-11-04 RX ORDER — CHOLECALCIFEROL (VITAMIN D3) 50 MCG
1 TABLET ORAL DAILY
COMMUNITY

## 2021-11-04 ASSESSMENT — MIFFLIN-ST. JEOR: SCORE: 1380.86

## 2021-11-04 ASSESSMENT — PAIN SCALES - GENERAL: PAINLEVEL: NO PAIN (0)

## 2021-11-04 NOTE — PROGRESS NOTES
"Assessment & Plan     ICD-10-CM    1. Encounter for Medicare annual wellness exam  Z00.00        Follow Up/Next Steps    Return in about 53 weeks (around 11/10/2022) for Annual Wellness Visit.  no concerns at this time.    Counseling and Education  Reviewed preventive health counseling, as reflected in patient instructions      BMI:   Estimated body mass index is 32.28 kg/m  as calculated from the following:    Height as of this encounter: 1.651 m (5' 5\").    Weight as of this encounter: 88 kg (194 lb).   Weight management plan: Discussed healthy diet and exercise guidelines      Appropriate preventive services were discussed with this patient, including applicable screening as appropriate for cardiovascular disease, diabetes, osteopenia/osteoporosis, and glaucoma.  As appropriate for age/gender, discussed screening for colorectal cancer, prostate cancer, breast cancer, and cervical cancer. Checklist reviewing preventive services available has been given to the patient.    Reviewed patients plan of care and provided an AVS. The Basic Care Plan (routine screening as documented in Health Maintenance) for Esperanza meets the Care Plan requirement. This Care Plan has been established and reviewed with the Patient.    Visit Provider: Juan Sultana RN  Supervising Provider: Kaylie Olguin MD, MD  [facility]       Subjective   Esperanza is a 75 year old who is being seen for an Annual Wellness Visit  accompanied by her none.    Healthy Habits:     In general, how would you rate your overall health?  Good    Frequency of exercise:  4-5 days/week    Duration of exercise:  Greater than 60 minutes    Do you usually eat at least 4 servings of fruit and vegetables a day, include whole grains    & fiber and avoid regularly eating high fat or \"junk\" foods?  Yes    Taking medications regularly:  Yes    Medication side effects:  None    Ability to successfully perform activities of daily " living:  No assistance needed    Home Safety:  No safety concerns identified    Hearing Impairment:  No hearing concerns    In the past 6 months, have you been bothered by leaking of urine? Yes    In general, how would you rate your overall mental or emotional health?  Good      PHQ-2 Total Score: 0    Additional concerns today:  No    Do you feel safe in your environment? Yes    Have you ever done Advance Care Planning? (For example, a Health Directive, POLST, or a discussion with a medical provider or your loved ones about your wishes): Yes, advance care planning is on file.yes    Fall risk  Fallen 2 or more times in the past year?: No  Any fall with injury in the past year?: No  Cognitive Screening   1) Repeat 3 items (Leader, Season, Table)    2) Clock draw: NORMAL  3) 3 item recall: Recalls 3 objects  Results: NORMAL clock, 1-2 items recalled: COGNITIVE IMPAIRMENT LESS LIKELY    Mini-CogTM Copyright S [employer]. Licensed by the author for use in [facility]; reprinted with permission (ahsan@[facility]). All rights reserved.      Do you have sleep apnea, excessive snoring or daytime drowsiness?: nono    Reviewed and updated as needed this visit by clinical staff  Tobacco  Allergies  Meds  Problems  Med Hx  Surg Hx  Fam Hx  Soc Hx         Social History     Tobacco Use     Smoking status: Former Smoker     Packs/day: 0.00     Years: 0.00     Pack years: 0.00     Types: Cigarettes     Smokeless tobacco: Never Used   Substance Use Topics     Alcohol use: Yes     Comment: Rare       Current providers sharing in care for this patient include:   Patient Care Team:  Julia Gifford MD as PCP - General (Internal Medicine)  uJlia Gifford MD as MD (Internal Medicine)  Pierre Campos MD as MD (Orthopedics)  Julia Gifford MD as Referring Physician (Internal Medicine)  Gurpreet Strange MD as MD (Dermatology)  Julia Gifford MD as Assigned PCP  Yin Srivastava MD PhD as MD  "(Family Practice)  Virgilio Shah MD as Assigned Neuroscience Provider  Marika Pérez MD as Assigned OBGYN Provider  Sowmya Hammond NP as Assigned Surgical Provider    The following health maintenance items are reviewed in Epic and correct as of today:  Health Maintenance Due   Topic Date Due     ANNUAL REVIEW OF  ORDERS  Never done     LUNG CANCER SCREENING  Never done               Objective    BP (!) 148/86 (BP Location: Left arm, Patient Position: Sitting, Cuff Size: Adult Regular)   Temp 98.5  F (36.9  C) (Temporal)   Resp 18   Ht 1.651 m (5' 5\")   Wt 88 kg (194 lb)   SpO2 95%   BMI 32.28 kg/m   Estimated body mass index is 32.28 kg/m  as calculated from the following:    Height as of this encounter: 1.651 m (5' 5\").    Weight as of this encounter: 88 kg (194 lb).  Physical Exam  Patient appears comfortable and in no acute distress.        Identified Health Risks:  "

## 2021-11-09 ENCOUNTER — ALLIED HEALTH/NURSE VISIT (OUTPATIENT)
Dept: EDUCATION SERVICES | Facility: CLINIC | Age: 76
End: 2021-11-09
Payer: COMMERCIAL

## 2021-11-09 VITALS — WEIGHT: 193 LBS | BODY MASS INDEX: 32.12 KG/M2

## 2021-11-09 DIAGNOSIS — R73.03 PREDIABETES: Primary | ICD-10-CM

## 2021-11-09 PROCEDURE — G9882 2 EM ONGOING MS MO 13-15 WL: HCPCS

## 2021-11-09 NOTE — PROGRESS NOTES
Medicare Diabetes Prevention Program: Session 4    Esperanza Gage presents for MDPP Session 4: Eat Well to Prevent T2    Subjective/Objective:    Wt 87.5 kg (193 lb)   BMI 32.12 kg/m      Minutes spent exercising over the past week: 580      Intervention/Plan:    Topics discussed today include information to meet the following learning objectives:    1) Explain how to eat well to prevent or delay type 2 diabetes  2) Explain how to build a healthy meal  3) Identify the items in each food group      Patient to follow-up next month for MDPP Maintenance Cohort Session 3.    Verna Veronica

## 2021-11-10 DIAGNOSIS — E78.00 PURE HYPERCHOLESTEROLEMIA: ICD-10-CM

## 2021-11-11 RX ORDER — SIMVASTATIN 10 MG
TABLET ORAL
Qty: 90 TABLET | Refills: 0 | Status: SHIPPED | OUTPATIENT
Start: 2021-11-11 | End: 2022-02-14

## 2021-11-11 NOTE — PATIENT INSTRUCTIONS
Patient Education   Personalized Prevention Plan  You are due for the preventive services outlined below.  Your care team is available to assist you in scheduling these services.  If you have already completed any of these items, please share that information with your care team to update in your medical record.  Health Maintenance Due   Topic Date Due     ANNUAL REVIEW OF HM ORDERS  Never done     LUNG CANCER SCREENING  Never done     FALL RISK ASSESSMENT  10/02/2021

## 2021-11-12 ASSESSMENT — ACTIVITIES OF DAILY LIVING (ADL): CURRENT_FUNCTION: NO ASSISTANCE NEEDED

## 2021-11-23 ENCOUNTER — TELEPHONE (OUTPATIENT)
Dept: SURGERY | Facility: CLINIC | Age: 76
End: 2021-11-23
Payer: COMMERCIAL

## 2021-11-23 NOTE — TELEPHONE ENCOUNTER
"Pt reached out mentioning she was billed for [facility] cohort.     - Left vm for pt to advise her to: \"Call the [place] billing office to ask about this and ask that the charge be run through her Medicare insurance, as it should have been.\"      -GN      "

## 2021-12-14 ENCOUNTER — ALLIED HEALTH/NURSE VISIT (OUTPATIENT)
Dept: EDUCATION SERVICES | Facility: CLINIC | Age: 76
End: 2021-12-14
Payer: COMMERCIAL

## 2021-12-14 VITALS — BODY MASS INDEX: 32.28 KG/M2 | WEIGHT: 194 LBS

## 2021-12-14 DIAGNOSIS — R73.03 PREDIABETES: Primary | ICD-10-CM

## 2021-12-14 PROCEDURE — G9891 EM SESSION REPORTING: HCPCS

## 2021-12-14 NOTE — PROGRESS NOTES
Medicare Diabetes Prevention Program: Session 13    Esperanza Gage presents for MDPP Session 13: Take Charge of Your Thoughts  Subjective/Objective:    Wt 88 kg (194 lb)   BMI 32.28 kg/m      Minutes spent exercising over the past week: 380      Intervention/Plan:    Topics discussed today include information to meet the following learning objectives:    1) Recognize the difference between helpful and harmful thoughts  2) Explain how to replace harmful thoughts with helpful thoughts        Patient to follow-up next week at MDPP Session 14.     Verna Veronica

## 2021-12-20 DIAGNOSIS — I10 ESSENTIAL HYPERTENSION: ICD-10-CM

## 2021-12-21 ENCOUNTER — ANCILLARY PROCEDURE (OUTPATIENT)
Dept: MAMMOGRAPHY | Facility: CLINIC | Age: 76
End: 2021-12-21
Payer: COMMERCIAL

## 2021-12-21 DIAGNOSIS — Z12.31 VISIT FOR SCREENING MAMMOGRAM: ICD-10-CM

## 2021-12-21 PROCEDURE — 77067 SCR MAMMO BI INCL CAD: CPT

## 2021-12-21 PROCEDURE — 77063 BREAST TOMOSYNTHESIS BI: CPT

## 2021-12-22 NOTE — TELEPHONE ENCOUNTER
losartan (COZAAR) 50 MG tablet   Take 1 tablet (50 mg) by mouth daily      Last Written Prescription Date:  9/13/21  Last Fill Quantity: 90,   # refills: 0  Last Office Visit : 11/23/20  Future Office visit:  none    Routing because:  Overdue appointment. Labs due Cr and K+.   BP > 140/90   11/04/21 (!) 148/86     Labs due Cr and K+.     Lab Test 08/31/20  1010   CR 0.60     Lab Test 08/31/20  1010   POTASSIUM 4.1

## 2021-12-23 DIAGNOSIS — E78.00 PURE HYPERCHOLESTEROLEMIA: ICD-10-CM

## 2021-12-23 RX ORDER — LOSARTAN POTASSIUM 50 MG/1
50 TABLET ORAL DAILY
Qty: 30 TABLET | Refills: 3 | Status: SHIPPED | OUTPATIENT
Start: 2021-12-23 | End: 2022-01-31

## 2021-12-23 NOTE — TELEPHONE ENCOUNTER
Message   Can you please let pt know that his lyme bw was normal? thank you     Verified Results  (1) LYME ANTIBODY PROFILE W/REFLEX TO WESTERN BLOT [id number] 09:19AM Patience Conway     Test Name Result Flag Reference   LYME IGG 0 06  0 00-0 79   NEGATIVE(0 00-0 79)-Absence of detectable Borrelia IgG Antibodies  A negative result does not exclude the possibility of Borrelia infection  If early Lyme disease is suspected,a second sample should be collected & tested 4 weeks after initial testing  LYME IGM 0 16  0 00-0 79   NEGATIVE (0 00-0 79)-Absence of detectable Borrelia IgM antibodies  A negative result does not exclude the possibility of Borrelia infection  If early lyme disease is suspected, a second sample should be collected & tested 4 weeks after initial testing  simvastatin (ZOCOR) 10 MG tablet  Last Written Prescription Date:  11/11/2021  Last Fill Quantity: 90,   # refills: 0  Last Office Visit :  11/4/2021  Future Office visit: None    Routing refill request to provider for review/approval because:  Second Request       Over due LDL Lab  Refer to [facility] as we do not fill medications for this clinic.  Please advise for refills.   Recent Labs   Lab Test 08/31/20  1010   LDL 57         Rubina Salcedo RN  Central Triage Red Flags/Med Refills

## 2021-12-23 NOTE — TELEPHONE ENCOUNTER
[facility] Call Center    Phone Message    May a detailed message be left on voicemail: yes     Reason for Call: Medication Refill Request    Has the patient contacted the pharmacy for the refill? Yes   Name of medication being requested: losartan (COZAAR) 50 MG tablet // simvastatin (ZOCOR) 10 MG tablet  Provider who prescribed the medication: Ирина  Pharmacy: [facility] [address] [place] [address]  Date medication is needed: asap. Pt will be out of the Losartan 12/27/21, and the Simvastatin before Ирина is back.    Action Taken: Other: whs    Travel Screening: Not Applicable

## 2022-01-11 ENCOUNTER — ALLIED HEALTH/NURSE VISIT (OUTPATIENT)
Dept: EDUCATION SERVICES | Facility: CLINIC | Age: 77
End: 2022-01-11
Payer: COMMERCIAL

## 2022-01-11 VITALS — BODY MASS INDEX: 32.48 KG/M2 | WEIGHT: 195.2 LBS

## 2022-01-11 DIAGNOSIS — R73.03 PREDIABETES: Primary | ICD-10-CM

## 2022-01-11 PROCEDURE — G9891 EM SESSION REPORTING: HCPCS

## 2022-01-12 NOTE — PROGRESS NOTES
Medicare Diabetes Prevention Program: Session 26    Esperanza Gage presents for MDP Session 26: Prevent T2--for Life!  Subjective/Objective:    Wt 88.5 kg (195 lb 3.2 oz)   BMI 32.48 kg/m      Minutes spent exercising over the past week: 400      Intervention/Plan:    Topics discussed today include information to meet the following learning objectives:    1) By the end of the session, participants will:  -Reflect on how far they've come since they started this program  -Explain how to keep their healthy lifestyle going once this program ends  -Set their goals for the next six months        Patient to follow-up next month's at MDPP Maintenance Session.    Verna Veronica

## 2022-01-27 NOTE — PROGRESS NOTES
ASSESSMENT:  This is a 77 yo PM female with history of OP by original T scores, and hx of compression fractures and hx of pelvic fracture who was on alendronate started 35 mg/wk 10 yrs ago  and 9663-9126 took a drug holiday and fell and fractured pelvis - then resumed alendronate  70mg/wk  8860-8307 then has been on recent holiday now since 2020. We discussed calcium and vitamin D and again discussed medication - she is not interested in daily shots but will consider prolia.  We will get blood work and put in for PA of prolia.     PLAN:  Blood work today  Get DXA 9/2022  Call next wk and see if prolia approved  If approved make a nurse visit and then come in for the shot  Get blood work 2wks AFTER the shot  Patient should take 1200mg of calcium/day in divided doses (diet and all supplements)   and vitamin D3 1000IU/day.  Dietary is best calcium      Thank you for allowing me to participate in the care of your patient.  Please do not hesitate to call with questions or concerns.    Sincerely,    Yin Srivastava MD, PhD  CC Julia Gifford MD       Time note (e5, 40'): The total time (on the date of service) for this service was 70 minutes, including discussion/face-to-face, chart review, interpretation not otherwise reported, documentation, and updating of the computerized record.                Esperanza is a  76 year old female /post menopausal] [GR0, P0] that presents today for osteoporosis follow up patient was last seen 11/2020. She had been on alendronate for 5 yrs. She took a holiday 2012-13 off alendronate and fell and fractured her pelvis. forteo was suggested but she didn't like daily injections.  Prolia was discussed.  But in 2014 she resumed alendronate 70 mg/wk. When last seen in 11/2020  she stopped alendronate for another year holiday. She  comes today to  discuss treatment.  The lab work for a secondary cause was negative. Since in 9/2020 she had a low Z score.  Currently she is on a drug holiday.    .    Referring Physician:Julia Gifford MD     HPI     Have you ever had a bone density test? Yes  Where = [facility]  When = 9/2020 2013, 2012, 2009  Spine Tscore = L1-2  -2.8 loss  4.6%  Z== -2.4 for L2  Left neck Tscore = -1.8  Total left hip Tscore = -1.7  Right neck Tscore = -1.3  Total Right hip Tscore = -1.1  Gain 3.7%  Have you received any x-ray dye or contrast in the last ten days? No  How many servings of dairy products do you consume per day? 4 Type: yogurt, milk, cottage cheese, green vegetables  Do you take a multi-vitamin daily? Yes Shakley vitamin   Do you take a vitamin D supplement? Yes 2000IU  Do you take a calcium supplement daily?  alton vitamin - 2 in AM   Do you take a supplement containing strontium? No  Are you exposed to natural sunlight at least 20 minutes three times a week? Yes    Social History   reports that she has quit smoking. Her smoking use included cigarettes. She smoked 0.00 packs per day for 0.00 years. She has never used smokeless tobacco. She reports current alcohol use. She reports that she does not use drugs.  Do you smoke cigarettes? Reformed smoker   Do you exercise? Yes. Details: 4-5 hrs/wk walking   Do you drink alcohol? No    Medication History  Have you used any of the following medications?   Actonel (Risedronate): No   Aredia (Pamidronate): No   Boniva (Ibindronate): No   Didronil (Etidronate): No   Evista (Raloxifene): No    Fosamax (Alendronate): on this currently - started 35 mg/wk 10 yrs ago  and 4178-1703 took a drug holiday and fell and fractured pelvis - then resumed alendronate  70mg/wk  6018-0346 then took another 1 year holiday off alendronate               Forteo (Parathyroid hormone) injections: No              HCTZ (Thiazide): No              Calcitonin nasal spray: No              Reclast or Zometa (Zolendronate): No              Prolia (Denosumab): No    Current Outpatient Medications   Medication Sig Dispense Refill     alendronate (FOSAMAX) 70 MG tablet Take  "1 tablet (70 mg) by mouth every 7 days Take 1 tablet once a week with a glass of water 30 minutes prior to breakfast 15 tablet 3     amoxicillin (AMOXIL) 500 MG capsule Take 4 tablets (2,000mg), one hour prior to dental or medical procedures. 4 capsule 3     Ascorbic Acid (VITAMIN C) 500 MG CAPS        ASPIRIN PO Take 81 mg by mouth daily       ciclopirox (LOPROX) 0.77 % cream Apply topically 2 times daily       losartan (COZAAR) 50 MG tablet Take 1 tablet (50 mg) by mouth daily 30 tablet 3     multivitamin w/minerals (MULTI-VITAMIN) tablet Take 1 tablet by mouth daily       omega 3 1000 MG CAPS        simvastatin (ZOCOR) 10 MG tablet TAKE 1 TABLET BY MOUTH ONCE DAILY AT BEDTIME 90 tablet 0     vitamin B-Complex Take 1 tablet by mouth daily       vitamin D3 (CHOLECALCIFEROL) 50 mcg (2000 units) tablet Take 1 tablet by mouth daily       vitamin E 400 units TABS Take 400 Units by mouth daily       zinc gluconate 50 MG tablet Take 50 mg by mouth daily          No Known Allergies    Past Medical History    Family History   Problem Relation Age of Onset     Osteoporosis Mother      Hypertension Brother      Cerebrovascular Disease Brother      Cerebrovascular Disease Brother      Heart Disease Father      C.A.D. No family hx of      Diabetes No family hx of      Skin Cancer No family hx of      Melanoma No family hx of      Dementia No family hx of      Heart Disease No family hx of      Other - See Comments Brother         cerebrovascular disease     Hypertension Brother      Coronary Artery Disease No family hx of        ROS:  General: none  Head/Eyes: none  Ears/Nose/Throat: none  Cardiovascular: high blood pressure  Respiratory: none  Gastrointestinal: none  Breast: none  Genitourinary: frequency/urgency  Sexual Function: none  Musculoskeletal: stiffness  Skin: none  Neurological: none  Mental Health: none  Endocrine: none    Clinic Measurements  Vitals: /80   Pulse 69   Ht 1.651 m (5' 5\")   Wt 89.8 kg (198 " lb)   BMI 32.95 kg/m    BMI= Body mass index is 32.95 kg/m .    Physical exam  Constitutional: Well appearing woman in no acute distress.   Psychological: appropriate mood.  Neck: No thyroidmegaly.  no carotid bruits.  Cardiovascular: regular rate and rhythm, normal S1 and S2, no murmurs, rubs or gallops, peripheral pulses full and symmetric   Respiratory: clear to auscultation, no wheezes or crackles, normal breath sounds.  Musculoskeletal: full range of motion, no edema and motor strength is equal in the upper and lower extremities    Spine: Straight, not tender, Flexion adequate, Extension poor, Lateral movement adequate, Rotational movement adequate  Skin: no concerning lesions, no jaundice.  Neurological: cranial nerves intact, normal strength, reflexes at patella and biceps normal, normal gait, no tremor.     LAB  Vertebra; Fracture Assessment: 2013           grade 1 (mild) compression fracture at T10           grade 2 (moderate) biconcave fracture at T11           grade 1 (mild) biconcave fracture at T12.  Dexa Scan: 9/2020    FRAX Assessment Tool: [N/A, was on alendronate   Risk Factors:  +FHx - PM, age,  T scores  Compression fractures   Reformed smoker     Yin Srivastava MD, PhD

## 2022-01-31 ENCOUNTER — OFFICE VISIT (OUTPATIENT)
Dept: FAMILY MEDICINE | Facility: CLINIC | Age: 77
End: 2022-01-31
Attending: FAMILY MEDICINE
Payer: COMMERCIAL

## 2022-01-31 ENCOUNTER — LAB (OUTPATIENT)
Dept: LAB | Facility: CLINIC | Age: 77
End: 2022-01-31
Attending: FAMILY MEDICINE
Payer: COMMERCIAL

## 2022-01-31 VITALS
WEIGHT: 198 LBS | SYSTOLIC BLOOD PRESSURE: 134 MMHG | HEART RATE: 69 BPM | BODY MASS INDEX: 32.99 KG/M2 | HEIGHT: 65 IN | DIASTOLIC BLOOD PRESSURE: 80 MMHG

## 2022-01-31 DIAGNOSIS — I10 ESSENTIAL HYPERTENSION: ICD-10-CM

## 2022-01-31 DIAGNOSIS — M81.0 SENILE OSTEOPOROSIS: ICD-10-CM

## 2022-01-31 DIAGNOSIS — M81.0 SENILE OSTEOPOROSIS: Primary | ICD-10-CM

## 2022-01-31 LAB
ANION GAP SERPL CALCULATED.3IONS-SCNC: 6 MMOL/L (ref 3–14)
BUN SERPL-MCNC: 11 MG/DL (ref 7–30)
CALCIUM SERPL-MCNC: 9.6 MG/DL (ref 8.5–10.1)
CHLORIDE BLD-SCNC: 109 MMOL/L (ref 94–109)
CO2 SERPL-SCNC: 22 MMOL/L (ref 20–32)
CREAT SERPL-MCNC: 0.56 MG/DL (ref 0.52–1.04)
GFR SERPL CREATININE-BSD FRML MDRD: >90 ML/MIN/1.73M2
GLUCOSE BLD-MCNC: 111 MG/DL (ref 70–99)
MAGNESIUM SERPL-MCNC: 2.3 MG/DL (ref 1.6–2.3)
PHOSPHATE SERPL-MCNC: 3.3 MG/DL (ref 2.5–4.5)
POTASSIUM BLD-SCNC: 4.2 MMOL/L (ref 3.4–5.3)
SODIUM SERPL-SCNC: 137 MMOL/L (ref 133–144)

## 2022-01-31 PROCEDURE — 83735 ASSAY OF MAGNESIUM: CPT | Performed by: FAMILY MEDICINE

## 2022-01-31 PROCEDURE — 36415 COLL VENOUS BLD VENIPUNCTURE: CPT

## 2022-01-31 PROCEDURE — 80048 BASIC METABOLIC PNL TOTAL CA: CPT

## 2022-01-31 PROCEDURE — 99417 PROLNG OP E/M EACH 15 MIN: CPT | Performed by: FAMILY MEDICINE

## 2022-01-31 PROCEDURE — 99215 OFFICE O/P EST HI 40 MIN: CPT | Performed by: FAMILY MEDICINE

## 2022-01-31 PROCEDURE — 84100 ASSAY OF PHOSPHORUS: CPT | Performed by: FAMILY MEDICINE

## 2022-01-31 PROCEDURE — G0463 HOSPITAL OUTPT CLINIC VISIT: HCPCS

## 2022-01-31 RX ORDER — LOSARTAN POTASSIUM 50 MG/1
50 TABLET ORAL DAILY
Qty: 90 TABLET | Refills: 3 | Status: SHIPPED | OUTPATIENT
Start: 2022-01-31 | End: 2023-02-02

## 2022-01-31 ASSESSMENT — ANXIETY QUESTIONNAIRES
1. FEELING NERVOUS, ANXIOUS, OR ON EDGE: NOT AT ALL
GAD7 TOTAL SCORE: 0
2. NOT BEING ABLE TO STOP OR CONTROL WORRYING: NOT AT ALL
6. BECOMING EASILY ANNOYED OR IRRITABLE: NOT AT ALL
IF YOU CHECKED OFF ANY PROBLEMS ON THIS QUESTIONNAIRE, HOW DIFFICULT HAVE THESE PROBLEMS MADE IT FOR YOU TO DO YOUR WORK, TAKE CARE OF THINGS AT HOME, OR GET ALONG WITH OTHER PEOPLE: NOT DIFFICULT AT ALL
7. FEELING AFRAID AS IF SOMETHING AWFUL MIGHT HAPPEN: NOT AT ALL
5. BEING SO RESTLESS THAT IT IS HARD TO SIT STILL: NOT AT ALL
3. WORRYING TOO MUCH ABOUT DIFFERENT THINGS: NOT AT ALL

## 2022-01-31 ASSESSMENT — PATIENT HEALTH QUESTIONNAIRE - PHQ9
5. POOR APPETITE OR OVEREATING: NOT AT ALL
SUM OF ALL RESPONSES TO PHQ QUESTIONS 1-9: 0

## 2022-01-31 ASSESSMENT — PAIN SCALES - GENERAL: PAINLEVEL: MODERATE PAIN (4)

## 2022-01-31 ASSESSMENT — MIFFLIN-ST. JEOR: SCORE: 1394

## 2022-01-31 NOTE — LETTER
1/31/2022       RE: Esperanza Gage  [address] [place] [address]     Dear Colleague,    Thank you for referring your patient, Esperanza Gage, to the [facility] [place] at [facility]. Please see a copy of my visit note below.    ASSESSMENT:  This is a 77 yo PM female with history of OP by original T scores, and hx of compression fractures and hx of pelvic fracture who was on alendronate started 35 mg/wk 10 yrs ago  and 0818-3185 took a drug holiday and fell and fractured pelvis - then resumed alendronate  70mg/wk  0108-5424 then has been on recent holiday now since 2020. We discussed calcium and vitamin D and again discussed medication - she is not interested in daily shots but will consider prolia.  We will get blood work and put in for PA of prolia.     PLAN:  Blood work today  Get DXA 9/2022  Call next wk and see if prolia approved  If approved make a nurse visit and then come in for the shot  Get blood work 2wks AFTER the shot  Patient should take 1200mg of calcium/day in divided doses (diet and all supplements)   and vitamin D3 1000IU/day.  Dietary is best calcium      Thank you for allowing me to participate in the care of your patient.  Please do not hesitate to call with questions or concerns.    Sincerely,    Yin Srivastava MD, PhD  CC Julia Gifford MD       Time note (e5, 40'): The total time (on the date of service) for this service was 70 minutes, including discussion/face-to-face, chart review, interpretation not otherwise reported, documentation, and updating of the computerized record.                Esperanza is a  76 year old female /post menopausal] [GR0, P0] that presents today for osteoporosis follow up patient was last seen 11/2020. She had been on alendronate for 5 yrs. She took a holiday 2012-13 off alendronate and fell and fractured her pelvis. forteo was suggested but she didn't like daily injections.  Prolia  was discussed.  But in 2014 she resumed alendronate 70 mg/wk. When last seen in 11/2020  she stopped alendronate for another year holiday. She  comes today to  discuss treatment.  The lab work for a secondary cause was negative. Since in 9/2020 she had a low Z score.  Currently she is on a drug holiday.    .   Referring Physician:Julia Gifford MD     HPI     Have you ever had a bone density test? Yes  Where = [facility]  When = 9/2020 2013, 2012, 2009  Spine Tscore = L1-2  -2.8 loss  4.6%  Z== -2.4 for L2  Left neck Tscore = -1.8  Total left hip Tscore = -1.7  Right neck Tscore = -1.3  Total Right hip Tscore = -1.1  Gain 3.7%  Have you received any x-ray dye or contrast in the last ten days? No  How many servings of dairy products do you consume per day? 4 Type: yogurt, milk, cottage cheese, green vegetables  Do you take a multi-vitamin daily? Yes Alton vitamin   Do you take a vitamin D supplement? Yes 2000IU  Do you take a calcium supplement daily?  alton vitamin - 2 in AM   Do you take a supplement containing strontium? No  Are you exposed to natural sunlight at least 20 minutes three times a week? Yes    Social History   reports that she has quit smoking. Her smoking use included cigarettes. She smoked 0.00 packs per day for 0.00 years. She has never used smokeless tobacco. She reports current alcohol use. She reports that she does not use drugs.  Do you smoke cigarettes? Reformed smoker   Do you exercise? Yes. Details: 4-5 hrs/wk walking   Do you drink alcohol? No    Medication History  Have you used any of the following medications?   Actonel (Risedronate): No   Aredia (Pamidronate): No   Boniva (Ibindronate): No   Didronil (Etidronate): No   Evista (Raloxifene): No    Fosamax (Alendronate): on this currently - started 35 mg/wk 10 yrs ago  and 2680-5357 took a drug holiday and fell and fractured pelvis - then resumed alendronate  70mg/wk  2507-5445 then took another 1 year holiday off alendronate                Forteo (Parathyroid hormone) injections: No              HCTZ (Thiazide): No              Calcitonin nasal spray: No              Reclast or Zometa (Zolendronate): No              Prolia (Denosumab): No    Current Outpatient Medications   Medication Sig Dispense Refill     alendronate (FOSAMAX) 70 MG tablet Take 1 tablet (70 mg) by mouth every 7 days Take 1 tablet once a week with a glass of water 30 minutes prior to breakfast 15 tablet 3     amoxicillin (AMOXIL) 500 MG capsule Take 4 tablets (2,000mg), one hour prior to dental or medical procedures. 4 capsule 3     Ascorbic Acid (VITAMIN C) 500 MG CAPS        ASPIRIN PO Take 81 mg by mouth daily       ciclopirox (LOPROX) 0.77 % cream Apply topically 2 times daily       losartan (COZAAR) 50 MG tablet Take 1 tablet (50 mg) by mouth daily 30 tablet 3     multivitamin w/minerals (MULTI-VITAMIN) tablet Take 1 tablet by mouth daily       omega 3 1000 MG CAPS        simvastatin (ZOCOR) 10 MG tablet TAKE 1 TABLET BY MOUTH ONCE DAILY AT BEDTIME 90 tablet 0     vitamin B-Complex Take 1 tablet by mouth daily       vitamin D3 (CHOLECALCIFEROL) 50 mcg (2000 units) tablet Take 1 tablet by mouth daily       vitamin E 400 units TABS Take 400 Units by mouth daily       zinc gluconate 50 MG tablet Take 50 mg by mouth daily          No Known Allergies    Past Medical History    Family History   Problem Relation Age of Onset     Osteoporosis Mother      Hypertension Brother      Cerebrovascular Disease Brother      Cerebrovascular Disease Brother      Heart Disease Father      C.A.D. No family hx of      Diabetes No family hx of      Skin Cancer No family hx of      Melanoma No family hx of      Dementia No family hx of      Heart Disease No family hx of      Other - See Comments Brother         cerebrovascular disease     Hypertension Brother      Coronary Artery Disease No family hx of        ROS:  General: none  Head/Eyes: none  Ears/Nose/Throat: none  Cardiovascular: high blood  "pressure  Respiratory: none  Gastrointestinal: none  Breast: none  Genitourinary: frequency/urgency  Sexual Function: none  Musculoskeletal: stiffness  Skin: none  Neurological: none  Mental Health: none  Endocrine: none    Clinic Measurements  Vitals: /80   Pulse 69   Ht 1.651 m (5' 5\")   Wt 89.8 kg (198 lb)   BMI 32.95 kg/m    BMI= Body mass index is 32.95 kg/m .    Physical exam  Constitutional: Well appearing woman in no acute distress.   Psychological: appropriate mood.  Neck: No thyroidmegaly.  no carotid bruits.  Cardiovascular: regular rate and rhythm, normal S1 and S2, no murmurs, rubs or gallops, peripheral pulses full and symmetric   Respiratory: clear to auscultation, no wheezes or crackles, normal breath sounds.  Musculoskeletal: full range of motion, no edema and motor strength is equal in the upper and lower extremities    Spine: Straight, not tender, Flexion adequate, Extension poor, Lateral movement adequate, Rotational movement adequate  Skin: no concerning lesions, no jaundice.  Neurological: cranial nerves intact, normal strength, reflexes at patella and biceps normal, normal gait, no tremor.     LAB  Vertebra; Fracture Assessment: 2013           grade 1 (mild) compression fracture at T10           grade 2 (moderate) biconcave fracture at T11           grade 1 (mild) biconcave fracture at T12.  Dexa Scan: 9/2020    FRAX Assessment Tool: [N/A, was on alendronate   Risk Factors:  +FHx - PM, age,  T scores  Compression fractures   Reformed smoker     Yin Srivastava MD, PhD      "

## 2022-01-31 NOTE — LETTER
February 1, 2022      Esperanza Gage  [address] [place] [address]        Dear ,    We are writing to inform you of your test results.    Your test results fall within the expected range(s) or remain unchanged from previous results.  Please continue with current treatment plan.    Resulted Orders   Phosphorus   Result Value Ref Range    Phosphorus 3.3 2.5 - 4.5 mg/dL   Magnesium   Result Value Ref Range    Magnesium 2.3 1.6 - 2.3 mg/dL       If you have any questions or concerns, please call the clinic at the number listed above.       Sincerely,      Yin Srivastava MD PhD

## 2022-01-31 NOTE — PATIENT INSTRUCTIONS
Blood work today  Get DXA 9/2022  Call next wk and see if prolia approved  If approved make a nurse visit and then come in for the shot  Get blood work 2wks AFTER the shot  Patient should take 1200mg of calcium/day in divided doses (diet and all supplements)   and vitamin D3 1000IU/day.  Dietary is best calcium

## 2022-02-01 ASSESSMENT — ANXIETY QUESTIONNAIRES: GAD7 TOTAL SCORE: 0

## 2022-02-07 ENCOUNTER — TELEPHONE (OUTPATIENT)
Dept: OBGYN | Facility: CLINIC | Age: 77
End: 2022-02-07
Payer: COMMERCIAL

## 2022-02-07 DIAGNOSIS — M85.80 OSTEOPENIA: ICD-10-CM

## 2022-02-07 DIAGNOSIS — Z92.29 PERSONAL HISTORY OF OTHER DRUG THERAPY: ICD-10-CM

## 2022-02-07 DIAGNOSIS — M81.0 SENILE OSTEOPOROSIS: Primary | ICD-10-CM

## 2022-02-07 NOTE — PROGRESS NOTES
Review of visit note indicates plan for Prolia. Order was not placed at time of visit to start PA process.    Prolia order placed. PA pending.

## 2022-02-07 NOTE — TELEPHONE ENCOUNTER
----- Message from Mirela Funes RN sent at 2/7/2022  1:35 PM CST -----  Regarding: FW: prolia auth  I placed the Prolia order this afternoon. Dr. Srivastava did not place the order at the visit. Patient needs a return call to let her know PA is pending. She doesn't have MyChart. Thanks! Shayy  ----- Message -----  From: Porsha Stewart RN  Sent: 2/7/2022   9:27 AM CST  To: Jared Rn-[facility]  Subject: prolia auth                                      Saw Dr. Srivastava last Monday and was instructed to call in 1 week to see if prolia approved yet.

## 2022-02-08 ENCOUNTER — ALLIED HEALTH/NURSE VISIT (OUTPATIENT)
Dept: EDUCATION SERVICES | Facility: CLINIC | Age: 77
End: 2022-02-08
Payer: COMMERCIAL

## 2022-02-08 VITALS — WEIGHT: 194.8 LBS | BODY MASS INDEX: 32.42 KG/M2

## 2022-02-08 DIAGNOSIS — R73.03 PREDIABETES: Primary | ICD-10-CM

## 2022-02-08 PROCEDURE — G9883 2 EM ONGOING MS MO 16-18 WL: HCPCS

## 2022-02-08 NOTE — PROGRESS NOTES
Medicare Diabetes Prevention Program: Session 24    Esperanza Gage presents for [facility] Session 24: Get Enough Sleep  Subjective/Objective:    Wt 88.4 kg (194 lb 12.8 oz)   BMI 32.42 kg/m      Minutes spent exercising over the past week: 540      Intervention/Plan:    Topics discussed today include information to meet the following learning objectives:    1) By the end of the session, participants will:  -Explain why sleep matters  -Identify some challenges of getting enough sleep and ways to cope with them       Pt requested resources emailed. No PHI in email.    Patient to follow-up next week at [facility] Session 25.     IZABEL MUÑOZ

## 2022-02-13 DIAGNOSIS — E78.00 PURE HYPERCHOLESTEROLEMIA: ICD-10-CM

## 2022-02-14 RX ORDER — SIMVASTATIN 10 MG
TABLET ORAL
Qty: 90 TABLET | Refills: 0 | Status: SHIPPED | OUTPATIENT
Start: 2022-02-14 | End: 2022-05-31

## 2022-02-16 ENCOUNTER — ALLIED HEALTH/NURSE VISIT (OUTPATIENT)
Dept: OBGYN | Facility: CLINIC | Age: 77
End: 2022-02-16
Attending: FAMILY MEDICINE
Payer: COMMERCIAL

## 2022-02-16 DIAGNOSIS — M81.0 SENILE OSTEOPOROSIS: Primary | ICD-10-CM

## 2022-02-16 PROCEDURE — 250N000011 HC RX IP 250 OP 636: Performed by: FAMILY MEDICINE

## 2022-02-16 PROCEDURE — 96372 THER/PROPH/DIAG INJ SC/IM: CPT | Performed by: FAMILY MEDICINE

## 2022-02-16 RX ADMIN — DENOSUMAB 60 MG: 60 INJECTION SUBCUTANEOUS at 09:03

## 2022-02-16 NOTE — NURSING NOTE
Chief Complaint   Patient presents with     Allied Health Visit     prolia     Clinic Administered Medication Documentation          Prolia Documentation    Prior to injection, verified patient identity using patient's name and date of birth. Medication was administered. Please see MAR and medication order for additional information. Patient instructed to remain in clinic for 15 minutes.    Indication: Prolia  (denosumab) is a prescription medicine used to treat osteoporosis in patients who:     Are at high risk for fracture, meaning patients who have had a fracture related to osteoporosis, or who have multiple risk factors for fracture.    Cannot use another osteoporosis medicine or other osteoporosis medicines did not work well.    The timeline for early/late injections would be 4 weeks early and any time after the 6 month aga. If a patient receives their injection late, then the subsequent injection would be 6 months from the date that they actually received the injection.    When was the last injection?  6 months ago  Was the last injection at least 6 months ago? Yes  Has the prior authorization been completed?  Yes  Is there an active order (within the past 365 days) in the chart?  Yes  Patient denied having dental work involving the bone in the past 6 months?  Yes  Patient denies a plan to dental work involving the bone in the next 6 months? Yes    The following steps were completed to comply with the REMS program for Prolia:    Confirms that patient received education from RN or provider via the Medication Guide and Patient Counseling Chart, including the serious risks of Prolia  and the symptoms of each risk.    Told the patient to seek prompt medical attention if they have signs or symptoms of any of the serious risks, as described in the Medication Guide and Patient Counseling Chart that was reviewed between the patient and RN or LP.    Provided each patient a copy of the Medication Guide and Patient  Brochure.      Was entire vial of medication used? Yes  Vial/Syringe: Syringe  Expiration Date:  05/24  Was the medication not being billed by clinic? No

## 2022-03-02 ENCOUNTER — TELEPHONE (OUTPATIENT)
Dept: OBGYN | Facility: CLINIC | Age: 77
End: 2022-03-02
Payer: COMMERCIAL

## 2022-03-02 ENCOUNTER — LAB (OUTPATIENT)
Dept: LAB | Facility: CLINIC | Age: 77
End: 2022-03-02
Payer: COMMERCIAL

## 2022-03-02 DIAGNOSIS — M81.0 SENILE OSTEOPOROSIS: Primary | ICD-10-CM

## 2022-03-02 DIAGNOSIS — M81.0 SENILE OSTEOPOROSIS: ICD-10-CM

## 2022-03-02 LAB
CALCIUM SERPL-MCNC: 8.7 MG/DL (ref 8.5–10.1)
MAGNESIUM SERPL-MCNC: 2.2 MG/DL (ref 1.6–2.3)
PHOSPHATE SERPL-MCNC: 2.7 MG/DL (ref 2.5–4.5)

## 2022-03-02 PROCEDURE — 36415 COLL VENOUS BLD VENIPUNCTURE: CPT

## 2022-03-02 PROCEDURE — 83735 ASSAY OF MAGNESIUM: CPT

## 2022-03-02 PROCEDURE — 82310 ASSAY OF CALCIUM: CPT

## 2022-03-02 PROCEDURE — 84100 ASSAY OF PHOSPHORUS: CPT

## 2022-03-08 ENCOUNTER — ALLIED HEALTH/NURSE VISIT (OUTPATIENT)
Dept: EDUCATION SERVICES | Facility: CLINIC | Age: 77
End: 2022-03-08
Payer: COMMERCIAL

## 2022-03-08 VITALS — BODY MASS INDEX: 32.15 KG/M2 | WEIGHT: 193.2 LBS

## 2022-03-08 DIAGNOSIS — R73.03 PREDIABETES: Primary | ICD-10-CM

## 2022-03-08 PROCEDURE — G9891 EM SESSION REPORTING: HCPCS

## 2022-03-08 NOTE — PROGRESS NOTES
Medicare Diabetes Prevention Program: Session 9    Esperanza Gage presents for MDPP Session 9: Manage Stress    Subjective/Objective:    Wt 87.6 kg (193 lb 3.2 oz)   BMI 32.15 kg/m      Minutes spent exercising over the past week: 335      Intervention/Plan:    Topics discussed today include information to meet the following learning objectives:    1) Some causes of stress  2) The link between stress and type 2 diabetes  3) Some ways to reduce stress  4) Some healthy ways to cope with stress      Patient to follow-up next month at MDPP Maintenance Session 7.  Pt requested MDPP resource emailed. No PHI in email.     IZABEL MUOÑZ

## 2022-03-23 RX ORDER — SIMVASTATIN 10 MG
10 TABLET ORAL AT BEDTIME
Qty: 90 TABLET | Refills: 1 | Status: SHIPPED | OUTPATIENT
Start: 2022-03-23 | End: 2022-05-05

## 2022-04-12 ENCOUNTER — ALLIED HEALTH/NURSE VISIT (OUTPATIENT)
Dept: EDUCATION SERVICES | Facility: CLINIC | Age: 77
End: 2022-04-12
Payer: COMMERCIAL

## 2022-04-12 VITALS — WEIGHT: 191.8 LBS | BODY MASS INDEX: 31.92 KG/M2

## 2022-04-12 DIAGNOSIS — R73.03 PREDIABETES: Primary | ICD-10-CM

## 2022-04-12 PROCEDURE — G9884 2 EM ONGOING MS MO 19-21 WL: HCPCS

## 2022-04-12 NOTE — PROGRESS NOTES
Medicare Diabetes Prevention Program: Session 19    Esperanza Gage presents for MDPP Session 19: Stay Active to Prevent T2  Subjective/Objective:    Wt 87 kg (191 lb 12.8 oz)   BMI 31.92 kg/m      Minutes spent exercising over the past week: 435      Intervention/Plan:    Topics discussed today include information to meet the following learning objectives:    1) By the end of the session, participants will:  -Identify some benefits of staying active  -Identify some challenges of staying active and ways to cope with them  -Reflect on how far they've come since they started this program        Patient to follow-up at next month's MDPP Maintenance Session.    IZABEL MUÑOZ

## 2022-05-03 ENCOUNTER — TELEPHONE (OUTPATIENT)
Dept: INTERNAL MEDICINE | Facility: CLINIC | Age: 77
End: 2022-05-03
Payer: COMMERCIAL

## 2022-05-03 DIAGNOSIS — E78.00 PURE HYPERCHOLESTEROLEMIA: ICD-10-CM

## 2022-05-03 NOTE — TELEPHONE ENCOUNTER
[facility] Call Center    Phone Message    May a detailed message be left on voicemail: yes     Reason for Call: Medication Refill Request    Has the patient contacted the pharmacy for the refill? Yes   Name of medication being requested: simvastatin (ZOCOR) 10 MG tablet [89019]  Provider who prescribed the medication: Dr. Gifford  Pharmacy: [facility]  Date medication is needed: asap     Action Taken: Message routed to:  Other: [facility]    Travel Screening: Not Applicable

## 2022-05-05 RX ORDER — SIMVASTATIN 10 MG
10 TABLET ORAL AT BEDTIME
Qty: 90 TABLET | Refills: 1 | Status: SHIPPED | OUTPATIENT
Start: 2022-05-05 | End: 2022-11-14

## 2022-05-10 ENCOUNTER — ALLIED HEALTH/NURSE VISIT (OUTPATIENT)
Dept: EDUCATION SERVICES | Facility: CLINIC | Age: 77
End: 2022-05-10
Payer: COMMERCIAL

## 2022-05-10 VITALS — BODY MASS INDEX: 32.12 KG/M2 | WEIGHT: 193 LBS

## 2022-05-10 DIAGNOSIS — R73.03 PREDIABETES: Primary | ICD-10-CM

## 2022-05-10 PROCEDURE — G9884 2 EM ONGOING MS MO 19-21 WL: HCPCS

## 2022-05-10 NOTE — PROGRESS NOTES
Medicare Diabetes Prevention Program: Session 23    Esperanza Gage presents for MDPP Session 23: Have Healthy Food You Enjoy  Subjective/Objective:    Wt 87.5 kg (193 lb)   BMI 32.12 kg/m      Minutes spent exercising over the past week: 150  Pt reported higher minutes previous weeks (275 -410 minutes); seeking medical care for injury.       Intervention/Plan:    Topics discussed today include information to meet the following learning objectives:    1) By the end of the session, participants will:  -How to take a healthy approach to eating  -How to make healthy choices  -How to have healthy food that they enjoy        Patient to follow-up at next month's MDPP Maintenance Session.     IZABEL MUÑOZ

## 2022-05-23 ENCOUNTER — TELEPHONE (OUTPATIENT)
Dept: SURGERY | Facility: CLINIC | Age: 77
End: 2022-05-23
Payer: COMMERCIAL

## 2022-05-25 NOTE — TELEPHONE ENCOUNTER
Pt reached out regarding invoice she received.    She emailed invoice 5/24/22.  I forwarded information to leadership on 5/25/22.    Notes.  MDPP patient was billed for services. See the forward invoice.     Esperanza Gage  MRN:  [id number]    I believe you both were needed to remove this charge in the past. Let me know if you need any additional information.    - Pt reported no change insurance & still carries a Ad[employer] plan ([employer]  - She also received a letter stating that May was her last maintenance session. I confirmed with her via phone that her maintenance cohort goes through September 2022.      - GDP

## 2022-05-31 ENCOUNTER — ANCILLARY PROCEDURE (OUTPATIENT)
Dept: GENERAL RADIOLOGY | Facility: CLINIC | Age: 77
End: 2022-05-31
Attending: INTERNAL MEDICINE
Payer: COMMERCIAL

## 2022-05-31 ENCOUNTER — OFFICE VISIT (OUTPATIENT)
Dept: INTERNAL MEDICINE | Facility: CLINIC | Age: 77
End: 2022-05-31
Attending: INTERNAL MEDICINE
Payer: COMMERCIAL

## 2022-05-31 ENCOUNTER — LAB (OUTPATIENT)
Dept: LAB | Facility: CLINIC | Age: 77
End: 2022-05-31
Attending: INTERNAL MEDICINE
Payer: COMMERCIAL

## 2022-05-31 VITALS
BODY MASS INDEX: 32.12 KG/M2 | DIASTOLIC BLOOD PRESSURE: 81 MMHG | HEART RATE: 65 BPM | SYSTOLIC BLOOD PRESSURE: 123 MMHG | WEIGHT: 193 LBS

## 2022-05-31 DIAGNOSIS — R73.01 IMPAIRED FASTING GLUCOSE: ICD-10-CM

## 2022-05-31 DIAGNOSIS — G89.29 CHRONIC PAIN OF RIGHT KNEE: ICD-10-CM

## 2022-05-31 DIAGNOSIS — M79.675 PAIN OF TOE OF LEFT FOOT: Primary | ICD-10-CM

## 2022-05-31 DIAGNOSIS — M25.561 CHRONIC PAIN OF RIGHT KNEE: ICD-10-CM

## 2022-05-31 DIAGNOSIS — I10 ESSENTIAL HYPERTENSION: ICD-10-CM

## 2022-05-31 LAB
ANION GAP SERPL CALCULATED.3IONS-SCNC: 7 MMOL/L (ref 3–14)
BUN SERPL-MCNC: 14 MG/DL (ref 7–30)
CALCIUM SERPL-MCNC: 9 MG/DL (ref 8.5–10.1)
CHLORIDE BLD-SCNC: 110 MMOL/L (ref 94–109)
CHOLEST SERPL-MCNC: 164 MG/DL
CO2 SERPL-SCNC: 25 MMOL/L (ref 20–32)
CREAT SERPL-MCNC: 0.55 MG/DL (ref 0.52–1.04)
FASTING STATUS PATIENT QL REPORTED: YES
GFR SERPL CREATININE-BSD FRML MDRD: >90 ML/MIN/1.73M2
GLUCOSE BLD-MCNC: 126 MG/DL (ref 70–99)
HBA1C MFR BLD: 5.9 % (ref 0–5.6)
HDLC SERPL-MCNC: 69 MG/DL
LDLC SERPL CALC-MCNC: 78 MG/DL
NONHDLC SERPL-MCNC: 95 MG/DL
POTASSIUM BLD-SCNC: 4.2 MMOL/L (ref 3.4–5.3)
SODIUM SERPL-SCNC: 142 MMOL/L (ref 133–144)
TRIGL SERPL-MCNC: 87 MG/DL

## 2022-05-31 PROCEDURE — 99214 OFFICE O/P EST MOD 30 MIN: CPT | Performed by: INTERNAL MEDICINE

## 2022-05-31 PROCEDURE — 83036 HEMOGLOBIN GLYCOSYLATED A1C: CPT

## 2022-05-31 PROCEDURE — 36415 COLL VENOUS BLD VENIPUNCTURE: CPT

## 2022-05-31 PROCEDURE — G0463 HOSPITAL OUTPT CLINIC VISIT: HCPCS

## 2022-05-31 PROCEDURE — 80048 BASIC METABOLIC PNL TOTAL CA: CPT

## 2022-05-31 PROCEDURE — 73560 X-RAY EXAM OF KNEE 1 OR 2: CPT | Mod: RT | Performed by: RADIOLOGY

## 2022-05-31 PROCEDURE — 80061 LIPID PANEL: CPT

## 2022-05-31 ASSESSMENT — PAIN SCALES - GENERAL: PAINLEVEL: MODERATE PAIN (4)

## 2022-05-31 NOTE — LETTER
"5/31/2022       RE: Esperanza Gage  [address] [place] [address]     Dear Colleague,    Thank you for referring your patient, Esperanza Gage, to the [facility]. Please see a copy of my visit note below.      Assessment & Plan     Pain of toe of left foot  Suspect toenail injury, recommend Podiatry evaluation. Referral was placed today.   - Orthopedic  Referral    Chronic pain of right knee  Patient had osteoarthritis-related changes on prior X-rays. Recommend additional imaging as well as consultation with Sports Medicine.   - Orthopedic  Referral  - XR Knee Standing Right 2 Views; Future    Essential hypertension  Blood pressure is within acceptable range. Recommend to continue with current medical therapy. Patient is fasting today, will check lipid panel and basic chemistry panel today.   - Lipid Profile; Future  - Basic Metabolic Panel; Future    Impaired fasting glucose  Will check Hgb A1C. Patient was encouraged to continue with lifestyle modifications.   - Hgb A1c; Future      I spent a total of 30 minutes on the day of the visit.   Time spent doing chart review, history and exam, documentation and further activities per the note       BMI:   Estimated body mass index is 32.12 kg/m  as calculated from the following:    Height as of 1/31/22: 1.651 m (5' 5\").    Weight as of this encounter: 87.5 kg (193 lb).           No follow-ups on file.    Julia Gifford MD  [facility]    Lotus Mata is a 76 year old who presents for the following health issues     HPI     Patient is here for follow up. She reports that she has been walking a lot over the course of winter. She states that in February she developed pain in her left great toe. She states that she has continued to walk. She developed bleeding under the nail. She has changed her shoes, however, " she continues to have discoloration of the left toe nail.     Review of Systems   Constitutional, HEENT, cardiovascular, pulmonary, GI, , musculoskeletal, neuro, skin, endocrine and psych systems are negative, except as otherwise noted.      Objective    /81   Pulse 65   Wt 87.5 kg (193 lb)   Breastfeeding No   BMI 32.12 kg/m    Body mass index is 32.12 kg/m .  Physical Exam   GENERAL: healthy, alert and no distress  EYES: Eyes grossly normal to inspection, PERRL and conjunctivae and sclerae normal  NECK: no adenopathy, no asymmetry, masses, or scars and thyroid normal to palpation  RESP: normal effort, no wheezing  CV: regular rates and rhythm and no peripheral edema  MS: right knee click with flexion-extension  SKIN: discoloration - left great toe                    Again, thank you for allowing me to participate in the care of your patient.      Sincerely,    Julia Gifford MD

## 2022-05-31 NOTE — PROGRESS NOTES
"  Assessment & Plan     Pain of toe of left foot  Suspect toenail injury, recommend Podiatry evaluation. Referral was placed today.   - Orthopedic  Referral    Chronic pain of right knee  Patient had osteoarthritis-related changes on prior X-rays. Recommend additional imaging as well as consultation with Sports Medicine.   - Orthopedic  Referral  - XR Knee Standing Right 2 Views; Future    Essential hypertension  Blood pressure is within acceptable range. Recommend to continue with current medical therapy. Patient is fasting today, will check lipid panel and basic chemistry panel today.   - Lipid Profile; Future  - Basic Metabolic Panel; Future    Impaired fasting glucose  Will check Hgb A1C. Patient was encouraged to continue with lifestyle modifications.   - Hgb A1c; Future      I spent a total of 30 minutes on the day of the visit.   Time spent doing chart review, history and exam, documentation and further activities per the note       BMI:   Estimated body mass index is 32.12 kg/m  as calculated from the following:    Height as of 1/31/22: 1.651 m (5' 5\").    Weight as of this encounter: 87.5 kg (193 lb).           No follow-ups on file.    Julia Gifford MD  [facility] OPAL Mata is a 76 year old who presents for the following health issues     HPI     Patient is here for follow up. She reports that she has been walking a lot over the course of winter. She states that in February she developed pain in her left great toe. She states that she has continued to walk. She developed bleeding under the nail. She has changed her shoes, however, she continues to have discoloration of the left toe nail.     Review of Systems   Constitutional, HEENT, cardiovascular, pulmonary, GI, , musculoskeletal, neuro, skin, endocrine and psych systems are negative, except as otherwise noted.      Objective    /81   Pulse 65   Wt 87.5 kg (193 lb)   " Breastfeeding No   BMI 32.12 kg/m    Body mass index is 32.12 kg/m .  Physical Exam   GENERAL: healthy, alert and no distress  EYES: Eyes grossly normal to inspection, PERRL and conjunctivae and sclerae normal  NECK: no adenopathy, no asymmetry, masses, or scars and thyroid normal to palpation  RESP: normal effort, no wheezing  CV: regular rates and rhythm and no peripheral edema  MS: right knee click with flexion-extension  SKIN: discoloration - left great toe

## 2022-05-31 NOTE — LETTER
Date:June 1, 2022      Patient was self referred, no letter generated. Do not send.        [facility] Information

## 2022-06-01 NOTE — TELEPHONE ENCOUNTER
DIAGNOSIS: R knee pain , per pt ref by Julia Gifford MD ,*will have xray done 5/31*   APPOINTMENT DATE: 6.13.22   NOTES STATUS DETAILS   OFFICE NOTE from referring provider Internal 5.31.22 Dr Julia Gifford, [facility]   MEDICATION LIST Internal    DEXA (osteoporosis/bone health) Internal    XRAYS (IMAGES & REPORTS) Internal 5.31.22 R knee standing  6.4.18 B knees  7.6.17 B knee  7.25.13 B knee

## 2022-06-02 ENCOUNTER — TELEPHONE (OUTPATIENT)
Dept: DERMATOLOGY | Facility: CLINIC | Age: 77
End: 2022-06-02

## 2022-06-02 ENCOUNTER — OFFICE VISIT (OUTPATIENT)
Dept: PODIATRY | Facility: CLINIC | Age: 77
End: 2022-06-02
Payer: COMMERCIAL

## 2022-06-02 VITALS — OXYGEN SATURATION: 97 % | HEIGHT: 66 IN | HEART RATE: 74 BPM | BODY MASS INDEX: 30.82 KG/M2 | WEIGHT: 191.8 LBS

## 2022-06-02 DIAGNOSIS — S90.112A CONTUSION OF LEFT GREAT TOE WITHOUT DAMAGE TO NAIL, INITIAL ENCOUNTER: Primary | ICD-10-CM

## 2022-06-02 PROCEDURE — 99203 OFFICE O/P NEW LOW 30 MIN: CPT | Performed by: PODIATRIST

## 2022-06-02 ASSESSMENT — PAIN SCALES - GENERAL: PAINLEVEL: NO PAIN (1)

## 2022-06-02 NOTE — TELEPHONE ENCOUNTER
Attempted to reach patient to schedule their follow-up with Dermatology.   I left the patient a voicemail to call the clinic for scheduling.     Morenita Lockhart, Virtual Visit Facilitator

## 2022-06-02 NOTE — PROGRESS NOTES
FOOT AND ANKLE SURGERY/PODIATRY CONSULT NOTE         ASSESSMENT:   Contusion left great toe  Subungual hematoma left great toe      TREATMENT:  I informed the patient that the discoloration of the toenail should resolve over the next several months.  I told the patient she may lose the toenail but if it does not follow-up it will regrow over the next several months.  She is to return to the clinic if she develops any redness, swelling or active bleeding for follow-up visit.        HPI: I was asked to see Esperanza Gage today to evaluate and treat discolored left great toenail.  The patient stated that approximately 3 months ago she went on a long walk.  She noticed that following the walk she had significant discoloration of the left great toenail.  She had some mild tenderness of the toenail at that time.  The entire toenail turned red and black eventually.  She stated that she has improved.  She feels as though the toe is starting to heal.  She has no pain at this time.  She is concerned that she may lose the toenail.  She wanted to know if there was anything that needed to be done to the toenail to prevent any complications.  She denies any previous treatment.  She has changed shoes have a larger toe box to accommodate her feet better.  The patient was seen in consultation at the request of Juila Gifford MD for evaluation and treatment of discolored toenail left great toe.       Past Medical History:   Diagnosis Date     Hearing problem 2017     Hyperlipidemia 2005     Hyperlipidemia LDL goal < 130      Hypertension      Osteoporosis, post-menopausal      Pelvic fracture (H)        Social History     Socioeconomic History     Marital status: Single     Spouse name: Not on file     Number of children: Not on file     Years of education: Not on file     Highest education level: Not on file   Occupational History     Not on file   Tobacco Use     Smoking status: Former Smoker     Packs/day: 0.00     Years: 0.00      Pack years: 0.00     Types: Cigarettes     Smokeless tobacco: Never Used   Vaping Use     Vaping Use: Never used   Substance and Sexual Activity     Alcohol use: Yes     Comment: Rare     Drug use: No     Sexual activity: Not Currently     Partners: Male   Other Topics Concern     Parent/sibling w/ CABG, MI or angioplasty before 65F 55M? Not Asked   Social History Narrative    Lives in her own home with two flights of stairs.  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  Had two brothers who both  of CVA. Participates in seniors program at [facility].       Social Determinants of Health     Financial Resource Strain: Not on file   Food Insecurity: Not on file   Transportation Needs: Not on file   Physical Activity: Not on file   Stress: Not on file   Social Connections: Not on file   Intimate Partner Violence: Not on file   Housing Stability: Not on file        No Known Allergies       Current Outpatient Medications:      Ascorbic Acid (VITAMIN C) 500 MG CAPS, , Disp: , Rfl:      ASPIRIN PO, Take 81 mg by mouth daily, Disp: , Rfl:      losartan (COZAAR) 50 MG tablet, Take 1 tablet (50 mg) by mouth daily, Disp: 90 tablet, Rfl: 3     multivitamin w/minerals (THERA-VIT-M) tablet, Take 1 tablet by mouth daily, Disp: , Rfl:      omega 3 1000 MG CAPS, , Disp: , Rfl:      simvastatin (ZOCOR) 10 MG tablet, Take 1 tablet (10 mg) by mouth At Bedtime, Disp: 90 tablet, Rfl: 1     vitamin B-Complex, Take 1 tablet by mouth daily, Disp: , Rfl:      vitamin D3 (CHOLECALCIFEROL) 50 mcg (2000 units) tablet, Take 1 tablet by mouth daily, Disp: , Rfl:      vitamin E 400 units TABS, Take 400 Units by mouth daily, Disp: , Rfl:      zinc gluconate 50 MG tablet, Take 50 mg by mouth daily, Disp: , Rfl:     Current Facility-Administered Medications:      denosumab (PROLIA) injection 60 mg, 60 mg, Subcutaneous, Q6 Months, Yin Srivastava MD PhD, 60 mg at 22 0903     Family History   Problem Relation Age of Onset      Osteoporosis Mother      Hypertension Brother      Cerebrovascular Disease Brother      Cerebrovascular Disease Brother      Heart Disease Father      C.A.D. No family hx of      Diabetes No family hx of      Skin Cancer No family hx of      Melanoma No family hx of      Dementia No family hx of      Heart Disease No family hx of      Other - See Comments Brother         cerebrovascular disease     Hypertension Brother      Coronary Artery Disease No family hx of         Social History     Socioeconomic History     Marital status: Single     Spouse name: Not on file     Number of children: Not on file     Years of education: Not on file     Highest education level: Not on file   Occupational History     Not on file   Tobacco Use     Smoking status: Former Smoker     Packs/day: 0.00     Years: 0.00     Pack years: 0.00     Types: Cigarettes     Smokeless tobacco: Never Used   Vaping Use     Vaping Use: Never used   Substance and Sexual Activity     Alcohol use: Yes     Comment: Rare     Drug use: No     Sexual activity: Not Currently     Partners: Male   Other Topics Concern     Parent/sibling w/ CABG, MI or angioplasty before 65F 55M? Not Asked   Social History Narrative    Lives in her own home with two flights of stairs.  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  Had two brothers who both  of CVA. Participates in seniors program at [facility].       Social Determinants of Health     Financial Resource Strain: Not on file   Food Insecurity: Not on file   Transportation Needs: Not on file   Physical Activity: Not on file   Stress: Not on file   Social Connections: Not on file   Intimate Partner Violence: Not on file   Housing Stability: Not on file        Review of Systems - Patient denies fever, chills, rash, wound, stiffness, limping, numbness, weakness, heart burn, blood in stool, chest pain with activity, calf pain when walking, shortness of breath with activity, chronic cough, easy  bleeding/bruising, swelling of ankles, excessive thirst, fatigue, depression, anxiety.  Patient admits to discolored left great toenail.      OBJECTIVE:  Appearance: alert, well appearing, and in no distress.    There were no vitals taken for this visit.     There is no height or weight on file to calculate BMI.     General appearance: Patient is alert and fully cooperative with history & exam.  No sign of distress is noted during the visit.  Psychiatric: Affect is pleasant & appropriate.  Patient appears motivated to improve health.  Respiratory: Breathing is regular & unlabored while sitting.  HEENT: Hearing is intact to spoken word.  Speech is clear.  No gross evidence of visual impairment that would impact ambulation.    Vascular: Dorsalis pedis and posterior tibial pulses are palpable. There is no pedal hair growth bilaterally.  CFT < 3 sec from anterior tibial surface to distal digits bilaterally. There is no appreciable edema noted.  Dermatologic: The left great toenail is normal thickness but severely discolored.  Turgor and texture are within normal limits. No coloration or temperature changes. No primary or secondary lesions noted.  Neurologic: All epicritic and proprioceptive sensations are grossly intact bilaterally.  Musculoskeletal: All active and passive ankle, subtalar, midtarsal, and 1st MPJ range of motion are grossly intact without pain or crepitus, with the exception of none. Manual muscle strength is within normal limits bilaterally. All dorsiflexors, plantarflexors, invertors, evertors are intact bilaterally.  No tenderness present to the left great toenail on palpation.  No tenderness to the left great toe with range of motion. Calf is soft/non-tender without warmth/induration    Imaging:       No images are attached to the encounter or orders placed in the encounter.     XR Knee Standing Right 2 Views    Result Date: 5/31/2022  Exam: AP and lateral views of the right knee dated 5/31/2022.  COMPARISON: 7/6/2017. CLINICAL HISTORY: Chronic knee pain. FINDINGS: AP and lateral views of the right knee were obtained. Joint space narrowing in the lateral femorotibial joint compartment of the right knee. Osteophytic spurring in the patellofemoral joint compartment. No large right knee joint effusion. No fracture or dislocation.     IMPRESSION: Joint space narrowing in the lateral femorotibial joint compartment of the right knee. MILDRED VARGAS MD   SYSTEM ID:  [id number]     XR Knee Standing Right 2 Views    Result Date: 5/31/2022  Exam: AP and lateral views of the right knee dated 5/31/2022. COMPARISON: 7/6/2017. CLINICAL HISTORY: Chronic knee pain. FINDINGS: AP and lateral views of the right knee were obtained. Joint space narrowing in the lateral femorotibial joint compartment of the right knee. Osteophytic spurring in the patellofemoral joint compartment. No large right knee joint effusion. No fracture or dislocation.     IMPRESSION: Joint space narrowing in the lateral femorotibial joint compartment of the right knee. MILDRED VARGAS MD   SYSTEM ID:  [id number]         Ericyvonne Flores DPM  [facility] Foot & Ankle Surgery/Podiatry

## 2022-06-02 NOTE — LETTER
6/2/2022         RE: Esperanza Gage  [address] [place] [address]        Dear Colleague,    Thank you for referring your patient, Esperanza Gage, to the [facility]. Please see a copy of my visit note below.    FOOT AND ANKLE SURGERY/PODIATRY CONSULT NOTE         ASSESSMENT:   Contusion left great toe  Subungual hematoma left great toe      TREATMENT:  I informed the patient that the discoloration of the toenail should resolve over the next several months.  I told the patient she may lose the toenail but if it does not follow-up it will regrow over the next several months.  She is to return to the clinic if she develops any redness, swelling or active bleeding for follow-up visit.        HPI: I was asked to see Esperanza Gage today to evaluate and treat discolored left great toenail.  The patient stated that approximately 3 months ago she went on a long walk.  She noticed that following the walk she had significant discoloration of the left great toenail.  She had some mild tenderness of the toenail at that time.  The entire toenail turned red and black eventually.  She stated that she has improved.  She feels as though the toe is starting to heal.  She has no pain at this time.  She is concerned that she may lose the toenail.  She wanted to know if there was anything that needed to be done to the toenail to prevent any complications.  She denies any previous treatment.  She has changed shoes have a larger toe box to accommodate her feet better.  The patient was seen in consultation at the request of Julia Gifford MD for evaluation and treatment of discolored toenail left great toe.       Past Medical History:   Diagnosis Date     Hearing problem 2017     Hyperlipidemia 2005     Hyperlipidemia LDL goal < 130      Hypertension      Osteoporosis, post-menopausal      Pelvic fracture (H)        Social History     Socioeconomic History     Marital status: Single     Spouse name: Not  on file     Number of children: Not on file     Years of education: Not on file     Highest education level: Not on file   Occupational History     Not on file   Tobacco Use     Smoking status: Former Smoker     Packs/day: 0.00     Years: 0.00     Pack years: 0.00     Types: Cigarettes     Smokeless tobacco: Never Used   Vaping Use     Vaping Use: Never used   Substance and Sexual Activity     Alcohol use: Yes     Comment: Rare     Drug use: No     Sexual activity: Not Currently     Partners: Male   Other Topics Concern     Parent/sibling w/ CABG, MI or angioplasty before 65F 55M? Not Asked   Social History Narrative    Lives in her own home with two flights of stairs.  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  Had two brothers who both  of CVA. Participates in seniors program at [facility].       Social Determinants of Health     Financial Resource Strain: Not on file   Food Insecurity: Not on file   Transportation Needs: Not on file   Physical Activity: Not on file   Stress: Not on file   Social Connections: Not on file   Intimate Partner Violence: Not on file   Housing Stability: Not on file        No Known Allergies       Current Outpatient Medications:      Ascorbic Acid (VITAMIN C) 500 MG CAPS, , Disp: , Rfl:      ASPIRIN PO, Take 81 mg by mouth daily, Disp: , Rfl:      losartan (COZAAR) 50 MG tablet, Take 1 tablet (50 mg) by mouth daily, Disp: 90 tablet, Rfl: 3     multivitamin w/minerals (THERA-VIT-M) tablet, Take 1 tablet by mouth daily, Disp: , Rfl:      omega 3 1000 MG CAPS, , Disp: , Rfl:      simvastatin (ZOCOR) 10 MG tablet, Take 1 tablet (10 mg) by mouth At Bedtime, Disp: 90 tablet, Rfl: 1     vitamin B-Complex, Take 1 tablet by mouth daily, Disp: , Rfl:      vitamin D3 (CHOLECALCIFEROL) 50 mcg (2000 units) tablet, Take 1 tablet by mouth daily, Disp: , Rfl:      vitamin E 400 units TABS, Take 400 Units by mouth daily, Disp: , Rfl:      zinc gluconate 50 MG tablet, Take 50 mg by mouth  daily, Disp: , Rfl:     Current Facility-Administered Medications:      denosumab (PROLIA) injection 60 mg, 60 mg, Subcutaneous, Q6 Months, Yin Srivastava MD PhD, 60 mg at 22 0903     Family History   Problem Relation Age of Onset     Osteoporosis Mother      Hypertension Brother      Cerebrovascular Disease Brother      Cerebrovascular Disease Brother      Heart Disease Father      C.A.D. No family hx of      Diabetes No family hx of      Skin Cancer No family hx of      Melanoma No family hx of      Dementia No family hx of      Heart Disease No family hx of      Other - See Comments Brother         cerebrovascular disease     Hypertension Brother      Coronary Artery Disease No family hx of         Social History     Socioeconomic History     Marital status: Single     Spouse name: Not on file     Number of children: Not on file     Years of education: Not on file     Highest education level: Not on file   Occupational History     Not on file   Tobacco Use     Smoking status: Former Smoker     Packs/day: 0.00     Years: 0.00     Pack years: 0.00     Types: Cigarettes     Smokeless tobacco: Never Used   Vaping Use     Vaping Use: Never used   Substance and Sexual Activity     Alcohol use: Yes     Comment: Rare     Drug use: No     Sexual activity: Not Currently     Partners: Male   Other Topics Concern     Parent/sibling w/ CABG, MI or angioplasty before 65F 55M? Not Asked   Social History Narrative    Lives in her own home with two flights of stairs.  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  Had two brothers who both  of CVA. Participates in seniors program at [facility].       Social Determinants of Health     Financial Resource Strain: Not on file   Food Insecurity: Not on file   Transportation Needs: Not on file   Physical Activity: Not on file   Stress: Not on file   Social Connections: Not on file   Intimate Partner Violence: Not on file   Housing Stability: Not on file         Review of Systems - Patient denies fever, chills, rash, wound, stiffness, limping, numbness, weakness, heart burn, blood in stool, chest pain with activity, calf pain when walking, shortness of breath with activity, chronic cough, easy bleeding/bruising, swelling of ankles, excessive thirst, fatigue, depression, anxiety.  Patient admits to discolored left great toenail.      OBJECTIVE:  Appearance: alert, well appearing, and in no distress.    There were no vitals taken for this visit.     There is no height or weight on file to calculate BMI.     General appearance: Patient is alert and fully cooperative with history & exam.  No sign of distress is noted during the visit.  Psychiatric: Affect is pleasant & appropriate.  Patient appears motivated to improve health.  Respiratory: Breathing is regular & unlabored while sitting.  HEENT: Hearing is intact to spoken word.  Speech is clear.  No gross evidence of visual impairment that would impact ambulation.    Vascular: Dorsalis pedis and posterior tibial pulses are palpable. There is no pedal hair growth bilaterally.  CFT < 3 sec from anterior tibial surface to distal digits bilaterally. There is no appreciable edema noted.  Dermatologic: The left great toenail is normal thickness but severely discolored.  Turgor and texture are within normal limits. No coloration or temperature changes. No primary or secondary lesions noted.  Neurologic: All epicritic and proprioceptive sensations are grossly intact bilaterally.  Musculoskeletal: All active and passive ankle, subtalar, midtarsal, and 1st MPJ range of motion are grossly intact without pain or crepitus, with the exception of none. Manual muscle strength is within normal limits bilaterally. All dorsiflexors, plantarflexors, invertors, evertors are intact bilaterally.  No tenderness present to the left great toenail on palpation.  No tenderness to the left great toe with range of motion. Calf is soft/non-tender  without warmth/induration    Imaging:       No images are attached to the encounter or orders placed in the encounter.     XR Knee Standing Right 2 Views    Result Date: 5/31/2022  Exam: AP and lateral views of the right knee dated 5/31/2022. COMPARISON: 7/6/2017. CLINICAL HISTORY: Chronic knee pain. FINDINGS: AP and lateral views of the right knee were obtained. Joint space narrowing in the lateral femorotibial joint compartment of the right knee. Osteophytic spurring in the patellofemoral joint compartment. No large right knee joint effusion. No fracture or dislocation.     IMPRESSION: Joint space narrowing in the lateral femorotibial joint compartment of the right knee. MILDRED VARGAS MD   SYSTEM ID:  [id number]     XR Knee Standing Right 2 Views    Result Date: 5/31/2022  Exam: AP and lateral views of the right knee dated 5/31/2022. COMPARISON: 7/6/2017. CLINICAL HISTORY: Chronic knee pain. FINDINGS: AP and lateral views of the right knee were obtained. Joint space narrowing in the lateral femorotibial joint compartment of the right knee. Osteophytic spurring in the patellofemoral joint compartment. No large right knee joint effusion. No fracture or dislocation.     IMPRESSION: Joint space narrowing in the lateral femorotibial joint compartment of the right knee. MILDRED VARGAS MD   SYSTEM ID:  [id number]         Eric Flores DPM  [facility] Foot & Ankle Surgery/Podiatry         Again, thank you for allowing me to participate in the care of your patient.        Sincerely,        Eric Galeas DPM

## 2022-06-06 ENCOUNTER — LAB (OUTPATIENT)
Dept: LAB | Facility: CLINIC | Age: 77
End: 2022-06-06
Payer: COMMERCIAL

## 2022-06-06 DIAGNOSIS — R73.01 IMPAIRED FASTING GLUCOSE: ICD-10-CM

## 2022-06-06 LAB
FASTING STATUS PATIENT QL REPORTED: YES
GLUCOSE BLD-MCNC: 116 MG/DL (ref 70–99)
HBA1C MFR BLD: 5.8 % (ref 0–5.6)

## 2022-06-06 PROCEDURE — 82947 ASSAY GLUCOSE BLOOD QUANT: CPT

## 2022-06-06 PROCEDURE — 36415 COLL VENOUS BLD VENIPUNCTURE: CPT

## 2022-06-06 PROCEDURE — 83036 HEMOGLOBIN GLYCOSYLATED A1C: CPT

## 2022-06-09 ENCOUNTER — OFFICE VISIT (OUTPATIENT)
Dept: PHYSICAL MEDICINE AND REHAB | Facility: CLINIC | Age: 77
End: 2022-06-09
Payer: COMMERCIAL

## 2022-06-09 ENCOUNTER — HOSPITAL ENCOUNTER (OUTPATIENT)
Dept: RADIOLOGY | Facility: HOSPITAL | Age: 77
Discharge: HOME | End: 2022-06-09
Attending: PAIN MEDICINE | Admitting: PAIN MEDICINE
Payer: COMMERCIAL

## 2022-06-09 VITALS — OXYGEN SATURATION: 96 % | DIASTOLIC BLOOD PRESSURE: 60 MMHG | HEART RATE: 76 BPM | SYSTOLIC BLOOD PRESSURE: 136 MMHG

## 2022-06-09 DIAGNOSIS — M54.50 CHRONIC BILATERAL LOW BACK PAIN WITHOUT SCIATICA: ICD-10-CM

## 2022-06-09 DIAGNOSIS — M54.50 CHRONIC BILATERAL LOW BACK PAIN WITHOUT SCIATICA: Primary | ICD-10-CM

## 2022-06-09 DIAGNOSIS — G89.29 CHRONIC BILATERAL LOW BACK PAIN WITHOUT SCIATICA: Primary | ICD-10-CM

## 2022-06-09 DIAGNOSIS — G89.29 CHRONIC BILATERAL LOW BACK PAIN WITHOUT SCIATICA: ICD-10-CM

## 2022-06-09 DIAGNOSIS — M79.18 MYOFASCIAL PAIN: ICD-10-CM

## 2022-06-09 PROCEDURE — 99204 OFFICE O/P NEW MOD 45 MIN: CPT | Performed by: PAIN MEDICINE

## 2022-06-09 PROCEDURE — 72100 X-RAY EXAM L-S SPINE 2/3 VWS: CPT

## 2022-06-09 ASSESSMENT — PAIN SCALES - GENERAL: PAINLEVEL: MILD PAIN (3)

## 2022-06-09 NOTE — PROGRESS NOTES
ASSESSMENT: Esperanza Gage is a 76 year old female who presents for consultation at the request of [facility] PCP Julia Gifford, with a past medical history significant for hearing problem, leg numbness, vitamin D deficiency, hyperlipidemia, hypertension, osteoporosis, chondromalacia patella, elbow fracture, milia, recurrent UTI, vaginal atrophy, status post knee surgery who presents today for new patient evaluation of low back pain:    -Overall patient's physical exam is reassuring that she has normal strength in her lower extremities.  Lower extremity reflexes are reduced, however symmetrical.  Pain is likely secondary to lumbar spondylosis without myelopathy.    Patient is neurologically intact on exam. No myelopathic or red flag symptoms.    Diagnoses and all orders for this visit:  Chronic bilateral low back pain without sciatica  -     XR Lumbar Spine 2/3 Views; Future  -     Physical Therapy Referral; Future  Myofascial pain  -     XR Lumbar Spine 2/3 Views; Future  -     Physical Therapy Referral; Future    PLAN:  Reviewed spine anatomy and disease process. Discussed diagnosis and treatment options with the patient today. A shared decision making model was used.  The patient's values and choices were respected. The following represents what was discussed and decided upon by the provider and the patient.      -DIAGNOSTIC TESTS:    -- I ordered x-rays of her lumbar spine.  Once or reviewed this I will have one of our nurses give her a call with results and recommendations.    -PHYSICAL THERAPY: I ordered physical therapy for her to work on core strengthening as well as hip  strengthening and flexibility.  I like her to have a home-going exercise program that she can do on a daily basis.  Discussed the importance of core strengthening, ROM, stretching exercises with the patient and how each of these entities is important in decreasing pain.  Explained to the patient that the purpose of physical  therapy is to teach the patient a home exercise program.  These exercises need to be performed every day in order to decrease pain and prevent future occurrences of pain.        -MEDICATIONS: No changes to medications.  -  Discussed multiple medication options today with patient. Discussed risks, side effects, and proper use of medications. Patient verbalized understanding.    -INTERVENTIONS: No interventions at this time.  Discussed risks and benefits of injections with patient today.    -PATIENT EDUCATION: We discussed pain management in a multimodal fashion including physical therapy, medication management, possible future injections.    -FOLLOW-UP:   Patient will follow up in 4 weeks.    Advised patient to call the [facility] if symptoms worsen or you have problems controlling bladder and bowel function.   ______________________________________________________________________    SUBJECTIVE:  HPI:  Esperanza Gage  Is a 76 year old female who presents today for new patient evaluation of low back pain.  Patient was seen at her orthopedic surgeons and discussed low back pain.  She was referred to spine center for further evaluation.  Patient notes that she has had low back pain since September 2021 which is gradually worsening.  Pain is worse with twisting as well as walking or sitting in bed.  She notes that if she twists or turns around in bed this is also painful.  Gardening is difficult as it is bending.  Coughing sometimes hurts if everything is flared if she arches backwards pain is worse.  She takes Tylenol occasionally which is helpful for her pain.  Her pain today is 3/10 its worst is 7/10 as best as 3/10.  She notes that her pain is worse on the right side than the left.  She points to her PSIS area where pain is.  She denies any bowel or bladder changes, fevers, chills, unintentional weight loss.    -Treatment to Date: None    -Medications:    Current Outpatient Medications   Medication     Ascorbic Acid  (VITAMIN C) 500 MG CAPS     ASPIRIN PO     losartan (COZAAR) 50 MG tablet     multivitamin w/minerals (THERA-VIT-M) tablet     omega 3 1000 MG CAPS     simvastatin (ZOCOR) 10 MG tablet     vitamin B-Complex     vitamin D3 (CHOLECALCIFEROL) 50 mcg (2000 units) tablet     vitamin E 400 units TABS     zinc gluconate 50 MG tablet     Current Facility-Administered Medications   Medication     denosumab (PROLIA) injection 60 mg       No Known Allergies    Past Medical History:   Diagnosis Date     Hearing problem 2017     Hyperlipidemia 2005     Hyperlipidemia LDL goal < 130      Hypertension      Osteoporosis, post-menopausal      Pelvic fracture (H)         Patient Active Problem List   Diagnosis     Vitamin D deficiency     Hyperlipidemia with target LDL less than 130     Osteopenia     Breast signs and symptoms     Chondromalacia of patella     Primary localized osteoarthrosis, lower leg     Pelvic fracture (H)     Medication management     Leg numbness     Seborrheic keratosis     Milia     History of tinea     Status post knee surgery     Recurrent UTI     Vaginal atrophy       Past Surgical History:   Procedure Laterality Date     ARTHROPLASTY KNEE Left 8/28/2018    Procedure: ARTHROPLASTY KNEE;  Left Total Knee Replacement;  Surgeon: Pierre Campos MD;  Location: [facility] OR     KNEE SURGERY       No prior surgeries       ORTHOPEDIC SURGERY       TOTAL KNEE ARTHROPLASTY Left 08/28/2018       Family History   Problem Relation Age of Onset     Osteoporosis Mother      Hypertension Brother      Cerebrovascular Disease Brother      Cerebrovascular Disease Brother      Heart Disease Father      C.A.D. No family hx of      Diabetes No family hx of      Skin Cancer No family hx of      Melanoma No family hx of      Dementia No family hx of      Heart Disease No family hx of      Other - See Comments Brother         cerebrovascular disease     Hypertension Brother      Coronary Artery Disease No family hx of        Reviewed  past medical, surgical, and family history with patient found on new patient intake packet located in EMR [employer] tab.     SOCIAL HX: She denies smoking, using recreational drugs.  She drinks alcohol about once a month.    ROS: Specifically negative for bowel/bladder dysfunction, balance changes, headache, dizziness, foot drop, fevers, chills, appetite changes, nausea/vomiting, unexplained weight loss. Otherwise 13 systems reviewed are negative. Please see the patient's intake questionnaire from today for details.    OBJECTIVE:  /60   Pulse 76   SpO2 96%     PHYSICAL EXAMINATION:    --CONSTITUTIONAL:  Vital signs as above.  No acute distress.  The patient is well nourished and well groomed.  --PSYCHIATRIC:  Appropriate mood and affect. The patient is awake, alert, oriented to person, place, time and answering questions appropriately with clear speech.    --SKIN:  Skin over the face, bilateral lower extremities, and posterior torso is clean, dry, intact without rashes.    --RESPIRATORY: Normal rhythm and effort. No abnormal accessory muscle breathing patterns noted.    --STANDING EXAMINATION:  Normal lumbar lordosis noted, no lateral shift.  --MUSCULOSKELETAL: Lumbar spine inspection reveals no evidence of deformity. Range of motion is not limited in lumbar flexion, extension, lateral rotation.  Facet loading is positive bilaterally.  Tenderness palpation over the low back and PSIS area.  Straight leg raising in the supine position is negative to radicular pain.   --SACROILIAC JOINT: Negative distraction.  Negative Maria Esther's with reproduction of pain to affected extremity.  One Finger point test positive bilaterally.  --GROSS MOTOR: Gait is non-antalgic. Easily arises from a seated position. Toe walking and heel walking are normal without significant difficulty.    --LOWER EXTREMITY MOTOR TESTING:  Plantar flexion left 5/5, right 5/5   Dorsiflexion left 5/5, right 5/5   Great toe MTP extension left 5/5, right  5/5  Knee flexion left 5/5, right 5/5  Knee extension left 5/5, right 5/5   Hip flexion left 5/5, right 5/5  Hip abduction left 5/5, right 5/5  Hip adduction left 5/5, right 5/5   --HIPS: Full range of motion bilaterally.  Stinchfield test is negative bilaterally.  --NEUROLOGICAL:  1/4 patellar and achilles reflexes bilaterally.  Sensation to light touch is intact in the bilateral L4, L5, and S1 dermatomes. Babinski is negative. No clonus.    --VASCULAR:  2/4 dorsalis pedis  pulses bilaterally.  Bilateral lower extremities are warm.  There is no pitting edema of the bilateral lower extremities.    RESULTS: Prior medical records from [facility] orthopedics were reviewed today.    Imaging: Lumbar spine Imaging was personally reviewed and interpreted today. The images were shown to the patient and the findings were explained using a spine model.      XR Knee Standing Right 2 Views    Result Date: 5/31/2022  Exam: AP and lateral views of the right knee dated 5/31/2022. COMPARISON: 7/6/2017. CLINICAL HISTORY: Chronic knee pain. FINDINGS: AP and lateral views of the right knee were obtained. Joint space narrowing in the lateral femorotibial joint compartment of the right knee. Osteophytic spurring in the patellofemoral joint compartment. No large right knee joint effusion. No fracture or dislocation.     IMPRESSION: Joint space narrowing in the lateral femorotibial joint compartment of the right knee. MILDRED VARGAS MD   SYSTEM ID:  [id number]

## 2022-06-09 NOTE — PATIENT INSTRUCTIONS
I ordered an x-ray of your low back.  Please call [phone] to schedule, if you are not able to do it where you are getting imaging today.  After I reviewed this I will have one of our nurses give you a call with results and recommendations.    ~Please call Nurse Navigation line [phone] with any questions or concerns about your treatment plan, if symptoms worsen and you would like to be seen urgently, or if you have problems controlling bladder and bowel function.

## 2022-06-09 NOTE — LETTER
6/9/2022         RE: Esperanza Gage  [address] [place] [address]        Dear Colleague,    Thank you for referring your patient, Esperanza Gage, to the [facility]. Please see a copy of my visit note below.    ASSESSMENT: Esperanza Gage is a 76 year old female who presents for consultation at the request of [facility] PCP Julia Gifford, with a past medical history significant for hearing problem, leg numbness, vitamin D deficiency, hyperlipidemia, hypertension, osteoporosis, chondromalacia patella, elbow fracture, milia, recurrent UTI, vaginal atrophy, status post knee surgery who presents today for new patient evaluation of low back pain:    -Overall patient's physical exam is reassuring that she has normal strength in her lower extremities.  Lower extremity reflexes are reduced, however symmetrical.  Pain is likely secondary to lumbar spondylosis without myelopathy.    Patient is neurologically intact on exam. No myelopathic or red flag symptoms.    Diagnoses and all orders for this visit:  Chronic bilateral low back pain without sciatica  -     XR Lumbar Spine 2/3 Views; Future  -     Physical Therapy Referral; Future  Myofascial pain  -     XR Lumbar Spine 2/3 Views; Future  -     Physical Therapy Referral; Future    PLAN:  Reviewed spine anatomy and disease process. Discussed diagnosis and treatment options with the patient today. A shared decision making model was used.  The patient's values and choices were respected. The following represents what was discussed and decided upon by the provider and the patient.      -DIAGNOSTIC TESTS:    -- I ordered x-rays of her lumbar spine.  Once or reviewed this I will have one of our nurses give her a call with results and recommendations.    -PHYSICAL THERAPY: I ordered physical therapy for her to work on core strengthening as well as hip  strengthening and flexibility.  I like her to have a home-going  exercise program that she can do on a daily basis.  Discussed the importance of core strengthening, ROM, stretching exercises with the patient and how each of these entities is important in decreasing pain.  Explained to the patient that the purpose of physical therapy is to teach the patient a home exercise program.  These exercises need to be performed every day in order to decrease pain and prevent future occurrences of pain.        -MEDICATIONS: No changes to medications.  -  Discussed multiple medication options today with patient. Discussed risks, side effects, and proper use of medications. Patient verbalized understanding.    -INTERVENTIONS: No interventions at this time.  Discussed risks and benefits of injections with patient today.    -PATIENT EDUCATION: We discussed pain management in a multimodal fashion including physical therapy, medication management, possible future injections.    -FOLLOW-UP:   Patient will follow up in 4 weeks.    Advised patient to call the Spine Center if symptoms worsen or you have problems controlling bladder and bowel function.   ______________________________________________________________________    SUBJECTIVE:  HPI:  Esperanza Gage  Is a 76 year old female who presents today for new patient evaluation of low back pain.  Patient was seen at her orthopedic surgeons and discussed low back pain.  She was referred to spine center for further evaluation.  Patient notes that she has had low back pain since September 2021 which is gradually worsening.  Pain is worse with twisting as well as walking or sitting in bed.  She notes that if she twists or turns around in bed this is also painful.  Gardening is difficult as it is bending.  Coughing sometimes hurts if everything is flared if she arches backwards pain is worse.  She takes Tylenol occasionally which is helpful for her pain.  Her pain today is 3/10 its worst is 7/10 as best as 3/10.  She notes that her pain is worse on the  right side than the left.  She points to her PSIS area where pain is.  She denies any bowel or bladder changes, fevers, chills, unintentional weight loss.    -Treatment to Date: None    -Medications:    Current Outpatient Medications   Medication     Ascorbic Acid (VITAMIN C) 500 MG CAPS     ASPIRIN PO     losartan (COZAAR) 50 MG tablet     multivitamin w/minerals (THERA-VIT-M) tablet     omega 3 1000 MG CAPS     simvastatin (ZOCOR) 10 MG tablet     vitamin B-Complex     vitamin D3 (CHOLECALCIFEROL) 50 mcg (2000 units) tablet     vitamin E 400 units TABS     zinc gluconate 50 MG tablet     Current Facility-Administered Medications   Medication     denosumab (PROLIA) injection 60 mg       No Known Allergies    Past Medical History:   Diagnosis Date     Hearing problem 2017     Hyperlipidemia 2005     Hyperlipidemia LDL goal < 130      Hypertension      Osteoporosis, post-menopausal      Pelvic fracture (H)         Patient Active Problem List   Diagnosis     Vitamin D deficiency     Hyperlipidemia with target LDL less than 130     Osteopenia     Breast signs and symptoms     Chondromalacia of patella     Primary localized osteoarthrosis, lower leg     Pelvic fracture (H)     Medication management     Leg numbness     Seborrheic keratosis     Milia     History of tinea     Status post knee surgery     Recurrent UTI     Vaginal atrophy       Past Surgical History:   Procedure Laterality Date     ARTHROPLASTY KNEE Left 8/28/2018    Procedure: ARTHROPLASTY KNEE;  Left Total Knee Replacement;  Surgeon: Pierre Campos MD;  Location: [facility] OR     KNEE SURGERY       No prior surgeries       ORTHOPEDIC SURGERY       TOTAL KNEE ARTHROPLASTY Left 08/28/2018       Family History   Problem Relation Age of Onset     Osteoporosis Mother      Hypertension Brother      Cerebrovascular Disease Brother      Cerebrovascular Disease Brother      Heart Disease Father      C.A.D. No family hx of      Diabetes No family hx of      Skin Cancer  No family hx of      Melanoma No family hx of      Dementia No family hx of      Heart Disease No family hx of      Other - See Comments Brother         cerebrovascular disease     Hypertension Brother      Coronary Artery Disease No family hx of        Reviewed past medical, surgical, and family history with patient found on new patient intake packet located in EMR [employer] tab.     SOCIAL HX: She denies smoking, using recreational drugs.  She drinks alcohol about once a month.    ROS: Specifically negative for bowel/bladder dysfunction, balance changes, headache, dizziness, foot drop, fevers, chills, appetite changes, nausea/vomiting, unexplained weight loss. Otherwise 13 systems reviewed are negative. Please see the patient's intake questionnaire from today for details.    OBJECTIVE:  /60   Pulse 76   SpO2 96%     PHYSICAL EXAMINATION:    --CONSTITUTIONAL:  Vital signs as above.  No acute distress.  The patient is well nourished and well groomed.  --PSYCHIATRIC:  Appropriate mood and affect. The patient is awake, alert, oriented to person, place, time and answering questions appropriately with clear speech.    --SKIN:  Skin over the face, bilateral lower extremities, and posterior torso is clean, dry, intact without rashes.    --RESPIRATORY: Normal rhythm and effort. No abnormal accessory muscle breathing patterns noted.    --STANDING EXAMINATION:  Normal lumbar lordosis noted, no lateral shift.  --MUSCULOSKELETAL: Lumbar spine inspection reveals no evidence of deformity. Range of motion is not limited in lumbar flexion, extension, lateral rotation.  Facet loading is positive bilaterally.  Tenderness palpation over the low back and PSIS area.  Straight leg raising in the supine position is negative to radicular pain.   --SACROILIAC JOINT: Negative distraction.  Negative Maria Esther's with reproduction of pain to affected extremity.  One Finger point test positive bilaterally.  --GROSS MOTOR: Gait is non-antalgic.  Easily arises from a seated position. Toe walking and heel walking are normal without significant difficulty.    --LOWER EXTREMITY MOTOR TESTING:  Plantar flexion left 5/5, right 5/5   Dorsiflexion left 5/5, right 5/5   Great toe MTP extension left 5/5, right 5/5  Knee flexion left 5/5, right 5/5  Knee extension left 5/5, right 5/5   Hip flexion left 5/5, right 5/5  Hip abduction left 5/5, right 5/5  Hip adduction left 5/5, right 5/5   --HIPS: Full range of motion bilaterally.  Stinchfield test is negative bilaterally.  --NEUROLOGICAL:  1/4 patellar and achilles reflexes bilaterally.  Sensation to light touch is intact in the bilateral L4, L5, and S1 dermatomes. Babinski is negative. No clonus.    --VASCULAR:  2/4 dorsalis pedis  pulses bilaterally.  Bilateral lower extremities are warm.  There is no pitting edema of the bilateral lower extremities.    RESULTS: Prior medical records from [facility] orthopedics were reviewed today.    Imaging: Lumbar spine Imaging was personally reviewed and interpreted today. The images were shown to the patient and the findings were explained using a spine model.      XR Knee Standing Right 2 Views    Result Date: 5/31/2022  Exam: AP and lateral views of the right knee dated 5/31/2022. COMPARISON: 7/6/2017. CLINICAL HISTORY: Chronic knee pain. FINDINGS: AP and lateral views of the right knee were obtained. Joint space narrowing in the lateral femorotibial joint compartment of the right knee. Osteophytic spurring in the patellofemoral joint compartment. No large right knee joint effusion. No fracture or dislocation.     IMPRESSION: Joint space narrowing in the lateral femorotibial joint compartment of the right knee. MILDRED VARGAS MD   SYSTEM ID:  [id number]        Again, thank you for allowing me to participate in the care of your patient.        Sincerely,        Cory Galindo, DO

## 2022-06-10 ENCOUNTER — TELEPHONE (OUTPATIENT)
Dept: PHYSICAL MEDICINE AND REHAB | Facility: CLINIC | Age: 77
End: 2022-06-10
Payer: COMMERCIAL

## 2022-06-10 ENCOUNTER — TELEPHONE (OUTPATIENT)
Dept: OBGYN | Facility: CLINIC | Age: 77
End: 2022-06-10
Payer: COMMERCIAL

## 2022-06-10 DIAGNOSIS — M81.0 SENILE OSTEOPOROSIS: Primary | ICD-10-CM

## 2022-06-10 NOTE — TELEPHONE ENCOUNTER
Spoke with patient and discussed results and recommendations per provider note. Patient states that she will call to schedule physical therapy and keep follow up appointment with Dr. Galindo on 7/6/2022. Pt verbalized understanding of results and has no further questions at this time.     Emmie Joe RN on 6/10/2022 at 2:48 PM

## 2022-06-10 NOTE — TELEPHONE ENCOUNTER
----- Message from Cory Galindo DO sent at 6/10/2022  2:27 PM CDT -----  Please call the patient let her know I reviewed x-ray of her lumbar spine.  It does show degenerative changes and wear-and-tear changes including arthritis which are likely cause of pain.  There is a small curvature or scoliosis in the low back.  Recommend he start physical therapy and follow-up as scheduled.

## 2022-06-10 NOTE — TELEPHONE ENCOUNTER
Scheduled nurse visit for August.  Reminded of need for DEXA in September per last visit notes.    Orders placed for post prolia labs.     She is also asking when she needs to follow up with Dr Gifford.  Will route to her to advise, as it is not noted in progress notes.

## 2022-06-10 NOTE — TELEPHONE ENCOUNTER
[facility] Call Center    Phone Message    May a detailed message be left on voicemail: yes     Reason for Call:Patient would like to schedule prolia shot for August. Please reach out to patient.    Action Taken: Message routed to:  [facility] HIEN    Travel Screening: Not Applicable

## 2022-06-13 ENCOUNTER — PRE VISIT (OUTPATIENT)
Dept: ORTHOPEDICS | Facility: CLINIC | Age: 77
End: 2022-06-13
Payer: COMMERCIAL

## 2022-06-13 ENCOUNTER — OFFICE VISIT (OUTPATIENT)
Dept: ORTHOPEDICS | Facility: CLINIC | Age: 77
End: 2022-06-13
Attending: INTERNAL MEDICINE
Payer: COMMERCIAL

## 2022-06-13 VITALS — BODY MASS INDEX: 30.7 KG/M2 | WEIGHT: 191 LBS | HEIGHT: 66 IN

## 2022-06-13 DIAGNOSIS — M17.11 PRIMARY OSTEOARTHRITIS OF RIGHT KNEE: Primary | ICD-10-CM

## 2022-06-13 PROCEDURE — 99203 OFFICE O/P NEW LOW 30 MIN: CPT | Performed by: FAMILY MEDICINE

## 2022-06-13 NOTE — LETTER
6/13/2022      RE: Esperanza Gage  [address]  [place] [address]     Dear Colleague,    Thank you for referring your patient, Esperanza Gage, to the [facility]IC[facility]IC [place]. Please see a copy of my visit note below.    This 76-year-old female is seen in follow-up at the request of Dr. Gifford to discuss right-sided knee x-rays.  She had an x-ray of her right knee in 2018 that showed mild lateral tibiofemoral DJD changes and mild patellofemoral DJD changes.  Today we reviewed her standing x-rays of her right knee that are essentially unchanged.  Patient denies any knee pain with walking.  She denies any limping.  She says that she will use the handrail when she goes up and down the stairs because she does not trust her leg and she feels that she has improvements in strength that could be made.  She recently saw physical medicine for low back and right-sided radicular leg discomfort.  They have her scheduled to see physical therapy.          H/o Left Total Knee Replacement - Left DOS: 08/28/2018, h/ o left knee cortisone injections 2017      XR Knee Standing Right 2 Views     Result Date: 5/31/2022  Exam: AP and lateral views of the right knee dated 5/31/2022. COMPARISON: 7/6/2017. CLINICAL HISTORY: Chronic knee pain. FINDINGS: AP and lateral views of the right knee were obtained. Joint space narrowing in the lateral femorotibial joint compartment of the right knee. Osteophytic spurring in the patellofemoral joint compartment. No large right knee joint effusion. No fracture or dislocation.      IMPRESSION: Joint space narrowing in the lateral femorotibial joint compartment of the right knee. MILDRED VARGAS MD   SYSTEM ID:  [id number]        PMH:  Past Medical History:   Diagnosis Date     Hearing problem 2017     Hyperlipidemia 2005     Hyperlipidemia LDL goal < 130      Hypertension      Osteoporosis, post-menopausal      Pelvic fracture (H)        Active problem list:  Patient  Active Problem List   Diagnosis     Vitamin D deficiency     Hyperlipidemia with target LDL less than 130     Osteopenia     Breast signs and symptoms     Chondromalacia of patella     Primary localized osteoarthrosis, lower leg     Pelvic fracture (H)     Medication management     Leg numbness     Seborrheic keratosis     Milia     History of tinea     Status post knee surgery     Recurrent UTI     Vaginal atrophy       FH:  Family History   Problem Relation Age of Onset     Osteoporosis Mother      Hypertension Brother      Cerebrovascular Disease Brother      Cerebrovascular Disease Brother      Heart Disease Father      C.A.D. No family hx of      Diabetes No family hx of      Skin Cancer No family hx of      Melanoma No family hx of      Dementia No family hx of      Heart Disease No family hx of      Other - See Comments Brother         cerebrovascular disease     Hypertension Brother      Coronary Artery Disease No family hx of        SH:  Social History     Socioeconomic History     Marital status: Single     Spouse name: Not on file     Number of children: Not on file     Years of education: Not on file     Highest education level: Not on file   Occupational History     Not on file   Tobacco Use     Smoking status: Former Smoker     Packs/day: 0.00     Years: 0.00     Pack years: 0.00     Types: Cigarettes     Smokeless tobacco: Never Used   Vaping Use     Vaping Use: Never used   Substance and Sexual Activity     Alcohol use: Yes     Comment: Rare     Drug use: No     Sexual activity: Not Currently     Partners: Male   Other Topics Concern     Parent/sibling w/ CABG, MI or angioplasty before 65F 55M? Not Asked   Social History Narrative    Lives in her own home with two flights of stairs.  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  Had two brothers who both  of CVA. Participates in seniors program at [facility].       Social Determinants of Health     Financial Resource Strain: Not on file    Food Insecurity: Not on file   Transportation Needs: Not on file   Physical Activity: Not on file   Stress: Not on file   Social Connections: Not on file   Intimate Partner Violence: Not on file   Housing Stability: Not on file       MEDS:  See EMR, reviewed  ALL:  See EMR, reviewed    REVIEW OF SYSTEMS:  CONSTITUTIONAL:NEGATIVE for fever, chills, change in weight  INTEGUMENTARY/SKIN: NEGATIVE for worrisome rashes, moles or lesions  EYES: NEGATIVE for vision changes or irritation  ENT/MOUTH: NEGATIVE for ear, mouth and throat problems  RESP:NEGATIVE for significant cough or SOB  BREAST: NEGATIVE for masses, tenderness or discharge  CV: NEGATIVE for chest pain, palpitations or peripheral edema  GI: NEGATIVE for nausea, abdominal pain, heartburn, or change in bowel habits  :NEGATIVE for frequency, dysuria, or hematuria  :NEGATIVE for frequency, dysuria, or hematuria  NEURO: NEGATIVE for weakness, dizziness or paresthesias  ENDOCRINE: NEGATIVE for temperature intolerance, skin/hair changes  HEME/ALLERGY/IMMUNE: NEGATIVE for bleeding problems  PSYCHIATRIC: NEGATIVE for changes in mood or affect        Objective: She has normal range of motion at the right hip to internal rotation external rotation and abduction.  She can do an active straight leg raise on the right with no extension lag.  There is no effusion to the right knee.  She is nontender over the medial or lateral patellar facet.  Nontender over the medial or lateral joint line of the right knee.  I can flex and extend the knee fully.  Anterior and posterior drawer is negative.  No swelling in the popliteal space and no swelling in the calf.  Appropriate in conversation and affect.    Assessment: Right-sided knee DJD, mild.  History of right-sided radicular leg pain and low back pain    Plan: We discussed the option of a cortisone injection however the patient declines that she says that she is not dealing with any knee discomfort.  She plans on optimizing  "physical therapy program and following up with physical medicine.  I explained that if she assesses her symptoms over time and finds that she has trouble ascending and descending stairs because of focal knee discomfort, that a cortisone shot would be an option.  She agrees to follow-up for her knee if her symptoms change.              Sports Medicine Clinic Visit    PCP: Julia Gifford    Esperanza Gage is a 76 year old female who is seen  in consultation at the request of Dr. Gifford presenting with right knee pain    Injury: No injury    Location of Pain: right lateral leg  Duration of Pain: Less than 1  year(s)  Rating of Pain: 5/10  Pain is better with: tylenol  Pain is worse with: Going up stairs  Additional Features: Feeling of instability when going up stairs  Treatment so far consists of: Tylenol  Prior History of related problems: Previous left knee replacement    Ht 1.664 m (5' 5.5\")   Wt 86.6 kg (191 lb)   BMI 31.30 kg/m        Again, thank you for allowing me to participate in the care of your patient.      Sincerely,    Navdeep Galeano MD    "

## 2022-06-13 NOTE — PROGRESS NOTES
This 76-year-old female is seen in follow-up at the request of Dr. Gifford to discuss right-sided knee x-rays.  She had an x-ray of her right knee in 2018 that showed mild lateral tibiofemoral DJD changes and mild patellofemoral DJD changes.  Today we reviewed her standing x-rays of her right knee that are essentially unchanged.  Patient denies any knee pain with walking.  She denies any limping.  She says that she will use the handrail when she goes up and down the stairs because she does not trust her leg and she feels that she has improvements in strength that could be made.  She recently saw physical medicine for low back and right-sided radicular leg discomfort.  They have her scheduled to see physical therapy.          H/o Left Total Knee Replacement - Left DOS: 08/28/2018, h/ o left knee cortisone injections 2017      XR Knee Standing Right 2 Views     Result Date: 5/31/2022  Exam: AP and lateral views of the right knee dated 5/31/2022. COMPARISON: 7/6/2017. CLINICAL HISTORY: Chronic knee pain. FINDINGS: AP and lateral views of the right knee were obtained. Joint space narrowing in the lateral femorotibial joint compartment of the right knee. Osteophytic spurring in the patellofemoral joint compartment. No large right knee joint effusion. No fracture or dislocation.      IMPRESSION: Joint space narrowing in the lateral femorotibial joint compartment of the right knee. MILDRED VARGAS MD   SYSTEM ID:  [id number]        PMH:  Past Medical History:   Diagnosis Date     Hearing problem 2017     Hyperlipidemia 2005     Hyperlipidemia LDL goal < 130      Hypertension      Osteoporosis, post-menopausal      Pelvic fracture (H)        Active problem list:  Patient Active Problem List   Diagnosis     Vitamin D deficiency     Hyperlipidemia with target LDL less than 130     Osteopenia     Breast signs and symptoms     Chondromalacia of patella     Primary localized osteoarthrosis, lower leg     Pelvic fracture (H)      Medication management     Leg numbness     Seborrheic keratosis     Milia     History of tinea     Status post knee surgery     Recurrent UTI     Vaginal atrophy       FH:  Family History   Problem Relation Age of Onset     Osteoporosis Mother      Hypertension Brother      Cerebrovascular Disease Brother      Cerebrovascular Disease Brother      Heart Disease Father      C.A.D. No family hx of      Diabetes No family hx of      Skin Cancer No family hx of      Melanoma No family hx of      Dementia No family hx of      Heart Disease No family hx of      Other - See Comments Brother         cerebrovascular disease     Hypertension Brother      Coronary Artery Disease No family hx of        SH:  Social History     Socioeconomic History     Marital status: Single     Spouse name: Not on file     Number of children: Not on file     Years of education: Not on file     Highest education level: Not on file   Occupational History     Not on file   Tobacco Use     Smoking status: Former Smoker     Packs/day: 0.00     Years: 0.00     Pack years: 0.00     Types: Cigarettes     Smokeless tobacco: Never Used   Vaping Use     Vaping Use: Never used   Substance and Sexual Activity     Alcohol use: Yes     Comment: Rare     Drug use: No     Sexual activity: Not Currently     Partners: Male   Other Topics Concern     Parent/sibling w/ CABG, MI or angioplasty before 65F 55M? Not Asked   Social History Narrative    Lives in her own home with two flights of stairs.  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  Had two brothers who both  of CVA. [facility]ates [facility].       Social Determinants of Health     Financial Resource Strain: Not on file   Food Insecurity: Not on file   Transportation Needs: Not on file   Physical Activity: Not on file   Stress: Not on file   Social Connections: Not on file   Intimate Partner Violence: Not on file   Housing Stability: Not on file       MEDS:  See EMR,  reviewed  ALL:  See EMR, reviewed    REVIEW OF SYSTEMS:  CONSTITUTIONAL:NEGATIVE for fever, chills, change in weight  INTEGUMENTARY/SKIN: NEGATIVE for worrisome rashes, moles or lesions  EYES: NEGATIVE for vision changes or irritation  ENT/MOUTH: NEGATIVE for ear, mouth and throat problems  RESP:NEGATIVE for significant cough or SOB  BREAST: NEGATIVE for masses, tenderness or discharge  CV: NEGATIVE for chest pain, palpitations or peripheral edema  GI: NEGATIVE for nausea, abdominal pain, heartburn, or change in bowel habits  :NEGATIVE for frequency, dysuria, or hematuria  :NEGATIVE for frequency, dysuria, or hematuria  NEURO: NEGATIVE for weakness, dizziness or paresthesias  ENDOCRINE: NEGATIVE for temperature intolerance, skin/hair changes  HEME/ALLERGY/IMMUNE: NEGATIVE for bleeding problems  PSYCHIATRIC: NEGATIVE for changes in mood or affect        Objective: She has normal range of motion at the right hip to internal rotation external rotation and abduction.  She can do an active straight leg raise on the right with no extension lag.  There is no effusion to the right knee.  She is nontender over the medial or lateral patellar facet.  Nontender over the medial or lateral joint line of the right knee.  I can flex and extend the knee fully.  Anterior and posterior drawer is negative.  No swelling in the popliteal space and no swelling in the calf.  Appropriate in conversation and affect.    Assessment: Right-sided knee DJD, mild.  History of right-sided radicular leg pain and low back pain    Plan: We discussed the option of a cortisone injection however the patient declines that she says that she is not dealing with any knee discomfort.  She plans on optimizing physical therapy program and following up with physical medicine.  I explained that if she assesses her symptoms over time and finds that she has trouble ascending and descending stairs because of focal knee discomfort, that a cortisone shot would be  an option.  She agrees to follow-up for her knee if her symptoms change.

## 2022-06-13 NOTE — PROGRESS NOTES
"Sports Medicine Clinic Visit    PCP: Julia Gifford    Esperanza Gage is a 76 year old female who is seen  in consultation at the request of Dr. Gifford presenting with right knee pain    Injury: No injury    Location of Pain: right lateral leg  Duration of Pain: Less than 1  year(s)  Rating of Pain: 5/10  Pain is better with: tylenol  Pain is worse with: Going up stairs  Additional Features: Feeling of instability when going up stairs  Treatment so far consists of: Tylenol  Prior History of related problems: Previous left knee replacement    Ht 1.664 m (5' 5.5\")   Wt 86.6 kg (191 lb)   BMI 31.30 kg/m    "

## 2022-06-14 ENCOUNTER — ALLIED HEALTH/NURSE VISIT (OUTPATIENT)
Dept: EDUCATION SERVICES | Facility: CLINIC | Age: 77
End: 2022-06-14
Payer: COMMERCIAL

## 2022-06-14 VITALS — BODY MASS INDEX: 31.2 KG/M2 | WEIGHT: 190.4 LBS

## 2022-06-14 DIAGNOSIS — R73.03 PREDIABETES: Primary | ICD-10-CM

## 2022-06-14 PROCEDURE — G9891 EM SESSION REPORTING: HCPCS

## 2022-06-14 NOTE — PROGRESS NOTES
Medicare Diabetes Prevention Program: Session 11    Esperanza Gage presents for MDPP Session 11: Belt with Triggers  Subjective/Objective:    Wt 86.4 kg (190 lb 6.4 oz)   BMI 31.20 kg/m      Minutes spent exercising over the past week: 286      Intervention/Plan:    Topics discussed today include information to meet the following learning objectives:    1) Some unhealthy food shopping triggers and ways to cope with them  2) Some unhealthy eating triggers and ways to cope with them  3) Some triggers of sitting still and ways to cope with them      Patient to follow-up next week at MDPP Session 12.     IZABEL MUÑOZ

## 2022-06-16 ENCOUNTER — THERAPY VISIT (OUTPATIENT)
Dept: PHYSICAL THERAPY | Facility: CLINIC | Age: 77
End: 2022-06-16
Attending: PAIN MEDICINE
Payer: COMMERCIAL

## 2022-06-16 DIAGNOSIS — G89.29 CHRONIC BILATERAL LOW BACK PAIN WITHOUT SCIATICA: ICD-10-CM

## 2022-06-16 DIAGNOSIS — M54.50 CHRONIC BILATERAL LOW BACK PAIN WITHOUT SCIATICA: ICD-10-CM

## 2022-06-16 DIAGNOSIS — M79.18 MYOFASCIAL PAIN: ICD-10-CM

## 2022-06-16 PROCEDURE — 97530 THERAPEUTIC ACTIVITIES: CPT | Mod: GP | Performed by: PHYSICAL THERAPIST

## 2022-06-16 PROCEDURE — 97110 THERAPEUTIC EXERCISES: CPT | Mod: GP | Performed by: PHYSICAL THERAPIST

## 2022-06-16 PROCEDURE — 97161 PT EVAL LOW COMPLEX 20 MIN: CPT | Mod: GP | Performed by: PHYSICAL THERAPIST

## 2022-06-16 NOTE — PROGRESS NOTES
Physical Therapy Initial Evaluation  Subjective:  The history is provided by the patient. No  was used.   Patient Health History  Esperanza Gage being seen for Chronic bilateral low back pain.     Problem began: 10/1/2021.   Problem occurred: Slow increase over time, unsure of onset timeline   Pain is reported as 4/10 on pain scale.  General health as reported by patient is good.  Pertinent medical history includes: high blood pressure, numbness/tingling and osteoporosis.   Red flags:  Pain at rest/night.     Surgeries include:  Orthopedic surgery. Other surgery history details: L TKA.    Current medications:  Cardiac medication. Other medications details: high cholesterol meds.    Current occupation is Retired.                     Therapist Generated HPI Evaluation  Problem details: Patient feels her right leg gives out on her sometimes especially on stairs. This has been an on going issue for. Patient feels her pain started due to her weight gain but is not fully sure what first started. .         Type of problem:  Lumbar.    This is a chronic condition.  Condition occurred with:  Insidious onset.  Where condition occurred: for unknown reasons.  Patient reports pain:  Lower lumbar spine.  Pain is described as aching and is intermittent.  Pain is the same all the time.  Since onset symptoms are gradually improving.  Associated symptoms:  Loss of motion/stiffness, loss of strength and loss of motion. Symptoms are exacerbated by twisting and sitting (standing up)  and relieved by activity/movement.  Special tests included:  MRI (mild arthritis, L convex curve in lumbar).    Work activity restrictions: retired.  Barriers include:  None as reported by patient.                        Objective:  System         Lumbar/SI Evaluation  ROM:    AROM Lumbar:   Flexion:            Hands flat on floor, minimal low back motion, lots of bending at hips, no curve reversal  Ext:                    Mod loss, ++  pain   Side Bend:        Left:  To knee, + pain    Right:  To knee, + pain  Rotation:           Left:  Min loss, + pain    Right:  Min loss, + pain  Side Glide:        Left:     Right:         Strength: Overall L leg stronger, patient says this is baseline since her TKA on L leg; fair abdominal activation                                                           Knee Evaluation:  ROM:  Arom wnl knee: R knee with significant crepitus, popping with end range extension.                                Tc Lumbar Evaluation    Posture:  Sitting: fair  Standing: fair  Lordosis: Accentuated  Lateral Shift: no  Correction of Posture: better      Test Movements:  FIS: During: no effect  After: no effect  Mechanical Response: no effect    EIS: During: produces  After: no worse  Mechanical Response: no effect  Repeat EIS: During: no effect  After: no worse  Mechanical Response: IncROM          Static Tests:  Sitting Slouched: negative        Lying Prone in Extension: mild low back pain                                             ROS    Assessment/Plan:    Patient is a 76 year old female with lumbar complaints.    Patient has the following significant findings with corresponding treatment plan.                Diagnosis 1:  Low back pain  Pain -  hot/cold therapy, manual therapy, self management, education, directional preference exercise and home program  Decreased ROM/flexibility - manual therapy, therapeutic exercise and home program  Decreased joint mobility - manual therapy, therapeutic exercise and home program  Decreased strength - therapeutic exercise, therapeutic activities and home program  Impaired muscle performance - neuro re-education and home program  Impaired posture - neuro re-education and home program    Therapy Evaluation Codes:   Cumulative Therapy Evaluation is: Low complexity.    Previous and current functional limitations:  (See Goal Flow Sheet for this information)    Short term and Long term goals:  (See Goal Flow Sheet for this information)     Communication ability:  Patient appears to be able to clearly communicate and understand verbal and written communication and follow directions correctly.  Treatment Explanation - The following has been discussed with the patient:   RX ordered/plan of care  Anticipated outcomes  Possible risks and side effects  This patient would benefit from PT intervention to resume normal activities.   Rehab potential is good.    Frequency:  1 X week, once daily  Duration:  for 2 weeks then 2x/month for 2 months  Discharge Plan:  Achieve all LTG.  Independent in home treatment program.  Reach maximal therapeutic benefit.    Please refer to the daily flowsheet for treatment today, total treatment time and time spent performing 1:1 timed codes.

## 2022-06-16 NOTE — PROGRESS NOTES
[facility]    OUTPATIENT Physical Therapy ORTHOPEDIC EVALUATION  PLAN OF TREATMENT FOR OUTPATIENT REHABILITATION  (COMPLETE FOR INITIAL CLAIMS ONLY)  Patient's Last Name, First Name, M.I.  YOB: 1945  Esperanza Gage    Provider s Name:  [facility]   Medical Record No.  [id number]   Start of Care Date:  06/16/22   Onset Date:   10/01/21   Type:     _X__PT   ___OT Medical Diagnosis:    Encounter Diagnoses   Name Primary?    Chronic bilateral low back pain without sciatica     Myofascial pain         Treatment Diagnosis:  Low back pain        Goals:     06/16/22 0500   Body Part   Goals listed below are for Low back   Goal #1   Goal #1 sleeping   Previous Functional Level No restrictions   Current Functional Level 1-2 hours without sleep per night   Performance Level With 5/10 low back pain   STG Target Performance Less than 1 hour without sleep per night   Performance Level WIth 3/10 low back pain   Rationale to establish restorative sleep pattern   Due Date 07/14/22   LTG Target Performance Sleep through the night w/o meds   Performance level With 1/10 low back pain   Rationale to establish restorative sleep pattern   Due Date 08/11/22       Therapy Frequency:  1x/week for two weeks then 2x/month for 2 months  Predicted Duration of Therapy Intervention:  10 weeks    Reyes Escalante, PT                 I CERTIFY THE NEED FOR THESE SERVICES FURNISHED UNDER        THIS PLAN OF TREATMENT AND WHILE UNDER MY CARE     (Physician co-signature of this document indicates review and certification of the therapy plan).                     Certification Date From:  06/16/22   Certification Date To:  08/25/22    Referring Provider:  Cory Galindo    Initial Assessment        See Epic Evaluation SOC Date: 06/16/22

## 2022-06-22 ENCOUNTER — TELEPHONE (OUTPATIENT)
Dept: SURGERY | Facility: CLINIC | Age: 77
End: 2022-06-22

## 2022-06-22 NOTE — PROGRESS NOTES
Medicare Diabetes Prevention Program: Session 18    Esperazna Gage presents for MDPP Session 18: Take a Fitness Break  Subjective/Objective:    Wt 86.1 kg (189 lb 12.8 oz)   BMI 31.10 kg/m      Minutes spent exercising over the past week: 175      Intervention/Plan:    Topics discussed today include information to meet the following learning objectives:    1) By the end of the session, participants will:  -Recognize the link between sitting still and Type 2 Diabetes  -Identify some challenges of taking fitness breaks and ways to cope with them        Patient to follow-up at next month's MDPP Maintenance Session.     IZABEL MUÑOZ

## 2022-06-23 ENCOUNTER — THERAPY VISIT (OUTPATIENT)
Dept: PHYSICAL THERAPY | Facility: CLINIC | Age: 77
End: 2022-06-23
Payer: COMMERCIAL

## 2022-06-23 DIAGNOSIS — M54.50 CHRONIC BILATERAL LOW BACK PAIN WITHOUT SCIATICA: Primary | ICD-10-CM

## 2022-06-23 DIAGNOSIS — G89.29 CHRONIC BILATERAL LOW BACK PAIN WITHOUT SCIATICA: Primary | ICD-10-CM

## 2022-06-23 PROCEDURE — 97530 THERAPEUTIC ACTIVITIES: CPT | Mod: GP | Performed by: PHYSICAL THERAPIST

## 2022-06-23 PROCEDURE — 97112 NEUROMUSCULAR REEDUCATION: CPT | Mod: GP | Performed by: PHYSICAL THERAPIST

## 2022-06-23 PROCEDURE — 97110 THERAPEUTIC EXERCISES: CPT | Mod: GP | Performed by: PHYSICAL THERAPIST

## 2022-06-30 ENCOUNTER — THERAPY VISIT (OUTPATIENT)
Dept: PHYSICAL THERAPY | Facility: CLINIC | Age: 77
End: 2022-06-30
Payer: COMMERCIAL

## 2022-06-30 DIAGNOSIS — G89.29 CHRONIC BILATERAL LOW BACK PAIN WITHOUT SCIATICA: Primary | ICD-10-CM

## 2022-06-30 DIAGNOSIS — M54.50 CHRONIC BILATERAL LOW BACK PAIN WITHOUT SCIATICA: Primary | ICD-10-CM

## 2022-06-30 PROCEDURE — 97110 THERAPEUTIC EXERCISES: CPT | Mod: GP | Performed by: PHYSICAL THERAPIST

## 2022-06-30 PROCEDURE — 97112 NEUROMUSCULAR REEDUCATION: CPT | Mod: GP | Performed by: PHYSICAL THERAPIST

## 2022-06-30 NOTE — PROGRESS NOTES
Assessment/Plan:    PROGRESS  REPORT    Progress reporting period is from 6/16/22 to 6/30/22.       SUBJECTIVE  Subjective: Pain continues to greatly improve. Using lumbar pillow all the time to help with posture which reduces pain. Any extension continues to decrease pain. Also feels that pelvic tilt decreases pain. R knee continues to be greatest limiter    Current Pain level: 0/10.     Initial Pain level: 2/10.   Changes in function:  Yes (See Goal flowsheet attached for changes in current functional level)  Adverse reaction to treatment or activity: None    OBJECTIVE  Changes noted in objective findings:  Yes, improved forward flexion to floor, still remains guarded  Objective: Min loss ext (improved from last week), improved flexion to floor after repeated ext; myotomes/dermatomes normal; patient has some pain reduction with rotation after completion ext     ASSESSMENT/PLAN  Updated problem list and treatment plan: Diagnosis 1:  Low back pain  Pain -  hot/cold therapy, manual therapy, self management, education, directional preference exercise and home program  Decreased ROM/flexibility - manual therapy, therapeutic exercise and home program  Decreased joint mobility - manual therapy, therapeutic exercise and home program  Impaired muscle performance - neuro re-education and home program  Decreased function - therapeutic activities and home program  STG/LTGs have been met or progress has been made towards goals:  Yes (See Goal flow sheet completed today.)  Assessment of Progress: The patient's condition is improving.  The patient's condition has potential to improve.  Self Management Plans:  Patient has been instructed in a home treatment program.  Patient  has been instructed in self management of symptoms.  I have re-evaluated this patient and find that the nature, scope, duration and intensity of the therapy is appropriate for the medical condition of the patient.  Esperanza continues to require the following  intervention to meet STG and LTG's:  PT    Recommendations:  This patient would benefit from continued therapy.     Frequency:  2 X month, once daily  Duration:  for 3 more visits        Please refer to the daily flowsheet for treatment today, total treatment time and time spent performing 1:1 timed codes.

## 2022-07-12 ENCOUNTER — THERAPY VISIT (OUTPATIENT)
Dept: PHYSICAL THERAPY | Facility: CLINIC | Age: 77
End: 2022-07-12
Payer: COMMERCIAL

## 2022-07-12 ENCOUNTER — ALLIED HEALTH/NURSE VISIT (OUTPATIENT)
Dept: EDUCATION SERVICES | Facility: CLINIC | Age: 77
End: 2022-07-12
Payer: COMMERCIAL

## 2022-07-12 VITALS — BODY MASS INDEX: 31.1 KG/M2 | WEIGHT: 189.8 LBS

## 2022-07-12 DIAGNOSIS — M54.50 CHRONIC BILATERAL LOW BACK PAIN WITHOUT SCIATICA: Primary | ICD-10-CM

## 2022-07-12 DIAGNOSIS — G89.29 CHRONIC BILATERAL LOW BACK PAIN WITHOUT SCIATICA: Primary | ICD-10-CM

## 2022-07-12 DIAGNOSIS — R73.03 PREDIABETES: Primary | ICD-10-CM

## 2022-07-12 PROCEDURE — 97140 MANUAL THERAPY 1/> REGIONS: CPT | Mod: GP | Performed by: PHYSICAL THERAPIST

## 2022-07-12 PROCEDURE — 97110 THERAPEUTIC EXERCISES: CPT | Mod: GP | Performed by: PHYSICAL THERAPIST

## 2022-07-12 PROCEDURE — 97112 NEUROMUSCULAR REEDUCATION: CPT | Mod: GP | Performed by: PHYSICAL THERAPIST

## 2022-07-12 PROCEDURE — G9884 2 EM ONGOING MS MO 19-21 WL: HCPCS

## 2022-08-04 ENCOUNTER — THERAPY VISIT (OUTPATIENT)
Dept: PHYSICAL THERAPY | Facility: CLINIC | Age: 77
End: 2022-08-04
Payer: COMMERCIAL

## 2022-08-04 DIAGNOSIS — M54.50 CHRONIC BILATERAL LOW BACK PAIN WITHOUT SCIATICA: Primary | ICD-10-CM

## 2022-08-04 DIAGNOSIS — G89.29 CHRONIC BILATERAL LOW BACK PAIN WITHOUT SCIATICA: Primary | ICD-10-CM

## 2022-08-04 PROCEDURE — 97110 THERAPEUTIC EXERCISES: CPT | Mod: GP | Performed by: PHYSICAL THERAPIST

## 2022-08-04 PROCEDURE — 97112 NEUROMUSCULAR REEDUCATION: CPT | Mod: GP | Performed by: PHYSICAL THERAPIST

## 2022-08-04 NOTE — PROGRESS NOTES
Assessment/Plan:    PROGRESS  REPORT    Progress reporting period is from 6/16/22 to 8/4/22.       SUBJECTIVE  Subjective: Back pain mostly resolved. Lingering low level pain that does not affect her. She feels her moving and strength are much more normal. She finds lumbar support greatly decreases her pain still especially on her recent trip. She is feeling fairly independent now with managing her back pain. Her knee is now her primary complaint     Current Pain level: 0/10.      Initial Pain level: 2/10.   Changes in function:  Yes (See Goal flowsheet attached for changes in current functional level)  Adverse reaction to treatment or activity: None    OBJECTIVE  Changes noted in objective findings:  Yes,  Objective: Lumbar ext/flex, rotation normal with no pain; dermatomes and myotomes normal; x12 sit to stands without use of arms; good abdominal activation and setting for bridge exercise.   ASSESSMENT/PLAN  Updated problem list and treatment plan: Diagnosis 1:  Low back pain  Pain -  hot/cold therapy, manual therapy, self management, education, directional preference exercise and home program  Decreased ROM/flexibility - manual therapy, therapeutic exercise and home program  Decreased joint mobility - manual therapy, therapeutic exercise and home program  Decreased strength - therapeutic exercise, therapeutic activities and home program  Impaired muscle performance - neuro re-education and home program  Decreased function - therapeutic activities and home program  STG/LTGs have been met or progress has been made towards goals:  Yes (See Goal flow sheet completed today.)  Assessment of Progress: The patient's condition is improving.  The patient has met all of their long term goals.  Self Management Plans:  Patient has been instructed in a home treatment program.  Patient is independent in a home treatment program.  Patient  has been instructed in self management of symptoms.  Patient is independent in self  management of symptoms.  I have re-evaluated this patient and find that the nature, scope, duration and intensity of the therapy is appropriate for the medical condition of the patient.  Esperanza continues to require the following intervention to meet STG and LTG's:  PT intervention is no longer required to meet STG/LTG.    Recommendations:  This patient is ready to be discharged from therapy and continue their home treatment program. She will continue HEP independently and will return only if needed.    Please refer to the daily flowsheet for treatment today, total treatment time and time spent performing 1:1 timed codes.

## 2022-08-05 ENCOUNTER — OFFICE VISIT (OUTPATIENT)
Dept: PHYSICAL MEDICINE AND REHAB | Facility: CLINIC | Age: 77
End: 2022-08-05
Payer: COMMERCIAL

## 2022-08-05 VITALS — SYSTOLIC BLOOD PRESSURE: 159 MMHG | OXYGEN SATURATION: 96 % | HEART RATE: 68 BPM | DIASTOLIC BLOOD PRESSURE: 58 MMHG

## 2022-08-05 DIAGNOSIS — M76.31 IT BAND SYNDROME, RIGHT: Primary | ICD-10-CM

## 2022-08-05 DIAGNOSIS — M79.18 MYOFASCIAL PAIN: ICD-10-CM

## 2022-08-05 DIAGNOSIS — M47.817 LUMBOSACRAL SPONDYLOSIS WITHOUT MYELOPATHY: ICD-10-CM

## 2022-08-05 PROCEDURE — 99214 OFFICE O/P EST MOD 30 MIN: CPT | Performed by: PAIN MEDICINE

## 2022-08-05 ASSESSMENT — PAIN SCALES - GENERAL: PAINLEVEL: MILD PAIN (2)

## 2022-08-05 NOTE — PROGRESS NOTES
Assessment:     Diagnoses and all orders for this visit:  It band syndrome, right  -     Physical Therapy Referral; Future  Lumbosacral spondylosis without myelopathy  -     Physical Therapy Referral; Future  Myofascial pain  -     Physical Therapy Referral; Future     Esperanza Gage is a 76 year old y.o. female with past medical history significant for hearing problem, leg numbness, vitamin D deficiency, hyperlipidemia, hypertension, osteoporosis, chondromalacia patella, elbow fracture, milia, recurrent UTI, vaginal atrophy, status post knee surgery who presents today for follow-up regarding low back pain:    -She has had improvement with low back pain with physical therapy.  Her pain is likely secondary to lumbar spondylosis without myelopathy.  She is also having right knee clicking and instability with walking and straightening her leg.  This is likely secondary to IT band syndrome.     Plan:     A shared decision making plan was used. The patient's values and choices were respected. Prior medical records from our last visit on 6/9/2022 were reviewed today. The following represents what was discussed and decided upon by the provider and the patient.        -DIAGNOSTIC TESTS: Images were personally reviewed and interpreted.   -- X-ray lumbar spine dated 6/9/2022 is personally viewed images interpreted and discussed with patient.  There is mild left convex curvature of the lumbar spine centered at L2-3.  There is grade 1 retrolisthesis of L3 on L4 and L4 and L5 as well as L5 on S1.  There is facet arthropathy from L2-3 through L5-S1.  -- Right knee dated 5/31/2022 is personally viewed images interpreted and discussed the patient.  There is mild joint space narrowing in the lateral femorotibial joint compartment of the right knee.    -INTERVENTIONS: No interventions at this time.    -MEDICATIONS: No changes to medications.  -  Discussed side effects of medications and proper use. Patient verbalized  understanding.    -PHYSICAL THERAPY: I have reordered physical therapy to have them additionally work on her right IT band and right knee with quadricep strengthening.  Recommend core strengthening as well.  Patient's had 3 sessions of physical therapy.  I recommended she continue with this.  Discussed the importance of core strengthening, ROM, stretching exercises with the patient and how each of these entities is important in decreasing pain.  Explained to the patient that the purpose of physical therapy is to teach the patient a home exercise program.  These exercises need to be performed every day in order to decrease pain and prevent future occurrences of pain.        -PATIENT EDUCATION: We discussed pain management in a multimodal fashion including physical therapy, medication management, possible future injections.    -FOLLOW UP: Patient will follow up in 4 weeks.  Advised to contact clinic if symptoms worsen or change.    Subjective:     Esperanza Gage is a 76 year old female who presents today for follow-up regarding low back pain.  Patient notes that her low back pain is doing quite a bit better at this time.  She notes that if she does her homework of physical therapy exercises that this helps more.  She notes when she extends backwards she does have increased mid to low back pain her pain is worse with prolonged walking or going up and down stairs.  She also notes a clicking in her right knee when going up stairs or extending her leg when not touching the ground.  Pain today is 2/10 is worst a 6 last as best as 2/10.  She denies any bowel or bladder changes, fevers, chills, unintentional weight loss.    Evaluation to Date: X-ray of lumbar spine dated 6/9/2022.  Right knee x-ray dated 5/31/2022.    Treatment to Date: 3 sessions of physical therapy.    Patient Active Problem List   Diagnosis     Vitamin D deficiency     Hyperlipidemia with target LDL less than 130     Osteopenia     Breast signs and symptoms      Chondromalacia of patella     Primary localized osteoarthrosis, lower leg     Pelvic fracture (H)     Medication management     Leg numbness     Seborrheic keratosis     Milia     History of tinea     Status post knee surgery     Recurrent UTI     Vaginal atrophy     Chronic bilateral low back pain without sciatica       Current Outpatient Medications   Medication     Ascorbic Acid (VITAMIN C) 500 MG CAPS     ASPIRIN PO     losartan (COZAAR) 50 MG tablet     multivitamin w/minerals (THERA-VIT-M) tablet     omega 3 1000 MG CAPS     simvastatin (ZOCOR) 10 MG tablet     vitamin B-Complex     vitamin D3 (CHOLECALCIFEROL) 50 mcg (2000 units) tablet     vitamin E 400 units TABS     zinc gluconate 50 MG tablet     Current Facility-Administered Medications   Medication     denosumab (PROLIA) injection 60 mg       No Known Allergies    Past Medical History:   Diagnosis Date     Hearing problem 2017     Hyperlipidemia 2005     Hyperlipidemia LDL goal < 130      Hypertension      Osteoporosis, post-menopausal      Pelvic fracture (H)         Review of Systems  ROS:  Specifically negative for bowel/bladder dysfunction, balance changes, headache, dizziness, foot drop, fevers, chills, appetite changes, nausea/vomiting, unexplained weight loss. Otherwise 13 systems reviewed are negative. Please see the patient's intake questionnaire from today for details.    Reviewed Social, Family, Past Medical and Past Surgical history with patient, no significant changes noted since prior visit.     Objective:     BP (!) 159/58   Pulse 68   SpO2 96%     PHYSICAL EXAMINATION:    --CONSTITUTIONAL: Well developed, well nourished, healthy appearing individual.  --PSYCHIATRIC: Appropriate mood and affect. No difficulty interacting due to temper, social withdrawal, or memory issues.  --RESPIRATORY: Normal rhythm and effort. No abnormal accessory muscle breathing patterns noted.   --MUSCULOSKELETAL:  Normal lumbar lordosis noted, no lateral  shift.  --GROSS MOTOR: Easily arises from a seated position. Gait is non-antalgic  --LUMBAR SPINE:  Inspection reveals no evidence of deformity. Range of motion is mildly limited in lumbar flexion, extension, lateral rotation.  Tenderness to palpation across the low back and right iliotibial band. Straight leg raising in the seated position is negative to radicular pain.   --SACROILIAC JOINT:  One Finger point test negative.  --LOWER EXTREMITY MOTOR TESTING:  Plantar flexion left 5/5, right 5/5   Dorsiflexion left 5/5, right 5/5   Great toe MTP extension left 5/5, right 5/5  Knee flexion left 5/5, right 5/5  Knee extension left 5/5, right 5/5   Hip flexion left 5/5, right 5/5  Hip abduction left 5/5, right 5/5  Hip adduction left 5/5, right 5/5   --NEUROLOGIC: Sensation to light touch is intact in the bilateral L4, L5, and S1 dermatomes.    RESULTS:   Imaging: Lumbar spine and right knee imaging was reviewed today.

## 2022-08-05 NOTE — LETTER
8/5/2022         RE: Esperanza Gage  [address] [place] [address]        Dear Colleague,    Thank you for referring your patient, Esperanza Gage, to the [facility]. Please see a copy of my visit note below.      Assessment:     Diagnoses and all orders for this visit:  It band syndrome, right  -     Physical Therapy Referral; Future  Lumbosacral spondylosis without myelopathy  -     Physical Therapy Referral; Future  Myofascial pain  -     Physical Therapy Referral; Future     Esperanza Gage is a 76 year old y.o. female with past medical history significant for hearing problem, leg numbness, vitamin D deficiency, hyperlipidemia, hypertension, osteoporosis, chondromalacia patella, elbow fracture, milia, recurrent UTI, vaginal atrophy, status post knee surgery who presents today for follow-up regarding low back pain:    -She has had improvement with low back pain with physical therapy.  Her pain is likely secondary to lumbar spondylosis without myelopathy.  She is also having right knee clicking and instability with walking and straightening her leg.  This is likely secondary to IT band syndrome.     Plan:     A shared decision making plan was used. The patient's values and choices were respected. Prior medical records from our last visit on 6/9/2022 were reviewed today. The following represents what was discussed and decided upon by the provider and the patient.        -DIAGNOSTIC TESTS: Images were personally reviewed and interpreted.   -- X-ray lumbar spine dated 6/9/2022 is personally viewed images interpreted and discussed with patient.  There is mild left convex curvature of the lumbar spine centered at L2-3.  There is grade 1 retrolisthesis of L3 on L4 and L4 and L5 as well as L5 on S1.  There is facet arthropathy from L2-3 through L5-S1.  -- Right knee dated 5/31/2022 is personally viewed images interpreted and discussed the patient.  There is mild joint space  narrowing in the lateral femorotibial joint compartment of the right knee.    -INTERVENTIONS: No interventions at this time.    -MEDICATIONS: No changes to medications.  -  Discussed side effects of medications and proper use. Patient verbalized understanding.    -PHYSICAL THERAPY: I have reordered physical therapy to have them additionally work on her right IT band and right knee with quadricep strengthening.  Recommend core strengthening as well.  Patient's had 3 sessions of physical therapy.  I recommended she continue with this.  Discussed the importance of core strengthening, ROM, stretching exercises with the patient and how each of these entities is important in decreasing pain.  Explained to the patient that the purpose of physical therapy is to teach the patient a home exercise program.  These exercises need to be performed every day in order to decrease pain and prevent future occurrences of pain.        -PATIENT EDUCATION: We discussed pain management in a multimodal fashion including physical therapy, medication management, possible future injections.    -FOLLOW UP: Patient will follow up in 4 weeks.  Advised to contact clinic if symptoms worsen or change.    Subjective:     Esperanza Gage is a 76 year old female who presents today for follow-up regarding low back pain.  Patient notes that her low back pain is doing quite a bit better at this time.  She notes that if she does her homework of physical therapy exercises that this helps more.  She notes when she extends backwards she does have increased mid to low back pain her pain is worse with prolonged walking or going up and down stairs.  She also notes a clicking in her right knee when going up stairs or extending her leg when not touching the ground.  Pain today is 2/10 is worst a 6 last as best as 2/10.  She denies any bowel or bladder changes, fevers, chills, unintentional weight loss.    Evaluation to Date: X-ray of lumbar spine dated 6/9/2022.   Right knee x-ray dated 5/31/2022.    Treatment to Date: 3 sessions of physical therapy.    Patient Active Problem List   Diagnosis     Vitamin D deficiency     Hyperlipidemia with target LDL less than 130     Osteopenia     Breast signs and symptoms     Chondromalacia of patella     Primary localized osteoarthrosis, lower leg     Pelvic fracture (H)     Medication management     Leg numbness     Seborrheic keratosis     Milia     History of tinea     Status post knee surgery     Recurrent UTI     Vaginal atrophy     Chronic bilateral low back pain without sciatica       Current Outpatient Medications   Medication     Ascorbic Acid (VITAMIN C) 500 MG CAPS     ASPIRIN PO     losartan (COZAAR) 50 MG tablet     multivitamin w/minerals (THERA-VIT-M) tablet     omega 3 1000 MG CAPS     simvastatin (ZOCOR) 10 MG tablet     vitamin B-Complex     vitamin D3 (CHOLECALCIFEROL) 50 mcg (2000 units) tablet     vitamin E 400 units TABS     zinc gluconate 50 MG tablet     Current Facility-Administered Medications   Medication     denosumab (PROLIA) injection 60 mg       No Known Allergies    Past Medical History:   Diagnosis Date     Hearing problem 2017     Hyperlipidemia 2005     Hyperlipidemia LDL goal < 130      Hypertension      Osteoporosis, post-menopausal      Pelvic fracture (H)         Review of Systems  ROS:  Specifically negative for bowel/bladder dysfunction, balance changes, headache, dizziness, foot drop, fevers, chills, appetite changes, nausea/vomiting, unexplained weight loss. Otherwise 13 systems reviewed are negative. Please see the patient's intake questionnaire from today for details.    Reviewed Social, Family, Past Medical and Past Surgical history with patient, no significant changes noted since prior visit.     Objective:     BP (!) 159/58   Pulse 68   SpO2 96%     PHYSICAL EXAMINATION:    --CONSTITUTIONAL: Well developed, well nourished, healthy appearing individual.  --PSYCHIATRIC: Appropriate mood and  affect. No difficulty interacting due to temper, social withdrawal, or memory issues.  --RESPIRATORY: Normal rhythm and effort. No abnormal accessory muscle breathing patterns noted.   --MUSCULOSKELETAL:  Normal lumbar lordosis noted, no lateral shift.  --GROSS MOTOR: Easily arises from a seated position. Gait is non-antalgic  --LUMBAR SPINE:  Inspection reveals no evidence of deformity. Range of motion is mildly limited in lumbar flexion, extension, lateral rotation.  Tenderness to palpation across the low back and right iliotibial band. Straight leg raising in the seated position is negative to radicular pain.   --SACROILIAC JOINT:  One Finger point test negative.  --LOWER EXTREMITY MOTOR TESTING:  Plantar flexion left 5/5, right 5/5   Dorsiflexion left 5/5, right 5/5   Great toe MTP extension left 5/5, right 5/5  Knee flexion left 5/5, right 5/5  Knee extension left 5/5, right 5/5   Hip flexion left 5/5, right 5/5  Hip abduction left 5/5, right 5/5  Hip adduction left 5/5, right 5/5   --NEUROLOGIC: Sensation to light touch is intact in the bilateral L4, L5, and S1 dermatomes.    RESULTS:   Imaging: Lumbar spine and right knee imaging was reviewed today.                          Again, thank you for allowing me to participate in the care of your patient.        Sincerely,        Cory Galindo, DO

## 2022-08-05 NOTE — PATIENT INSTRUCTIONS
I ordered physical therapy for your back and knee.    We discussed that your IT band or iliotibial band is likely involved with your knee problem.    ~Please call Nurse Navigation line [phone] with any questions or concerns about your treatment plan, if symptoms worsen and you would like to be seen urgently, or if you have problems controlling bladder and bowel function.

## 2022-08-09 ENCOUNTER — ALLIED HEALTH/NURSE VISIT (OUTPATIENT)
Dept: EDUCATION SERVICES | Facility: CLINIC | Age: 77
End: 2022-08-09
Payer: COMMERCIAL

## 2022-08-09 VITALS — BODY MASS INDEX: 31.6 KG/M2 | WEIGHT: 192.8 LBS

## 2022-08-09 DIAGNOSIS — R73.03 PREDIABETES: Primary | ICD-10-CM

## 2022-08-09 PROCEDURE — G9885 2 EM ONGOING MS MO 22-24 WL: HCPCS

## 2022-08-09 NOTE — PROGRESS NOTES
Medicare Diabetes Prevention Program: Session 14    Esperanza Gage presents for MDPP Session 14: Get Support  Subjective/Objective:    Wt 87.5 kg (192 lb 12.8 oz)   BMI 31.60 kg/m      Minutes spent exercising over the past week: 330      Intervention/Plan:    Topics discussed today include information to meet the following learning objectives:    1) By the end of the session, participants will explain how to get support from:  -Family, friends and co-workers  -Groups, classes and clubs  -Professionals      Patient to follow-up at next month's MDPP Maintenance Session.     IZABEL MUÑOZ

## 2022-08-16 ENCOUNTER — ALLIED HEALTH/NURSE VISIT (OUTPATIENT)
Dept: OBGYN | Facility: CLINIC | Age: 77
End: 2022-08-16
Attending: FAMILY MEDICINE
Payer: COMMERCIAL

## 2022-08-16 ENCOUNTER — THERAPY VISIT (OUTPATIENT)
Dept: PHYSICAL THERAPY | Facility: CLINIC | Age: 77
End: 2022-08-16
Attending: PAIN MEDICINE
Payer: COMMERCIAL

## 2022-08-16 DIAGNOSIS — M76.31 IT BAND SYNDROME, RIGHT: ICD-10-CM

## 2022-08-16 DIAGNOSIS — M81.0 SENILE OSTEOPOROSIS: Primary | ICD-10-CM

## 2022-08-16 DIAGNOSIS — M79.18 MYOFASCIAL PAIN: ICD-10-CM

## 2022-08-16 DIAGNOSIS — M25.561 CHRONIC PAIN OF RIGHT KNEE: ICD-10-CM

## 2022-08-16 DIAGNOSIS — G89.29 CHRONIC PAIN OF RIGHT KNEE: ICD-10-CM

## 2022-08-16 DIAGNOSIS — M47.817 LUMBOSACRAL SPONDYLOSIS WITHOUT MYELOPATHY: ICD-10-CM

## 2022-08-16 PROCEDURE — 97161 PT EVAL LOW COMPLEX 20 MIN: CPT | Mod: GP | Performed by: PHYSICAL THERAPIST

## 2022-08-16 PROCEDURE — 96372 THER/PROPH/DIAG INJ SC/IM: CPT | Performed by: FAMILY MEDICINE

## 2022-08-16 PROCEDURE — 250N000011 HC RX IP 250 OP 636: Performed by: FAMILY MEDICINE

## 2022-08-16 PROCEDURE — 97110 THERAPEUTIC EXERCISES: CPT | Mod: GP | Performed by: PHYSICAL THERAPIST

## 2022-08-16 PROCEDURE — 999N000103 HC STATISTIC NO CHARGE FACILITY FEE

## 2022-08-16 PROCEDURE — 99207 PR NO CHARGE LOS: CPT

## 2022-08-16 RX ADMIN — DENOSUMAB 60 MG: 60 INJECTION SUBCUTANEOUS at 08:41

## 2022-08-16 ASSESSMENT — ACTIVITIES OF DAILY LIVING (ADL)
PAIN: I DO NOT HAVE THE SYMPTOM
RISE FROM A CHAIR: ACTIVITY IS SOMEWHAT DIFFICULT
SIT WITH YOUR KNEE BENT: ACTIVITY IS NOT DIFFICULT
HOW_WOULD_YOU_RATE_THE_CURRENT_FUNCTION_OF_YOUR_KNEE_DURING_YOUR_USUAL_DAILY_ACTIVITIES_ON_A_SCALE_FROM_0_TO_100_WITH_100_BEING_YOUR_LEVEL_OF_KNEE_FUNCTION_PRIOR_TO_YOUR_INJURY_AND_0_BEING_THE_INABILITY_TO_PERFORM_ANY_OF_YOUR_USUAL_DAILY_ACTIVITIES?: 20
STIFFNESS: THE SYMPTOM AFFECTS MY ACTIVITY SLIGHTLY
SWELLING: I DO NOT HAVE THE SYMPTOM
KNEEL ON THE FRONT OF YOUR KNEE: ACTIVITY IS SOMEWHAT DIFFICULT
STAND: ACTIVITY IS NOT DIFFICULT
WALK: ACTIVITY IS NOT DIFFICULT
KNEE_ACTIVITY_OF_DAILY_LIVING_SUM: 50
GO DOWN STAIRS: ACTIVITY IS SOMEWHAT DIFFICULT
AS_A_RESULT_OF_YOUR_KNEE_INJURY,_HOW_WOULD_YOU_RATE_YOUR_CURRENT_LEVEL_OF_DAILY_ACTIVITY?: ABNORMAL
RAW_SCORE: 50
HOW_WOULD_YOU_RATE_THE_OVERALL_FUNCTION_OF_YOUR_KNEE_DURING_YOUR_USUAL_DAILY_ACTIVITIES?: ABNORMAL
KNEE_ACTIVITY_OF_DAILY_LIVING_SCORE: 71.43
GO UP STAIRS: ACTIVITY IS FAIRLY DIFFICULT
GIVING WAY, BUCKLING OR SHIFTING OF KNEE: THE SYMPTOM AFFECTS MY ACTIVITY SEVERELY
WEAKNESS: I DO NOT HAVE THE SYMPTOM
LIMPING: I DO NOT HAVE THE SYMPTOM
SQUAT: I AM UNABLE TO DO THE ACTIVITY

## 2022-08-16 NOTE — NURSING NOTE
Chief Complaint   Patient presents with     Allied Health Visit     Prolia injection           Clinic Administered Medication Documentation    Administrations This Visit     denosumab (PROLIA) injection 60 mg     Admin Date  08/16/2022 Action  Given Dose  60 mg Route  Subcutaneous Site  Left Arm Administered By  Maria A Mcginnis LPN    Ordering Provider: Yin Srivastava MD PhD    NDC: 28491-868-05    Lot#: [id number]    : AMGEN    Patient Supplied?: No                  Prolia Documentation    Prior to injection, verified patient identity using patient's name and date of birth. Medication was administered. Please see MAR and medication order for additional information. Patient instructed to remain in clinic for 15 minutes.    Indication: Prolia  (denosumab) is a prescription medicine used to treat osteoporosis in patients who:     Are at high risk for fracture, meaning patients who have had a fracture related to osteoporosis, or who have multiple risk factors for fracture.    Cannot use another osteoporosis medicine or other osteoporosis medicines did not work well.    The timeline for early/late injections would be 4 weeks early and any time after the 6 month aga. If a patient receives their injection late, then the subsequent injection would be 6 months from the date that they actually received the injection.    When was the last injection?    Was the last injection at least 6 months ago? Yes  Has the prior authorization been completed?  Yes  Is there an active order (within the past 365 days) in the chart?  Yes  Patient denies any dental work involving the bone (e.g. tooth extraction or dental implants) in the past 4 weeks?  Yes  Patient denies plans for any dental work involving the bone (e.g. tooth extraction or dental implants) in the next 4 weeks? Yes    The following steps were completed to comply with the REMS program for Prolia:    Confirms that patient received education from RN or provider  via the Medication Guide and Patient Counseling Chart, including the serious risks of Prolia  and the symptoms of each risk.    Told the patient to seek prompt medical attention if they have signs or symptoms of any of the serious risks, as described in the Medication Guide and Patient Counseling Chart that was reviewed between the patient and RN or LP.    Provided each patient a copy of the Medication Guide and Patient Brochure.      Was entire vial of medication used? Yes  Vial/Syringe: Syringe  Expiration Date:  10/2024  Was this medication supplied by the patient? No         Maria A Mcginnis LPN

## 2022-08-16 NOTE — PROGRESS NOTES
Subjective:  The history is provided by the patient.   Patient Health History  Esperanza Gage being seen for right knee pain.     Date of Onset: 8/5/22 MD order.   Problem occurred: unknown   Pain score: mostly no pain, just buckling of the knee.  General health as reported by patient is good.  Pertinent medical history includes: high blood pressure, numbness/tingling, osteoarthritis, osteoporosis and overweight.     Medical allergies: none.   Surgeries include:  Orthopedic surgery. Other surgery history details: L TKA.    Current medications:  Bone density and high blood pressure medication. Other medications details: cholesterol.    Current occupation is retired.      Other job/home tasks details: yard work, housework, stairs (2 story home).                Therapist Generated HPI Evaluation  Problem details: 8/5/22 - MD order  Pt reports right knee pain. Gets clicking going up stairs. Really not having any pain, but feels like it is going to buckle. Significant crepitus extending knee when NWB. Can barely squat. History of L TKA. Feels like she doesn't have the strength to go up with stairs without railing. Cannot carry grocery bags up stairs. Walking is not a problem.   Has difficulty doing things karl gardening.  Was recently in PT for LBP which has improved. .         Type of problem:  Right knee.    This is a new condition.  Condition occurred with:  Insidious onset and degenerative joint disease.  Where condition occurred: for unknown reasons.  Patient reports pain:  Lateral.  Pain quality: mostly buckling, clicking. and is intermittent.  Radiates to: none. Pain is the same all the time.    Associated symptoms:  Other and buckling/giving out (clicking). Symptoms are exacerbated by ascending stairs, descending stairs, bending/squatting and other (extending knee ins NWB)  and relieved by other (avoiding the bad motions, avoiding bending the knee).  Special tests included:  X-ray.  Past treatment: none. Improved  with treatment: NA.  Work activity restrictions: retired.  Barriers include:  None as reported by patient.      Physical Exam                    Objective:  Standing Alignment:              Knee:  Genu valgus R                                                       Hip Evaluation    Hip Strength:    Flexion:   Left: 4+/5   Pain:  Right: 4+/5   Pain:                    Extension:  Left: 4+/5  Pain:Right: 4/5    Pain:    Abduction:  Right: 4/5    Pain:                           Knee Evaluation:  ROM:      PROM      Extension: Left:   Right:  0  Flexion: Left:   Right:  WNL      Strength:     Extension:  Left: 5/5   Pain:      Right: 5/5   Pain:  Flexion:  Left: 5/5   Pain:      Right: 5/5   Pain:    Quad Set Left: Fair    Pain:   Quad Set Right: Fair    Pain:      Palpation:      Right knee tenderness present at:  Medial Joint Line; Lateral Joint Line and IT Band  Right knee tenderness not present at:  Popliteal      Functional Testing:          Quad:    Single Leg Squat:  Left:      Right:        Bilateral Leg Squat:  Very minimal depth prior to shifting into L LE and feeling of R knee will buckle and click       Proprioception:   [place] Balance Test:  Left:  EO x 30 sec  Right:  EO x 30 sec - 1 hand tap down on table  % of Uninvolved:           General     ROS    Assessment/Plan:    Patient is a 76 year old female with right side knee complaints.    Patient has the following significant findings with corresponding treatment plan.                Diagnosis 1:  Right knee pain    Decreased strength - therapeutic exercise, therapeutic activities and home program  Impaired balance - neuro re-education, therapeutic activities and home program  Impaired muscle performance - neuro re-education and home program  Decreased function - therapeutic activities and home program  Impaired posture - neuro re-education, therapeutic activities and home program    Therapy Evaluation Codes:   1) History comprised of:   Personal factors  that impact the plan of care:      None.    Comorbidity factors that impact the plan of care are:      Osteoarthritis and Overweight.     Medications impacting care: None.  2) Examination of Body Systems comprised of:   Body structures and functions that impact the plan of care:      Knee.   Activity limitations that impact the plan of care are:      Lifting, Squatting/kneeling and Stairs.  3) Clinical presentation characteristics are:   Stable/Uncomplicated.  4) Decision-Making    Low complexity using standardized patient assessment instrument and/or measureable assessment of functional outcome.  Cumulative Therapy Evaluation is: Low complexity.    Previous and current functional limitations:  (See Goal Flow Sheet for this information)    Short term and Long term goals: (See Goal Flow Sheet for this information)     Communication ability:  Patient appears to be able to clearly communicate and understand verbal and written communication and follow directions correctly.  Treatment Explanation - The following has been discussed with the patient:   RX ordered/plan of care  Anticipated outcomes  Possible risks and side effects  This patient would benefit from PT intervention to resume normal activities.   Rehab potential is good.    Frequency:  1 X week, once daily  Duration:  for 4 weeks tapering to 2 X a month over 8 weeks  Discharge Plan:  Achieve all LTG.  Independent in home treatment program.  Reach maximal therapeutic benefit.    Please refer to the daily flowsheet for treatment today, total treatment time and time spent performing 1:1 timed codes.

## 2022-08-16 NOTE — PROGRESS NOTES
[facility]    OUTPATIENT Physical Therapy ORTHOPEDIC EVALUATION  PLAN OF TREATMENT FOR OUTPATIENT REHABILITATION  (COMPLETE FOR INITIAL CLAIMS ONLY)  Patient's Last Name, First Name, M.I.  YOB: 1945  Esperanza Gage    Provider s Name:  [facility]   Medical Record No.  [id number]   Start of Care Date:  08/16/22   Onset Date:    (8/5/22 MD order)   Type:     _X__PT   ___OT Medical Diagnosis:    Encounter Diagnoses   Name Primary?    Lumbosacral spondylosis without myelopathy     It band syndrome, right     Myofascial pain     Chronic pain of right knee         Treatment Diagnosis:  R knee pain        Goals:     08/16/22 0500   Body Part   Goals listed below are for R knee   Goal #2   Goal #2 stairs   Previous Functional Level No restrictions   Current Functional Level Ascend stairs   Performance Level must use railing for 2 flights   STG [employer] Ascend stairs   Performance Level 4-5 stairs without railing   Rationale to reach upper level of home safely;for safe community access to buildings   Due Date 09/06/22   LTG [employer] Ascend stairs   Performance Level 2 flights without railing   Rationale to reach upper level of home safely;for safe community access to buildings   Due Date 11/13/22       Therapy Frequency:  1x/week for 4 weeks tapering to 2 X a month over 8 weeks  Predicted Duration of Therapy Intervention:  12 weeks    Nelda Warren, PT                 I CERTIFY THE NEED FOR THESE SERVICES FURNISHED UNDER        THIS PLAN OF TREATMENT AND WHILE UNDER MY CARE     (Physician attestation of this document indicates review and certification of the therapy plan).                     Certification Date From:  08/16/22   Certification Date To:  11/13/22    Referring Provider:  Cory Galindo    Initial Assessment        See Epic Evaluation SOC  Date: 08/16/22

## 2022-08-23 ENCOUNTER — THERAPY VISIT (OUTPATIENT)
Dept: PHYSICAL THERAPY | Facility: CLINIC | Age: 77
End: 2022-08-23
Payer: COMMERCIAL

## 2022-08-23 DIAGNOSIS — M25.561 CHRONIC PAIN OF RIGHT KNEE: Primary | ICD-10-CM

## 2022-08-23 DIAGNOSIS — G89.29 CHRONIC PAIN OF RIGHT KNEE: Primary | ICD-10-CM

## 2022-08-23 PROCEDURE — 97110 THERAPEUTIC EXERCISES: CPT | Mod: 59 | Performed by: PHYSICAL THERAPIST

## 2022-08-23 PROCEDURE — 97530 THERAPEUTIC ACTIVITIES: CPT | Mod: GP | Performed by: PHYSICAL THERAPIST

## 2022-08-23 PROCEDURE — 97140 MANUAL THERAPY 1/> REGIONS: CPT | Mod: 59 | Performed by: PHYSICAL THERAPIST

## 2022-08-30 ENCOUNTER — LAB (OUTPATIENT)
Dept: LAB | Facility: CLINIC | Age: 77
End: 2022-08-30
Payer: COMMERCIAL

## 2022-08-30 ENCOUNTER — THERAPY VISIT (OUTPATIENT)
Dept: PHYSICAL THERAPY | Facility: CLINIC | Age: 77
End: 2022-08-30
Payer: COMMERCIAL

## 2022-08-30 DIAGNOSIS — G89.29 CHRONIC PAIN OF RIGHT KNEE: Primary | ICD-10-CM

## 2022-08-30 DIAGNOSIS — M25.561 CHRONIC PAIN OF RIGHT KNEE: Primary | ICD-10-CM

## 2022-08-30 DIAGNOSIS — M81.0 SENILE OSTEOPOROSIS: ICD-10-CM

## 2022-08-30 LAB
CALCIUM SERPL-MCNC: 9.2 MG/DL (ref 8.5–10.1)
MAGNESIUM SERPL-MCNC: 2.2 MG/DL (ref 1.6–2.3)
PHOSPHATE SERPL-MCNC: 2.8 MG/DL (ref 2.5–4.5)

## 2022-08-30 PROCEDURE — 83735 ASSAY OF MAGNESIUM: CPT

## 2022-08-30 PROCEDURE — 97140 MANUAL THERAPY 1/> REGIONS: CPT | Mod: GP | Performed by: PHYSICAL THERAPIST

## 2022-08-30 PROCEDURE — 82310 ASSAY OF CALCIUM: CPT

## 2022-08-30 PROCEDURE — 97110 THERAPEUTIC EXERCISES: CPT | Mod: GP | Performed by: PHYSICAL THERAPIST

## 2022-08-30 PROCEDURE — 97530 THERAPEUTIC ACTIVITIES: CPT | Mod: GP | Performed by: PHYSICAL THERAPIST

## 2022-08-30 PROCEDURE — 84100 ASSAY OF PHOSPHORUS: CPT

## 2022-08-30 PROCEDURE — 36415 COLL VENOUS BLD VENIPUNCTURE: CPT

## 2022-09-06 ENCOUNTER — THERAPY VISIT (OUTPATIENT)
Dept: PHYSICAL THERAPY | Facility: CLINIC | Age: 77
End: 2022-09-06
Payer: COMMERCIAL

## 2022-09-06 DIAGNOSIS — M25.561 CHRONIC PAIN OF RIGHT KNEE: Primary | ICD-10-CM

## 2022-09-06 DIAGNOSIS — G89.29 CHRONIC PAIN OF RIGHT KNEE: Primary | ICD-10-CM

## 2022-09-06 PROCEDURE — 97530 THERAPEUTIC ACTIVITIES: CPT | Mod: GP | Performed by: PHYSICAL THERAPIST

## 2022-09-06 PROCEDURE — 97110 THERAPEUTIC EXERCISES: CPT | Mod: 59 | Performed by: PHYSICAL THERAPIST

## 2022-09-06 NOTE — PROGRESS NOTES
PROGRESS  REPORT    Progress reporting period is from 8/16/22 to 9/6/22.       SUBJECTIVE  Subjective: pt reports her left groin was sore for a few days after starting the standing marching. has been using just 2 sticks in her ankle weights and plans to add 1 each week. has been able to get down on floor on yoga mat for HEP, uses chair next to her to assist her getting back up. does feel like she is getting slowly stronger and not needing as much break between sets. stairs are still improved but depends on time of day. was able to to stairs by hardly touching the railing today. still using rolling pin. Trying HEP daily but missed 2 days this week     Current Pain level: 0/10.      Initial Pain level: 0/10.   Changes in function:  Yes (See Goal flowsheet attached for changes in current functional level)  Adverse reaction to treatment or activity: None    OBJECTIVE  Changes noted in objective findings:  Yes  Objective: able to ascend 4 inch step with minimal vaulting or use of UE, but demonstrates significant vaulting for 6 inch step up. improved LE strength in NWB as demo'd by addition of ankle weights and incr reps with all HEP. tenderness most noted distal ITB.     ASSESSMENT/PLAN  Updated problem list and treatment plan: Diagnosis 1:  R knee pain    Decreased ROM/flexibility - manual therapy, therapeutic exercise and home program  Decreased strength - therapeutic exercise, therapeutic activities and home program  Impaired balance - neuro re-education, therapeutic activities and home program  Decreased function - therapeutic activities and home program  STG/LTGs have been met or progress has been made towards goals:  Yes (See Goal flow sheet completed today.)  Assessment of Progress: The patient's condition is improving.  Patient is meeting short term goals and is progressing towards long term goals.  Self Management Plans:  Patient has been instructed in a home treatment program.  Patient  has been instructed in  self management of symptoms.  I have re-evaluated this patient and find that the nature, scope, duration and intensity of the therapy is appropriate for the medical condition of the patient.  Esperanza continues to require the following intervention to meet STG and LTG's:  PT    Recommendations:  This patient would benefit from continued therapy.     Frequency:  1-2 X a month, once daily  Duration:  for 2 months        Please refer to the daily flowsheet for treatment today, total treatment time and time spent performing 1:1 timed codes.

## 2022-09-08 ENCOUNTER — OFFICE VISIT (OUTPATIENT)
Dept: PHYSICAL MEDICINE AND REHAB | Facility: CLINIC | Age: 77
End: 2022-09-08
Payer: COMMERCIAL

## 2022-09-08 VITALS — HEART RATE: 74 BPM | OXYGEN SATURATION: 96 % | DIASTOLIC BLOOD PRESSURE: 63 MMHG | SYSTOLIC BLOOD PRESSURE: 136 MMHG

## 2022-09-08 DIAGNOSIS — M47.817 LUMBOSACRAL SPONDYLOSIS WITHOUT MYELOPATHY: Primary | ICD-10-CM

## 2022-09-08 DIAGNOSIS — M79.18 MYOFASCIAL PAIN: ICD-10-CM

## 2022-09-08 DIAGNOSIS — G89.29 CHRONIC PAIN OF RIGHT KNEE: ICD-10-CM

## 2022-09-08 DIAGNOSIS — M25.561 CHRONIC PAIN OF RIGHT KNEE: ICD-10-CM

## 2022-09-08 DIAGNOSIS — M76.31 IT BAND SYNDROME, RIGHT: ICD-10-CM

## 2022-09-08 PROCEDURE — 99214 OFFICE O/P EST MOD 30 MIN: CPT | Performed by: PAIN MEDICINE

## 2022-09-08 ASSESSMENT — PAIN SCALES - GENERAL: PAINLEVEL: MILD PAIN (2)

## 2022-09-08 NOTE — PATIENT INSTRUCTIONS
I have reordered physical therapy for you.  I agree with recommendations from your physical therapist of 1-2 times per month.    ~Please call Nurse Navigation line [phone] with any questions or concerns about your treatment plan, if symptoms worsen and you would like to be seen urgently, or if you have problems controlling bladder and bowel function.

## 2022-09-08 NOTE — LETTER
9/8/2022         RE: Esperanza Gage  [address] [place] [address]        Dear Colleague,    Thank you for referring your patient, Esperanza Gage, to the [facility]. Please see a copy of my visit note below.      Assessment:     Diagnoses and all orders for this visit:  Lumbosacral spondylosis without myelopathy  -     Physical Therapy Referral; Future  It band syndrome, right  -     Physical Therapy Referral; Future  Myofascial pain  -     Physical Therapy Referral; Future  Chronic pain of right knee  -     Physical Therapy Referral; Future     Esperanza Gage is a 76 year old y.o. female with past medical history significant for hearing problem, leg numbness, vitamin D deficiency, hyperlipidemia, hypertension, osteoporosis, chondromalacia patella, elbow fracture, milia, recurrent UTI, vaginal atrophy, status post knee surgery who presents today for follow-up regarding low back pain:    -Patient is doing much better and feels that physical therapy has been very helpful for her.  Back pain is likely secondary to lumbar spondylosis without myelopathy.  She also has IT band syndrome on the right with chronic right knee pain likely secondary to osteoarthritis.     Plan:     A shared decision making plan was used. The patient's values and choices were respected. Prior medical records from our last visit on 8/5/2022 as well as physical therapy notes were reviewed today. The following represents what was discussed and decided upon by the provider and the patient.        -DIAGNOSTIC TESTS: Images were personally reviewed and interpreted.   --X-ray lumbar spine dated 6/9/2022 is personally viewed images interpreted and discussed with patient.  There is mild left convex curvature of the lumbar spine centered at L2-3.  There is grade 1 retrolisthesis of L3 on L4 and L4 and L5 as well as L5 on S1.  There is facet arthropathy from L2-3 through L5-S1.  -- Right knee dated 5/31/2022 is  personally viewed images interpreted and discussed the patient.  There is mild joint space narrowing in the lateral femorotibial joint compartment of the right knee.    -INTERVENTIONS: No interventions at this time.    -MEDICATIONS: No changes to medications.  -  Discussed side effects of medications and proper use. Patient verbalized understanding.    -PHYSICAL THERAPY: I have rewritten physical therapy orders for once to twice per month for the next 3 months.  Discussed the importance of core strengthening, ROM, stretching exercises with the patient and how each of these entities is important in decreasing pain.  Explained to the patient that the purpose of physical therapy is to teach the patient a home exercise program.  These exercises need to be performed every day in order to decrease pain and prevent future occurrences of pain.        -PATIENT EDUCATION: We discussed pain management in a multimodal fashion including physical therapy, medication management, possible future injections.    -FOLLOW UP: Patient will follow up in 3 months in person.  Advised to contact clinic if symptoms worsen or change.    Subjective:     Esperanza Gage is a 76 year old female who presents today for follow-up regarding low back pain and right lateral thigh pain to her right knee.  Patient notes that she continues to be doing much better.  Physical therapy has been very helpful.  Her pain today is 2/10 is worst is 4/10 as best as 2/10.  Her pain is worse with too much bending while gardening for sitting for too long with little activity.  Her pain is improved when she does her physical therapy exercises.  She is currently not taking any medications for pain.  She notes that her right knee feels like it will buckle especially when going up and down stairs.  She is working on strengthening muscles around her knee.  She is happy with how she is doing with the improvement she has made.  She started to walk more.  She denies any bowel  or bladder changes, fevers, chills, unintentional weight loss.    Evaluation to Date: X-ray of lumbar spine dated 6/9/2022.  Right knee x-ray dated 5/31/2022.     Treatment to Date:  Several sessions of physical therapy.    Patient Active Problem List   Diagnosis     Vitamin D deficiency     Hyperlipidemia with target LDL less than 130     Osteopenia     Breast signs and symptoms     Chondromalacia of patella     Primary localized osteoarthrosis, lower leg     Pelvic fracture (H)     Medication management     Leg numbness     Seborrheic keratosis     Milia     History of tinea     Status post knee surgery     Recurrent UTI     Vaginal atrophy     Chronic bilateral low back pain without sciatica     Chronic pain of right knee       Current Outpatient Medications   Medication     Ascorbic Acid (VITAMIN C) 500 MG CAPS     ASPIRIN PO     losartan (COZAAR) 50 MG tablet     multivitamin w/minerals (THERA-VIT-M) tablet     omega 3 1000 MG CAPS     simvastatin (ZOCOR) 10 MG tablet     vitamin B-Complex     vitamin D3 (CHOLECALCIFEROL) 50 mcg (2000 units) tablet     vitamin E 400 units TABS     zinc gluconate 50 MG tablet     No current facility-administered medications for this visit.       No Known Allergies    Past Medical History:   Diagnosis Date     Hearing problem 2017     Hyperlipidemia 2005     Hyperlipidemia LDL goal < 130      Hypertension      Osteoporosis, post-menopausal      Pelvic fracture (H)         Review of Systems  ROS:  Specifically negative for bowel/bladder dysfunction, balance changes, headache, dizziness, foot drop, fevers, chills, appetite changes, nausea/vomiting, unexplained weight loss. Otherwise 13 systems reviewed are negative. Please see the patient's intake questionnaire from today for details.    Reviewed Social, Family, Past Medical and Past Surgical history with patient, no significant changes noted since prior visit.     Objective:     /63   Pulse 74   SpO2 96%     PHYSICAL  EXAMINATION:    --CONSTITUTIONAL: Well developed, well nourished, healthy appearing individual.  --PSYCHIATRIC: Appropriate mood and affect. No difficulty interacting due to temper, social withdrawal, or memory issues.  --RESPIRATORY: Normal rhythm and effort. No abnormal accessory muscle breathing patterns noted.   --MUSCULOSKELETAL:  Normal lumbar lordosis noted, no lateral shift.  --GROSS MOTOR: Easily arises from a seated position. Gait is non-antalgic  --LUMBAR SPINE:  Inspection reveals no evidence of deformity. Range of motion is mildly limited in lumbar flexion, extension, lateral rotation. No tenderness to palpation lumbar spine. Straight leg raising in the seated position is negative to radicular pain.   --SACROILIAC JOINT: One Finger point test negative.  --LOWER EXTREMITY MOTOR TESTING:  Plantar flexion left 5/5, right 5/5   Dorsiflexion left 5/5, right 5/5   Great toe MTP extension left 5/5, right 5/5  Knee flexion left 5/5, right 5/5  Knee extension left 5/5, right 5/5   Hip flexion left 5/5, right 5/5  Hip abduction left 5/5, right 5/5  Hip adduction left 5/5, right 5/5   --NEUROLOGIC:  Sensation to light touch is intact in the bilateral L4, L5, and S1 dermatomes.    RESULTS:   Imaging: Lumbar spine imaging was reviewed today.                         Again, thank you for allowing me to participate in the care of your patient.        Sincerely,        Cory Galindo, DO

## 2022-09-08 NOTE — PROGRESS NOTES
Assessment:     Diagnoses and all orders for this visit:  Lumbosacral spondylosis without myelopathy  -     Physical Therapy Referral; Future  It band syndrome, right  -     Physical Therapy Referral; Future  Myofascial pain  -     Physical Therapy Referral; Future  Chronic pain of right knee  -     Physical Therapy Referral; Future     Esperanza Gage is a 76 year old y.o. female with past medical history significant for hearing problem, leg numbness, vitamin D deficiency, hyperlipidemia, hypertension, osteoporosis, chondromalacia patella, elbow fracture, milia, recurrent UTI, vaginal atrophy, status post knee surgery who presents today for follow-up regarding low back pain:    -Patient is doing much better and feels that physical therapy has been very helpful for her.  Back pain is likely secondary to lumbar spondylosis without myelopathy.  She also has IT band syndrome on the right with chronic right knee pain likely secondary to osteoarthritis.     Plan:     A shared decision making plan was used. The patient's values and choices were respected. Prior medical records from our last visit on 8/5/2022 as well as physical therapy notes were reviewed today. The following represents what was discussed and decided upon by the provider and the patient.        -DIAGNOSTIC TESTS: Images were personally reviewed and interpreted.   --X-ray lumbar spine dated 6/9/2022 is personally viewed images interpreted and discussed with patient.  There is mild left convex curvature of the lumbar spine centered at L2-3.  There is grade 1 retrolisthesis of L3 on L4 and L4 and L5 as well as L5 on S1.  There is facet arthropathy from L2-3 through L5-S1.  -- Right knee dated 5/31/2022 is personally viewed images interpreted and discussed the patient.  There is mild joint space narrowing in the lateral femorotibial joint compartment of the right knee.    -INTERVENTIONS: No interventions at this time.    -MEDICATIONS: No changes to  medications.  -  Discussed side effects of medications and proper use. Patient verbalized understanding.    -PHYSICAL THERAPY: I have rewritten physical therapy orders for once to twice per month for the next 3 months.  Discussed the importance of core strengthening, ROM, stretching exercises with the patient and how each of these entities is important in decreasing pain.  Explained to the patient that the purpose of physical therapy is to teach the patient a home exercise program.  These exercises need to be performed every day in order to decrease pain and prevent future occurrences of pain.        -PATIENT EDUCATION: We discussed pain management in a multimodal fashion including physical therapy, medication management, possible future injections.    -FOLLOW UP: Patient will follow up in 3 months in person.  Advised to contact clinic if symptoms worsen or change.    Subjective:     Esperanza Gage is a 76 year old female who presents today for follow-up regarding low back pain and right lateral thigh pain to her right knee.  Patient notes that she continues to be doing much better.  Physical therapy has been very helpful.  Her pain today is 2/10 is worst is 4/10 as best as 2/10.  Her pain is worse with too much bending while gardening for sitting for too long with little activity.  Her pain is improved when she does her physical therapy exercises.  She is currently not taking any medications for pain.  She notes that her right knee feels like it will buckle especially when going up and down stairs.  She is working on strengthening muscles around her knee.  She is happy with how she is doing with the improvement she has made.  She started to walk more.  She denies any bowel or bladder changes, fevers, chills, unintentional weight loss.    Evaluation to Date: X-ray of lumbar spine dated 6/9/2022.  Right knee x-ray dated 5/31/2022.     Treatment to Date:  Several sessions of physical therapy.    Patient Active Problem  List   Diagnosis     Vitamin D deficiency     Hyperlipidemia with target LDL less than 130     Osteopenia     Breast signs and symptoms     Chondromalacia of patella     Primary localized osteoarthrosis, lower leg     Pelvic fracture (H)     Medication management     Leg numbness     Seborrheic keratosis     Milia     History of tinea     Status post knee surgery     Recurrent UTI     Vaginal atrophy     Chronic bilateral low back pain without sciatica     Chronic pain of right knee       Current Outpatient Medications   Medication     Ascorbic Acid (VITAMIN C) 500 MG CAPS     ASPIRIN PO     losartan (COZAAR) 50 MG tablet     multivitamin w/minerals (THERA-VIT-M) tablet     omega 3 1000 MG CAPS     simvastatin (ZOCOR) 10 MG tablet     vitamin B-Complex     vitamin D3 (CHOLECALCIFEROL) 50 mcg (2000 units) tablet     vitamin E 400 units TABS     zinc gluconate 50 MG tablet     No current facility-administered medications for this visit.       No Known Allergies    Past Medical History:   Diagnosis Date     Hearing problem 2017     Hyperlipidemia 2005     Hyperlipidemia LDL goal < 130      Hypertension      Osteoporosis, post-menopausal      Pelvic fracture (H)         Review of Systems  ROS:  Specifically negative for bowel/bladder dysfunction, balance changes, headache, dizziness, foot drop, fevers, chills, appetite changes, nausea/vomiting, unexplained weight loss. Otherwise 13 systems reviewed are negative. Please see the patient's intake questionnaire from today for details.    Reviewed Social, Family, Past Medical and Past Surgical history with patient, no significant changes noted since prior visit.     Objective:     /63   Pulse 74   SpO2 96%     PHYSICAL EXAMINATION:    --CONSTITUTIONAL: Well developed, well nourished, healthy appearing individual.  --PSYCHIATRIC: Appropriate mood and affect. No difficulty interacting due to temper, social withdrawal, or memory issues.  --RESPIRATORY: Normal rhythm and  effort. No abnormal accessory muscle breathing patterns noted.   --MUSCULOSKELETAL:  Normal lumbar lordosis noted, no lateral shift.  --GROSS MOTOR: Easily arises from a seated position. Gait is non-antalgic  --LUMBAR SPINE:  Inspection reveals no evidence of deformity. Range of motion is mildly limited in lumbar flexion, extension, lateral rotation. No tenderness to palpation lumbar spine. Straight leg raising in the seated position is negative to radicular pain.   --SACROILIAC JOINT: One Finger point test negative.  --LOWER EXTREMITY MOTOR TESTING:  Plantar flexion left 5/5, right 5/5   Dorsiflexion left 5/5, right 5/5   Great toe MTP extension left 5/5, right 5/5  Knee flexion left 5/5, right 5/5  Knee extension left 5/5, right 5/5   Hip flexion left 5/5, right 5/5  Hip abduction left 5/5, right 5/5  Hip adduction left 5/5, right 5/5   --NEUROLOGIC:  Sensation to light touch is intact in the bilateral L4, L5, and S1 dermatomes.    RESULTS:   Imaging: Lumbar spine imaging was reviewed today.

## 2022-09-13 ENCOUNTER — ALLIED HEALTH/NURSE VISIT (OUTPATIENT)
Dept: EDUCATION SERVICES | Facility: CLINIC | Age: 77
End: 2022-09-13
Payer: COMMERCIAL

## 2022-09-13 VITALS — WEIGHT: 189.8 LBS | BODY MASS INDEX: 31.1 KG/M2

## 2022-09-13 DIAGNOSIS — R73.03 PREDIABETES: Primary | ICD-10-CM

## 2022-09-13 PROCEDURE — G9891 EM SESSION REPORTING: HCPCS

## 2022-09-13 NOTE — PROGRESS NOTES
Medicare Diabetes Prevention Program: Session 25    Esperanza Gage presents for MDPP Session 25: Get Back on Track  Subjective/Objective:    Wt 86.1 kg (189 lb 12.8 oz)   BMI 31.10 kg/m      Minutes spent exercising over the past week: 370       Intervention/Plan:    Topics discussed today include information to meet the following learning objectives:    1) By the end of the session, participants will:  -Staying positive  -Following the five steps of problem solving        Patient graduated from MDPP Maintenance Program.    IZABEL MUÑOZ

## 2022-09-28 ENCOUNTER — THERAPY VISIT (OUTPATIENT)
Dept: PHYSICAL THERAPY | Facility: CLINIC | Age: 77
End: 2022-09-28
Payer: COMMERCIAL

## 2022-09-28 DIAGNOSIS — G89.29 CHRONIC PAIN OF RIGHT KNEE: Primary | ICD-10-CM

## 2022-09-28 DIAGNOSIS — M25.561 CHRONIC PAIN OF RIGHT KNEE: Primary | ICD-10-CM

## 2022-09-28 PROCEDURE — 97110 THERAPEUTIC EXERCISES: CPT | Mod: GP | Performed by: PHYSICAL THERAPIST

## 2022-09-28 PROCEDURE — 97530 THERAPEUTIC ACTIVITIES: CPT | Mod: GP | Performed by: PHYSICAL THERAPIST

## 2022-09-28 ASSESSMENT — ACTIVITIES OF DAILY LIVING (ADL)
GIVING WAY, BUCKLING OR SHIFTING OF KNEE: THE SYMPTOM AFFECTS MY ACTIVITY MODERATELY
SQUAT: ACTIVITY IS VERY DIFFICULT
SWELLING: I DO NOT HAVE THE SYMPTOM
GO UP STAIRS: ACTIVITY IS SOMEWHAT DIFFICULT
STIFFNESS: I HAVE THE SYMPTOM BUT IT DOES NOT AFFECT MY ACTIVITY
WEAKNESS: THE SYMPTOM AFFECTS MY ACTIVITY SLIGHTLY
STAND: ACTIVITY IS NOT DIFFICULT
SIT WITH YOUR KNEE BENT: ACTIVITY IS NOT DIFFICULT
HOW_WOULD_YOU_RATE_THE_CURRENT_FUNCTION_OF_YOUR_KNEE_DURING_YOUR_USUAL_DAILY_ACTIVITIES_ON_A_SCALE_FROM_0_TO_100_WITH_100_BEING_YOUR_LEVEL_OF_KNEE_FUNCTION_PRIOR_TO_YOUR_INJURY_AND_0_BEING_THE_INABILITY_TO_PERFORM_ANY_OF_YOUR_USUAL_DAILY_ACTIVITIES?: 90
WALK: ACTIVITY IS NOT DIFFICULT
RISE FROM A CHAIR: ACTIVITY IS SOMEWHAT DIFFICULT
HOW_WOULD_YOU_RATE_THE_OVERALL_FUNCTION_OF_YOUR_KNEE_DURING_YOUR_USUAL_DAILY_ACTIVITIES?: NEARLY NORMAL
LIMPING: I DO NOT HAVE THE SYMPTOM
RAW_SCORE: 50
KNEE_ACTIVITY_OF_DAILY_LIVING_SUM: 50
GO DOWN STAIRS: ACTIVITY IS SOMEWHAT DIFFICULT
AS_A_RESULT_OF_YOUR_KNEE_INJURY,_HOW_WOULD_YOU_RATE_YOUR_CURRENT_LEVEL_OF_DAILY_ACTIVITY?: ABNORMAL
KNEE_ACTIVITY_OF_DAILY_LIVING_SCORE: 71.43
KNEEL ON THE FRONT OF YOUR KNEE: ACTIVITY IS SOMEWHAT DIFFICULT
PAIN: THE SYMPTOM AFFECTS MY ACTIVITY SLIGHTLY

## 2022-10-19 ENCOUNTER — THERAPY VISIT (OUTPATIENT)
Dept: PHYSICAL THERAPY | Facility: CLINIC | Age: 77
End: 2022-10-19
Payer: COMMERCIAL

## 2022-10-19 DIAGNOSIS — G89.29 CHRONIC PAIN OF RIGHT KNEE: Primary | ICD-10-CM

## 2022-10-19 DIAGNOSIS — M25.561 CHRONIC PAIN OF RIGHT KNEE: Primary | ICD-10-CM

## 2022-10-19 PROCEDURE — 97110 THERAPEUTIC EXERCISES: CPT | Mod: GP | Performed by: PHYSICAL THERAPIST

## 2022-10-19 PROCEDURE — 97530 THERAPEUTIC ACTIVITIES: CPT | Mod: GP | Performed by: PHYSICAL THERAPIST

## 2022-10-20 ENCOUNTER — OFFICE VISIT (OUTPATIENT)
Dept: DERMATOLOGY | Facility: CLINIC | Age: 77
End: 2022-10-20
Payer: COMMERCIAL

## 2022-10-20 ENCOUNTER — OFFICE VISIT (OUTPATIENT)
Dept: OBGYN | Facility: CLINIC | Age: 77
End: 2022-10-20
Attending: ADVANCED PRACTICE MIDWIFE
Payer: COMMERCIAL

## 2022-10-20 VITALS
BODY MASS INDEX: 31.49 KG/M2 | WEIGHT: 189 LBS | HEART RATE: 88 BPM | HEIGHT: 65 IN | DIASTOLIC BLOOD PRESSURE: 92 MMHG | SYSTOLIC BLOOD PRESSURE: 163 MMHG

## 2022-10-20 DIAGNOSIS — R30.0 DYSURIA: Primary | ICD-10-CM

## 2022-10-20 DIAGNOSIS — D22.9 MULTIPLE NEVI: ICD-10-CM

## 2022-10-20 DIAGNOSIS — L82.0 SEBORRHEIC KERATOSES, INFLAMED: ICD-10-CM

## 2022-10-20 DIAGNOSIS — Z12.83 SKIN EXAM FOR MALIGNANT NEOPLASM: Primary | ICD-10-CM

## 2022-10-20 DIAGNOSIS — R23.8 VENOUS LAKE OF LIP: ICD-10-CM

## 2022-10-20 DIAGNOSIS — L82.1 SEBORRHEIC KERATOSES: ICD-10-CM

## 2022-10-20 DIAGNOSIS — D18.01 CHERRY ANGIOMA: ICD-10-CM

## 2022-10-20 DIAGNOSIS — L81.4 LENTIGO: ICD-10-CM

## 2022-10-20 LAB
ALBUMIN UR-MCNC: ABNORMAL MG/DL
APPEARANCE UR: ABNORMAL
BILIRUB UR QL STRIP: NEGATIVE
COLOR UR AUTO: ABNORMAL
GLUCOSE UR STRIP-MCNC: NEGATIVE MG/DL
HGB UR QL STRIP: ABNORMAL
KETONES UR STRIP-MCNC: ABNORMAL MG/DL
LEUKOCYTE ESTERASE UR QL STRIP: ABNORMAL
NITRATE UR QL: NEGATIVE
PH UR STRIP: 6 [PH] (ref 5–8)
SP GR UR STRIP: 1.02 (ref 1–1.03)
UROBILINOGEN UR STRIP-ACNC: 0.2 E.U./DL

## 2022-10-20 PROCEDURE — 87086 URINE CULTURE/COLONY COUNT: CPT | Performed by: ADVANCED PRACTICE MIDWIFE

## 2022-10-20 PROCEDURE — G0463 HOSPITAL OUTPT CLINIC VISIT: HCPCS

## 2022-10-20 PROCEDURE — 17110 DESTRUCTION B9 LES UP TO 14: CPT | Mod: GC | Performed by: DERMATOLOGY

## 2022-10-20 PROCEDURE — 81003 URINALYSIS AUTO W/O SCOPE: CPT | Performed by: ADVANCED PRACTICE MIDWIFE

## 2022-10-20 PROCEDURE — 99213 OFFICE O/P EST LOW 20 MIN: CPT | Performed by: ADVANCED PRACTICE MIDWIFE

## 2022-10-20 PROCEDURE — 99213 OFFICE O/P EST LOW 20 MIN: CPT | Mod: 25 | Performed by: DERMATOLOGY

## 2022-10-20 RX ORDER — PHENAZOPYRIDINE HYDROCHLORIDE 200 MG/1
200 TABLET, FILM COATED ORAL 3 TIMES DAILY PRN
Qty: 30 TABLET | Refills: 1 | Status: SHIPPED | OUTPATIENT
Start: 2022-10-20

## 2022-10-20 RX ORDER — SULFAMETHOXAZOLE/TRIMETHOPRIM 800-160 MG
1 TABLET ORAL 2 TIMES DAILY
Qty: 6 TABLET | Refills: 0 | Status: SHIPPED | OUTPATIENT
Start: 2022-10-20 | End: 2022-10-23

## 2022-10-20 ASSESSMENT — PAIN SCALES - GENERAL: PAINLEVEL: NO PAIN (0)

## 2022-10-20 NOTE — LETTER
"10/20/2022       RE: Esperanza Gage  [address] [place] [address]     Dear Colleague,    Thank you for referring your patient, Esperanza Gage, to the [facility]. Please see a copy of my visit note below.      Assessment & Plan     Dysuria  Frequency, urgency and dysuria consistent with UTI. Prior infections treated successfully with bactrim, will treat with bactrim today and return precautions provided if symptoms do not improve in 2-3 days or worsen. Culture pending, will adjust antibiotics as necessary for susceptibilities.  - [place] Urine Macroscopic Nursing POCT  - Urine Culture Aerobic Bacterial  - phenazopyridine (PYRIDIUM) 200 MG tablet; Take 1 tablet (200 mg) by mouth 3 times daily as needed for irritation  - sulfamethoxazole-trimethoprim (BACTRIM DS) 800-160 MG tablet; Take 1 tablet by mouth 2 times daily for 3 days     BMI:   Estimated body mass index is 31.45 kg/m  as calculated from the following:    Height as of this encounter: 1.651 m (5' 5\").    Weight as of this encounter: 85.7 kg (189 lb).       No follow-ups on file.    ARSH Benton CNM  [facility]    Lotus Mata is a 76 year old, presenting for the following health issues:  UTI (Possible UTI)      HPI     Patient first developed symptoms 10/3. Symptoms included burning with urination, urgency and frequency. She is urinating every 1-2 hours, only small amounts at a time, and it is waking her up at night. No fever/chills, no flank or back pain, no recent abx use, no hx of DM.     Her last UTI was 2 years ago, since then she has given up coffee and drinking more water which has been helpful. Reports she has used bactrim and ciprofloxacin in the past for UTIs, both of which have been helpful.    She was given vaginal estrogen cream in 2020 for vaginal dryness, never ended up using it because she was " "unsure whether it would have to be used for life. No symptoms of vaginal discomfort, itchiness, or pain with sitting or wiping.    BP elevated today, patient reports it has been a very stressful day running around, and she ran up the stairs to get to this appointment.     Objective    BP (!) 163/92 (BP Location: Left arm, Patient Position: Sitting, Cuff Size: Adult Regular)   Pulse 88   Ht 1.651 m (5' 5\")   Wt 85.7 kg (189 lb)   BMI 31.45 kg/m    Body mass index is 31.45 kg/m .  Physical Exam   GENERAL: healthy, alert and no distress  NECK: no adenopathy, no asymmetry, masses, or scars and thyroid normal to palpation  ABDOMEN: soft, nontender, no hepatosplenomegaly, no masses and bowel sounds normal  MS: no gross musculoskeletal defects noted, no edema    Nati Becker MD    ----- Service Performed and Documented by Resident or Fellow ------       I agree with the PFSH and ROS as completed by the resident, except for changes made by me. The remainder of the encounter was performed by me and scribed by the SNM. The scribed note accurately reflects my personal services and decisions made by me.  - ARSH Benton CNM      "

## 2022-10-20 NOTE — NURSING NOTE
Dermatology Rooming Note    Esperanza Gage's goals for this visit include:   Chief Complaint   Patient presents with     Skin Check     Esperanza is here today for a skin check. She is concerned about an area on her lip.     Ellie Mtz CMA

## 2022-10-20 NOTE — PATIENT INSTRUCTIONS
Cryotherapy    What is it?  Use of a very cold liquid, such as liquid nitrogen, to freeze and destroy abnormal skin cells that need to be removed    What should I expect?  Tenderness and redness  A small blister that might grow and fill with dark purple blood. There may be crusting.  More than one treatment may be needed if the lesions do not go away.    How do I care for the treated area?  Gently wash the area with your hands when bathing.  Use a thin layer of Vaseline to help with healing. You may use a Band-Aid.   The area should heal within 7-10 days and may leave behind a pink or lighter color.   Do not use an antibiotic or Neosporin ointment.   You may take acetaminophen (Tylenol) for pain.     Call your doctor if you have:  Severe pain  Signs of infection (warmth, redness, cloudy yellow drainage, and or a bad smell)  Questions or concerns    Who should I call with questions?      [facility]: [phone]      [facility]: [phone]      For urgent needs outside of business hours call the [facility] at [phone] and ask for the dermatology resident on call

## 2022-10-20 NOTE — LETTER
10/20/2022       RE: Esperanza Gage  [address]  [place] [address]     Dear Colleague,    Thank you for referring your patient, Esperanza Gage, to the [facility] DERMATOLOGY CLINIC [place] [facility]. Please see a copy of my visit note below.    [facility] Dermatology Note   Encounter Date: Oct 20, 2022  Office visit    Dermatology Problem List:    Last FBSE: 10/20/22    # Tinea pedis  - Current Tx: butenafine  # ISK  - s/p LN2  # Multiple benign skin findings    ___________________________________________    Assessment & Plan:    # ISK - L cheek (x1) and R arm (x1)  Discussed the possible etiologies, clinical course, and management options of this condition with the patient. Will perform cryotherapy on the areas above  - See procedure note below    # Multiple clinically banal appearing nevi  # Lentigo  Discussed the possible etiologies, clinical course, and management options of this benign condition with the patient.  - Reviewed the ABCDEs of melanoma  - Recommend daily sunscreen use with SPF at least 30     # Venous lake, upper lip  # Seborrheic keratoses  # Cherry angiomata  Discussed the possible etiologies, clinical course, and management options of this benign condition with the patient.  - Reassurance provided     Procedures Performed:  Cryotherapy procedure note  - location: L cheek (x1) and R arm (x1)  - number of lesions treated: 2  After verbal consent and discussion of risks and benefits including but no limited to dyspigmentation/scar, blister, and pain, lesions treated with 1-2mm freeze border for 2 cycles with liquid nitrogen, post cryotherapy instructions provided    Follow-up: 12m    Staff Involved:  Patient was seen and staffed with attending physician Dr. Keyon Srivastava MD  Med/Derm Resident PGY-5  P:[phone]    Staff Addendum:  I was present for the entire procedure. Mirela Ta  MD BISHOP, Mirela Ta MD, saw this patient with the resident and agree with the resident s findings and plan of care as documented in the resident s note.    ___________________________________________      CC: Skin Check (Esperanza is here today for a skin check. She is concerned about an area on her lip.)      HPI:  Ms. Esperanza Gage is a(n) 76 year old female who presents to clinic today for FBSE. Reports:  - main concern is a spot on her upper lip  - noticed it about 9 months ago  - denies any pain, pruritus, or bleeding  - wants to make sure it's not a skin cancer  - otherwise feeling well in usual state of health    Physical exam:  General: in no acute distress, well-developed, well-nourished  Skin:  - skin type: fair  - 3mm blue papule on the R upper lateral lip  - multiple light brown to tan papules with reassuring pigment network on dermoscopy on trunk and back  - light brown macules on sun exposed skin  - tan to light brown waxy stuck on papules and plaques on trunk and extremities.  L cheek (x1) and R arm (x1) with an erythematous hue  - red or purple papules that bing with diascopy on trunk  - No other lesions of concern on areas examined.     Medications:  Current Outpatient Medications   Medication     Ascorbic Acid (VITAMIN C) 500 MG CAPS     ASPIRIN PO     losartan (COZAAR) 50 MG tablet     multivitamin w/minerals (THERA-VIT-M) tablet     omega 3 1000 MG CAPS     simvastatin (ZOCOR) 10 MG tablet     vitamin B-Complex     vitamin D3 (CHOLECALCIFEROL) 50 mcg (2000 units) tablet     vitamin E 400 units TABS     zinc gluconate 50 MG tablet     No current facility-administered medications for this visit.      Past Medical History:   Patient Active Problem List   Diagnosis     Vitamin D deficiency     Hyperlipidemia with target LDL less than 130     Osteopenia     Breast signs and symptoms     Chondromalacia of patella     Primary localized osteoarthrosis, lower leg     Pelvic fracture (H)     Medication  management     Leg numbness     Seborrheic keratosis     Milia     History of tinea     Status post knee surgery     Recurrent UTI     Vaginal atrophy     Chronic bilateral low back pain without sciatica     Chronic pain of right knee     Past Medical History:   Diagnosis Date     Hearing problem 2017     Hyperlipidemia 2005     Hyperlipidemia LDL goal < 130      Hypertension      Osteoporosis, post-menopausal      Pelvic fracture (H)        CC Referred Self, MD  No address on file on close of this encounter.

## 2022-10-20 NOTE — PROGRESS NOTES
[facility] Dermatology Note   Encounter Date: Oct 20, 2022  Office visit    Dermatology Problem List:    Last FBSE: 10/20/22    # Tinea pedis  - Current Tx: butenafine  # ISK  - s/p LN2  # Multiple benign skin findings    ___________________________________________    Assessment & Plan:    # ISK - L cheek (x1) and R arm (x1)  Discussed the possible etiologies, clinical course, and management options of this condition with the patient. Will perform cryotherapy on the areas above  - See procedure note below    # Multiple clinically banal appearing nevi  # Lentigo  Discussed the possible etiologies, clinical course, and management options of this benign condition with the patient.  - Reviewed the ABCDEs of melanoma  - Recommend daily sunscreen use with SPF at least 30     # Venous lake, upper lip  # Seborrheic keratoses  # Cherry angiomata  Discussed the possible etiologies, clinical course, and management options of this benign condition with the patient.  - Reassurance provided     Procedures Performed:  Cryotherapy procedure note  - location: L cheek (x1) and R arm (x1)  - number of lesions treated: 2  After verbal consent and discussion of risks and benefits including but no limited to dyspigmentation/scar, blister, and pain, lesions treated with 1-2mm freeze border for 2 cycles with liquid nitrogen, post cryotherapy instructions provided    Follow-up: 12m    Staff Involved:  Patient was seen and staffed with attending physician Dr. Keyon Srivastava MD  Med/Derm Resident PGY-5  P:[phone]    Staff Addendum:  I was present for the entire procedure. Mirela Ta MD  I, Mirela Ta MD, saw this patient with the resident and agree with the resident s findings and plan of care as documented in the resident s note.    ___________________________________________      CC: Skin Check (Esperanza is here today for a skin check. She is concerned about an area on her  lip.)      HPI:  Ms. Esperanza Gage is a(n) 76 year old female who presents to clinic today for FBSE. Reports:  - main concern is a spot on her upper lip  - noticed it about 9 months ago  - denies any pain, pruritus, or bleeding  - wants to make sure it's not a skin cancer  - otherwise feeling well in usual state of health    Physical exam:  General: in no acute distress, well-developed, well-nourished  Skin:  - skin type: fair  - 3mm blue papule on the R upper lateral lip  - multiple light brown to tan papules with reassuring pigment network on dermoscopy on trunk and back  - light brown macules on sun exposed skin  - tan to light brown waxy stuck on papules and plaques on trunk and extremities.  L cheek (x1) and R arm (x1) with an erythematous hue  - red or purple papules that bing with diascopy on trunk  - No other lesions of concern on areas examined.     Medications:  Current Outpatient Medications   Medication     Ascorbic Acid (VITAMIN C) 500 MG CAPS     ASPIRIN PO     losartan (COZAAR) 50 MG tablet     multivitamin w/minerals (THERA-VIT-M) tablet     omega 3 1000 MG CAPS     simvastatin (ZOCOR) 10 MG tablet     vitamin B-Complex     vitamin D3 (CHOLECALCIFEROL) 50 mcg (2000 units) tablet     vitamin E 400 units TABS     zinc gluconate 50 MG tablet     No current facility-administered medications for this visit.      Past Medical History:   Patient Active Problem List   Diagnosis     Vitamin D deficiency     Hyperlipidemia with target LDL less than 130     Osteopenia     Breast signs and symptoms     Chondromalacia of patella     Primary localized osteoarthrosis, lower leg     Pelvic fracture (H)     Medication management     Leg numbness     Seborrheic keratosis     Milia     History of tinea     Status post knee surgery     Recurrent UTI     Vaginal atrophy     Chronic bilateral low back pain without sciatica     Chronic pain of right knee     Past Medical History:   Diagnosis Date     Hearing problem 2017      Hyperlipidemia 2005     Hyperlipidemia LDL goal < 130      Hypertension      Osteoporosis, post-menopausal      Pelvic fracture (H)        CC Referred Self, MD  No address on file on close of this encounter.

## 2022-10-20 NOTE — PROGRESS NOTES
"  Assessment & Plan     Dysuria  Frequency, urgency and dysuria consistent with UTI. Prior infections treated successfully with bactrim, will treat with bactrim today and return precautions provided if symptoms do not improve in 2-3 days or worsen. Culture pending, will adjust antibiotics as necessary for susceptibilities.  - Clinitek Urine Macroscopic Nursing POCT  - Urine Culture Aerobic Bacterial  - phenazopyridine (PYRIDIUM) 200 MG tablet; Take 1 tablet (200 mg) by mouth 3 times daily as needed for irritation  - sulfamethoxazole-trimethoprim (BACTRIM DS) 800-160 MG tablet; Take 1 tablet by mouth 2 times daily for 3 days     BMI:   Estimated body mass index is 31.45 kg/m  as calculated from the following:    Height as of this encounter: 1.651 m (5' 5\").    Weight as of this encounter: 85.7 kg (189 lb).       No follow-ups on file.    ARSH Benton [facility] OPAL Mata is a 76 year old, presenting for the following health issues:  UTI (Possible UTI)      HPI     Patient first developed symptoms 10/3. Symptoms included burning with urination, urgency and frequency. She is urinating every 1-2 hours, only small amounts at a time, and it is waking her up at night. No fever/chills, no flank or back pain, no recent abx use, no hx of DM.     Her last UTI was 2 years ago, since then she has given up coffee and drinking more water which has been helpful. Reports she has used bactrim and ciprofloxacin in the past for UTIs, both of which have been helpful.    She was given vaginal estrogen cream in 2020 for vaginal dryness, never ended up using it because she was unsure whether it would have to be used for life. No symptoms of vaginal discomfort, itchiness, or pain with sitting or wiping.    BP elevated today, patient reports it has been a very stressful day running around, and she ran up the stairs to get to this appointment.      Objective    BP (!) 163/92 (BP " "Location: Left arm, Patient Position: Sitting, Cuff Size: Adult Regular)   Pulse 88   Ht 1.651 m (5' 5\")   Wt 85.7 kg (189 lb)   BMI 31.45 kg/m    Body mass index is 31.45 kg/m .  Physical Exam   GENERAL: healthy, alert and no distress  NECK: no adenopathy, no asymmetry, masses, or scars and thyroid normal to palpation  ABDOMEN: soft, nontender, no hepatosplenomegaly, no masses and bowel sounds normal  MS: no gross musculoskeletal defects noted, no edema    Nati Becker MD    ----- Service Performed and Documented by Resident or Fellow ------        I agree with the PFSH and ROS as completed by the resident, except for changes made by me. The remainder of the encounter was performed by me and scribed by the SNM. The scribed note accurately reflects my personal services and decisions made by me.  - ARSH Benton CNM          "

## 2022-10-22 LAB — BACTERIA UR CULT: ABNORMAL

## 2022-11-05 DIAGNOSIS — E78.00 PURE HYPERCHOLESTEROLEMIA: ICD-10-CM

## 2022-11-14 RX ORDER — SIMVASTATIN 10 MG
10 TABLET ORAL AT BEDTIME
Qty: 90 TABLET | Refills: 1 | Status: SHIPPED | OUTPATIENT
Start: 2022-11-14 | End: 2023-08-07

## 2022-11-14 NOTE — TELEPHONE ENCOUNTER
Simvastatin 10 MG Oral Tablet  Last Written Prescription Date:   5/5/2022  Last Fill Quantity: 90,   # refills: 1  Last Office Visit :  5/31/2022  Future Office visit:  None  90 Tabs, 1 Refill sent to lenard Salcedo RN  Central Triage Red Flags/Med Refills

## 2022-11-16 ENCOUNTER — THERAPY VISIT (OUTPATIENT)
Dept: PHYSICAL THERAPY | Facility: CLINIC | Age: 77
End: 2022-11-16
Payer: COMMERCIAL

## 2022-11-16 DIAGNOSIS — M25.561 CHRONIC PAIN OF RIGHT KNEE: Primary | ICD-10-CM

## 2022-11-16 DIAGNOSIS — G89.29 CHRONIC PAIN OF RIGHT KNEE: Primary | ICD-10-CM

## 2022-11-16 PROCEDURE — 97530 THERAPEUTIC ACTIVITIES: CPT | Mod: GP | Performed by: PHYSICAL THERAPIST

## 2022-11-16 PROCEDURE — 97110 THERAPEUTIC EXERCISES: CPT | Mod: GP | Performed by: PHYSICAL THERAPIST

## 2022-11-16 NOTE — PROGRESS NOTES
[facility]    OUTPATIENT Physical Therapy ORTHOPEDIC EVALUATION  PLAN OF TREATMENT FOR OUTPATIENT REHABILITATION  (COMPLETE FOR INITIAL CLAIMS ONLY)  Patient's Last Name, First Name, M.I.  YOB: 1945  Esperanza Gage    Provider s Name:  [facility]   Medical Record No.  [id number]   Start of Care Date:  08/16/22   Onset Date:    (8/5/22 MD order)   Treatment Diagnosis:  R knee pain Medical Diagnosis:  Chronic pain of right knee       Goals:     11/16/22 0500   Body Part   Goals listed below are for R knee   Goal #2   Goal #2 stairs   Previous Functional Level No restrictions   Current Functional Level Ascend stairs   Performance Level 2 flights reciprocally without break, light use of rail   STG Target Performance Ascend stairs   Performance Level 4-5 stairs without railing   Rationale to reach upper level of home safely;for safe community access to buildings   Due Date 09/06/22   Date Goal Met 08/30/22   LTG Target Performance Ascend stairs   Performance Level 2 flights without railing   Rationale to reach upper level of home safely;for safe community access to buildings   Due Date 01/15/23   If goal not met, Why? in progress, goal almost met       Therapy Frequency:  1x every other month  Predicted Duration of Therapy Intervention:  over 2 months    Nelda Warren, PT                 I CERTIFY THE NEED FOR THESE SERVICES FURNISHED UNDER        THIS PLAN OF TREATMENT AND WHILE UNDER MY CARE     (Physician attestation of this document indicates review and certification of the therapy plan).                     Certification Date From:  11/14/22   Certification Date To:  01/15/23    Referring Provider:  Cory Galindo    Initial Assessment        See Epic Evaluation SOC Date: 08/16/22

## 2022-11-16 NOTE — PROGRESS NOTES
PROGRESS  REPORT    Progress reporting period is from 9/7/22 to 11/16/22.       SUBJECTIVE  Subjective: she has had a lot of outdoor exercise lately of yardwork/gardening and shoveling. has still been going to her exercise class 2x/week. instead of sit to stands she does a lot of squats. really concentrates on pushing through the heel and this helps. did not do as much of her PT HEP though. has improved on balance of putting her pants on standing up. still click in the knee, but doesnt happen every time with knee ext. stairs doing pretty well, still uses railing but not needing to use as much and feel as labored. feels improved strength and endurance now where she can do 2 flights without a rest break now.     Current Pain level: 0/10.      Initial Pain level: 0/10.   Changes in function:  Yes (See Goal flowsheet attached for changes in current functional level)  Adverse reaction to treatment or activity: None    OBJECTIVE  Changes noted in objective findings:  Yes  Objective: session focused on current status, POC, long term plan. pt demos good understanding of HEP/POC     ASSESSMENT/PLAN  Updated problem list and treatment plan: Diagnosis 1:  R knee pain    Decreased strength - therapeutic exercise, therapeutic activities and home program  Decreased function - therapeutic activities and home program  STG/LTGs have been met or progress has been made towards goals:  Yes (See Goal flow sheet completed today.)  Assessment of Progress: The patient's condition is improving.  Patient is meeting short term goals and is progressing towards long term goals.  Self Management Plans:  Patient has been instructed in a home treatment program.  Patient  has been instructed in self management of symptoms.  I have re-evaluated this patient and find that the nature, scope, duration and intensity of the therapy is appropriate for the medical condition of the patient.  Esperanza continues to require the following intervention to meet STG  and LTG's:  PT    Recommendations:  This patient would benefit from continued therapy.     Frequency:  1x in the next 2 months if needed  Duration:  for 2 months  Patient can discharge if not needing follow up        Please refer to the daily flowsheet for treatment today, total treatment time and time spent performing 1:1 timed codes.

## 2022-12-28 ENCOUNTER — OFFICE VISIT (OUTPATIENT)
Dept: PHYSICAL MEDICINE AND REHAB | Facility: CLINIC | Age: 77
End: 2022-12-28
Payer: COMMERCIAL

## 2022-12-28 VITALS — SYSTOLIC BLOOD PRESSURE: 136 MMHG | HEART RATE: 73 BPM | DIASTOLIC BLOOD PRESSURE: 64 MMHG | OXYGEN SATURATION: 96 %

## 2022-12-28 DIAGNOSIS — G89.29 CHRONIC PAIN OF RIGHT KNEE: ICD-10-CM

## 2022-12-28 DIAGNOSIS — M79.18 MYOFASCIAL PAIN: ICD-10-CM

## 2022-12-28 DIAGNOSIS — M47.817 LUMBOSACRAL SPONDYLOSIS WITHOUT MYELOPATHY: ICD-10-CM

## 2022-12-28 DIAGNOSIS — G62.9 PERIPHERAL POLYNEUROPATHY: Primary | ICD-10-CM

## 2022-12-28 DIAGNOSIS — M76.31 IT BAND SYNDROME, RIGHT: ICD-10-CM

## 2022-12-28 DIAGNOSIS — M25.561 CHRONIC PAIN OF RIGHT KNEE: ICD-10-CM

## 2022-12-28 PROCEDURE — 99214 OFFICE O/P EST MOD 30 MIN: CPT | Performed by: PAIN MEDICINE

## 2022-12-28 RX ORDER — GABAPENTIN 100 MG/1
CAPSULE ORAL
Qty: 90 CAPSULE | Refills: 1 | Status: SHIPPED | OUTPATIENT
Start: 2022-12-28 | End: 2024-02-14

## 2022-12-28 ASSESSMENT — PAIN SCALES - GENERAL: PAINLEVEL: MILD PAIN (2)

## 2022-12-28 NOTE — LETTER
12/28/2022         RE: Esperanza Gage  [address] [place] [address]        Dear Colleague,    Thank you for referring your patient, Esperanza Gage, to the [facility]. Please see a copy of my visit note below.      Assessment:     Diagnoses and all orders for this visit:  Peripheral polyneuropathy  -     gabapentin (NEURONTIN) 100 MG capsule; Take 100 mg at bedtime for 1 week and then increase to 200 mg for 1 week and then 300 mg.  After this you can stay at this dose.  Lumbosacral spondylosis without myelopathy  It band syndrome, right  Myofascial pain  Chronic pain of right knee     Esperanza Gage is a 77 year old y.o. female with past medical history significant for hearing problem, leg numbness, vitamin D deficiency, hyperlipidemia, hypertension, osteoporosis, chondromalacia patella, elbow fracture, milia, recurrent UTI, vaginal atrophy, status post knee surgery who presents today for follow-up regarding low back pain:    -Low back pain is secondary to myofascial type pain and lumbar spondylosis without myelopathy likely.     Plan:     A shared decision making plan was used. The patient's values and choices were respected. Prior medical records from our last visit on 9/8/2022 were reviewed today. The following represents what was discussed and decided upon by the provider and the patient.        -DIAGNOSTIC TESTS: Images were personally reviewed and interpreted.   --X-ray lumbar spine dated 6/9/2022 is personally viewed images interpreted and discussed with patient.  There is mild left convex curvature of the lumbar spine centered at L2-3.  There is grade 1 retrolisthesis of L3 on L4 and L4 and L5 as well as L5 on S1.  There is facet arthropathy from L2-3 through L5-S1.  -- Right knee dated 5/31/2022 is personally viewed images interpreted and discussed the patient.  There is mild joint space narrowing in the lateral femorotibial joint compartment of the right  knee.    -INTERVENTIONS: No interventions at this time.    -MEDICATIONS: I have ordered gabapentin 100 mg at bedtime which she will increase every week until she is taking 300 mg at bedtime.  -  Discussed side effects of medications and proper use. Patient verbalized understanding.    -PHYSICAL THERAPY: She continues to do home exercises on a consistent basis.  I recommend that she continues with this.     -PATIENT EDUCATION: We discussed pain management in a multimodal fashion including physical therapy, medication management, possible future injections.    -FOLLOW UP: Patient will follow up as needed.  Advised to contact clinic if symptoms worsen or change.    Subjective:     Esperanza Gage is a 77 year old female who presents today for follow-up regarding right low back pain.  Patient notes that she continues to have right low back pain, however its continued to be better with exercises and moving.  Pain is worse with sitting for prolonged period of time as well as reading in bed.  She had physical therapy up until November and continues to do these home exercises.  Has been helpful.  She has neuropathy in her feet with discomfort being worse at bedtime and night it wakes her up.  She denies any bowel or bladder changes, fevers, chills, unintentional weight loss.  Pain today is 2/10 is worst is 4/10 as best as 2/10.    Evaluation to Date: X-ray of lumbar spine dated 6/9/2022.  Right knee x-ray dated 5/31/2022.     Treatment to Date:  Several sessions of physical therapy.    Patient Active Problem List   Diagnosis     Vitamin D deficiency     Hyperlipidemia with target LDL less than 130     Osteopenia     Breast signs and symptoms     Chondromalacia of patella     Primary localized osteoarthrosis, lower leg     Pelvic fracture (H)     Medication management     Leg numbness     Seborrheic keratosis     Milia     History of tinea     Status post knee surgery     Recurrent UTI     Vaginal atrophy     Chronic bilateral  low back pain without sciatica     Chronic pain of right knee       Current Outpatient Medications   Medication     gabapentin (NEURONTIN) 100 MG capsule     Ascorbic Acid (VITAMIN C) 500 MG CAPS     ASPIRIN PO     losartan (COZAAR) 50 MG tablet     multivitamin w/minerals (THERA-VIT-M) tablet     omega 3 1000 MG CAPS     phenazopyridine (PYRIDIUM) 200 MG tablet     simvastatin (ZOCOR) 10 MG tablet     vitamin B-Complex     vitamin D3 (CHOLECALCIFEROL) 50 mcg (2000 units) tablet     vitamin E 400 units TABS     zinc gluconate 50 MG tablet     No current facility-administered medications for this visit.       No Known Allergies    Past Medical History:   Diagnosis Date     Hearing problem 2017     Hyperlipidemia 2005     Hyperlipidemia LDL goal < 130      Hypertension      Osteoporosis, post-menopausal      Pelvic fracture (H)         Review of Systems  ROS:   Specifically negative for bowel/bladder dysfunction, balance changes, headache, dizziness, foot drop, fevers, chills, appetite changes, nausea/vomiting, unexplained weight loss. Otherwise 13 systems reviewed are negative. Please see the patient's intake questionnaire from today for details.    Reviewed Social, Family, Past Medical and Past Surgical history with patient, no significant changes noted since prior visit.     Objective:     /64   Pulse 73   SpO2 96%     PHYSICAL EXAMINATION:    --CONSTITUTIONAL: Well developed, well nourished, healthy appearing individual.  --PSYCHIATRIC: Appropriate mood and affect. No difficulty interacting due to temper, social withdrawal, or memory issues.  --SKIN: Lumbar region is dry and intact.   --RESPIRATORY: Normal rhythm and effort. No abnormal accessory muscle breathing patterns noted.   --MUSCULOSKELETAL:  Normal lumbar lordosis noted, no lateral shift.  --GROSS MOTOR: Easily arises from a seated position. Gait is non-antalgic  --LUMBAR SPINE:  Inspection reveals no evidence of deformity. Range of motion is  mildly limited in lumbar flexion, extension, lateral rotation. No tenderness to palpation lumbar spine. Straight leg raising in the seated position is negative to radicular pain.  --SACROILIAC JOINT: One Finger point test negative.  --LOWER EXTREMITY MOTOR TESTING:  Plantar flexion left 5/5, right 5/5   Dorsiflexion left 5/5, right 5/5   Great toe MTP extension left 5/5, right 5/5  Knee flexion left 5/5, right 5/5  Knee extension left 5/5, right 5/5   Hip flexion left 5/5, right 5/5  Hip abduction left 5/5, right 5/5  Hip adduction left 5/5, right 5/5   --NEUROLOGIC:  Sensation to light touch is intact in the bilateral L4, L5, and S1 dermatomes.    RESULTS:   Imaging: Lumbar spine imaging was reviewed today.                        Again, thank you for allowing me to participate in the care of your patient.        Sincerely,        Cory Galindo, DO

## 2022-12-28 NOTE — PATIENT INSTRUCTIONS
I have ordered gabapentin 100 mg you can take at bedtime for 1 week and then increase to 200 mg for 1 week.  After 1 more week you can increase to 300 mg and stay at this dose.    ~Please call Nurse Navigation line [phone] with any questions or concerns about your treatment plan, if symptoms worsen and you would like to be seen urgently, or if you have problems controlling bladder and bowel function.

## 2022-12-28 NOTE — PROGRESS NOTES
Assessment:     Diagnoses and all orders for this visit:  Peripheral polyneuropathy  -     gabapentin (NEURONTIN) 100 MG capsule; Take 100 mg at bedtime for 1 week and then increase to 200 mg for 1 week and then 300 mg.  After this you can stay at this dose.  Lumbosacral spondylosis without myelopathy  It band syndrome, right  Myofascial pain  Chronic pain of right knee     Esperanza Gage is a 77 year old y.o. female with past medical history significant for hearing problem, leg numbness, vitamin D deficiency, hyperlipidemia, hypertension, osteoporosis, chondromalacia patella, elbow fracture, milia, recurrent UTI, vaginal atrophy, status post knee surgery who presents today for follow-up regarding low back pain:    -Low back pain is secondary to myofascial type pain and lumbar spondylosis without myelopathy likely.     Plan:     A shared decision making plan was used. The patient's values and choices were respected. Prior medical records from our last visit on 9/8/2022 were reviewed today. The following represents what was discussed and decided upon by the provider and the patient.        -DIAGNOSTIC TESTS: Images were personally reviewed and interpreted.   --X-ray lumbar spine dated 6/9/2022 is personally viewed images interpreted and discussed with patient.  There is mild left convex curvature of the lumbar spine centered at L2-3.  There is grade 1 retrolisthesis of L3 on L4 and L4 and L5 as well as L5 on S1.  There is facet arthropathy from L2-3 through L5-S1.  -- Right knee dated 5/31/2022 is personally viewed images interpreted and discussed the patient.  There is mild joint space narrowing in the lateral femorotibial joint compartment of the right knee.    -INTERVENTIONS: No interventions at this time.    -MEDICATIONS: I have ordered gabapentin 100 mg at bedtime which she will increase every week until she is taking 300 mg at bedtime.  -  Discussed side effects of medications and proper use. Patient verbalized  understanding.    -PHYSICAL THERAPY: She continues to do home exercises on a consistent basis.  I recommend that she continues with this.     -PATIENT EDUCATION: We discussed pain management in a multimodal fashion including physical therapy, medication management, possible future injections.    -FOLLOW UP: Patient will follow up as needed.  Advised to contact clinic if symptoms worsen or change.    Subjective:     Esperanza Gage is a 77 year old female who presents today for follow-up regarding right low back pain.  Patient notes that she continues to have right low back pain, however its continued to be better with exercises and moving.  Pain is worse with sitting for prolonged period of time as well as reading in bed.  She had physical therapy up until November and continues to do these home exercises.  Has been helpful.  She has neuropathy in her feet with discomfort being worse at bedtime and night it wakes her up.  She denies any bowel or bladder changes, fevers, chills, unintentional weight loss.  Pain today is 2/10 is worst is 4/10 as best as 2/10.    Evaluation to Date: X-ray of lumbar spine dated 6/9/2022.  Right knee x-ray dated 5/31/2022.     Treatment to Date:  Several sessions of physical therapy.    Patient Active Problem List   Diagnosis     Vitamin D deficiency     Hyperlipidemia with target LDL less than 130     Osteopenia     Breast signs and symptoms     Chondromalacia of patella     Primary localized osteoarthrosis, lower leg     Pelvic fracture (H)     Medication management     Leg numbness     Seborrheic keratosis     Milia     History of tinea     Status post knee surgery     Recurrent UTI     Vaginal atrophy     Chronic bilateral low back pain without sciatica     Chronic pain of right knee       Current Outpatient Medications   Medication     gabapentin (NEURONTIN) 100 MG capsule     Ascorbic Acid (VITAMIN C) 500 MG CAPS     ASPIRIN PO     losartan (COZAAR) 50 MG tablet     multivitamin  w/minerals (THERA-VIT-M) tablet     omega 3 1000 MG CAPS     phenazopyridine (PYRIDIUM) 200 MG tablet     simvastatin (ZOCOR) 10 MG tablet     vitamin B-Complex     vitamin D3 (CHOLECALCIFEROL) 50 mcg (2000 units) tablet     vitamin E 400 units TABS     zinc gluconate 50 MG tablet     No current facility-administered medications for this visit.       No Known Allergies    Past Medical History:   Diagnosis Date     Hearing problem 2017     Hyperlipidemia 2005     Hyperlipidemia LDL goal < 130      Hypertension      Osteoporosis, post-menopausal      Pelvic fracture (H)         Review of Systems  ROS:   Specifically negative for bowel/bladder dysfunction, balance changes, headache, dizziness, foot drop, fevers, chills, appetite changes, nausea/vomiting, unexplained weight loss. Otherwise 13 systems reviewed are negative. Please see the patient's intake questionnaire from today for details.    Reviewed Social, Family, Past Medical and Past Surgical history with patient, no significant changes noted since prior visit.     Objective:     /64   Pulse 73   SpO2 96%     PHYSICAL EXAMINATION:    --CONSTITUTIONAL: Well developed, well nourished, healthy appearing individual.  --PSYCHIATRIC: Appropriate mood and affect. No difficulty interacting due to temper, social withdrawal, or memory issues.  --SKIN: Lumbar region is dry and intact.   --RESPIRATORY: Normal rhythm and effort. No abnormal accessory muscle breathing patterns noted.   --MUSCULOSKELETAL:  Normal lumbar lordosis noted, no lateral shift.  --GROSS MOTOR: Easily arises from a seated position. Gait is non-antalgic  --LUMBAR SPINE:  Inspection reveals no evidence of deformity. Range of motion is mildly limited in lumbar flexion, extension, lateral rotation. No tenderness to palpation lumbar spine. Straight leg raising in the seated position is negative to radicular pain.  --SACROILIAC JOINT: One Finger point test negative.  --LOWER EXTREMITY MOTOR  TESTING:  Plantar flexion left 5/5, right 5/5   Dorsiflexion left 5/5, right 5/5   Great toe MTP extension left 5/5, right 5/5  Knee flexion left 5/5, right 5/5  Knee extension left 5/5, right 5/5   Hip flexion left 5/5, right 5/5  Hip abduction left 5/5, right 5/5  Hip adduction left 5/5, right 5/5   --NEUROLOGIC:  Sensation to light touch is intact in the bilateral L4, L5, and S1 dermatomes.    RESULTS:   Imaging: Lumbar spine imaging was reviewed today.

## 2023-01-11 ENCOUNTER — TELEPHONE (OUTPATIENT)
Dept: OBGYN | Facility: CLINIC | Age: 78
End: 2023-01-11

## 2023-01-11 DIAGNOSIS — M81.0 SENILE OSTEOPOROSIS: Primary | ICD-10-CM

## 2023-01-11 DIAGNOSIS — Z92.29 HX DRUG THERAPY: ICD-10-CM

## 2023-01-11 NOTE — TELEPHONE ENCOUNTER
Called Esperanza and advised that Dr Srivastava recommended follow up around 9/2022.  This was not completed.    She agrees to office visit. Scheduled for 2/20, and she would like prolia that day if possible.    Would like orders for any recommended labs prior to her appointment.    Routed to Dr Srivastava

## 2023-01-11 NOTE — TELEPHONE ENCOUNTER
[facility] Call Center    Phone Message    May a detailed message be left on voicemail: yes     Reason for Call: Other: d     Action Taken: Message routed to:  [facility] ([facility]): Patient is calling to schedule Prolia injection. Please reach out. Thank you    Travel Screening: Not Applicable

## 2023-01-11 NOTE — TELEPHONE ENCOUNTER
Message sent to  staff to assist patient with scheduling nurse visit for Prolia injection. Message sent to Dr. Srivastava for new Prolia order.

## 2023-01-12 NOTE — PROGRESS NOTES
1/11/23  prolia and a BMP ordered -  Also needs a DXA - should be seen after the DXA.  Yin Srivastava MD, PhD

## 2023-01-12 NOTE — TELEPHONE ENCOUNTER
"Per Dr. Srivastava:    \"This pt needs a bmp first then the prolia - also needs a DXA and then see me - orders are placed\"    Called patient to pass on message, reached VM. Left message with information and clinic number to call back with questions/concerns.          "

## 2023-01-26 ENCOUNTER — ANCILLARY PROCEDURE (OUTPATIENT)
Dept: MAMMOGRAPHY | Facility: CLINIC | Age: 78
End: 2023-01-26
Attending: INTERNAL MEDICINE
Payer: COMMERCIAL

## 2023-01-26 ENCOUNTER — ANCILLARY PROCEDURE (OUTPATIENT)
Dept: BONE DENSITY | Facility: CLINIC | Age: 78
End: 2023-01-26
Attending: FAMILY MEDICINE
Payer: COMMERCIAL

## 2023-01-26 ENCOUNTER — LAB (OUTPATIENT)
Dept: LAB | Facility: CLINIC | Age: 78
End: 2023-01-26
Attending: FAMILY MEDICINE
Payer: COMMERCIAL

## 2023-01-26 DIAGNOSIS — Z12.31 VISIT FOR SCREENING MAMMOGRAM: ICD-10-CM

## 2023-01-26 DIAGNOSIS — M81.0 SENILE OSTEOPOROSIS: ICD-10-CM

## 2023-01-26 LAB
ANION GAP SERPL CALCULATED.3IONS-SCNC: 7 MMOL/L (ref 7–15)
BUN SERPL-MCNC: 12.8 MG/DL (ref 8–23)
CALCIUM SERPL-MCNC: 9.7 MG/DL (ref 8.8–10.2)
CHLORIDE SERPL-SCNC: 106 MMOL/L (ref 98–107)
CREAT SERPL-MCNC: 0.66 MG/DL (ref 0.51–0.95)
DEPRECATED HCO3 PLAS-SCNC: 27 MMOL/L (ref 22–29)
GFR SERPL CREATININE-BSD FRML MDRD: 90 ML/MIN/1.73M2
GLUCOSE SERPL-MCNC: 101 MG/DL (ref 70–99)
POTASSIUM SERPL-SCNC: 4.6 MMOL/L (ref 3.4–5.3)
SODIUM SERPL-SCNC: 140 MMOL/L (ref 136–145)

## 2023-01-26 PROCEDURE — 77080 DXA BONE DENSITY AXIAL: CPT | Performed by: INTERNAL MEDICINE

## 2023-01-26 PROCEDURE — 36415 COLL VENOUS BLD VENIPUNCTURE: CPT | Performed by: PATHOLOGY

## 2023-01-26 PROCEDURE — 77067 SCR MAMMO BI INCL CAD: CPT | Mod: GC

## 2023-01-26 PROCEDURE — 80048 BASIC METABOLIC PNL TOTAL CA: CPT | Performed by: PATHOLOGY

## 2023-01-26 PROCEDURE — 77063 BREAST TOMOSYNTHESIS BI: CPT | Mod: GC

## 2023-01-31 DIAGNOSIS — I10 ESSENTIAL HYPERTENSION: ICD-10-CM

## 2023-02-02 RX ORDER — LOSARTAN POTASSIUM 50 MG/1
TABLET ORAL
Qty: 90 TABLET | Refills: 0 | Status: SHIPPED | OUTPATIENT
Start: 2023-02-02 | End: 2023-05-04

## 2023-02-17 NOTE — PROGRESS NOTES
ASSESSMENT:  This is a 77-year-old postmenopausal female with history of osteoporosis by original T-scores and history of compression fractures and pelvic fracture.  She initially was on alendronate and most recently had been taking alendronate 70 mg from 2014 to 2020.  She then had a holiday till 2022 when she started Prolia.  She has had 2 injections of Prolia. She has multiple joint complaints but I do not think this is related to the Prolia.  She is due for Prolia today.  We discussed calcium and vitamin D.  We will see her again in a year and her next Prolia will be due in August.    PLAN:    Prolia today  Blood work in 2 wks  See Dr Gifford about hip and knee   Patient should take 3244-5470 mg of calcium/day in divided doses and vitamin D3 2000IU/day.    Thank you for allowing me to participate in the care of your patient.  Please do not hesitate to call with questions or concerns.    Sincerely,    Yin Srivastava MD, PhD  CC Julia Gifford       Time note (e5, 40'): The total of my time (on the date of service) for this service was 52 minutes, including discussion/face-to-face, chart review, interpretation not otherwise reported, documentation, and updating of the computerized record.          Esperanza is a  77 year old female post menopausal] [GR0, P0] that presents today for osteoporosis follow up patient was last seen 1/2022. She has a hx of compression fractures and hx of pelvic fracture. She was  on alendronate started 35 mg/wk 10 yrs ago  and 9315-1753 took a drug holiday and fell and fractured pelvis - then resumed alendronate  70mg/wk  3280-2729 then on holiday till 1/2022 when started prolia. She has had 2 shots:  2/2022 and Aug 2022.    She is due for prolia. She describes  back pain which started spring 2022 and then did PT and helped. Then started with right hip pain about 2 wks ago - worse with weight bearing, no hx of injury.  She also has knee  pain and has been seen at [facility] and xray  did show compartment narrowing  - not of these pains are likely related to the prolia.     Referring Physician: Julia Banerjee MD    HPI     Have you ever had a bone density test? Yes  Where = [facility]  When = 1/2023 9/2020 2013, 2012, 2009  Spine Tscore = L1-3  -1.7  Left neck Tscore = -1.5  Total left hip Tscore = -1.7  Right neck Tscore = -1.7  Total Right hip Tscore = -1.2  Have you received any x-ray dye or contrast in the last ten days? No  How many servings of dairy products do you consume per day? 3 Type: milk  Yogurt  Cottage cheese  - green vegetables   Do you take a multi-vitamin daily? Yes  Do you take a vitamin D supplement? Yes 2000IU/day  Do you take a calcium supplement daily?  Alpesh Mag  - 500mg  1 tid   Do you take a supplement containing strontium? No  Are you exposed to natural sunlight at least 20 minutes three times a week? Yes    Social History   reports that she has quit smoking. Her smoking use included cigarettes. She has never used smokeless tobacco. She reports current alcohol use. She reports that she does not use drugs.  Do you smoke cigarettes? Reformed smoker  Do you exercise? Yes. Details: 1 hr 2x/wk exercise class - minimal walking this time of year   Do you drink alcohol? No    Medication History  Have you used any of the following medications?  Actonel (Risedronate): No              Aredia (Pamidronate): No              Boniva (Ibindronate): No              Didronil (Etidronate): No              Evista (Raloxifene): No               Fosamax (Alendronate): on this currently - started 35 mg/wk 10 yrs ago  and 3370-1855 took a drug holiday and fell and fractured pelvis - then resumed alendronate  70mg/wk  0594-6183 then took another 1 year holiday off alendronate               Forteo (Parathyroid hormone) injections: No              HCTZ (Thiazide): No              Calcitonin nasal spray: No              Reclast or Zometa (Zolendronate): No              Prolia (Denosumab): last shot 8/2022 -  is due      Current Outpatient Medications   Medication Sig Dispense Refill     Ascorbic Acid (VITAMIN C) 500 MG CAPS        ASPIRIN PO Take 81 mg by mouth daily       gabapentin (NEURONTIN) 100 MG capsule Take 100 mg at bedtime for 1 week and then increase to 200 mg for 1 week and then 300 mg.  After this you can stay at this dose. 90 capsule 1     losartan (COZAAR) 50 MG tablet Take 1 tablet by mouth once daily 90 tablet 0     multivitamin w/minerals (THERA-VIT-M) tablet Take 1 tablet by mouth daily       omega 3 1000 MG CAPS        phenazopyridine (PYRIDIUM) 200 MG tablet Take 1 tablet (200 mg) by mouth 3 times daily as needed for irritation 30 tablet 1     simvastatin (ZOCOR) 10 MG tablet Take 1 tablet (10 mg) by mouth At Bedtime 90 tablet 1     vitamin B-Complex Take 1 tablet by mouth daily       vitamin D3 (CHOLECALCIFEROL) 50 mcg (2000 units) tablet Take 1 tablet by mouth daily       vitamin E 400 units TABS Take 400 Units by mouth daily       zinc gluconate 50 MG tablet Take 50 mg by mouth daily          No Known Allergies    Past Medical History    Family History   Problem Relation Age of Onset     Osteoporosis Mother      Hypertension Brother      Cerebrovascular Disease Brother      Cerebrovascular Disease Brother      Heart Disease Father      C.A.D. No family hx of      Diabetes No family hx of      Skin Cancer No family hx of      Melanoma No family hx of      Dementia No family hx of      Heart Disease No family hx of      Other - See Comments Brother         cerebrovascular disease     Hypertension Brother      Coronary Artery Disease No family hx of        ROS:  General: none  Head/Eyes: none  Ears/Nose/Throat: none  Cardiovascular: none  Respiratory: none  Gastrointestinal: none  Breast: none  Genitourinary: none  Sexual Function: none  Musculoskeletal: joint pain and back pain right hip and muscle weakness in knees  And has knee pain and gets click on right side - saw sports medicine -    Skin:  "none  Neurological: numbness/tingling  Both feet  Left worse than right     Mental Health: none  Endocrine: none    Clinic Measurements  Vitals: BP (!) 155/85   Pulse 66   Ht 1.651 m (5' 5\")   Wt 85.8 kg (189 lb 3.2 oz)   BMI 31.48 kg/m    BMI= Body mass index is 31.48 kg/m .    Physical exam  Constitutional: Well appearing woman in no acute distress.   Psychological: appropriate mood.  Neck: No thyroidmegaly.  no carotid bruits.  Cardiovascular: regular rate and rhythm, normal S1 and S2, no murmurs, rubs or gallops  Respiratory: clear to auscultation, no wheezes or crackles, normal breath sounds.  Musculoskeletal: good range of motion, no edema and motor strength is equal in the upper and lower extremities    Spine: Straight, not tender, Flexion good, Extension adequate, Lateral movement adequate, Rotational movement adequate  Skin: no concerning lesions, no jaundice.  Neurological: cranial nerves intact, normal strength, reflexes at patella and biceps normal, normal gait, no tremor.     LAB  Vertebra; Fracture Assessment: Vertebra; Fracture Assessment: 2013           grade 1 (mild) compression fracture at T10           grade 2 (moderate) biconcave fracture at T11           grade 1 (mild) biconcave fracture at T12.  Dexa Scan: 1/2023    FRAX Assessment Tool: [N/A,   Risk Factors:  +FHx - PM, age,  T scores  Compression fractures   Reformed smoker     Yin Srivastava MD, PhD    "

## 2023-02-20 ENCOUNTER — OFFICE VISIT (OUTPATIENT)
Dept: FAMILY MEDICINE | Facility: CLINIC | Age: 78
End: 2023-02-20
Attending: FAMILY MEDICINE
Payer: COMMERCIAL

## 2023-02-20 VITALS
SYSTOLIC BLOOD PRESSURE: 155 MMHG | BODY MASS INDEX: 31.52 KG/M2 | HEIGHT: 65 IN | DIASTOLIC BLOOD PRESSURE: 85 MMHG | HEART RATE: 66 BPM | WEIGHT: 189.2 LBS

## 2023-02-20 DIAGNOSIS — M81.0 SENILE OSTEOPOROSIS: Primary | ICD-10-CM

## 2023-02-20 PROCEDURE — 250N000011 HC RX IP 250 OP 636: Performed by: FAMILY MEDICINE

## 2023-02-20 PROCEDURE — 99215 OFFICE O/P EST HI 40 MIN: CPT | Performed by: FAMILY MEDICINE

## 2023-02-20 PROCEDURE — G0463 HOSPITAL OUTPT CLINIC VISIT: HCPCS | Mod: 25 | Performed by: FAMILY MEDICINE

## 2023-02-20 PROCEDURE — 96372 THER/PROPH/DIAG INJ SC/IM: CPT | Performed by: FAMILY MEDICINE

## 2023-02-20 RX ADMIN — DENOSUMAB 60 MG: 60 INJECTION SUBCUTANEOUS at 12:58

## 2023-02-20 ASSESSMENT — PATIENT HEALTH QUESTIONNAIRE - PHQ9
SUM OF ALL RESPONSES TO PHQ QUESTIONS 1-9: 0
5. POOR APPETITE OR OVEREATING: NOT AT ALL

## 2023-02-20 ASSESSMENT — ANXIETY QUESTIONNAIRES
2. NOT BEING ABLE TO STOP OR CONTROL WORRYING: NOT AT ALL
5. BEING SO RESTLESS THAT IT IS HARD TO SIT STILL: NOT AT ALL
GAD7 TOTAL SCORE: 0
GAD7 TOTAL SCORE: 0
3. WORRYING TOO MUCH ABOUT DIFFERENT THINGS: NOT AT ALL
6. BECOMING EASILY ANNOYED OR IRRITABLE: NOT AT ALL
1. FEELING NERVOUS, ANXIOUS, OR ON EDGE: NOT AT ALL
7. FEELING AFRAID AS IF SOMETHING AWFUL MIGHT HAPPEN: NOT AT ALL

## 2023-02-20 NOTE — PATIENT INSTRUCTIONS
Thank you for trusting us with your care!     If you need to contact us for questions about:  Symptoms, Scheduling & Medical Questions; Non-urgent (2-3 day response) Luis message, Urgent (needing response today) [phone] (if after 3:30pm next day response)   Prescriptions: Please call your Pharmacy   Billing: [place] [phone] or [facility]:[phone]      Prolia today  Blood work in 2 wks  See Dr Gifford about hip and legs  Patient should take 6603-3564 mg of calcium/day in divided doses and vitamin D3 2000IU/day.

## 2023-02-20 NOTE — NURSING NOTE
Clinic Administered Medication Documentation    Administrations This Visit     denosumab (PROLIA) injection 60 mg     Admin Date  02/20/2023 Action  Given Dose  60 mg Route  Subcutaneous Site  Left Arm Administered By  Shauna Mendez CMA    Ordering Provider: Yin Srivastava MD PhD    NDC: 02652-559-59    Lot#: [id number]    : AMGEN    Patient Supplied?: No                  Prolia Documentation    Prior to injection, verified patient identity using patient's name and date of birth. Medication was administered. Please see MAR and medication order for additional information. Patient instructed to report any adverse reaction to staff immediately .    Indication: Prolia  (denosumab) is a prescription medicine used to treat osteoporosis in patients who:     Are at high risk for fracture, meaning patients who have had a fracture related to osteoporosis, or who have multiple risk factors for fracture.    Cannot use another osteoporosis medicine or other osteoporosis medicines did not work well.    The timeline for early/late injections would be 4 weeks early and any time after the 6 month aga. If a patient receives their injection late, then the subsequent injection would be 6 months from the date that they actually received the injection.    When was the last injection?  8/2022  Was the last injection at least 6 months ago? Yes  Has the prior authorization been completed?  Yes  Is there an active order (within the past 365 days) in the chart?  Yes  Patient denies any dental work involving the bone (e.g. tooth extraction or dental implants) in the past 4 weeks?  Yes  Patient denies plans for any dental work involving the bone (e.g. tooth extraction or dental implants) in the next 4 weeks? Yes    The following steps were completed to comply with the REMS program for Prolia:    Confirms that patient received education from RN or provider via the Medication Guide and Patient Counseling Chart, including the  serious risks of Prolia  and the symptoms of each risk.    Told the patient to seek prompt medical attention if they have signs or symptoms of any of the serious risks, as described in the Medication Guide and Patient Counseling Chart that was reviewed between the patient and RN or LP.    Provided each patient a copy of the Medication Guide and Patient Brochure.      Was entire vial of medication used? Yes  Vial/Syringe: Syringe  Expiration Date:  3/31/25  Was this medication supplied by the patient? No

## 2023-02-22 PROBLEM — G89.29 CHRONIC PAIN OF RIGHT KNEE: Status: RESOLVED | Noted: 2022-08-16 | Resolved: 2023-02-22

## 2023-02-22 PROBLEM — M25.561 CHRONIC PAIN OF RIGHT KNEE: Status: RESOLVED | Noted: 2022-08-16 | Resolved: 2023-02-22

## 2023-02-22 NOTE — PROGRESS NOTES
Please see Progress Note from the last visit on 11/16/22 for discharge information as patient did not return to Physical therapy. Patient will be discharged from formal physical therapy at this time.

## 2023-03-06 ENCOUNTER — LAB (OUTPATIENT)
Dept: LAB | Facility: CLINIC | Age: 78
End: 2023-03-06
Payer: COMMERCIAL

## 2023-03-06 DIAGNOSIS — M81.0 SENILE OSTEOPOROSIS: Primary | ICD-10-CM

## 2023-03-06 DIAGNOSIS — M81.0 SENILE OSTEOPOROSIS: ICD-10-CM

## 2023-03-06 LAB
CALCIUM SERPL-MCNC: 9.5 MG/DL (ref 8.8–10.2)
MAGNESIUM SERPL-MCNC: 2.1 MG/DL (ref 1.7–2.3)
PHOSPHATE SERPL-MCNC: 3.3 MG/DL (ref 2.5–4.5)

## 2023-03-06 PROCEDURE — 82310 ASSAY OF CALCIUM: CPT

## 2023-03-06 PROCEDURE — 84100 ASSAY OF PHOSPHORUS: CPT

## 2023-03-06 PROCEDURE — 36415 COLL VENOUS BLD VENIPUNCTURE: CPT

## 2023-03-06 PROCEDURE — 83735 ASSAY OF MAGNESIUM: CPT

## 2023-04-02 ENCOUNTER — HEALTH MAINTENANCE LETTER (OUTPATIENT)
Age: 78
End: 2023-04-02

## 2023-05-02 DIAGNOSIS — I10 ESSENTIAL HYPERTENSION: ICD-10-CM

## 2023-05-05 RX ORDER — LOSARTAN POTASSIUM 50 MG/1
50 TABLET ORAL DAILY
Qty: 90 TABLET | Refills: 0 | Status: SHIPPED | OUTPATIENT
Start: 2023-05-05 | End: 2023-08-22

## 2023-05-05 NOTE — TELEPHONE ENCOUNTER
losartan (COZAAR) 50 MG tablet      Last Written Prescription Date:  2/2/23  Last Fill Quantity: 90,   # refills: 0  Last Office Visit : 2/20/23  Future Office visit:  NONE    Routing refill request to provider for review/approval because:  Blood pressure out of range     BP Readings from Last 3 Encounters:   02/20/23 (!) 155/85   12/28/22 136/64   10/20/22 (!) 163/92       90d daniel refill sent, routed to clinic staff for follow up

## 2023-08-03 DIAGNOSIS — E78.00 PURE HYPERCHOLESTEROLEMIA: ICD-10-CM

## 2023-08-07 RX ORDER — SIMVASTATIN 10 MG
TABLET ORAL
Qty: 90 TABLET | Refills: 0 | Status: SHIPPED | OUTPATIENT
Start: 2023-08-07 | End: 2023-09-11

## 2023-08-07 NOTE — TELEPHONE ENCOUNTER
simvastatin (ZOCOR) 10 MG tablet       Last Written Prescription Date:  11-  Last Fill Quantity: 90,   # refills: 1  Last Office Visit : 5-  Future Office visit:  9-      Statins Protocol Failed 08/03/2023 06:20 AM   Protocol Details  LDL on file in past 12 months    Recent (12 mo) or future (30 days) visit within the authorizing provider's specialty     Lab Test 05/31/22  0830   LDL 78     Noreen refill for overdue lab

## 2023-08-21 ENCOUNTER — ALLIED HEALTH/NURSE VISIT (OUTPATIENT)
Dept: OBGYN | Facility: CLINIC | Age: 78
End: 2023-08-21
Payer: COMMERCIAL

## 2023-08-21 DIAGNOSIS — M81.0 SENILE OSTEOPOROSIS: Primary | ICD-10-CM

## 2023-08-21 DIAGNOSIS — Z92.29 HX DRUG THERAPY: ICD-10-CM

## 2023-08-21 PROCEDURE — 250N000011 HC RX IP 250 OP 636: Mod: JZ | Performed by: FAMILY MEDICINE

## 2023-08-21 PROCEDURE — 99207 PR NO BILLABLE SERVICE THIS VISIT: CPT

## 2023-08-21 PROCEDURE — G0463 HOSPITAL OUTPT CLINIC VISIT: HCPCS

## 2023-08-21 PROCEDURE — 96372 THER/PROPH/DIAG INJ SC/IM: CPT | Performed by: FAMILY MEDICINE

## 2023-08-21 RX ADMIN — DENOSUMAB 60 MG: 60 INJECTION SUBCUTANEOUS at 09:03

## 2023-08-21 NOTE — PROGRESS NOTES
8-21-23 last prolia was 2/20/2023.   prolia ordered and BMP - needs a callcium   Yin Srivastava MD, PhD

## 2023-08-21 NOTE — PROGRESS NOTES
Chief Complaint   Patient presents with    [facility] Visit     Prolia injection              Clinic Administered Medication Documentation      Prolia Documentation    Indication: Prolia  (denosumab) is a prescription medicine used to treat osteoporosis in patients who:   Are at high risk for fracture, meaning patients who have had a fracture related to osteoporosis, or who have multiple risk factors for fracture.  Cannot use another osteoporosis medicine or other osteoporosis medicines did not work well.  The timeline for early/late injections would be 4 weeks early and any time after the 6 month aga. If a patient receives their injection late, then the subsequent injection would be 6 months from the date that they actually received the injection.    When was the last injection?    Was the last injection at least 6 months ago? Yes  Has the prior authorization been completed?  Yes  Is there an active order (written within the past 365 days, with administrations remaining, not ) in the chart?  Yes  Patient denies any dental work involving the bone (e.g. tooth extraction or dental implants) in the past 4 weeks?  Yes  Patient denies plans for any dental work involving the bone (e.g. tooth extraction or dental implants) in the next 4 weeks? Yes    The following steps were completed to comply with the REMS program for Prolia:  Confirms that patient received education from RN or provider via the Medication Guide and Patient Counseling Chart, including the serious risks of Prolia  and the symptoms of each risk.  Told the patient to seek prompt medical attention if they have signs or symptoms of any of the serious risks, as described in the Medication Guide and Patient Counseling Chart that was reviewed between the patient and RN or LP.  Provided each patient a copy of the Medication Guide and Patient Brochure.    Prior to injection, verified patient identity using patient's name and date of birth. Medication was  administered. Please see MAR and medication order for additional information. Patient instructed to remain in clinic for 15 minutes and report any adverse reaction to staff immediately.    Vial/Syringe: Syringe  Was this medication supplied by the patient? Yes, Medication was received directly from a [place] pharmacy in a staff to staff handoff or via  delivery and the patient HAS already been billed for the medication (follow site specific policies)     Verified that the patient has refills remaining in their prescription.           Maria A Mcginnis LPN

## 2023-08-22 DIAGNOSIS — I10 ESSENTIAL HYPERTENSION: ICD-10-CM

## 2023-08-22 RX ORDER — LOSARTAN POTASSIUM 50 MG/1
50 TABLET ORAL DAILY
Qty: 90 TABLET | Refills: 0 | Status: SHIPPED | OUTPATIENT
Start: 2023-08-22 | End: 2023-09-11

## 2023-08-22 NOTE — TELEPHONE ENCOUNTER
losartan (COZAAR) 50 MG tablet 90 tablet 0 5/5/202       Last Office Visit: 2/20/23  Future Office visit:  9/11/23         Angiotensin-II Receptors [id number]/22/2023 10:03 AM   Protocol Details Last blood pressure under 140/90 in past 12 months        High BP at last office visit-injection given.     Eleanor Aj RN

## 2023-09-11 ENCOUNTER — OFFICE VISIT (OUTPATIENT)
Dept: INTERNAL MEDICINE | Facility: CLINIC | Age: 78
End: 2023-09-11
Attending: INTERNAL MEDICINE
Payer: COMMERCIAL

## 2023-09-11 ENCOUNTER — LAB (OUTPATIENT)
Dept: LAB | Facility: CLINIC | Age: 78
End: 2023-09-11
Attending: INTERNAL MEDICINE
Payer: COMMERCIAL

## 2023-09-11 VITALS
HEART RATE: 67 BPM | WEIGHT: 199 LBS | DIASTOLIC BLOOD PRESSURE: 76 MMHG | SYSTOLIC BLOOD PRESSURE: 119 MMHG | HEIGHT: 65 IN | BODY MASS INDEX: 33.15 KG/M2

## 2023-09-11 DIAGNOSIS — G60.9 IDIOPATHIC PERIPHERAL NEUROPATHY: ICD-10-CM

## 2023-09-11 DIAGNOSIS — I10 ESSENTIAL HYPERTENSION: ICD-10-CM

## 2023-09-11 DIAGNOSIS — E78.00 PURE HYPERCHOLESTEROLEMIA: ICD-10-CM

## 2023-09-11 DIAGNOSIS — G89.29 CHRONIC PAIN OF RIGHT KNEE: ICD-10-CM

## 2023-09-11 DIAGNOSIS — Z00.00 ENCOUNTER FOR MEDICARE ANNUAL WELLNESS EXAM: Primary | ICD-10-CM

## 2023-09-11 DIAGNOSIS — Z86.0100 HISTORY OF COLONIC POLYPS: ICD-10-CM

## 2023-09-11 DIAGNOSIS — M25.561 CHRONIC PAIN OF RIGHT KNEE: ICD-10-CM

## 2023-09-11 DIAGNOSIS — Z00.00 PREVENTATIVE HEALTH CARE: ICD-10-CM

## 2023-09-11 LAB
ANION GAP SERPL CALCULATED.3IONS-SCNC: 11 MMOL/L (ref 7–15)
BUN SERPL-MCNC: 11.3 MG/DL (ref 8–23)
CALCIUM SERPL-MCNC: 9.5 MG/DL (ref 8.8–10.2)
CHLORIDE SERPL-SCNC: 106 MMOL/L (ref 98–107)
CHOLEST SERPL-MCNC: 184 MG/DL
CREAT SERPL-MCNC: 0.6 MG/DL (ref 0.51–0.95)
DEPRECATED HCO3 PLAS-SCNC: 24 MMOL/L (ref 22–29)
EGFRCR SERPLBLD CKD-EPI 2021: >90 ML/MIN/1.73M2
GLUCOSE SERPL-MCNC: 119 MG/DL (ref 70–99)
HBA1C MFR BLD: 5.9 %
HDLC SERPL-MCNC: 80 MG/DL
LDLC SERPL CALC-MCNC: 86 MG/DL
NONHDLC SERPL-MCNC: 104 MG/DL
POTASSIUM SERPL-SCNC: 4 MMOL/L (ref 3.4–5.3)
SODIUM SERPL-SCNC: 141 MMOL/L (ref 136–145)
TOTAL PROTEIN SERUM FOR ELP: 7.1 G/DL (ref 6.4–8.3)
TRIGL SERPL-MCNC: 88 MG/DL
TSH SERPL DL<=0.005 MIU/L-ACNC: 2.63 UIU/ML (ref 0.3–4.2)
VIT B12 SERPL-MCNC: 498 PG/ML (ref 232–1245)

## 2023-09-11 PROCEDURE — 84165 PROTEIN E-PHORESIS SERUM: CPT | Mod: TC | Performed by: PATHOLOGY

## 2023-09-11 PROCEDURE — 80048 BASIC METABOLIC PNL TOTAL CA: CPT

## 2023-09-11 PROCEDURE — 83036 HEMOGLOBIN GLYCOSYLATED A1C: CPT

## 2023-09-11 PROCEDURE — G0463 HOSPITAL OUTPT CLINIC VISIT: HCPCS | Performed by: INTERNAL MEDICINE

## 2023-09-11 PROCEDURE — G0439 PPPS, SUBSEQ VISIT: HCPCS | Performed by: INTERNAL MEDICINE

## 2023-09-11 PROCEDURE — 84155 ASSAY OF PROTEIN SERUM: CPT

## 2023-09-11 PROCEDURE — 84443 ASSAY THYROID STIM HORMONE: CPT

## 2023-09-11 PROCEDURE — 36415 COLL VENOUS BLD VENIPUNCTURE: CPT

## 2023-09-11 PROCEDURE — 82607 VITAMIN B-12: CPT

## 2023-09-11 PROCEDURE — 84166 PROTEIN E-PHORESIS/URINE/CSF: CPT | Performed by: PATHOLOGY

## 2023-09-11 PROCEDURE — 80061 LIPID PANEL: CPT

## 2023-09-11 RX ORDER — LOSARTAN POTASSIUM 50 MG/1
50 TABLET ORAL DAILY
Qty: 90 TABLET | Refills: 3 | Status: SHIPPED | OUTPATIENT
Start: 2023-09-11

## 2023-09-11 RX ORDER — SIMVASTATIN 10 MG
TABLET ORAL
Qty: 90 TABLET | Refills: 3 | Status: SHIPPED | OUTPATIENT
Start: 2023-09-11

## 2023-09-11 NOTE — PROGRESS NOTES
SUBJECTIVE:   Esperanza is a 77 year old who presents for Preventive Visit.      Are you in the first 12 months of your Medicare coverage?  No    HPI      Have you ever done Advance Care Planning? (For example, a Health Directive, POLST, or a discussion with a medical provider or your loved ones about your wishes): Yes, advance care planning is on file.       Fall risk  Fallen 2 or more times in the past year?: No  Any fall with injury in the past year?: No  click delete button to remove this line now  Cognitive Screening   1) Repeat 3 items (Leader, Season, Table)    2) Clock draw: NORMAL  3) 3 item recall: Recalls 3 objects  Results: 3 items recalled: COGNITIVE IMPAIRMENT LESS LIKELY    Mini-CogTM Copyright S Isidoro. Licensed by the author for use in [place] [employer]; reprinted with permission (ahsan@[facility]). All rights reserved.      Do you have sleep apnea, excessive snoring or daytime drowsiness? : no    Reviewed and updated as needed this visit by clinical staff    Allergies  Meds              Reviewed and updated as needed this visit by Provider   Tobacco     Med Hx  Surg Hx  Fam Hx         Social History     Tobacco Use    Smoking status: Former     Packs/day: 0.00     Years: 0.00     Pack years: 0.00     Types: Cigarettes    Smokeless tobacco: Never   Substance Use Topics    Alcohol use: Yes     Comment: Rare             11/12/2021    10:03 AM   Alcohol Use   Prescreen: >3 drinks/day or >7 drinks/week? No     Do you have a current opioid prescription? No  Do you use any other controlled substances or medications that are not prescribed by a provider? None              Current providers sharing in care for this patient include:   Patient Care Team:  Julia Gifford MD as PCP - General (Internal Medicine)  Julia Gifford MD as MD (Internal Medicine)  Pierre Campos MD as MD (Orthopedics)  Julia Gifford MD as Referring Physician (Internal Medicine)  Gurpreet Strange MD as MD  "(Dermatology)  Yin Srivastava MD PhD as MD (Family Practice)  Frederic Srivastava MD as Resident (Internal Medicine)  Navdeep Galeano MD as Assigned Musculoskeletal Provider  Alexx Deshpande APRN CNMARCUS as Midwife (OB/Gyn)  Alexx Deshpande APRN CNMARCUS as Assigned OBGYN Provider  Mirela Ta MD as MD (Dermatology)  Eric Galeas DPM as Assigned Surgical Provider  Julia Gifford MD as Assigned PCP    The following health maintenance items are reviewed in Epic and correct as of today:  Health Maintenance   Topic Date Due    COLORECTAL CANCER SCREENING  08/18/2022    MEDICARE ANNUAL WELLNESS VISIT  11/04/2022    FALL RISK ASSESSMENT  11/04/2022    COVID-19 Vaccine (6 - Moderna series) 01/20/2023    INFLUENZA VACCINE (1) 09/01/2023    ADVANCE CARE PLANNING  11/12/2026    LIPID  05/31/2027    DTAP/TDAP/TD IMMUNIZATION (3 - Td or Tdap) 03/09/2030    DEXA  01/26/2038    HEPATITIS C SCREENING  Completed    PHQ-2 (once per calendar year)  Completed    Pneumococcal Vaccine: 65+ Years  Completed    ZOSTER IMMUNIZATION  Completed    IPV IMMUNIZATION  Aged Out    HPV IMMUNIZATION  Aged Out    MENINGITIS IMMUNIZATION  Aged Out    MAMMO SCREENING  Discontinued    LUNG CANCER SCREENING  Discontinued     Labs reviewed in [facility]  Mammogram Screening: Mammogram Screening - Patient over age 75, has elected to continue with screening.    Mammogram Screening - Patient over age 75, has elected to continue with screening.  Pertinent mammograms are reviewed under the imaging tab.    Review of Systems  Right knee pain - ongoing issue, interested in additional evaluation.   Bilateral feet pain and numbness  Otherwise, negative review of symtpms    OBJECTIVE:   /77   Pulse 67   Ht 1.651 m (5' 5\")   Wt 90.3 kg (199 lb)   BMI 33.12 kg/m   Estimated body mass index is 33.12 kg/m  as calculated from the following:    Height as of this encounter: 1.651 m (5' 5\").    Weight as of this encounter: 90.3 kg (199 " "lb).  Physical Exam  GENERAL APPEARANCE: healthy, alert and no distress  EYES: Eyes grossly normal to inspection, PERRL and conjunctivae and sclerae normal  RESP: lungs clear to auscultation - no rales, rhonchi or wheezes  CV: regular rate and rhythm, normal S1 S2, no S3 or S4, no murmur, click or rub, no peripheral edema and peripheral pulses strong  ABDOMEN: soft, nontender, and bowel sounds normal  SKIN: no suspicious lesions or rashes  NEURO: Normal strength and tone, sensory exam grossly normal, mentation intact and speech normal  PSYCH: mentation appears normal and affect normal/bright    Diagnostic Test Results:  Labs reviewed in Epic    ASSESSMENT / PLAN:       ICD-10-CM    1. Encounter for Medicare annual wellness exam  Z00.00       2. Essential hypertension  I10 losartan (COZAAR) 50 MG tablet     Basic Metabolic Panel     TSH with free T4 reflex      3. Pure hypercholesterolemia  E78.00 simvastatin (ZOCOR) 10 MG tablet     Lipid Profile      4. Chronic pain of right knee  M25.561 Orthopedic  Referral    G89.29       5. History of colonic polyps  Z86.010 Colonoscopy Screening  Referral      6. Idiopathic peripheral neuropathy  G60.9 Hemoglobin A1c     Vitamin B12     Protein Electrophoresis     Protein electrophoresis random urine     Adult Neurology  Referral      7. Preventative health care  Z00.00 Adult Eye  Referral                COUNSELING:  Reviewed preventive health counseling, as reflected in patient instructions       Regular exercise       Healthy diet/nutrition       Vision screening      BMI:   Estimated body mass index is 33.12 kg/m  as calculated from the following:    Height as of this encounter: 1.651 m (5' 5\").    Weight as of this encounter: 90.3 kg (199 lb).         She reports that she has quit smoking. Her smoking use included cigarettes. She has never used smokeless tobacco.      Appropriate preventive services were discussed with this patient, " including applicable screening as appropriate for cardiovascular disease, diabetes, osteopenia/osteoporosis, and glaucoma.  As appropriate for age/gender, discussed screening for colorectal cancer, prostate cancer, breast cancer, and cervical cancer. Checklist reviewing preventive services available has been given to the patient.    Reviewed patients plan of care and provided an AVS. The Basic Care Plan (routine screening as documented in Health Maintenance) for Esperanza meets the Care Plan requirement. This Care Plan has been established and reviewed with the Patient.          Julia Gifford MD  [facility] [place]

## 2023-09-11 NOTE — PATIENT INSTRUCTIONS
Thank you for trusting us with your care!     If you need to contact us for questions about:    Symptoms, Scheduling & Medical Questions: Non-urgent (2-3 day response) Mychart message, Urgent (needing response today) [phone] (if after 3:30pm next day response)   Prescriptions: Please call your Pharmacy   Billing: [place] [phone] or  Physicians:[phone]   Patient Education   Personalized Prevention Plan  You are due for the preventive services outlined below.  Your care team is available to assist you in scheduling these services.  If you have already completed any of these items, please share that information with your care team to update in your medical record.  Health Maintenance Due   Topic Date Due     Colorectal Cancer Screening  08/18/2022     Annual Wellness Visit  11/04/2022     COVID-19 Vaccine (6 - Moderna series) 01/20/2023     Flu Vaccine (1) 09/01/2023

## 2023-09-11 NOTE — LETTER
9/11/2023       RE: Esperanza Gage  [address] [place] [address]     Dear Colleague,    Thank you for referring your patient, Esperanza Gage, to the [facility] [place] [facility]. Please see a copy of my visit note below.    SUBJECTIVE:   Esperanza is a 77 year old who presents for Preventive Visit.      Are you in the first 12 months of your Medicare coverage?  No    HPI      Have you ever done Advance Care Planning? (For example, a Health Directive, POLST, or a discussion with a medical provider or your loved ones about your wishes): Yes, advance care planning is on file.       Fall risk  Fallen 2 or more times in the past year?: No  Any fall with injury in the past year?: No  click delete button to remove this line now  Cognitive Screening   1) Repeat 3 items (Leader, Season, Table)    2) Clock draw: NORMAL  3) 3 item recall: Recalls 3 objects  Results: 3 items recalled: COGNITIVE IMPAIRMENT LESS LIKELY    Mini-CogTM Copyright S Isidoro. Licensed by the author for use in [facility]; reprinted with permission (ahsan@[facility]). All rights reserved.      Do you have sleep apnea, excessive snoring or daytime drowsiness? : no    Reviewed and updated as needed this visit by clinical staff    Allergies  Meds              Reviewed and updated as needed this visit by Provider   Tobacco     Med Hx  Surg Hx  Fam Hx         Social History     Tobacco Use    Smoking status: Former     Packs/day: 0.00     Years: 0.00     Pack years: 0.00     Types: Cigarettes    Smokeless tobacco: Never   Substance Use Topics    Alcohol use: Yes     Comment: Rare             11/12/2021    10:03 AM   Alcohol Use   Prescreen: >3 drinks/day or >7 drinks/week? No     Do you have a current opioid prescription? No  Do you use any other controlled substances or medications that are not prescribed by a provider? None              Current providers sharing  in care for this patient include:   Patient Care Team:  Julia Gifford MD as PCP - General (Internal Medicine)  Julia Gifford MD as MD (Internal Medicine)  Pierre Campos MD as MD (Orthopedics)  Julia Gifford MD as Referring Physician (Internal Medicine)  Gurpreet Strange MD as MD (Dermatology)  Yin Srivastava MD PhD as MD (Family Practice)  Frederic Srivastava MD as Resident (Internal Medicine)  Navdeep Galeano MD as Assigned Musculoskeletal Provider  Alexx Deshpande APRN CNM as Midwife (OB/Gyn)  Alexx Deshpande APRN CNM as Assigned OBGYN Provider  Mirela Ta MD as MD (Dermatology)  Eric Galeas DPM as Assigned Surgical Provider  Julia Gifford MD as Assigned PCP    The following health maintenance items are reviewed in Epic and correct as of today:  Health Maintenance   Topic Date Due    COLORECTAL CANCER SCREENING  08/18/2022    MEDICARE ANNUAL WELLNESS VISIT  11/04/2022    FALL RISK ASSESSMENT  11/04/2022    COVID-19 Vaccine (6 - Moderna series) 01/20/2023    INFLUENZA VACCINE (1) 09/01/2023    ADVANCE CARE PLANNING  11/12/2026    LIPID  05/31/2027    DTAP/TDAP/TD IMMUNIZATION (3 - Td or Tdap) 03/09/2030    DEXA  01/26/2038    HEPATITIS C SCREENING  Completed    PHQ-2 (once per calendar year)  Completed    Pneumococcal Vaccine: 65+ Years  Completed    ZOSTER IMMUNIZATION  Completed    IPV IMMUNIZATION  Aged Out    HPV IMMUNIZATION  Aged Out    MENINGITIS IMMUNIZATION  Aged Out    MAMMO SCREENING  Discontinued    LUNG CANCER SCREENING  Discontinued     Labs reviewed in [facility]  Mammogram Screening: Mammogram Screening - Patient over age 75, has elected to continue with screening.    Mammogram Screening - Patient over age 75, has elected to continue with screening.  Pertinent mammograms are reviewed under the imaging tab.    Review of Systems  Right knee pain - ongoing issue, interested in additional evaluation.   Bilateral feet pain and  "numbness  Otherwise, negative review of symtpms    OBJECTIVE:   /77   Pulse 67   Ht 1.651 m (5' 5\")   Wt 90.3 kg (199 lb)   BMI 33.12 kg/m   Estimated body mass index is 33.12 kg/m  as calculated from the following:    Height as of this encounter: 1.651 m (5' 5\").    Weight as of this encounter: 90.3 kg (199 lb).  Physical Exam  GENERAL APPEARANCE: healthy, alert and no distress  EYES: Eyes grossly normal to inspection, PERRL and conjunctivae and sclerae normal  RESP: lungs clear to auscultation - no rales, rhonchi or wheezes  CV: regular rate and rhythm, normal S1 S2, no S3 or S4, no murmur, click or rub, no peripheral edema and peripheral pulses strong  ABDOMEN: soft, nontender, and bowel sounds normal  SKIN: no suspicious lesions or rashes  NEURO: Normal strength and tone, sensory exam grossly normal, mentation intact and speech normal  PSYCH: mentation appears normal and affect normal/bright    Diagnostic Test Results:  Labs reviewed in Epic    ASSESSMENT / PLAN:       ICD-10-CM    1. Encounter for Medicare annual wellness exam  Z00.00       2. Essential hypertension  I10 losartan (COZAAR) 50 MG tablet     Basic Metabolic Panel     TSH with free T4 reflex      3. Pure hypercholesterolemia  E78.00 simvastatin (ZOCOR) 10 MG tablet     Lipid Profile      4. Chronic pain of right knee  M25.561 Orthopedic  Referral    G89.29       5. History of colonic polyps  Z86.010 Colonoscopy Screening  Referral      6. Idiopathic peripheral neuropathy  G60.9 Hemoglobin A1c     Vitamin B12     Protein Electrophoresis     Protein electrophoresis random urine     Adult Neurology  Referral      7. Preventative health care  Z00.00 Adult Eye  Referral                COUNSELING:  Reviewed preventive health counseling, as reflected in patient instructions       Regular exercise       Healthy diet/nutrition       Vision screening      BMI:   Estimated body mass index is 33.12 kg/m  as " "calculated from the following:    Height as of this encounter: 1.651 m (5' 5\").    Weight as of this encounter: 90.3 kg (199 lb).         She reports that she has quit smoking. Her smoking use included cigarettes. She has never used smokeless tobacco.      Appropriate preventive services were discussed with this patient, including applicable screening as appropriate for cardiovascular disease, diabetes, osteopenia/osteoporosis, and glaucoma.  As appropriate for age/gender, discussed screening for colorectal cancer, prostate cancer, breast cancer, and cervical cancer. Checklist reviewing preventive services available has been given to the patient.    Reviewed patients plan of care and provided an AVS. The Basic Care Plan (routine screening as documented in Health Maintenance) for Esperanza meets the Care Plan requirement. This Care Plan has been established and reviewed with the Patient.          Julia Gifford MD  [facility]      "

## 2023-09-12 ENCOUNTER — TELEPHONE (OUTPATIENT)
Dept: GASTROENTEROLOGY | Facility: CLINIC | Age: 78
End: 2023-09-12
Payer: COMMERCIAL

## 2023-09-12 LAB
ALBUMIN SERPL ELPH-MCNC: 4.4 G/DL (ref 3.7–5.1)
ALPHA1 GLOB SERPL ELPH-MCNC: 0.2 G/DL (ref 0.2–0.4)
ALPHA2 GLOB SERPL ELPH-MCNC: 0.6 G/DL (ref 0.5–0.9)
B-GLOBULIN SERPL ELPH-MCNC: 0.8 G/DL (ref 0.6–1)
GAMMA GLOB SERPL ELPH-MCNC: 1 G/DL (ref 0.7–1.6)
M PROTEIN SERPL ELPH-MCNC: 0 G/DL
PROT PATTERN SERPL ELPH-IMP: NORMAL
PROT PATTERN UR ELPH-IMP: NORMAL

## 2023-09-12 PROCEDURE — 84165 PROTEIN E-PHORESIS SERUM: CPT | Mod: 26

## 2023-09-12 PROCEDURE — 84166 PROTEIN E-PHORESIS/URINE/CSF: CPT | Mod: 26

## 2023-09-12 NOTE — TELEPHONE ENCOUNTER
"Endoscopy Scheduling Screen    Have you had a positive Covid test in the last 14 days?  No    Are you active on MyChart?   Yes    What insurance is in the chart?  Other:  [employer]    Ordering/Referring Provider:     SHOLA RESTREPO      (If ordering provider performs procedure, schedule with ordering provider unless otherwise instructed. )    BMI: Estimated body mass index is 33.12 kg/m  as calculated from the following:    Height as of 9/11/23: 1.651 m (5' 5\").    Weight as of 9/11/23: 90.3 kg (199 lb).     Sedation Ordered  moderate sedation.   If patient BMI > 50 do not schedule in [facility].    If patient BMI > 45 do not schedule at [facility].    Are you taking methadone or Suboxone?  No    Are you taking any prescription medications for pain 3 or more times per week?   Yes, prep only (RN Review required.)    Do you have a history of malignant hyperthermia or adverse reaction to anesthesia?  No    (Females) Are you currently pregnant?        Have you been diagnosed or told you have pulmonary hypertension?   No    Do you have an LVAD?  No    Have you been told you have moderate to severe sleep apnea?  No    Have you been told you have COPD, asthma, or any other lung disease?  No    Do you have any heart conditions?  No     Have you ever had an organ transplant?   No    Have you ever had or are you awaiting a heart or lung transplant?   No    Have you had a stroke or transient ischemic attack (TIA aka \"mini stroke\" in the last 6 months?   No    Have you been diagnosed with or been told you have cirrhosis of the liver?   No    Are you currently on dialysis?   No    Do you need assistance transferring?   No    BMI: Estimated body mass index is 33.12 kg/m  as calculated from the following:    Height as of 9/11/23: 1.651 m (5' 5\").    Weight as of 9/11/23: 90.3 kg (199 lb).     Is patients BMI > 40 and scheduling location [facility]?  No    Do you take an injectable medication for weight loss or diabetes (excluding " insulin)?  No    Do you take the medication Naltrexone?  No    Do you take blood thinners?  No       Prep   Are you currently on dialysis or do you have chronic kidney disease?  No    Do you have a diagnosis of diabetes?  No    Do you have a diagnosis of cystic fibrosis (CF)?  No    On a regular basis do you go 3 -5 days between bowel movements?  No    BMI > 40?  No    Preferred Pharmacy:      [email or web address] [address] [place] [id number]  Phone: [phone] Fax: [phone]      Final Scheduling Details   Colonoscopy prep sent?  Standard MiraLAX    Procedure scheduled  Colonoscopy    Surgeon:  STORMY     Date of procedure:  12/19     Pre-OP / PAC:   No - Not required for this site.    Location  [facility] - [facility] - Per order.    Sedation   Moderate Sedation - Per order.      Patient Reminders:   You will receive a call from a Nurse to review instructions and health history.  This assessment must be completed prior to your procedure.  Failure to complete the Nurse assessment may result in the procedure being cancelled.      On the day of your procedure, please designate an adult(s) who can drive you home stay with you for the next 24 hours. The medicines used in the exam will make you sleepy. You will not be able to drive.      You cannot take public transportation, ride share services, or non-medical taxi service without a responsible caregiver.  Medical transport services are allowed with the requirement that a responsible caregiver will receive you at your destination.  We require that drivers and caregivers are confirmed prior to your procedure.

## 2023-09-13 NOTE — TELEPHONE ENCOUNTER
DIAGNOSIS: Chronic pain of right knee, Michelle Gifford, xrays from a year ago.    APPOINTMENT DATE: 09/18/23   NOTES STATUS DETAILS   OFFICE NOTE from referring provider Internal 09/11/23, 05/31/22. 01/30/15, 06/21/10 - Julia Gifford MD    OFFICE NOTE from other specialist Internal 12/28/22, 09/08/22, 08/05/22, 06/09/22 - Cory Galindo, DO     11/16/22, 08/16/22 - Nelda Warren, PT     06/16/22 - Reyes Escalante, PT     06/13/22 - Dr. Galeano/Dayo Clark, ATC      06/02/22 - Eric Galeas DPM     08/30/18 - Dr. Campos    10/16/12 - Dr. Dumont    03/01/10 - Sima Mora    OPERATIVE REPORT N/A Meniscus repair in 1983   MEDICATION LIST Internal    XRAYS  & INJECTIONS (IMAGES & REPORTS) Internal XR R KNEE:  - 05/31/22    XR BRANDY KNEE:  - 07/06/17, 07/12/12

## 2023-09-18 ENCOUNTER — OFFICE VISIT (OUTPATIENT)
Dept: ORTHOPEDICS | Facility: CLINIC | Age: 78
End: 2023-09-18
Attending: INTERNAL MEDICINE
Payer: COMMERCIAL

## 2023-09-18 ENCOUNTER — PRE VISIT (OUTPATIENT)
Dept: ORTHOPEDICS | Facility: CLINIC | Age: 78
End: 2023-09-18

## 2023-09-18 DIAGNOSIS — M17.11 PRIMARY OSTEOARTHRITIS OF RIGHT KNEE: Primary | ICD-10-CM

## 2023-09-18 PROCEDURE — 99214 OFFICE O/P EST MOD 30 MIN: CPT | Performed by: FAMILY MEDICINE

## 2023-09-18 NOTE — PROGRESS NOTES
[facility]  SPORTS & ORTHOPEDIC CLINIC VISIT     Sep 18, 2023        ASSESSMENT & PLAN    77-year-old with exacerbation of right knee pain that is felt to be secondary to osteoarthritis    Reviewed imaging and assessment with patient in detail  We discussed pain control with Tylenol up to 3 times daily for pain as well as topical diclofenac.  We have placed referral to physical therapy.  May consider patellar cutout knee sleeve for support  We also discussed the indication for steroid injection.  She will contact us if she would like to pursue this option.    Vadim Bose MD  [facility] [place]    -----  Chief Complaint   Patient presents with    Right Knee - Pain       SUBJECTIVE  Esperanza Gage is a/an 77 year old female who is seen in consultation at the request of  Julia Gifford M.D. for evaluation of  right leg pain.     The patient is seen by themselves.  The patient is Right handed    Onset: 1 years(s) ago. Patient describes injury as having R leg weakness and pain. Feels like her leg could give out at any given moment.   Location of Pain: right lateral leg to hip   Worsened by: Sit to stand, walking up stairs, squatting, lunges   Better with: NA  Treatments tried: home exercises  Associated symptoms: weakness of R leg    Orthopedic/Surgical history: NO  Social History/Occupation: Retired       REVIEW OF SYSTEMS:  Do you have fever, chills, weight loss? No  Do you have any vision problems? No  Do you have any chest pain or edema? No  Do you have any shortness of breath or wheezing?  No  Do you have stomach problems? No  Do you have any numbness or focal weakness? No  Do you have diabetes? No  Do you have problems with bleeding or clotting? No  Do you have an rashes or other skin lesions? No    OBJECTIVE:  There were no vitals taken for this visit.     Patient is alert, No acute distress, pleasant and conversational.    Gait: nonantalgic.  Normal heel toe gait.    Patient is able to perform two legged squat without difficulty.    right knee:   Skin intact. No erythema or ecchymosis.  No effusion or soft tissue swelling.    AROM: Zero to approximately 135  with crepitus in antioerr knee. No pain    Palpation: No medial or lateral facet joint tenderness.  Ttp lateral joint line. No ttp medial joint line    Special Tests:  Negative bounce test, negative forced flexion and negative Shivani's.  No ligamentous laxity or pain with valgus or varus stress.  Negative Lachman's, Anterior Drawer and Posterior Drawer     Full Isometric quad strength, extensor mechanism in place     Neurovascularly intact in the lower extremity    Hip and Ankle with full AROM and nontender      RADIOLOGY:    Three-view x-rays of the right knee dated 5/31/2022 are reviewed independently which demonstrate moderate DJD in the lateral compartment with osteophytosis.  Mild DJD in the medial compartment.  No acute findings.  See EMR for formal radiology report.

## 2023-09-18 NOTE — LETTER
9/18/2023      RE: Esperanza Gage  [address]  [place] [address]     Dear Colleague,    Thank you for referring your patient, Esperanza Gage, to the [facility] SP[facility]IC[facility]. Please see a copy of my visit note below.      [facility]  SPORTS & ORTHOPEDIC CLINIC VISIT     Sep 18, 2023        ASSESSMENT & PLAN    77-year-old with exacerbation of right knee pain that is felt to be secondary to osteoarthritis    Reviewed imaging and assessment with patient in detail  We discussed pain control with Tylenol up to 3 times daily for pain as well as topical diclofenac.  We have placed referral to physical therapy.  May consider patellar cutout knee sleeve for support  We also discussed the indication for steroid injection.  She will contact us if she would like to pursue this option.    Vadim Bose MD  [facility]    -----  Chief Complaint   Patient presents with     Right Knee - Pain       SUBJECTIVE  Esperanza Gage is a/an 77 year old female who is seen in consultation at the request of  Julia Gifford M.D. for evaluation of  right leg pain.     The patient is seen by themselves.  The patient is Right handed    Onset: 1 years(s) ago. Patient describes injury as having R leg weakness and pain. Feels like her leg could give out at any given moment.   Location of Pain: right lateral leg to hip   Worsened by: Sit to stand, walking up stairs, squatting, lunges   Better with: NA  Treatments tried: home exercises  Associated symptoms: weakness of R leg    Orthopedic/Surgical history: NO  Social History/Occupation: Retired       REVIEW OF SYSTEMS:  Do you have fever, chills, weight loss? No  Do you have any vision problems? No  Do you have any chest pain or edema? No  Do you have any shortness of breath or wheezing?  No  Do you have stomach problems? No  Do you have any numbness or focal weakness? No  Do you have  diabetes? No  Do you have problems with bleeding or clotting? No  Do you have an rashes or other skin lesions? No    OBJECTIVE:  There were no vitals taken for this visit.     Patient is alert, No acute distress, pleasant and conversational.    Gait: nonantalgic. Normal heel toe gait.    Patient is able to perform two legged squat without difficulty.    right knee:   Skin intact. No erythema or ecchymosis.  No effusion or soft tissue swelling.    AROM: Zero to approximately 135  with crepitus in antioerr knee. No pain    Palpation: No medial or lateral facet joint tenderness.  Ttp lateral joint line. No ttp medial joint line    Special Tests:  Negative bounce test, negative forced flexion and negative Shivani's.  No ligamentous laxity or pain with valgus or varus stress.  Negative Lachman's, Anterior Drawer and Posterior Drawer     Full Isometric quad strength, extensor mechanism in place     Neurovascularly intact in the lower extremity    Hip and Ankle with full AROM and nontender      RADIOLOGY:    Three-view x-rays of the right knee dated 5/31/2022 are reviewed independently which demonstrate moderate DJD in the lateral compartment with osteophytosis.  Mild DJD in the medial compartment.  No acute findings.  See EMR for formal radiology report.           Again, thank you for allowing me to participate in the care of your patient.      Sincerely,    Vadim Bose MD

## 2023-09-18 NOTE — PATIENT INSTRUCTIONS
Using Tylenol up to 3 times daily for pain.  May also use topical diclofenac (Voltaren gel) 3-4 times daily as needed for pain  Follow-up with physical therapy  Contact our clinic if you would like to try a steroid injection in your knee

## 2023-09-21 ENCOUNTER — OFFICE VISIT (OUTPATIENT)
Dept: OPHTHALMOLOGY | Facility: CLINIC | Age: 78
End: 2023-09-21
Payer: COMMERCIAL

## 2023-09-21 DIAGNOSIS — H52.13 MYOPIC ASTIGMATISM OF BOTH EYES: Primary | ICD-10-CM

## 2023-09-21 DIAGNOSIS — H52.203 MYOPIC ASTIGMATISM OF BOTH EYES: Primary | ICD-10-CM

## 2023-09-21 DIAGNOSIS — Z98.890 HISTORY OF REPAIR OF RETINAL DEFECT BY LASER PHOTOCOAGULATION: ICD-10-CM

## 2023-09-21 DIAGNOSIS — H25.813 COMBINED FORMS OF AGE-RELATED CATARACT OF BOTH EYES: ICD-10-CM

## 2023-09-21 DIAGNOSIS — Z00.00 PREVENTATIVE HEALTH CARE: ICD-10-CM

## 2023-09-21 PROCEDURE — 92004 COMPRE OPH EXAM NEW PT 1/>: CPT | Performed by: OPTOMETRIST

## 2023-09-21 PROCEDURE — 92015 DETERMINE REFRACTIVE STATE: CPT | Performed by: OPTOMETRIST

## 2023-09-21 ASSESSMENT — REFRACTION_WEARINGRX
OD_CYLINDER: +0.75
OD_AXIS: 086
OS_ADD: +2.00
OS_CYLINDER: +1.25
OD_ADD: +2.00
SPECS_TYPE: PAL
OD_SPHERE: -5.50
OS_SPHERE: -7.00
OS_AXIS: 103

## 2023-09-21 ASSESSMENT — CONF VISUAL FIELD
METHOD: COUNTING FINGERS
OS_NORMAL: 1
OD_NORMAL: 1
OS_INFERIOR_TEMPORAL_RESTRICTION: 0
OD_INFERIOR_TEMPORAL_RESTRICTION: 0
OD_SUPERIOR_TEMPORAL_RESTRICTION: 0
OS_INFERIOR_NASAL_RESTRICTION: 0
OD_INFERIOR_NASAL_RESTRICTION: 0
OS_SUPERIOR_TEMPORAL_RESTRICTION: 0
OD_SUPERIOR_NASAL_RESTRICTION: 0
OS_SUPERIOR_NASAL_RESTRICTION: 0

## 2023-09-21 ASSESSMENT — TONOMETRY
OD_IOP_MMHG: 11
IOP_METHOD: ICARE
OS_IOP_MMHG: 15

## 2023-09-21 ASSESSMENT — REFRACTION_MANIFEST
OD_SPHERE: -6.00
OS_ADD: +2.50
OS_SPHERE: -7.00
OD_CYLINDER: +1.25
OD_AXIS: 075
OD_ADD: +2.50
OS_AXIS: 120
OS_CYLINDER: +1.00

## 2023-09-21 ASSESSMENT — VISUAL ACUITY
OD_CC+: -1
OD_CC: 20/20
OS_PH_CC+: +2
CORRECTION_TYPE: GLASSES
METHOD: SNELLEN - LINEAR
OS_PH_CC: 20/25
OS_CC: 20/30

## 2023-09-21 ASSESSMENT — CUP TO DISC RATIO
OD_RATIO: 0.3
OS_RATIO: 0.3

## 2023-09-21 ASSESSMENT — EXTERNAL EXAM - RIGHT EYE: OD_EXAM: NORMAL

## 2023-09-21 ASSESSMENT — SLIT LAMP EXAM - LIDS
COMMENTS: NORMAL
COMMENTS: NORMAL

## 2023-09-21 ASSESSMENT — EXTERNAL EXAM - LEFT EYE: OS_EXAM: NORMAL

## 2023-09-21 NOTE — Clinical Note
Thank you for referring Esperanza Gage for her annual eye exam. Glasses prescribed due to myopic astigmatism both eyes . Ocular health was grossly normal on examination. Recommended repeat evaluation in 1 year. Please contact me with any questions. Braxton Regalado, OD on 6/21/2021 at 2:43 PM

## 2023-09-21 NOTE — PROGRESS NOTES
History  HPI       Annual Eye Exam    Associated symptoms include Negative for eye pain, flashes and floaters.  Pain was noted as 0/10. Additional comments: Here for complete exam.              Comments    Last eye exam 5 years ago.   Here because insurance changed.   Takes glasses off to read.   Denies using eyedrops.     Juan C Krause 2:02 PM September 21, 2023            Last edited by Juan C Krause on 9/21/2023  2:02 PM.          Assessment/Plan  (H52.203,  H52.13) Myopic astigmatism of both eyes  (primary encounter diagnosis)  Comment: Myopic astigmatism both eyes with presbyopia, improvement in acuity at near with increased bifocal add  Plan: REFRACTION         Educated patient on condition and clinical findings. Dispensed spectacle prescription for full time wear. Monitor annually.    (Z98.890) History of repair of retinal defect by laser photocoagulation  Comment: History of barricade laser, well pigmented  Plan:  Educated patient on signs and symptoms of retinal detachment including increase in flashes, floaters, or a change in vision. If symptoms present, return to clinic immediately.    (H25.813) Combined forms of age-related cataract of both eyes  Comment: Not visually significant  Plan:  No treatment indicated at this time. Monitor annually.    (Z00.00) Preventative health care  Comment: Referred for eye exam  Plan:  Copy of chart sent to Dr. Gifford.    Return to clinic in 1 year for comprehensive eye exam.    Complete documentation of historical and exam elements from today's encounter can  be found in the full encounter summary report (not reduplicated in this progress  note). I personally obtained the chief complaint(s) and history of present illness. I  confirmed and edited as necessary the review of systems, past medical/surgical  history, family history, social history, and examination findings as documented by  others; and I examined the patient myself. I personally reviewed the relevant tests,  images, and  reports as documented above. I formulated and edited as necessary the  assessment and plan and discussed the findings and management plan with the  patient and family.    Braxton Regalado OD, FAAO

## 2023-09-21 NOTE — NURSING NOTE
Chief Complaints and History of Present Illnesses   Patient presents with    Annual Eye Exam     Here for complete exam.      Chief Complaint(s) and History of Present Illness(es)       Annual Eye Exam              Associated symptoms: Negative for eye pain, flashes and floaters    Pain scale: 0/10    Comments: Here for complete exam.               Comments    Last eye exam 5 years ago.   Here because insurance changed.   Takes glasses off to read.   Denies using eyedrops.     Juan C Krause 2:02 PM September 21, 2023

## 2023-09-23 NOTE — TELEPHONE ENCOUNTER
RECORDS RECEIVED FROM:    REASON FOR VISIT: Follow-up/Establish Care   Date of Appt: 10/25/2023   NOTES (FOR ALL VISITS) STATUS DETAILS   OFFICE NOTE from referring provider Epic Dr. Hyser-2/1/2021, 9/28/2020   OFFICE NOTE from other specialist     DISCHARGE SUMMARY from hospital     DISCHARGE REPORT from the ER     OPERATIVE REPORT     [facility] (MS ONLY)     EMG     EEG     MEDICATION LIST     IMAGING  (FOR ALL VISITS)     LUMBAR PUNCTURE     TRUE SCAN (MOVEMENT)     ULTRASOUND (CAROTID BILAT) *VASCULAR*     MRI (HEAD, NECK, SPINE) PACS XR Lumbar Spine-6/9/2022   CT (HEAD, NECK, SPINE)

## 2023-09-26 ENCOUNTER — TELEPHONE (OUTPATIENT)
Dept: NEUROLOGY | Facility: CLINIC | Age: 78
End: 2023-09-26
Payer: COMMERCIAL

## 2023-09-26 NOTE — TELEPHONE ENCOUNTER
Left Voicemail (1st Attempt) and Sent Mychart (1st Attempt) for the patient to call back and schedule the following:    Appointment type: NEW  Provider: Enrique Kamara or Antonio  Return date: First Avail  Specialty phone number: [phone]  Additional appointment(s) needed: N/A  Additonal Notes: *OK to offer Dr. Anh DELGADO slot November 16 or November 30 11:00AM, per Conchita Martínez RN*. If pt not avail then, please offer next avail by video visit or in person    Mirela Jiang on 9/26/2023 at 10:47 AM

## 2023-09-27 NOTE — TELEPHONE ENCOUNTER
[facility] Call Center    Phone Message    May a detailed message be left on voicemail: yes     Reason for Call: Other: Pt is responding to a message left in regards to  Appt slot for- Notes: *OK to offer Dr. Anh DELGADO slot November 16 or November 30 11:00AM, per      Patient is willing to accept any of these two dates and the time works great for her.     Please respond to patient to confirm via phone [phone] (Mobile) or by [employer] message.     Writer unable to schedule.    Thanks    Action Taken: Message routed to:  [facility] neurology    Travel Screening: Not Applicable

## 2023-09-28 NOTE — TELEPHONE ENCOUNTER
Left Voicemail (1st Attempt) for the patient to call back and schedule the following:    Appointment type: New (in person per patient request)  Provider: Anh  Return date: First available  Specialty phone number: [phone]  Additional appointment(s) needed: N/A  Additional Notes: Add to wait list    Abdi Arellano on 9/28/2023 at 12:44 PM

## 2023-09-28 NOTE — TELEPHONE ENCOUNTER
Called pt to offer her a virtual appt with Dr. Kamara for 11/30 and she stated she wants an in person appt and will wait until February.  Informed her someone from our scheduling team will give her a call to schedule the in person appt.  Advised her to ask the scheduling team to put her on Dr. Kamara's wait list too.

## 2023-10-04 ENCOUNTER — THERAPY VISIT (OUTPATIENT)
Dept: PHYSICAL THERAPY | Facility: CLINIC | Age: 78
End: 2023-10-04
Payer: COMMERCIAL

## 2023-10-04 DIAGNOSIS — M17.11 PRIMARY OSTEOARTHRITIS OF RIGHT KNEE: ICD-10-CM

## 2023-10-04 DIAGNOSIS — G89.29 CHRONIC PAIN OF RIGHT KNEE: Primary | ICD-10-CM

## 2023-10-04 DIAGNOSIS — M25.561 CHRONIC PAIN OF RIGHT KNEE: Primary | ICD-10-CM

## 2023-10-04 PROCEDURE — 97161 PT EVAL LOW COMPLEX 20 MIN: CPT | Mod: GP | Performed by: PHYSICAL THERAPIST

## 2023-10-04 PROCEDURE — 97110 THERAPEUTIC EXERCISES: CPT | Mod: GP | Performed by: PHYSICAL THERAPIST

## 2023-10-04 NOTE — PROGRESS NOTES
PHYSICAL THERAPY EVALUATION  Type of Visit: Evaluation    See electronic medical record for Abuse and Falls Screening details.    Subjective       Presenting condition or subjective complaint: weakness, hard to up stairs and get up from chairs  MD order 9/18/23  Pt reports right knee pain and feeling of weakness, like the knee could give out on her. Main issues with weakness, only having mild pain. Having a lot of trouble getting out of chairs, extremely difficult from low couches. Tough going up stairs but has been working on this a lot and feels it is improving. Afraid to try getting on the floor if she cannot get up. Pain lateral thigh and lateral knee.  Patient was seen in PT from August 2022-November 2022 for similar issue with progress. She had stopped doing her HEP for awhile when she lost track of her exercise sheets. But has continued her exercise class. Standing hip ABD and ext, sit to stands.     Xrays results copied from radiologist report in [facility], May 2022  IMPRESSION: Joint space narrowing in the lateral femorotibial joint  compartment of the right knee.    Date of onset: 09/18/23 (MD order)    Relevant medical history: Arthritis; High blood pressure; History of fractures; Osteoporosis; Overweight   Dates & types of surgery:      Prior diagnostic imaging/testing results: X-ray     Prior therapy history for the same diagnosis, illness or injury: Yes PT 1 year ago      Living Environment  Social support:     Type of home:     Stairs to enter the home:         Ramp:     Stairs inside the home:         Help at home:    Equipment owned:       Employment:      Hobbies/Interests:      Patient goals for therapy: use my knee without using a railing or grab bar       Objective     KNEE EVALUATION  PAIN: Pain Level at Rest: 0/10  Pain Level with Use: 5/10 quick lightening strike  Pain Location: lateral thigh, lateral knee  Pain Quality: lightening strike  Pain Frequency: intermittent  Pain is Worst: daytime  Pain  is Exacerbated By: getting up from low chairs/couches, going up stairs  Pain is Relieved By: working on strength  Pain Progression: Worsened    ROM:   (Degrees) PROM    LEFT RIGHT   Knee Flexion  WNL   Knee Extension  WNL   Knee Hyperextension       STRENGTH:   Pain: - none + mild ++ moderate +++ severe  Strength Scale: 0-5/5 Left Right   Hip Flexion 4+ 4   Hip Extension     Hip Abduction  4-   Knee Flexion 5- 5-   Knee Extension 5- 5-   Knee posture: Right knee valgus in standing  BALANCE/PROPRIOCEPTION:  SL balance EO >15 sec  FUNCTIONAL TESTS:  able to sit to stand from tall table without UE support, but requires bilat UE support for sit>stand from normal chair  PALPATION:  TTP lateral joint line    Limited examination time d/t patient being late and long subjective reports from patient.     Assessment & Plan   CLINICAL IMPRESSIONS  Medical Diagnosis: Primary osteoarthritis of right knee    Treatment Diagnosis: right knee pain   Impression/Assessment: Patient is a 77 year old female with right knee complaints.  The following significant findings have been identified: Pain, Decreased strength, Impaired balance, Decreased activity tolerance, and Impaired posture. These impairments interfere with their ability to perform recreational activities, household chores, household mobility, and community mobility as compared to previous level of function.     Clinical Decision Making (Complexity):  Clinical Presentation: Stable/Uncomplicated  Clinical Presentation Rationale: based on medical and personal factors listed in PT evaluation  Clinical Decision Making (Complexity): Low complexity    PLAN OF CARE  Treatment Interventions:  Interventions: Manual Therapy, Neuromuscular Re-education, Therapeutic Activity, Therapeutic Exercise    Long Term Goals     PT Goal 1  Goal Identifier: R knee/transfers  Goal Description: pt will be able to transfer out of normal chair height without UE support x 5 reps  Rationale: to maximize  safety and independence with performance of ADLs and functional tasks;to maximize safety and independence within the home;to maximize safety and independence within the community  Goal Progress: use of UE support with 1 rep  Target Date: 12/27/23      Frequency of Treatment: 1x/week for 4 weeks, then 2x/week for 8 weeks  Duration of Treatment: 12 weeks    Education Assessment:        Risks and benefits of evaluation/treatment have been explained.   Patient/Family/caregiver agrees with Plan of Care.     Evaluation Time:     PT Eval, Low Complexity Minutes (81538): 14     Signing Clinician: JELENA Palma [facility]                                                                                   OUTPATIENT PHYSICAL THERAPY      PLAN OF TREATMENT FOR OUTPATIENT REHABILITATION   Patient's Last Name, First Name, Esperanza Agosto YOB: 1945   Provider's Name   [facility]   Medical Record No.  [id number]     Onset Date: 09/18/23 (MD order)  Start of Care Date: 10/04/23     Medical Diagnosis:  Primary osteoarthritis of right knee      PT Treatment Diagnosis:  right knee pain Plan of Treatment  Frequency/Duration: 1x/week for 4 weeks, then 2x/week for 8 weeks/ 12 weeks    Certification date from 10/04/23 to 12/27/23         See note for plan of treatment details and functional goals     Nelda Warren PT                         I CERTIFY THE NEED FOR THESE SERVICES FURNISHED UNDER        THIS PLAN OF TREATMENT AND WHILE UNDER MY CARE     (Physician attestation of this document indicates review and certification of the therapy plan).                Referring Provider:  Vadim Bose      Initial Assessment  See Epic Evaluation- Start of Care Date: 10/04/23

## 2023-10-10 ENCOUNTER — THERAPY VISIT (OUTPATIENT)
Dept: PHYSICAL THERAPY | Facility: CLINIC | Age: 78
End: 2023-10-10
Payer: COMMERCIAL

## 2023-10-10 DIAGNOSIS — G89.29 CHRONIC PAIN OF RIGHT KNEE: ICD-10-CM

## 2023-10-10 DIAGNOSIS — M17.11 PRIMARY OSTEOARTHRITIS OF RIGHT KNEE: Primary | ICD-10-CM

## 2023-10-10 DIAGNOSIS — M25.561 CHRONIC PAIN OF RIGHT KNEE: ICD-10-CM

## 2023-10-10 PROCEDURE — 97530 THERAPEUTIC ACTIVITIES: CPT | Mod: GP | Performed by: PHYSICAL THERAPIST

## 2023-10-10 PROCEDURE — 97110 THERAPEUTIC EXERCISES: CPT | Mod: GP | Performed by: PHYSICAL THERAPIST

## 2023-10-17 ENCOUNTER — THERAPY VISIT (OUTPATIENT)
Dept: PHYSICAL THERAPY | Facility: CLINIC | Age: 78
End: 2023-10-17
Payer: COMMERCIAL

## 2023-10-17 DIAGNOSIS — M17.11 PRIMARY OSTEOARTHRITIS OF RIGHT KNEE: Primary | ICD-10-CM

## 2023-10-17 DIAGNOSIS — M25.561 CHRONIC PAIN OF RIGHT KNEE: ICD-10-CM

## 2023-10-17 DIAGNOSIS — G89.29 CHRONIC PAIN OF RIGHT KNEE: ICD-10-CM

## 2023-10-17 PROCEDURE — 97530 THERAPEUTIC ACTIVITIES: CPT | Mod: GP | Performed by: PHYSICAL THERAPIST

## 2023-10-17 PROCEDURE — 97110 THERAPEUTIC EXERCISES: CPT | Mod: GP | Performed by: PHYSICAL THERAPIST

## 2023-10-24 ENCOUNTER — OFFICE VISIT (OUTPATIENT)
Dept: DERMATOLOGY | Facility: CLINIC | Age: 78
End: 2023-10-24
Payer: COMMERCIAL

## 2023-10-24 DIAGNOSIS — L82.0 SEBORRHEIC KERATOSES, INFLAMED: Primary | ICD-10-CM

## 2023-10-24 DIAGNOSIS — D22.9 MULTIPLE NEVI: ICD-10-CM

## 2023-10-24 DIAGNOSIS — L82.1 SEBORRHEIC KERATOSES: ICD-10-CM

## 2023-10-24 DIAGNOSIS — L81.4 LENTIGO: ICD-10-CM

## 2023-10-24 DIAGNOSIS — D18.01 CHERRY ANGIOMA: ICD-10-CM

## 2023-10-24 PROCEDURE — 99213 OFFICE O/P EST LOW 20 MIN: CPT | Performed by: DERMATOLOGY

## 2023-10-24 ASSESSMENT — PAIN SCALES - GENERAL: PAINLEVEL: NO PAIN (0)

## 2023-10-24 NOTE — PROGRESS NOTES
[facility] Dermatology Note  Encounter Date: Oct 24, 2023  Office Visit     Dermatology Problem List:    # Tinea pedis  - Current Tx: lotrimin  # ISK  - s/p LN2  # Multiple benign skin findings    ____________________________________________    Assessment & Plan:     # Benign findings: seborrheic keratoses, nevi, lentigo, cherry angiomas, cyst of lower back (area of concern per patient)  - discussed warning signs for skin cancer  - continue good sun protection    # Tinea pedis  - continue lotrimin cream    Follow-up: 1 year(s) in-person, or earlier for new or changing lesions    Staff:     Mirela Ta MD  ____________________________________________    CC: Skin Check (Annual FBSE/Small spot on back/)    HPI:  Ms. Esperanza Gage is a(n) 77 year old female who presents today as a return patient for a skin check.  Notes a chronic spot on her back.  No tender or bleeding lesions.  Just noted a recurrence of her tinea pedis and restarted her lotrimin cream.     Patient is otherwise feeling well, without additional skin concerns.     Labs Reviewed:  N/A    Physical Exam:  Vitals: There were no vitals taken for this visit.  SKIN: Total skin excluding the undergarment areas was performed. The exam included the head/face, neck, both arms, chest, back, abdomen, both legs, digits and/or nails.   - cyst lower back  - waxy stuck on papules  - cherry angiomas  - lentigo  - nevi with no atypia  -scale and maceration of the web space of the feet     - No other lesions of concern on areas examined.     Medications:  Current Outpatient Medications   Medication    Ascorbic Acid (VITAMIN C) 500 MG CAPS    ASPIRIN PO    losartan (COZAAR) 50 MG tablet    multivitamin w/minerals (THERA-VIT-M) tablet    omega 3 1000 MG CAPS    phenazopyridine (PYRIDIUM) 200 MG tablet    simvastatin (ZOCOR) 10 MG tablet    vitamin B-Complex    vitamin D3 (CHOLECALCIFEROL) 50 mcg (2000 units) tablet    vitamin E 400 units TABS    zinc  gluconate 50 MG tablet    gabapentin (NEURONTIN) 100 MG capsule     Current Facility-Administered Medications   Medication    denosumab (PROLIA) injection 60 mg      Past Medical History:   Patient Active Problem List   Diagnosis    Vitamin D deficiency    Hyperlipidemia with target LDL less than 130    Osteopenia    Breast signs and symptoms    Chondromalacia of patella    Primary localized osteoarthrosis, lower leg    Pelvic fracture (H)    Medication management    Leg numbness    Seborrheic keratosis    Milia    History of tinea    Status post knee surgery    Recurrent UTI    Vaginal atrophy    Chronic bilateral low back pain without sciatica    Primary osteoarthritis of right knee    Chronic pain of right knee     Past Medical History:   Diagnosis Date    Hearing problem 2017    Hyperlipidemia 2005    Hyperlipidemia LDL goal < 130     Hypertension     Osteoporosis, post-menopausal     Pelvic fracture (H)        CC Referred Self, MD  No address on file on close of this encounter.

## 2023-10-24 NOTE — LETTER
10/24/2023       RE: Esperanza Gage  [address]  [place] [address]     Dear Colleague,    Thank you for referring your patient, Esperanza Gage, to the [facility]MATOLOGY CL[facility]IC [place] at [facility]. Please see a copy of my visit note below.    [facility] Dermatology Note  Encounter Date: Oct 24, 2023  Office Visit     Dermatology Problem List:    # Tinea pedis  - Current Tx: lotrimin  # ISK  - s/p LN2  # Multiple benign skin findings    ____________________________________________    Assessment & Plan:     # Benign findings: seborrheic keratoses, nevi, lentigo, cherry angiomas, cyst of lower back (area of concern per patient)  - discussed warning signs for skin cancer  - continue good sun protection    # Tinea pedis  - continue lotrimin cream    Follow-up: 1 year(s) in-person, or earlier for new or changing lesions    Staff:     Mirela Ta MD  ____________________________________________    CC: Skin Check (Annual FBSE/Small spot on back/)    HPI:  Ms. Esperanza Gage is a(n) 77 year old female who presents today as a return patient for a skin check.  Notes a chronic spot on her back.  No tender or bleeding lesions.  Just noted a recurrence of her tinea pedis and restarted her lotrimin cream.     Patient is otherwise feeling well, without additional skin concerns.     Labs Reviewed:  N/A    Physical Exam:  Vitals: There were no vitals taken for this visit.  SKIN: Total skin excluding the undergarment areas was performed. The exam included the head/face, neck, both arms, chest, back, abdomen, both legs, digits and/or nails.   - cyst lower back  - waxy stuck on papules  - cherry angiomas  - lentigo  - nevi with no atypia  -scale and maceration of the web space of the feet     - No other lesions of concern on areas examined.     Medications:  Current Outpatient Medications   Medication    Ascorbic Acid (VITAMIN C) 500 MG CAPS     ASPIRIN PO    losartan (COZAAR) 50 MG tablet    multivitamin w/minerals (THERA-VIT-M) tablet    omega 3 1000 MG CAPS    phenazopyridine (PYRIDIUM) 200 MG tablet    simvastatin (ZOCOR) 10 MG tablet    vitamin B-Complex    vitamin D3 (CHOLECALCIFEROL) 50 mcg (2000 units) tablet    vitamin E 400 units TABS    zinc gluconate 50 MG tablet    gabapentin (NEURONTIN) 100 MG capsule     Current Facility-Administered Medications   Medication    denosumab (PROLIA) injection 60 mg      Past Medical History:   Patient Active Problem List   Diagnosis    Vitamin D deficiency    Hyperlipidemia with target LDL less than 130    Osteopenia    Breast signs and symptoms    Chondromalacia of patella    Primary localized osteoarthrosis, lower leg    Pelvic fracture (H)    Medication management    Leg numbness    Seborrheic keratosis    Milia    History of tinea    Status post knee surgery    Recurrent UTI    Vaginal atrophy    Chronic bilateral low back pain without sciatica    Primary osteoarthritis of right knee    Chronic pain of right knee     Past Medical History:   Diagnosis Date    Hearing problem 2017    Hyperlipidemia 2005    Hyperlipidemia LDL goal < 130     Hypertension     Osteoporosis, post-menopausal     Pelvic fracture (H)        CC Referred Self, MD  No address on file on close of this encounter.

## 2023-10-24 NOTE — NURSING NOTE
Dermatology Rooming Note    Esperanza Gage's goals for this visit include:   Chief Complaint   Patient presents with    Skin Check     Annual FBSE  Small spot on back     Joann Hoffmann LPN

## 2023-10-25 ENCOUNTER — PRE VISIT (OUTPATIENT)
Dept: NEUROLOGY | Facility: CLINIC | Age: 78
End: 2023-10-25

## 2023-11-03 ENCOUNTER — THERAPY VISIT (OUTPATIENT)
Dept: PHYSICAL THERAPY | Facility: CLINIC | Age: 78
End: 2023-11-03
Payer: COMMERCIAL

## 2023-11-03 DIAGNOSIS — M17.11 PRIMARY OSTEOARTHRITIS OF RIGHT KNEE: Primary | ICD-10-CM

## 2023-11-03 DIAGNOSIS — G89.29 CHRONIC PAIN OF RIGHT KNEE: ICD-10-CM

## 2023-11-03 DIAGNOSIS — M25.561 CHRONIC PAIN OF RIGHT KNEE: ICD-10-CM

## 2023-11-03 PROCEDURE — 97110 THERAPEUTIC EXERCISES: CPT | Mod: GP | Performed by: PHYSICAL THERAPIST

## 2023-11-03 PROCEDURE — 97530 THERAPEUTIC ACTIVITIES: CPT | Mod: GP | Performed by: PHYSICAL THERAPIST

## 2023-12-05 ENCOUNTER — TELEPHONE (OUTPATIENT)
Dept: GASTROENTEROLOGY | Facility: CLINIC | Age: 78
End: 2023-12-05
Payer: COMMERCIAL

## 2023-12-05 DIAGNOSIS — Z12.11 SPECIAL SCREENING FOR MALIGNANT NEOPLASMS, COLON: Primary | ICD-10-CM

## 2023-12-05 RX ORDER — BISACODYL 5 MG/1
TABLET, DELAYED RELEASE ORAL
Qty: 4 TABLET | Refills: 0 | Status: SHIPPED | OUTPATIENT
Start: 2023-12-05

## 2023-12-05 NOTE — TELEPHONE ENCOUNTER
Pre visit planning completed.      Procedure details:    Patient scheduled for Colonoscopy  on 12.19.23.     Arrival time: 0845. Procedure time 0945    Pre op exam needed? N/A    Facility location: [facility]; [address] [place] [address], [address] Floor, [place] [address]    Sedation type: Conscious sedation     Indication for procedure: History of colonic polyps [Z86.010]       Chart review:     Electronic implanted devices? No    Recent diagnosis of diverticulitis within the last 6 weeks? No    Diabetic? No    Diabetic medication HOLDING recommendations: (if applicable)  Oral diabetic medications: N/A  Diabetic injectables: N/A  Insulin: N/A      Medication review:    Anticoagulants? No    NSAIDS? No NSAID medications per patient's medication list.  RN will verify with pre-assessment call.    Other medication HOLDING recommendations:  N/A      Prep for procedure:     Review bowel habits and previous prep with patient. Per chart review, hx of constipation and poor prep from 2017 Colonoscopy.     Bowel prep recommendation: Double Miralax prep   Due to:  constipation noted or reported.  and poor prep/inadequate bowel prep in the past.       Prep instructions were not sent as bowel prep discussion can take place with patient.         Sanam Rehman RN  Endoscopy Procedure Pre Assessment RN  [phone] option 4

## 2023-12-05 NOTE — TELEPHONE ENCOUNTER
Attempted to contact patient in order to complete pre assessment questions.     No answer. Left message to return call to [phone] option 4    Missed call communication sent via [employer].    Sanam Rehman RN  Endoscopy Procedure Pre-Assessment RN

## 2023-12-05 NOTE — TELEPHONE ENCOUNTER
Pre assessment completed for upcoming procedure.   (Please see previous telephone encounter notes for complete details)    Patient  returned call.       Procedure details:    Arrival time and facility location reviewed.    Pre op exam needed? N/A    Designated  policy reviewed. Instructed to have someone stay 6 hours post procedure.     COVID policy reviewed.      Medication review:    Medications reviewed. Please see supporting documentation below. Holding recommendations discussed (if applicable).       Prep for procedure:     Procedure prep instructions reviewed.        Additional information needed?  Discussed prep with pt - pt decided she prefers extended golytely.     Bowel prep has been sent to    [facility] [address] [place] [address]    Instructions sent via Ifinity      Patient  verbalized understanding and had no questions or concerns at this time.      Shauna Best RN  Endoscopy Procedure Pre Assessment RN  [phone] option 4

## 2023-12-19 ENCOUNTER — HOSPITAL ENCOUNTER (OUTPATIENT)
Facility: AMBULATORY SURGERY CENTER | Age: 78
Discharge: HOME | End: 2023-12-19
Attending: INTERNAL MEDICINE | Admitting: INTERNAL MEDICINE
Payer: COMMERCIAL

## 2023-12-19 VITALS
HEIGHT: 65 IN | HEART RATE: 79 BPM | SYSTOLIC BLOOD PRESSURE: 130 MMHG | BODY MASS INDEX: 31.82 KG/M2 | RESPIRATION RATE: 16 BRPM | TEMPERATURE: 97.2 F | DIASTOLIC BLOOD PRESSURE: 73 MMHG | OXYGEN SATURATION: 100 % | WEIGHT: 191 LBS

## 2023-12-19 LAB — COLONOSCOPY: NORMAL

## 2023-12-19 PROCEDURE — 88305 TISSUE EXAM BY PATHOLOGIST: CPT | Mod: TC | Performed by: INTERNAL MEDICINE

## 2023-12-19 PROCEDURE — 88305 TISSUE EXAM BY PATHOLOGIST: CPT | Mod: 26 | Performed by: PATHOLOGY

## 2023-12-19 PROCEDURE — 45380 COLONOSCOPY AND BIOPSY: CPT | Mod: 59,PT | Performed by: INTERNAL MEDICINE

## 2023-12-19 PROCEDURE — 45385 COLONOSCOPY W/LESION REMOVAL: CPT | Mod: PT | Performed by: INTERNAL MEDICINE

## 2023-12-19 RX ORDER — ONDANSETRON 2 MG/ML
4 INJECTION INTRAMUSCULAR; INTRAVENOUS
Status: DISCONTINUED | OUTPATIENT
Start: 2023-12-19 | End: 2023-12-19 | Stop reason: HOSPADM

## 2023-12-19 RX ORDER — NALOXONE HYDROCHLORIDE 0.4 MG/ML
0.4 INJECTION, SOLUTION INTRAMUSCULAR; INTRAVENOUS; SUBCUTANEOUS
Status: DISCONTINUED | OUTPATIENT
Start: 2023-12-19 | End: 2023-12-20 | Stop reason: HOSPADM

## 2023-12-19 RX ORDER — FLUMAZENIL 0.1 MG/ML
0.2 INJECTION, SOLUTION INTRAVENOUS
Status: ACTIVE | OUTPATIENT
Start: 2023-12-19 | End: 2023-12-19

## 2023-12-19 RX ORDER — NALOXONE HYDROCHLORIDE 0.4 MG/ML
0.2 INJECTION, SOLUTION INTRAMUSCULAR; INTRAVENOUS; SUBCUTANEOUS
Status: DISCONTINUED | OUTPATIENT
Start: 2023-12-19 | End: 2023-12-20 | Stop reason: HOSPADM

## 2023-12-19 RX ORDER — LIDOCAINE 40 MG/G
CREAM TOPICAL
Status: DISCONTINUED | OUTPATIENT
Start: 2023-12-19 | End: 2023-12-19 | Stop reason: HOSPADM

## 2023-12-19 RX ORDER — PROCHLORPERAZINE MALEATE 5 MG
5 TABLET ORAL EVERY 6 HOURS PRN
Status: DISCONTINUED | OUTPATIENT
Start: 2023-12-19 | End: 2023-12-20 | Stop reason: HOSPADM

## 2023-12-19 RX ORDER — ONDANSETRON 2 MG/ML
4 INJECTION INTRAMUSCULAR; INTRAVENOUS EVERY 6 HOURS PRN
Status: DISCONTINUED | OUTPATIENT
Start: 2023-12-19 | End: 2023-12-20 | Stop reason: HOSPADM

## 2023-12-19 RX ORDER — ONDANSETRON 4 MG/1
4 TABLET, ORALLY DISINTEGRATING ORAL EVERY 6 HOURS PRN
Status: DISCONTINUED | OUTPATIENT
Start: 2023-12-19 | End: 2023-12-20 | Stop reason: HOSPADM

## 2023-12-19 RX ORDER — SODIUM CHLORIDE, SODIUM LACTATE, POTASSIUM CHLORIDE, CALCIUM CHLORIDE 600; 310; 30; 20 MG/100ML; MG/100ML; MG/100ML; MG/100ML
INJECTION, SOLUTION INTRAVENOUS CONTINUOUS
Status: DISCONTINUED | OUTPATIENT
Start: 2023-12-19 | End: 2023-12-19 | Stop reason: HOSPADM

## 2023-12-19 RX ORDER — FENTANYL CITRATE 50 UG/ML
INJECTION, SOLUTION INTRAMUSCULAR; INTRAVENOUS PRN
Status: DISCONTINUED | OUTPATIENT
Start: 2023-12-19 | End: 2023-12-19 | Stop reason: HOSPADM

## 2023-12-19 NOTE — DISCHARGE INSTRUCTIONS
[facility]  Home Care Following Anesthesia  For 24 hours after surgery:  Get plenty of rest.  A responsible adult must stay with you for at least 24 hours after you leave the surgery center.  Do not drive or use heavy equipment.  If you have weakness or tingling, don't drive or use heavy equipment until this feeling goes away.   Do not drink alcohol.   Avoid strenuous or risky activities.  Ask for help when climbing stairs.  You may feel lightheaded.  IF so, sit for a few minutes before standing.  Have someone help you get up.   If you have nausea (feel sick to your stomach): Drink only clear liquids such as apple juice, ginger ale, broth or 7-Up.  Rest may also help.  Be sure to drink enough fluids.  Move to a regular diet as you feel able.   You may have a slight fever.  Call the doctor if your fever is over 100 F (37.7 C) (taken under the tongue) or lasts longer than 24 hours.  You may have a dry mouth, a sore throat, muscle aches or trouble sleeping. These should go away after 24 hours.  Do not make important or legal decisions.   It is recommended to avoid smoking.               Tips for taking pain medications  To get the best pain relief possible, remember these points:  Take pain medications as directed, before pain becomes severe.  Pain medication can upset your stomach: taking it with food may help.  Constipation is a common side effect of pain medication. Drink plenty of  fluids.  Eat foods high in fiber. Take a stool softener if recommended by your doctor or pharmacist.  Do not drink alcohol, drive or operate machinery while taking pain medications.  Ask about other ways to control pain, such as with heat, ice or relaxation.    Tylenol/Acetaminophen Consumption    If you feel your pain relief is insufficient, you may take Tylenol/Acetaminophen in addition to your narcotic pain medication.   Be careful not to exceed 4,000 mg of Tylenol/Acetaminophen in a 24 hour  period from all sources.  If you are taking extra strength Tylenol/acetaminophen (500 mg), the maximum dose is 8 tablets in 24 hours.  If you are taking regular strength acetaminophen (325 mg), the maximum dose is 12 tablets in 24 hours.    Call a doctor for any of the following:  Signs of infection (fever, growing tenderness at the surgery site, a large amount of drainage or bleeding, severe pain, foul-smelling drainage, redness, swelling).  It has been over 8 to 10 hours since surgery and you are still not able to urinate (pass water).  Headache for over 24 hours.  Numbness, tingling or weakness the day after surgery (if you had spinal anesthesia).  Signs of Covid-19 infection (temperature over 100 degrees, shortness of breath, cough, loss of taste/smell, generalized body aches, persistent headache, chills, sore throat, nausea/vomiting/diarrhea)  Your doctor is: MD Arnaud Alfaro               Discharge Instructions after Colonoscopy      Today you had a Colonoscopy     Activity and Diet  You were given medicine for pain. You may be dizzy or sleepy.  For 24 hours:   Do not drive or use heavy equipment.   Do not make important decisions.   Do not drink any alcohol.  You may return to your normal diet and medicines.    Discomfort   Air was placed in your colon during the exam in order to see it. Walking helps to pass the air.   You may take Tylenol (acetaminophen) for pain unless your doctor has told you not to.  Do not take aspirin or ibuprofen (Advil, Motrin, or other anti-inflammatory  drugs) for _____ days.    Follow-up  ____ We took small tissue samples or polyps to study. Your doctor will call you with the results  within two weeks.    When to call:    Call right away if you have:   Unusual pain in belly or chest pain not relieved with passing air.   More than 1 to 2 Tablespoons of bleeding from your rectum.   Fever above 100.6  F (37.5  C).    If you have severe pain, bleeding, or shortness of breath, go to an  emergency room.    If you have questions, call:  Monday to Friday, 8 a.m. to 4:30 p.m.  Central Scheduling Department: [phone]    After hours  [facility]: [phone] (Ask for the GI fellow on call)

## 2023-12-19 NOTE — H&P
Esperanza Gage  [id number]  female  78 year old      Reason for procedure/surgery: colon cancer screening    Patient Active Problem List   Diagnosis    Vitamin D deficiency    Hyperlipidemia with target LDL less than 130    Osteopenia    Breast signs and symptoms    Chondromalacia of patella    Primary localized osteoarthrosis, lower leg    Pelvic fracture (H)    Medication management    Leg numbness    Seborrheic keratosis    Milia    History of tinea    Status post knee surgery    Recurrent UTI    Vaginal atrophy    Chronic bilateral low back pain without sciatica    Primary osteoarthritis of right knee    Chronic pain of right knee       Past Surgical History:    Past Surgical History:   Procedure Laterality Date    ARTHROPLASTY KNEE Left 8/28/2018    Procedure: ARTHROPLASTY KNEE;  Left Total Knee Replacement;  Surgeon: Pierre Campos MD;  Location: [facility] OR    KNEE SURGERY      No prior surgeries      ORTHOPEDIC SURGERY      TOTAL KNEE ARTHROPLASTY Left 08/28/2018       Past Medical History:   Past Medical History:   Diagnosis Date    Hearing problem 2017    Hyperlipidemia 2005    Hyperlipidemia LDL goal < 130     Hypertension     Osteoporosis, post-menopausal     Pelvic fracture (H)        Social History:   Social History     Tobacco Use    Smoking status: Former     Packs/day: 0.00     Years: 0.00     Additional pack years: 0.00     Total pack years: 0.00     Types: Cigarettes    Smokeless tobacco: Never   Substance Use Topics    Alcohol use: Yes     Comment: Rare       Family History:   Family History   Problem Relation Age of Onset    Osteoporosis Mother     Hypertension Brother     Cerebrovascular Disease Brother     Cerebrovascular Disease Brother     Heart Disease Father     C.A.D. No family hx of     Diabetes No family hx of     Skin Cancer No family hx of     Melanoma No family hx of     Dementia No family hx of     Heart Disease No family hx of     Other - See Comments Brother         cerebrovascular  disease    Hypertension Brother     Coronary Artery Disease No family hx of        Allergies: No Known Allergies    Active Medications:   Current Outpatient Medications   Medication Sig Dispense Refill    Ascorbic Acid (VITAMIN C) 500 MG CAPS       ASPIRIN PO Take 81 mg by mouth daily      bisacodyl (DULCOLAX) 5 MG EC tablet Two days prior to exam take two (2) tablets at 4pm. One day prior to exam take two (2) tablets at 4pm 4 tablet 0    losartan (COZAAR) 50 MG tablet Take 1 tablet (50 mg) by mouth daily 90 tablet 3    multivitamin w/minerals (THERA-VIT-M) tablet Take 1 tablet by mouth daily      omega 3 1000 MG CAPS       phenazopyridine (PYRIDIUM) 200 MG tablet Take 1 tablet (200 mg) by mouth 3 times daily as needed for irritation 30 tablet 1    polyethylene glycol (GOLYTELY) 236 g suspension Take as directed. Two days before your exam fill the first container with water. Cover and shake until mixed well. At 5:00pm drink one 8oz glass every 10-15 minutes until half (1/2) of the first container is empty. Store the remainder in the refrigerator. One day before your exam at 5:00pm drink the second half of the first container until it is gone. Before you go to bed mix the second container with water and put in refrigerator. Six hours before your check in time drink one 8oz glass every 10-15 minutes until half of container is empty. Discard the remainder of solution. 8000 mL 0    simvastatin (ZOCOR) 10 MG tablet Take 1 tablet (10 mg) by mouth At Bedtime - Oral 90 tablet 3    vitamin B-Complex Take 1 tablet by mouth daily      vitamin D3 (CHOLECALCIFEROL) 50 mcg (2000 units) tablet Take 1 tablet by mouth daily      vitamin E 400 units TABS Take 400 Units by mouth daily      zinc gluconate 50 MG tablet Take 50 mg by mouth daily      gabapentin (NEURONTIN) 100 MG capsule Take 100 mg at bedtime for 1 week and then increase to 200 mg for 1 week and then 300 mg.  After this you can stay at this dose. 90 capsule 1  "      Systemic Review:   CONSTITUTIONAL: NEGATIVE for fever, chills, change in weight  ENT/MOUTH: NEGATIVE for ear, mouth and throat problems  RESP: NEGATIVE for significant cough or SOB  CV: NEGATIVE for chest pain, palpitations or peripheral edema    Physical Examination:   Vital Signs: BP (!) 149/89 (BP Location: Right arm)   Pulse 89   Temp 97.5  F (36.4  C) (Temporal)   Resp 18   Ht 1.651 m (5' 5\")   Wt 86.6 kg (191 lb)   SpO2 95%   BMI 31.78 kg/m    GENERAL: healthy, alert and no distress  NECK: no adenopathy, no asymmetry, masses, or scars  RESP: lungs clear to auscultation - no rales, rhonchi or wheezes  CV: regular rate and rhythm, normal S1 S2, no S3 or S4, no murmur, click or rub, no peripheral edema and peripheral pulses strong  ABDOMEN: soft, nontender, no hepatosplenomegaly, no masses and bowel sounds normal  MS: no gross musculoskeletal defects noted, no edema    Plan: Appropriate to proceed as scheduled.      Karen Alfaro MD  12/19/2023    PCP:  Julia Gifford    "

## 2023-12-20 PROBLEM — M25.561 CHRONIC PAIN OF RIGHT KNEE: Status: RESOLVED | Noted: 2023-10-04 | Resolved: 2023-12-20

## 2023-12-20 PROBLEM — M17.11 PRIMARY OSTEOARTHRITIS OF RIGHT KNEE: Status: RESOLVED | Noted: 2023-10-04 | Resolved: 2023-12-20

## 2023-12-20 PROBLEM — G89.29 CHRONIC PAIN OF RIGHT KNEE: Status: RESOLVED | Noted: 2023-10-04 | Resolved: 2023-12-20

## 2023-12-20 LAB
PATH REPORT.COMMENTS IMP SPEC: NORMAL
PATH REPORT.COMMENTS IMP SPEC: NORMAL
PATH REPORT.FINAL DX SPEC: NORMAL
PATH REPORT.GROSS SPEC: NORMAL
PATH REPORT.MICROSCOPIC SPEC OTHER STN: NORMAL
PATH REPORT.RELEVANT HX SPEC: NORMAL
PHOTO IMAGE: NORMAL

## 2023-12-20 NOTE — PROGRESS NOTES
11/03/23 0500   Appointment Info   Signing clinician's name / credentials Nelda Warren, DPT, OCS, Cert MDT   Total/Authorized Visits PT 8   Visits Used 4   Medical Diagnosis Primary osteoarthritis of right knee   PT Tx Diagnosis right knee pain   Other pertinent information print   Quick Adds Certification   Progress Note/Certification   Start of Care Date 10/04/23   Onset of illness/injury or Date of Surgery 09/18/23  (MD order)   Therapy Frequency 1x/week for 4 weeks, then 2x/week for 8 weeks   Predicted Duration 12 weeks   Certification date from 10/04/23   Certification date to 12/27/23   Progress Note Completed Date 10/04/23   PT Goal 1   Goal Identifier R knee/transfers   Goal Description pt will be able to transfer out of normal chair height without UE support x 5 reps   Rationale to maximize safety and independence with performance of ADLs and functional tasks;to maximize safety and independence within the home;to maximize safety and independence within the community   Goal Progress normal chair height - no UE support but slight incr momentum   Target Date 12/27/23   Subjective Report   Subjective Report pt reports she has been going up her stairs better, not reaching for the railing like she used to. still doesnt feel normal ease, but its much better and less hesitant. continues to have shoulder issues so will likely get an order. HEP going well, recently added YTB to priyal. has been leading her stairs with R leg now, focusing on equal feet for sit>stand. took out 1 cushion for her sit to stands. pushing herself to do 20 x 2 with her lying down exercise. still can be tough getting out of cars, and getting up from low toilets. the click in her knees is less.   Objective Measures   Objective Measures Objective Measure 1;Objective Measure 2   Objective Measure 1   Objective Measure squat/sit to stand   Details improved performance, able to perform sit<>Stand from lowest table height without UE use, just  mild momentum use   Treatment Interventions (PT)   Interventions Therapeutic Procedure/Exercise;Therapeutic Activity   Therapeutic Procedure/Exercise   Therapeutic Procedures: strength, endurance, ROM, flexibillity minutes (50455) 14   Therapeutic Procedures Ther Proc 2;Ther Proc 3   Ther Proc 1 HEP   Ther Proc 1 - Details discussed performance of HEP,  progression of her HEP and results/tolerance   PTRx Ther Proc 1 Hip Flexion Straight Leg Raise   PTRx Ther Proc 1 - Details HEP   PTRx Ther Proc 2 Bridging #1   PTRx Ther Proc 2 - Details x20 review - glute and ab set   PTRx Ther Proc 3 Hip Abduction Straight Leg Raise   PTRx Ther Proc 3 - Details HEP   PTRx Ther Proc 4 Clamshell Feet together   PTRx Ther Proc 4 - Details HEP with YTB verbal rev   PTRx Ther Proc 5 Seated Knee Extension With Theraband   PTRx Ther Proc 5 - Details verbal rev with Blue TB   Therapeutic Activity   Therapeutic Activities: dynamic activities to improve functional performance minutes (28978) 24   Therapeutic Activities Ther Act 2   Ther Act 1 status   Ther Act 1 - Details discussed status, progress, stairs. discussed other body part issues and future referral. discussed this therapist leaving and continue with other therapist in future   Ther Act 2 positive mental outlook/distraction/not overthinking   Ther Act 2 - Details review importance of not overthinking things, moving naturally, using symmetry, positions to stand up from, positive thinking and not making excuse or saying cant. using ideas like singing a song outloud during sit to stand to not overthink and use distraction, similar to when she works hard outside and doesnt think about what she is doing   PTRx Ther Act 1 Sit to Stand   PTRx Ther Act 1 - Details x15 at lowest table height - no use of UEs slight momentum use   PTRx Ther Act 2 Stair Step Ups   PTRx Ther Act 2 - Details HEP verbal rev   PTRx Ther Act 3 Dumbbell Lunges - Split Squats   PTRx Ther Act 3 - Details 10 x 2 bilat  - light UE support mid range only   Plan   Plan for next session DC?   Total Session Time   Timed Code Treatment Minutes 38   Total Treatment Time (sum of timed and untimed services) 38         DISCHARGE  Reason for Discharge: Patient has failed to schedule further appointments.    Equipment Issued:     Discharge Plan: Patient to continue home program.    Referring Provider:  Vadim Bose

## 2023-12-22 ENCOUNTER — TELEPHONE (OUTPATIENT)
Dept: INTERNAL MEDICINE | Facility: CLINIC | Age: 78
End: 2023-12-22

## 2023-12-22 NOTE — CONFIDENTIAL NOTE
[facility] Call Center    Phone Message    May a detailed message be left on voicemail: yes     Reason for Call: Pt calling to schedule prolia injection. Please call pt Thank you    Action Taken: Message routed to:  Other: whs    Travel Screening: Not Applicable

## 2024-02-14 ENCOUNTER — OFFICE VISIT (OUTPATIENT)
Dept: NEUROLOGY | Facility: CLINIC | Age: 79
End: 2024-02-14
Attending: INTERNAL MEDICINE
Payer: COMMERCIAL

## 2024-02-14 ENCOUNTER — LAB (OUTPATIENT)
Dept: LAB | Facility: CLINIC | Age: 79
End: 2024-02-14
Attending: PSYCHIATRY & NEUROLOGY
Payer: COMMERCIAL

## 2024-02-14 ENCOUNTER — ANCILLARY PROCEDURE (OUTPATIENT)
Dept: MAMMOGRAPHY | Facility: CLINIC | Age: 79
End: 2024-02-14
Attending: INTERNAL MEDICINE
Payer: COMMERCIAL

## 2024-02-14 VITALS
SYSTOLIC BLOOD PRESSURE: 145 MMHG | OXYGEN SATURATION: 97 % | WEIGHT: 202.5 LBS | BODY MASS INDEX: 33.7 KG/M2 | HEART RATE: 73 BPM | DIASTOLIC BLOOD PRESSURE: 84 MMHG

## 2024-02-14 DIAGNOSIS — R09.89 OTHER SPECIFIED SYMPTOMS AND SIGNS INVOLVING THE CIRCULATORY AND RESPIRATORY SYSTEMS: ICD-10-CM

## 2024-02-14 DIAGNOSIS — M81.0 SENILE OSTEOPOROSIS: ICD-10-CM

## 2024-02-14 DIAGNOSIS — Z12.31 VISIT FOR SCREENING MAMMOGRAM: ICD-10-CM

## 2024-02-14 DIAGNOSIS — G60.9 IDIOPATHIC PERIPHERAL NEUROPATHY: ICD-10-CM

## 2024-02-14 LAB
ANION GAP SERPL CALCULATED.3IONS-SCNC: 10 MMOL/L (ref 7–15)
BUN SERPL-MCNC: 12.3 MG/DL (ref 8–23)
CALCIUM SERPL-MCNC: 10 MG/DL (ref 8.8–10.2)
CHLORIDE SERPL-SCNC: 103 MMOL/L (ref 98–107)
CREAT SERPL-MCNC: 0.64 MG/DL (ref 0.51–0.95)
CRP SERPL-MCNC: <3 MG/L
DEPRECATED HCO3 PLAS-SCNC: 27 MMOL/L (ref 22–29)
EGFRCR SERPLBLD CKD-EPI 2021: >90 ML/MIN/1.73M2
ERYTHROCYTE [SEDIMENTATION RATE] IN BLOOD BY WESTERGREN METHOD: 17 MM/HR (ref 0–30)
GLUCOSE SERPL-MCNC: 106 MG/DL (ref 70–99)
POTASSIUM SERPL-SCNC: 4 MMOL/L (ref 3.4–5.3)
SODIUM SERPL-SCNC: 140 MMOL/L (ref 135–145)

## 2024-02-14 PROCEDURE — 99000 SPECIMEN HANDLING OFFICE-LAB: CPT | Performed by: PATHOLOGY

## 2024-02-14 PROCEDURE — 86140 C-REACTIVE PROTEIN: CPT | Performed by: PATHOLOGY

## 2024-02-14 PROCEDURE — 77063 BREAST TOMOSYNTHESIS BI: CPT | Mod: GC | Performed by: RADIOLOGY

## 2024-02-14 PROCEDURE — 86037 ANCA TITER EACH ANTIBODY: CPT | Performed by: PSYCHIATRY & NEUROLOGY

## 2024-02-14 PROCEDURE — 85652 RBC SED RATE AUTOMATED: CPT | Performed by: PATHOLOGY

## 2024-02-14 PROCEDURE — 36415 COLL VENOUS BLD VENIPUNCTURE: CPT | Performed by: PATHOLOGY

## 2024-02-14 PROCEDURE — 84207 ASSAY OF VITAMIN B-6: CPT | Mod: 90 | Performed by: PATHOLOGY

## 2024-02-14 PROCEDURE — 99204 OFFICE O/P NEW MOD 45 MIN: CPT | Performed by: PSYCHIATRY & NEUROLOGY

## 2024-02-14 PROCEDURE — 77067 SCR MAMMO BI INCL CAD: CPT | Mod: GC | Performed by: RADIOLOGY

## 2024-02-14 PROCEDURE — 80048 BASIC METABOLIC PNL TOTAL CA: CPT | Performed by: PATHOLOGY

## 2024-02-14 RX ORDER — GABAPENTIN 100 MG/1
CAPSULE ORAL
Qty: 84 CAPSULE | Refills: 0 | Status: SHIPPED | OUTPATIENT
Start: 2024-02-14 | End: 2024-03-20

## 2024-02-14 ASSESSMENT — PAIN SCALES - GENERAL: PAINLEVEL: NO PAIN (0)

## 2024-02-14 NOTE — LETTER
2/14/2024       RE: Esperanza Gage  [address] [place] [address]     Dear Colleague,    Thank you for referring your patient, Esperanza Gage, to the [facility] [place] [facility]. Please see a copy of my visit note below.    [facility] Neurology Consultation    Esperanza Gage MRN# [id number]   Age: 78 year old YOB: 1945     Requesting physician: Julia Branham     Reason for Consultation: neuropathy      History of Presenting Symptoms:   Esperanza Gage is a 78 year old female who presents today for evaluation of neuropathy.    The patient has a pertinent medical history of hypovitaminosis D, HLD, HTN, chronic low back pain, and a pelvic fracture.  The patient was seen in neurology clinic last 2/1/2021 with Dr. Shah for issues of longstanding left saphenous neuropathy and the development of a mild sensory neuropathy b/l.  Prior w/up and prior neurology visits are well documented 9/28/2020 as follows:  - Dr. Aguirre in 2015 indicated symptom onset in 2010 of the left saphenous nerve and EMG at that time showed absent sensory nerve potentials of left saphenous and left superficial peroneal nerve.  - Dr. Dey in 2018 saw her after a knee surgery done that year which led to worsened saphenous neuropathy symptoms. Her exam was negative for new sensory deficits.  - Dr. Shah in 2020 visit led to reports of b/l sensory issues of her feet (soles). Prior visits with Dr. 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 Serum studies (SSA, SSB, SPEP, Immunofixation) were normal with Dr. Shah.    Low back pain was/has been evaluated through PM&R providers, with visit 9/8/2022 documenting the presence of right IT band syndrome, and lumbosacral sponylosis without myelopathy. PT was recmomended given imaging (XR 6/9/2022) wasn't revealing  for major degrees of retrolisthesis or central stenosis beyond some facet arthropathy at L2-3 through L5-S1.    Dr. Gifford of  recently saw the patient 9/11/2023 and orders for neurology referral were placed for idiopathic peripheral neuropathy.    Today, the patient feels her numbness and burning and stinging in her legs (b/l) on her soles and toes and tops of her feet.  She also has numbness over her left saphenous nerve which hasn't changed.  There is discoloration with her distal toes and feet (redness).  There is cramp in her left toes at times when trying to move them.  She may wake up at night and her feet are red (left predominant).   Her symptoms are overall becoming near continuous and bothersome, affecting her quality of life.  She does mention never trying gabapentin for the symptoms.       Social History:   No smoking history. No alcohol abuse history.      Medications:     Current Outpatient Medications   Medication    Ascorbic Acid (VITAMIN C) 500 MG CAPS    ASPIRIN PO    bisacodyl (DULCOLAX) 5 MG EC tablet    gabapentin (NEURONTIN) 100 MG capsule    losartan (COZAAR) 50 MG tablet    multivitamin w/minerals (THERA-VIT-M) tablet    omega 3 1000 MG CAPS    phenazopyridine (PYRIDIUM) 200 MG tablet    polyethylene glycol (GOLYTELY) 236 g suspension    simvastatin (ZOCOR) 10 MG tablet    vitamin B-Complex    vitamin D3 (CHOLECALCIFEROL) 50 mcg (2000 units) tablet    vitamin E 400 units TABS    zinc gluconate 50 MG tablet      Physical Exam:   Vitals: BP (!) 145/84 (BP Location: Right arm, Patient Position: Sitting, Cuff Size: Adult Large)   Pulse 73   Wt 91.9 kg (202 lb 8 oz)   SpO2 97%   BMI 33.70 kg/m     General: Seated comfortably in no acute distress.  HEENT: Optic discs sharp and vasculature normal on funduscopic exam.   Musculoskeletal: red feet b/l, large veins present in feet. Swelling with pitting edema in b/l ankles to mid-leg.  Neurologic:     Mental Status: Fully alert, attentive and  oriented. Speech clear and fluent, no paraphasic errors.      Cranial Nerves: Visual fields intact. PERRL. EOMI with normal smooth pursuit. Facial sensation intact/symmetric. Facial movements symmetric. Hearing not formally tested but intact to conversation. Palate elevation symmetric, uvula midline. No dysarthria. Shoulder shrug strong bilaterally. Tongue protrusion midline.     Motor: No tremors or other abnormal movements observed. Muscle tone normal throughout. No pronator drift. Normal/symmetric rapid finger tapping. Strength 5/5 throughout upper and lower extremities.     Deep Tendon Reflexes: 2+/symmetric throughout upper and lower extremities. No clonus. Toes downgoing bilaterally.     Sensory: Intact/symmetric to vibration, proprioception, and temperature.  Errors in reporting light touch and pinprick differentiation in distal toes. Negative Romberg.      Coordination: Finger-nose-finger without dysmetria. Rapid alternating movements intact/symmetric with normal speed and rhythm.     Gait: Normal, steady casual gait. Able to walk on toes, heels and tandem without difficulty.         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  She has some redness present today, with leg swelling, so it is possible some symptoms are more related to issues of circulation over neuropathy. However, given the dysesthesias present, I do think her neuropathy is playing a role here and could be better managed from a symptom control standpoint. She was hesitant to start gabapentin in the past, but after our discussion today she was okay with a short trial to see if her symptoms improve.  On top of this trial, I will place an order for [facility] and certain serum studies for reversible vasculitis, or other  causes of dysesthesias related to neuropathy.    Plan:  B6, CRP, ESR, ANCA  [facility] b/l  Gabapentin 100 mg at bedtime for 7 days, then   - 200 mg at bedtime for 7 days, then   - 300 mg at bedtime     Follow up in Neurology clinic in 3-6 months, or should new concerns arise.      Total time today (55 min) in this patient encounter was spent on pre-charting, counseling and/or coordination of care.  The patient is in agreement with this plan and has no further questions.      Again, thank you for allowing me to participate in the care of your patient.      Sincerely,    Kapil Kamara, DO

## 2024-02-14 NOTE — PROGRESS NOTES
[facility] Neurology Consultation    Esperanza Gage MRN# [id number]   Age: 78 year old YOB: 1945     Requesting physician: Julia Branham     Reason for Consultation: neuropathy      History of Presenting Symptoms:   Esperanza Gage is a 78 year old female who presents today for evaluation of neuropathy.    The patient has a pertinent medical history of hypovitaminosis D, HLD, HTN, chronic low back pain, and a pelvic fracture.  The patient was seen in neurology clinic last 2/1/2021 with Dr. Shah for issues of longstanding left saphenous neuropathy and the development of a mild sensory neuropathy b/l.  Prior w/up and prior neurology visits are well documented 9/28/2020 as follows:  - Dr. Aguirre in 2015 indicated symptom onset in 2010 of the left saphenous nerve and EMG at that time showed absent sensory nerve potentials of left saphenous and left superficial peroneal nerve.  - Dr. Dey in 2018 saw her after a knee surgery done that year which led to worsened saphenous neuropathy symptoms. Her exam was negative for new sensory deficits.  - Dr. Shah in 2020 visit led to reports of b/l sensory issues of her feet (soles). Prior visits with Dr. 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 Serum studies (SSA, SSB, SPEP, Immunofixation) were normal with Dr. Shah.    Low back pain was/has been evaluated through PM&R providers, with visit 9/8/2022 documenting the presence of right IT band syndrome, and lumbosacral sponylosis without myelopathy. PT was recmomended given imaging (XR 6/9/2022) wasn't revealing for major degrees of retrolisthesis or central stenosis beyond some facet arthropathy at L2-3 through L5-S1.    Dr. Gifford of  recently saw the patient 9/11/2023 and orders for neurology referral were placed for idiopathic peripheral neuropathy.    Today, the patient feels her numbness and burning and stinging  in her legs (b/l) on her soles and toes and tops of her feet.  She also has numbness over her left saphenous nerve which hasn't changed.  There is discoloration with her distal toes and feet (redness).  There is cramp in her left toes at times when trying to move them.  She may wake up at night and her feet are red (left predominant).   Her symptoms are overall becoming near continuous and bothersome, affecting her quality of life.  She does mention never trying gabapentin for the symptoms.       Social History:   No smoking history. No alcohol abuse history.      Medications:     Current Outpatient Medications   Medication    Ascorbic Acid (VITAMIN C) 500 MG CAPS    ASPIRIN PO    bisacodyl (DULCOLAX) 5 MG EC tablet    gabapentin (NEURONTIN) 100 MG capsule    losartan (COZAAR) 50 MG tablet    multivitamin w/minerals (THERA-VIT-M) tablet    omega 3 1000 MG CAPS    phenazopyridine (PYRIDIUM) 200 MG tablet    polyethylene glycol (GOLYTELY) 236 g suspension    simvastatin (ZOCOR) 10 MG tablet    vitamin B-Complex    vitamin D3 (CHOLECALCIFEROL) 50 mcg (2000 units) tablet    vitamin E 400 units TABS    zinc gluconate 50 MG tablet      Physical Exam:   Vitals: BP (!) 145/84 (BP Location: Right arm, Patient Position: Sitting, Cuff Size: Adult Large)   Pulse 73   Wt 91.9 kg (202 lb 8 oz)   SpO2 97%   BMI 33.70 kg/m     General: Seated comfortably in no acute distress.  HEENT: Optic discs sharp and vasculature normal on funduscopic exam.   Musculoskeletal: red feet b/l, large veins present in feet. Swelling with pitting edema in b/l ankles to mid-leg.  Neurologic:     Mental Status: Fully alert, attentive and oriented. Speech clear and fluent, no paraphasic errors.      Cranial Nerves: Visual fields intact. PERRL. EOMI with normal smooth pursuit. Facial sensation intact/symmetric. Facial movements symmetric. Hearing not formally tested but intact to conversation. Palate elevation symmetric, uvula midline. No dysarthria.  Shoulder shrug strong bilaterally. Tongue protrusion midline.     Motor: No tremors or other abnormal movements observed. Muscle tone normal throughout. No pronator drift. Normal/symmetric rapid finger tapping. Strength 5/5 throughout upper and lower extremities.     Deep Tendon Reflexes: 2+/symmetric throughout upper and lower extremities. No clonus. Toes downgoing bilaterally.     Sensory: Intact/symmetric to vibration, proprioception, and temperature.  Errors in reporting light touch and pinprick differentiation in distal toes. Negative Romberg.      Coordination: Finger-nose-finger without dysmetria. Rapid alternating movements intact/symmetric with normal speed and rhythm.     Gait: Normal, steady casual gait. Able to walk on toes, heels and tandem without difficulty.         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  She has some redness present today, with leg swelling, so it is possible some symptoms are more related to issues of circulation over neuropathy. However, given the dysesthesias present, I do think her neuropathy is playing a role here and could be better managed from a symptom control standpoint. She was hesitant to start gabapentin in the past, but after our discussion today she was okay with a short trial to see if her symptoms improve.  On top of this trial, I will place an order for MERLIN and certain serum studies for reversible vasculitis, or other causes of dysesthesias related to neuropathy.    Plan:  B6, CRP, ESR, ANCA  MERLIN b/l  Gabapentin 100 mg at bedtime for 7 days, then   - 200 mg at bedtime for 7 days, then   - 300 mg at bedtime     Follow up in Neurology clinic in 3-6 months, or should new concerns arise.    BRENNAN Kamara D.O.    of Neurology    Total time today (55 min) in this patient encounter was spent on pre-charting, counseling and/or coordination of care.  The patient is in agreement with this plan and has no further questions.

## 2024-02-14 NOTE — PATIENT INSTRUCTIONS
I suspect your neuropathy is worsened, which can certainly happen with idiopathic types of neuropathy. I would like you to have a few tests to look for reversible causes, but otherwise I would ask you to focus on symptom management.     I would recommend trying gabapentin slowly over time to see if it improves your symptoms at night, then if it works, expand the dosing into the daytime.    Gabapentin 100 mg tablets:   - take 1 pill nightly for one week, then   - take 2 pills nightly for one week, then   - take 3 pills nightly and after a week please let me know how this is helping or not

## 2024-02-15 ENCOUNTER — ANCILLARY PROCEDURE (OUTPATIENT)
Dept: ULTRASOUND IMAGING | Facility: CLINIC | Age: 79
End: 2024-02-15
Attending: PSYCHIATRY & NEUROLOGY
Payer: COMMERCIAL

## 2024-02-15 DIAGNOSIS — R09.89 OTHER SPECIFIED SYMPTOMS AND SIGNS INVOLVING THE CIRCULATORY AND RESPIRATORY SYSTEMS: ICD-10-CM

## 2024-02-15 DIAGNOSIS — G60.9 IDIOPATHIC PERIPHERAL NEUROPATHY: ICD-10-CM

## 2024-02-15 LAB
ANCA AB PATTERN SER IF-IMP: NORMAL
C-ANCA TITR SER IF: NORMAL {TITER}

## 2024-02-15 PROCEDURE — 93922 UPR/L XTREMITY ART 2 LEVELS: CPT | Mod: GC | Performed by: RADIOLOGY

## 2024-02-16 LAB — PYRIDOXAL PHOS SERPL-SCNC: 453 NMOL/L

## 2024-02-21 ENCOUNTER — ALLIED HEALTH/NURSE VISIT (OUTPATIENT)
Dept: OBGYN | Facility: CLINIC | Age: 79
End: 2024-02-21
Payer: COMMERCIAL

## 2024-02-21 DIAGNOSIS — M81.0 SENILE OSTEOPOROSIS: Primary | ICD-10-CM

## 2024-02-21 PROCEDURE — 96372 THER/PROPH/DIAG INJ SC/IM: CPT | Performed by: FAMILY MEDICINE

## 2024-02-21 PROCEDURE — 250N000011 HC RX IP 250 OP 636: Mod: JZ | Performed by: FAMILY MEDICINE

## 2024-02-21 RX ADMIN — DENOSUMAB 60 MG: 60 INJECTION SUBCUTANEOUS at 11:13

## 2024-02-21 NOTE — PATIENT INSTRUCTIONS
Thank you for trusting us with your care!     If you need to contact us for questions about:  Symptoms, Scheduling & Medical Questions; Non-urgent (2-3 day response) Luis message, Urgent (needing response today) [phone] (if after 3:30pm next day response)   Prescriptions: Please call your Pharmacy   Billing: [facility] [phone] or [facility]:[phone]

## 2024-02-21 NOTE — NURSING NOTE
Chief Complaint   Patient presents with    Allied Health Visit     Prolia                     Prolia      Prolia is used to protect further re-absorption of bone mass in men and women with osteoporosis who are at high risk for fracture.         1 ml of a 60 mg/ml solution in a single -use prefilled syringe subcutaneously into the upper arm, upper thigh, or abdomen.      Patient should take calcium 1200 mg or dose instructed by provider      Patient should take at least 1000 units of Vitamin D daily        Before each injection      Calcium level within 30 days of each injection      Verify no serious infections currently        After each infection      Need to go to any [facility] two weeks after Prolia injections        Calcium      Magnesium      Phosphorus       Follow up in six months after first Prolia injection with your provider.     You will need to have a calcium level drawn before this visit.           Follow up annually with your provider        Adverse Reactions            Hypocalcemia      Serious infections of the skin, lower abdomin, bladder or ear          Endocarditis      Dermatitis      Severe jaw bone problems (osteonecrosis)      Muscle aches      Diarrhea      Headache      Possible atypical femur ( leg) fractures      Possible spinal fracture after discontinuation                   Clinic Administered Medication Documentation      Prolia Documentation    Indication: Prolia  (denosumab) is a prescription medicine used to treat osteoporosis in patients who:   Are at high risk for fracture, meaning patients who have had a fracture related to osteoporosis, or who have multiple risk factors for fracture.  Cannot use another osteoporosis medicine or other osteoporosis medicines did not work well.  The timeline for early/late injections would be 4 weeks early and any time after the 6 month aga. If a patient receives their injection late, then the subsequent injection would be 6 months from the  date that they actually received the injection.    When was the last injection?  23  Was the last injection at least 6 months ago? Yes  Has the prior authorization been completed?  Yes  Is there an active order (written within the past 365 days, with administrations remaining, not ) in the chart?  Yes  Patient denies any dental work involving the bone (e.g. tooth extraction or dental implants) in the past 4 weeks?  Yes  Patient denies plans for any dental work involving the bone (e.g. tooth extraction or dental implants) in the next 4 weeks? Yes    The following steps were completed to comply with the REMS program for Prolia:  Confirms that patient received education from RN or provider via the Medication Guide and Patient Counseling Chart, including the serious risks of Prolia  and the symptoms of each risk.  Told the patient to seek prompt medical attention if they have signs or symptoms of any of the serious risks, as described in the Medication Guide and Patient Counseling Chart that was reviewed between the patient and RN or LP.  Provided each patient a copy of the Medication Guide and Patient Brochure.    Prior to injection, verified patient identity using patient's name and date of birth. Medication was administered. Please see MAR and medication order for additional information. Patient instructed to report any adverse reaction to staff immediately.    Vial/Syringe: Single dose vial. Was entire vial of medication used? Yes  Was this medication supplied by the patient? No  Verified that the patient has refills remaining in their prescription.

## 2024-08-08 ENCOUNTER — TELEPHONE (OUTPATIENT)
Dept: OBGYN | Facility: CLINIC | Age: 79
End: 2024-08-08
Payer: COMMERCIAL

## 2024-08-08 NOTE — CONFIDENTIAL NOTE
Patient is scheduled for prolia injection 8/21.  No recent Calcium drawn after 2/21 injection, no current order, no valid PA. Last clinic visit 2/2023    Routed to dr Srivastava for orders

## 2024-08-12 DIAGNOSIS — M81.0 SENILE OSTEOPOROSIS: Primary | ICD-10-CM

## 2024-08-12 DIAGNOSIS — Z92.29 PERSONAL HISTORY OF OTHER DRUG THERAPY: ICD-10-CM

## 2024-08-12 DIAGNOSIS — M80.00XD AGE-RELATED OSTEOPOROSIS WITH CURRENT PATHOLOGICAL FRACTURE WITH ROUTINE HEALING, SUBSEQUENT ENCOUNTER: ICD-10-CM

## 2024-08-13 DIAGNOSIS — Z92.29 PERSONAL HISTORY OF OTHER DRUG THERAPY: ICD-10-CM

## 2024-08-13 DIAGNOSIS — M81.0 SENILE OSTEOPOROSIS: Primary | ICD-10-CM

## 2024-08-13 NOTE — PROGRESS NOTES
8/12/2024 - she should be seen in the next month - I ordered the prolia and a calcium and creatinine  - she needs a calcium - her DXA is due 1/2025 and that is ordered.  Yin Srivastava MD, PhD          The following steps were completed to comply with the REMS program for Prolia:  Reviewed the serious risks of Prolia  and the symptoms of each risk.  Advised patient to seek prompt medical attention if they have signs or symptoms of any of the serious risks.  Patient will be provided a copy of the Medication Guide and Patient Brochure prior to first injection.    Yin Srivastava MD PhD

## 2024-08-13 NOTE — TELEPHONE ENCOUNTER
Tried to reach Esperanza, but received voicemail.  Left message to call back regarding her upcoming prolia appointment.    PA is pending.    Needs to complete labs ASAP-ordered    Needs to schedule a clinic visit with Dr Srivastava in next 1-2 months

## 2024-08-15 ENCOUNTER — LAB (OUTPATIENT)
Dept: LAB | Facility: CLINIC | Age: 79
End: 2024-08-15
Payer: COMMERCIAL

## 2024-08-15 DIAGNOSIS — M80.00XD AGE-RELATED OSTEOPOROSIS WITH CURRENT PATHOLOGICAL FRACTURE WITH ROUTINE HEALING, SUBSEQUENT ENCOUNTER: ICD-10-CM

## 2024-08-15 LAB
CALCIUM SERPL-MCNC: 9.4 MG/DL (ref 8.8–10.4)
CREAT SERPL-MCNC: 0.6 MG/DL (ref 0.51–0.95)
EGFRCR SERPLBLD CKD-EPI 2021: >90 ML/MIN/1.73M2

## 2024-08-15 PROCEDURE — 82565 ASSAY OF CREATININE: CPT

## 2024-08-15 PROCEDURE — 36415 COLL VENOUS BLD VENIPUNCTURE: CPT

## 2024-08-15 PROCEDURE — 82310 ASSAY OF CALCIUM: CPT

## 2024-08-15 NOTE — TELEPHONE ENCOUNTER
Spoke with Esperanza over the phone and notified her of the necessary labs (Ca + Cr ordered by Dr. Srivastava). She plans to get them drawn today at her local [facility]. Also scheduled pt for an appt with Dr. Srivastava for .    Clinic Administered Medication Documentation      Prolia Documentation    Indication: Prolia  (denosumab) is a prescription medicine used to treat osteoporosis in patients who:   Are at high risk for fracture, meaning patients who have had a fracture related to osteoporosis, or who have multiple risk factors for fracture.  Cannot use another osteoporosis medicine or other osteoporosis medicines did not work well.  The timeline for early/late injections would be 4 weeks early and any time after the 6 month aga. If a patient receives their injection late, then the subsequent injection would be 6 months from the date that they actually received the injection.    When was the last injection?  24  Was the last injection at least 6 months ago? Yes  Has the prior authorization been completed?  Yes  Is there an active order (written within the past 365 days, with administrations remaining, not ) in the chart?  Yes   GFR Estimate   Date Value Ref Range Status   2024 >90 >60 mL/min/1.73m2 Final   2020 90 >60 mL/min/[1.73_m2] Final     Comment:     Non  GFR Calc  Starting 2018, serum creatinine based estimated GFR (eGFR) will be   calculated using the Chronic Kidney Disease Epidemiology Collaboration   (CKD-EPI) equation.       Has patient had a GFR within the last 12 months? Yes   Is GFR under 30, or patient has a diagnosis of CKD4 or CKD5? No

## 2024-08-21 ENCOUNTER — ALLIED HEALTH/NURSE VISIT (OUTPATIENT)
Dept: OBGYN | Facility: CLINIC | Age: 79
End: 2024-08-21
Attending: FAMILY MEDICINE
Payer: COMMERCIAL

## 2024-08-21 DIAGNOSIS — M81.0 SENILE OSTEOPOROSIS: Primary | ICD-10-CM

## 2024-08-21 PROCEDURE — 250N000011 HC RX IP 250 OP 636: Mod: JZ

## 2024-08-21 PROCEDURE — 96372 THER/PROPH/DIAG INJ SC/IM: CPT

## 2024-08-21 RX ADMIN — DENOSUMAB 60 MG: 60 INJECTION SUBCUTANEOUS at 08:50

## 2024-08-21 NOTE — PROGRESS NOTES
Clinic Administered Medication Documentation      Prolia Documentation    Indication: Prolia  (denosumab) is a prescription medicine used to treat osteoporosis in patients who:   Are at high risk for fracture, meaning patients who have had a fracture related to osteoporosis, or who have multiple risk factors for fracture.  Cannot use another osteoporosis medicine or other osteoporosis medicines did not work well.  The timeline for early/late injections would be 4 weeks early and any time after the 6 month aga. If a patient receives their injection late, then the subsequent injection would be 6 months from the date that they actually received the injection.    When was the last injection?  24  Was the last injection at least 6 months ago? Yes  Has the prior authorization been completed?  Yes  Is there an active order (written within the past 365 days, with administrations remaining, not ) in the chart?  Yes   GFR Estimate   Date Value Ref Range Status   08/15/2024 >90 >60 mL/min/1.73m2 Final     Comment:     eGFR calculated using  CKD-EPI equation.   2020 90 >60 mL/min/[1.73_m2] Final     Comment:     Non  GFR Calc  Starting 2018, serum creatinine based estimated GFR (eGFR) will be   calculated using the Chronic Kidney Disease Epidemiology Collaboration   (CKD-EPI) equation.       Has patient had a GFR within the last 12 months? Yes   Is GFR under 30, or patient has a diagnosis of CKD4 or CKD5? No   Patient denies gastric bypass or parathyroid surgery in past 6 months? Yes - patient denies.   Patient denies dental work in the past two months involving drilling into the bone, such as implants/extractions, oral surgery or a tooth extraction that has not healed yet?  Yes  Patient denies plans for an emergency tooth extraction within the next week? Yes    The following steps were completed to comply with the REMS program for Prolia:  Reviewed information in the Medication  Guide, including the serious risks of Prolia  and the symptoms of each risk.  Advised patient to seek prompt medical attention if they have signs or symptoms of any of the serious risks.  Provided each patient a copy of the Medication Guide and Patient Guide.    Prior to injection, verified patient identity using patient's name and date of birth. Medication was administered. Please see MAR and medication order for additional information. Patient instructed to remain in clinic for 15 minutes and report any adverse reaction to staff immediately.    Vial/Syringe: Syringe  Was this medication supplied by the patient? No    Verified that the patient has refills remaining in their prescription.

## 2024-08-26 NOTE — PROGRESS NOTES
ASSESSMENT:  This is a 78-year-old postmenopausal female with history of osteoporosis by original T-scores and history of compression fractures and a pelvic fracture.  Initially she was on alendronate and most recently was on alendronate 70 mg from 20 14 to 2020.  She then had a holiday till 2022 when she started Prolia.  She received Prolia recently - 8/21/24.   She is not having any side effects.  Her next DEXA is due in January 2025.  Her next Prolia is due in February 2025 and I will see her at that time.  We did discuss calcium and vitamin D today.    PLAN:   DEXA is due in Jan 2025  Prolia due in 2/2025  See me then  - in 2/2025     Patient should take 1200mg of calcium/day in divided doses and vitamin D3 2000IU/day  Food is best source of calcium                    Prolia      Prolia is used to protect further re-absorption of bone mass in men and women with osteoporosis who are at high risk for fracture.         1 ml of a 60 mg/ml solution in a single -use prefilled syringe subcutaneously into the upper arm, upper thigh, or abdomen.      Patient should take calcium 1200 mg or dose instructed by provider      Patient should take at least 1000 units of Vitamin D daily        Before each injection      Calcium level within 30 days of each injection      Verify no serious infections currently        After each infection      Need to go to any [facility] two weeks after Prolia injections        Calcium      Magnesium      Phosphorus       Follow up in six months after first Prolia injection with your provider.     You will need to have a calcium level drawn before this visit.           Follow up annually with your provider        Adverse Reactions            Hypocalcemia      Serious infections of the skin, lower abdomin, bladder or ear          Endocarditis      Dermatitis      Severe jaw bone problems (osteonecrosis)      Muscle aches      Diarrhea      Headache      Possible atypical femur ( leg) fractures       Possible spinal fracture after discontinuation       Thank you for allowing me to participate in the care of your patient.  Please do not hesitate to call with questions or concerns.    Sincerely,    Yin Srivastava MD, PhD  CC Julia Gifford MD     Time note (e5, 40'): The total of my time (on the date of service) for this service was 40 minutes, including discussion/face-to-face, chart review, interpretation not otherwise reported, documentation, and updating of the computerized record.    The following steps were completed to comply with the REMS program for Prolia:  Reviewed the serious risks of Prolia  and the symptoms of each risk.  Advised patient to seek prompt medical attention if they have signs or symptoms of any of the serious risks.  Patient will be provided a copy of the Medication Guide and Patient Brochure prior to first injection.    Yin Srivastava MD, PhD        Esperanza is a  78 year old female  post menopausal] [GR0, P0] that presents today for osteoporosis follow up patient was last seen 2/2023. She has a history of osteoporosis by original T-scores and history of compression fractures and pelvic fracture.  She initially was on alendronate and most recently had been taking alendronate 70 mg from 2014 to 2020.  She then had a holiday till 2022 when she started Prolia.  Referring Physician: Julia Gifford MD     HPI     Have you ever had a bone density test? Yes  Where = [facility]  When = 1/2023 9/2020 2013, 2012, 2009  Spine Tscore = L1-3  -1.7  Left neck Tscore = -1.5  Total left hip Tscore = -1.7  Right neck Tscore = -1.7  Total Right hip Tscore = -1.2  Have you received any x-ray dye or contrast in the last ten days? No  How many servings of dairy products do you consume per day? 2 Type: milk every day, yogurt some days   Do you take a multi-vitamin daily? Yes  some calcium   Do you take a vitamin D supplement? Yes 2000IU   Do you take a calcium supplement daily?  Calcium citrate  2/day   Do you  take a supplement containing strontium? No  Are you exposed to natural sunlight at least 20 minutes three times a week? Yes    Social History   reports that she has quit smoking. Her smoking use included cigarettes. She has never used smokeless tobacco. She reports current alcohol use. She reports that she does not use drugs.  Do you smoke cigarettes? Reformed smoker   Do you exercise? Yes. Details: exercise 2x/wk  60 min class   Do you drink alcohol? rare    Medication History  Have you used any of the following medications?   Actonel (Risedronate): No              Aredia (Pamidronate): No              Boniva (Ibindronate): No              Didronil (Etidronate): No              Evista (Raloxifene): No               Fosamax (Alendronate): - started 35 mg/wk in 2002-was on it 10 yrs. 2966-4966 took a drug holiday and fell and fractured pelvis - then resumed alendronate  70mg/wk  [id number] then took another 1 year holiday off alendronate               Forteo (Parathyroid hormone) injections: No              HCTZ (Thiazide): No              Calcitonin nasal spray: No              Reclast or Zometa (Zolendronate): No              Prolia (Denosumab): started in 2/2022  -had last shot 8/21/24   Current Outpatient Medications   Medication Sig Dispense Refill    Ascorbic Acid (VITAMIN C) 500 MG CAPS       ASPIRIN PO Take 81 mg by mouth daily      bisacodyl (DULCOLAX) 5 MG EC tablet Two days prior to exam take two (2) tablets at 4pm. One day prior to exam take two (2) tablets at 4pm (Patient not taking: Reported on 2/14/2024) 4 tablet 0    gabapentin (NEURONTIN) 100 MG capsule Take 1 capsule (100 mg) by mouth at bedtime for 7 days, THEN 2 capsules (200 mg) at bedtime for 7 days, THEN 3 capsules (300 mg) at bedtime for 21 days. 84 capsule 0    losartan (COZAAR) 50 MG tablet Take 1 tablet (50 mg) by mouth daily 90 tablet 3    multivitamin w/minerals (THERA-VIT-M) tablet Take 1 tablet by mouth daily      omega 3 1000 MG CAPS        phenazopyridine (PYRIDIUM) 200 MG tablet Take 1 tablet (200 mg) by mouth 3 times daily as needed for irritation (Patient not taking: Reported on 2/14/2024) 30 tablet 1    polyethylene glycol (GOLYTELY) 236 g suspension Take as directed. Two days before your exam fill the first container with water. Cover and shake until mixed well. At 5:00pm drink one 8oz glass every 10-15 minutes until half (1/2) of the first container is empty. Store the remainder in the refrigerator. One day before your exam at 5:00pm drink the second half of the first container until it is gone. Before you go to bed mix the second container with water and put in refrigerator. Six hours before your check in time drink one 8oz glass every 10-15 minutes until half of container is empty. Discard the remainder of solution. (Patient not taking: Reported on 2/14/2024) 8000 mL 0    simvastatin (ZOCOR) 10 MG tablet Take 1 tablet (10 mg) by mouth At Bedtime - Oral 90 tablet 3    vitamin B-Complex Take 1 tablet by mouth daily      vitamin D3 (CHOLECALCIFEROL) 50 mcg (2000 units) tablet Take 1 tablet by mouth daily      vitamin E 400 units TABS Take 400 Units by mouth daily      zinc gluconate 50 MG tablet Take 50 mg by mouth daily          No Known Allergies    Past Medical History    Family History   Problem Relation Age of Onset    Osteoporosis Mother     Hypertension Brother     Cerebrovascular Disease Brother     Cerebrovascular Disease Brother     Heart Disease Father     C.A.D. No family hx of     Diabetes No family hx of     Skin Cancer No family hx of     Melanoma No family hx of     Dementia No family hx of     Heart Disease No family hx of     Other - See Comments Brother         cerebrovascular disease    Hypertension Brother     Coronary Artery Disease No family hx of        ROS:  General: none  Head/Eyes: none  Ears/Nose/Throat: none  Cardiovascular: none  Respiratory: none  Gastrointestinal: none  Breast: none  Genitourinary: none  Sexual  Function: none  Musculoskeletal: none  Skin: none  Neurological: numbness/tingling  neuropathy in feet   Mental Health: none  Endocrine: none    Clinic Measurements  Vitals: /84 (BP Location: Right arm, Patient Position: Sitting, Cuff Size: Adult Large)   Pulse 78   SpO2 98%   BMI= There is no height or weight on file to calculate BMI.    Physical exam  Constitutional: Well appearing woman in no acute distress.   Psychological: appropriate mood.  Neck: No thyroidmegaly.  no carotid bruits.  Cardiovascular: regular rate and rhythm, normal S1 and S2, no murmurs, rubs or gallops, peripheral pulses full and symmetric   Respiratory: clear to auscultation, no wheezes or crackles, normal breath sounds.  Musculoskeletal: good range of motion, no edema, and motor strength is equal in the upper and lower extremities    Spine: Straight, not tender, Flexion good, Extension good, Lateral movement good, Rotational movement good  Skin: no concerning lesions, no jaundice.  Neurological: cranial nerves intact, normal strength, reflexes at patella and biceps normal, normal gait, no tremor.     LAB  Vertebra; Fracture Assessment: Vertebra; Fracture Assessment: 2013           grade 1 (mild) compression fracture at T10           grade 2 (moderate) biconcave fracture at T11           grade 1 (mild) biconcave fracture at T12.  Dexa Scan: 1/2023     FRAX Assessment Tool: [N/A,   Risk Factors:  +FHx - PM, age,  T scores  Compression fractures   Reformed smoker     Yin Srivastava MD, PhD      Answers submitted by the patient for this visit:  General Questionnaire (Submitted on 8/28/2024)  Chief Complaint: Chronic problems general questions HPI Form  What is the reason for your visit today? : follow up after rolia shot  How many servings of fruits and vegetables do you eat daily?: 4 or more  On average, how many sweetened beverages do you drink each day (Examples: soda, juice, sweet tea, etc.  Do NOT count diet or artificially  sweetened beverages)?: 0  How many minutes a day do you exercise enough to make your heart beat faster?: 30 to 60  How many days a week do you exercise enough to make your heart beat faster?: 3 or less  How many days per week do you miss taking your medication?: 0

## 2024-08-28 ENCOUNTER — APPOINTMENT (OUTPATIENT)
Dept: LAB | Facility: CLINIC | Age: 79
End: 2024-08-28
Payer: COMMERCIAL

## 2024-08-28 ENCOUNTER — OFFICE VISIT (OUTPATIENT)
Dept: FAMILY MEDICINE | Facility: CLINIC | Age: 79
End: 2024-08-28
Payer: COMMERCIAL

## 2024-08-28 VITALS — HEART RATE: 78 BPM | DIASTOLIC BLOOD PRESSURE: 84 MMHG | OXYGEN SATURATION: 98 % | SYSTOLIC BLOOD PRESSURE: 134 MMHG

## 2024-08-28 DIAGNOSIS — M81.0 SENILE OSTEOPOROSIS: Primary | ICD-10-CM

## 2024-08-28 LAB
CALCIUM SERPL-MCNC: 9.8 MG/DL (ref 8.8–10.4)
MAGNESIUM SERPL-MCNC: 2.1 MG/DL (ref 1.7–2.3)
PHOSPHATE SERPL-MCNC: 4.3 MG/DL (ref 2.5–4.5)

## 2024-08-28 PROCEDURE — 36415 COLL VENOUS BLD VENIPUNCTURE: CPT | Performed by: PATHOLOGY

## 2024-08-28 PROCEDURE — 99215 OFFICE O/P EST HI 40 MIN: CPT | Performed by: FAMILY MEDICINE

## 2024-08-28 PROCEDURE — 84100 ASSAY OF PHOSPHORUS: CPT | Performed by: PATHOLOGY

## 2024-08-28 PROCEDURE — 82310 ASSAY OF CALCIUM: CPT | Performed by: PATHOLOGY

## 2024-08-28 PROCEDURE — 83735 ASSAY OF MAGNESIUM: CPT | Performed by: PATHOLOGY

## 2024-08-28 NOTE — PATIENT INSTRUCTIONS
DEXA is due in Jan 2025  Prolia due in 2/2025  See me then  - in 2/2025     Patient should take 1200mg of calcium/day in divided doses and vitamin D3 2000IU/day  Food is best source of calcium                    Prolia      Prolia is used to protect further re-absorption of bone mass in men and women with osteoporosis who are at high risk for fracture.         1 ml of a 60 mg/ml solution in a single -use prefilled syringe subcutaneously into the upper arm, upper thigh, or abdomen.      Patient should take calcium 1200 mg or dose instructed by provider      Patient should take at least 1000 units of Vitamin D daily        Before each injection      Calcium level within 30 days of each injection      Verify no serious infections currently        After each infection      Need to go to any [facility] two weeks after Prolia injections        Calcium      Magnesium      Phosphorus       Follow up in six months after first Prolia injection with your provider.     You will need to have a calcium level drawn before this visit.           Follow up annually with your provider        Adverse Reactions            Hypocalcemia      Serious infections of the skin, lower abdomin, bladder or ear          Endocarditis      Dermatitis      Severe jaw bone problems (osteonecrosis)      Muscle aches      Diarrhea      Headache      Possible atypical femur ( leg) fractures      Possible spinal fracture after discontinuation

## 2024-08-28 NOTE — PROGRESS NOTES
Lotus Mata is a 78 year old, presenting for the following health issues:  Follow Up (Osteoporosis follow up)      8/28/2024     1:32 PM   Additional Questions   Roomed by          8/28/2024   Declines Weight   Did patient decline having their weight taken? Yes        History of Present Illness       Reason for visit:  Follow up after rolia shot    She eats 4 or more servings of fruits and vegetables daily.She consumes 0 sweetened beverage(s) daily.She exercises with enough effort to increase her heart rate 30 to 60 minutes per day.  She exercises with enough effort to increase her heart rate 3 or less days per week.   She is taking medications regularly.                     Objective    /84 (BP Location: Right arm, Patient Position: Sitting, Cuff Size: Adult Large)   Pulse 78   SpO2 98%   There is no height or weight on file to calculate BMI.  Physical Exam               Signed Electronically by: Yin Srivastava MD PhD

## 2024-10-02 DIAGNOSIS — Z98.890 STATUS POST KNEE SURGERY: Primary | ICD-10-CM

## 2024-10-23 NOTE — TELEPHONE ENCOUNTER
DIAGNOSIS: 5 year follow-up from L knee replacement    APPOINTMENT DATE: 10/31/2024   NOTES STATUS DETAILS   OFFICE NOTE from referring provider     MEDICATION LIST Internal    LABS     XRAYS (IMAGES & REPORTS) Internal Xray Knee Left 10/31/2024     Xray Knee 5/31/2022    More in PACS

## 2024-10-24 DIAGNOSIS — I10 ESSENTIAL HYPERTENSION: ICD-10-CM

## 2024-10-24 DIAGNOSIS — E78.00 PURE HYPERCHOLESTEROLEMIA: ICD-10-CM

## 2024-10-26 ENCOUNTER — HEALTH MAINTENANCE LETTER (OUTPATIENT)
Age: 79
End: 2024-10-26

## 2024-10-28 ENCOUNTER — OFFICE VISIT (OUTPATIENT)
Dept: INTERNAL MEDICINE | Facility: CLINIC | Age: 79
End: 2024-10-28
Attending: INTERNAL MEDICINE
Payer: COMMERCIAL

## 2024-10-28 ENCOUNTER — LAB (OUTPATIENT)
Dept: LAB | Facility: CLINIC | Age: 79
End: 2024-10-28
Attending: INTERNAL MEDICINE
Payer: COMMERCIAL

## 2024-10-28 VITALS
BODY MASS INDEX: 32.32 KG/M2 | DIASTOLIC BLOOD PRESSURE: 82 MMHG | HEIGHT: 65 IN | WEIGHT: 194 LBS | SYSTOLIC BLOOD PRESSURE: 131 MMHG | HEART RATE: 75 BPM

## 2024-10-28 DIAGNOSIS — Z00.00 MEDICARE ANNUAL WELLNESS VISIT, SUBSEQUENT: ICD-10-CM

## 2024-10-28 DIAGNOSIS — Z12.31 ENCOUNTER FOR SCREENING MAMMOGRAM FOR BREAST CANCER: ICD-10-CM

## 2024-10-28 DIAGNOSIS — Z00.00 MEDICARE ANNUAL WELLNESS VISIT, SUBSEQUENT: Primary | ICD-10-CM

## 2024-10-28 DIAGNOSIS — I10 ESSENTIAL HYPERTENSION: ICD-10-CM

## 2024-10-28 DIAGNOSIS — E78.00 PURE HYPERCHOLESTEROLEMIA: ICD-10-CM

## 2024-10-28 LAB
ANION GAP SERPL CALCULATED.3IONS-SCNC: 10 MMOL/L (ref 7–15)
BUN SERPL-MCNC: 13.1 MG/DL (ref 8–23)
CALCIUM SERPL-MCNC: 8.9 MG/DL (ref 8.8–10.4)
CHLORIDE SERPL-SCNC: 102 MMOL/L (ref 98–107)
CHOLEST SERPL-MCNC: 167 MG/DL
CREAT SERPL-MCNC: 0.59 MG/DL (ref 0.51–0.95)
EGFRCR SERPLBLD CKD-EPI 2021: >90 ML/MIN/1.73M2
FASTING STATUS PATIENT QL REPORTED: YES
FASTING STATUS PATIENT QL REPORTED: YES
GLUCOSE SERPL-MCNC: 121 MG/DL (ref 70–99)
HCO3 SERPL-SCNC: 24 MMOL/L (ref 22–29)
HDLC SERPL-MCNC: 82 MG/DL
LDLC SERPL CALC-MCNC: 74 MG/DL
NONHDLC SERPL-MCNC: 85 MG/DL
POTASSIUM SERPL-SCNC: 3.8 MMOL/L (ref 3.4–5.3)
SODIUM SERPL-SCNC: 136 MMOL/L (ref 135–145)
TRIGL SERPL-MCNC: 57 MG/DL

## 2024-10-28 PROCEDURE — 80048 BASIC METABOLIC PNL TOTAL CA: CPT

## 2024-10-28 PROCEDURE — G0463 HOSPITAL OUTPT CLINIC VISIT: HCPCS | Performed by: INTERNAL MEDICINE

## 2024-10-28 PROCEDURE — G0439 PPPS, SUBSEQ VISIT: HCPCS | Performed by: INTERNAL MEDICINE

## 2024-10-28 PROCEDURE — 36415 COLL VENOUS BLD VENIPUNCTURE: CPT

## 2024-10-28 PROCEDURE — 84207 ASSAY OF VITAMIN B-6: CPT

## 2024-10-28 PROCEDURE — 82465 ASSAY BLD/SERUM CHOLESTEROL: CPT

## 2024-10-28 RX ORDER — SIMVASTATIN 10 MG
TABLET ORAL
Qty: 90 TABLET | Refills: 3 | Status: SHIPPED | OUTPATIENT
Start: 2024-10-28

## 2024-10-28 RX ORDER — LOSARTAN POTASSIUM 50 MG/1
50 TABLET ORAL DAILY
Qty: 90 TABLET | Refills: 3 | Status: SHIPPED | OUTPATIENT
Start: 2024-10-28

## 2024-10-28 SDOH — HEALTH STABILITY: PHYSICAL HEALTH: ON AVERAGE, HOW MANY DAYS PER WEEK DO YOU ENGAGE IN MODERATE TO STRENUOUS EXERCISE (LIKE A BRISK WALK)?: 2 DAYS

## 2024-10-28 ASSESSMENT — ANXIETY QUESTIONNAIRES
GAD7 TOTAL SCORE: 0
7. FEELING AFRAID AS IF SOMETHING AWFUL MIGHT HAPPEN: NOT AT ALL
GAD7 TOTAL SCORE: 0
4. TROUBLE RELAXING: NOT AT ALL
2. NOT BEING ABLE TO STOP OR CONTROL WORRYING: NOT AT ALL
7. FEELING AFRAID AS IF SOMETHING AWFUL MIGHT HAPPEN: NOT AT ALL
5. BEING SO RESTLESS THAT IT IS HARD TO SIT STILL: NOT AT ALL
1. FEELING NERVOUS, ANXIOUS, OR ON EDGE: NOT AT ALL
6. BECOMING EASILY ANNOYED OR IRRITABLE: NOT AT ALL
3. WORRYING TOO MUCH ABOUT DIFFERENT THINGS: NOT AT ALL
GAD7 TOTAL SCORE: 0

## 2024-10-28 ASSESSMENT — SOCIAL DETERMINANTS OF HEALTH (SDOH): HOW OFTEN DO YOU GET TOGETHER WITH FRIENDS OR RELATIVES?: TWICE A WEEK

## 2024-10-28 NOTE — PROGRESS NOTES
"Preventive Care Visit  [facility]  Julia Gifford MD, Internal Medicine  Oct 28, 2024      Assessment & Plan     Medicare annual wellness visit, subsequent  Patient is due for lipid check as well as screening for diabetes.  Discussed vaccines.  Patient is up-to-date.  Age-appropriate cancer screening has been completed.  - Lipid panel reflex to direct LDL Fasting; Future  - Basic metabolic panel  (Ca, Cl, CO2, Creat, Gluc, K, Na, BUN); Future  - Vitamin B6; Future    Essential hypertension  Blood pressure is within acceptable range.  Recommend to continue with current medical therapy without changes.  - losartan (COZAAR) 50 MG tablet; Take 1 tablet (50 mg) by mouth daily.    Pure hypercholesterolemia  Patient is unable to tolerate statin therapy without any significant issues.  Recommend to continue with current medication dose.  - simvastatin (ZOCOR) 10 MG tablet; Take 1 tablet (10 mg) by mouth At Bedtime - Oral    Encounter for screening mammogram for breast cancer  Breast cancer screening discussed.  Patient would like to continue with ongoing screening.  Mammogram was ordered today.  - MA Screen Bilateral w/Kenny; Future        BMI  Estimated body mass index is 32.28 kg/m  as calculated from the following:    Height as of this encounter: 1.651 m (5' 5\").    Weight as of this encounter: 88 kg (194 lb).       Counseling  Appropriate preventive services were addressed with this patient via screening, questionnaire, or discussion as appropriate for fall prevention, nutrition, physical activity, Tobacco-use cessation, social engagement, weight loss and cognition.  Checklist reviewing preventive services available has been given to the patient.  Reviewed patient's diet, addressing concerns and/or questions.   She is at risk for lack of exercise and has been provided with information to increase physical activity for the benefit of her well-being.           No " follow-ups on file.    Subjective   Esperanza is a 78 year old, presenting for the following:  Physical (Annual exam)        10/28/2024     7:54 AM   Additional Questions   Roomed by [email or web address]           HPI    Patient is here for routine preventive visit. She is wondering if she should continue with mammograms. She is still dealing with right leg weakness and not able to do squats or get up from a chair without using her arms for support.     Health Care Directive  Patient has a Health Care Directive on file  Advance care planning document is on file and is current.      10/28/2024   General Health   How would you rate your overall physical health? Good   Feel stress (tense, anxious, or unable to sleep) Not at all            10/28/2024   Nutrition   Diet: Regular (no restrictions)            10/28/2024   Exercise   Days per week of moderate/strenous exercise 2 days      (!) EXERCISE CONCERN      10/28/2024   Social Factors   Frequency of gathering with friends or relatives Twice a week   Worry food won't last until get money to buy more No   Food not last or not have enough money for food? No   Do you have housing? (Housing is defined as stable permanent housing and does not include staying ouside in a car, in a tent, in an abandoned building, in an overnight shelter, or couch-surfing.) Yes   Are you worried about losing your housing? No   Lack of transportation? No   Unable to get utilities (heat,electricity)? No            10/28/2024   Fall Risk   Fallen 2 or more times in the past year? Yes    Trouble with walking or balance? No        Patient-reported           10/28/2024   Activities of Daily Living- Home Safety   Needs help with the following daily activites None of the above   Safety concerns in the home None of the above            10/28/2024   Dental   Dentist two times every year? Yes            10/28/2024   Hearing Screening   Hearing concerns? None of the above            10/28/2024   Driving Risk Screening    Patient/family members have concerns about driving No            10/28/2024   General Alertness/Fatigue Screening   Have you been more tired than usual lately? No            10/28/2024   Urinary Incontinence Screening   Bothered by leaking urine in past 6 months No            10/28/2024   TB Screening   Were you born outside of the [place]? No              Today's PHQ-2 Score:       2/14/2024    12:58 PM   PHQ-2 ( 1999 Pf[employer])   Q1: Little interest or pleasure in doing things 0   Q2: Feeling down, depressed or hopeless 0   PHQ-2 Score 0         10/28/2024   Substance Use   Alcohol more than 3/day or more than 7/wk Not Applicable   Do you have a current opioid prescription? No   How severe/bad is pain from 1 to 10? 0/10 (No Pain)   Do you use any other substances recreationally? No        Social History     Tobacco Use    Smoking status: Former     Current packs/day: 0.00     Types: Cigarettes    Smokeless tobacco: Never   Vaping Use    Vaping status: Never Used   Substance Use Topics    Alcohol use: Yes     Comment: Rare    Drug use: No           2/14/2024   LAST FHS-7 RESULTS   1st degree relative breast or ovarian cancer No   Any relative bilateral breast cancer No   Any male have breast cancer No   Any ONE woman have BOTH breast AND ovarian cancer No   Any woman with breast cancer before 50yrs No   2 or more relatives with breast AND/OR ovarian cancer No   2 or more relatives with breast AND/OR bowel cancer No           Mammogram Screening - After age 74- determine frequency with patient based on health status, life expectancy and patient goals    ASCVD Risk   The 10-year ASCVD risk score (Rosio DEWITT, et al., 2019) is: 27.6%    Values used to calculate the score:      Age: 78 years      Sex: Female      Is Non- : No      Diabetic: No      Tobacco smoker: No      Systolic Blood Pressure: 131 mmHg      Is BP treated: Yes      HDL Cholesterol: 80 mg/dL      Total Cholesterol: 184  mg/dL            Reviewed and updated as needed this visit by Provider                    Family History   Problem Relation Age of Onset    Osteoporosis Mother     Hypertension Brother     Cerebrovascular Disease Brother     Cerebrovascular Disease Brother     Heart Disease Father     C.A.D. No family hx of     Diabetes No family hx of     Skin Cancer No family hx of     Melanoma No family hx of     Dementia No family hx of     Heart Disease No family hx of     Other - See Comments Brother         cerebrovascular disease    Hypertension Brother     Coronary Artery Disease No family hx of        Past Medical History:   Diagnosis Date    Hearing problem 2017    Hyperlipidemia 2005    Hyperlipidemia LDL goal < 130     Hypertension     Osteoporosis, post-menopausal     Pelvic fracture (H)      Past Surgical History:   Procedure Laterality Date    ARTHROPLASTY KNEE Left 8/28/2018    Procedure: ARTHROPLASTY KNEE;  Left Total Knee Replacement;  Surgeon: Pierre Campos MD;  Location: [facility] OR    COLONOSCOPY N/A 12/19/2023    Procedure: COLONOSCOPY, WITH POLYPECTOMY;  Surgeon: Karen Alfaro MD;  Location: [facility] OR    KNEE SURGERY      No prior surgeries      ORTHOPEDIC SURGERY      TOTAL KNEE ARTHROPLASTY Left 08/28/2018     Current providers sharing in care for this patient include:  Patient Care Team:  Julia Gifford MD as PCP - General (Internal Medicine)  Julia Gifford MD as MD (Internal Medicine)  Pierre Campos MD as MD (Orthopedics)  Julia Gifford MD as Referring Physician (Internal Medicine)  Gurpreet Strange MD as MD (Dermatology)  Yin Srivastava MD PhD as MD (Family Practice)  Frederic Srivastava MD as Resident (Internal Medicine)  Alexx Deshpande APRN CNM as Midwife (OB/Gyn)  Mirela Ta MD as MD (Dermatology)  Braxton Regalado OD as MD (Optometry)  Kapil Kamara DO as Physician (Neurology)  Vadim Bose MD as Assigned Musculoskeletal  "Provider  Mirela Ta MD as Assigned Surgical Provider  Kapil Kamara DO as Assigned Neuroscience Provider  Julia Gifford MD as Assigned PCP    The following health maintenance items are reviewed in Epic and correct as of today:  Health Maintenance   Topic Date Due    ANNUAL REVIEW OF HM ORDERS  Never done    MEDICARE ANNUAL WELLNESS VISIT  09/11/2024    LIPID  09/11/2024    BMP  02/14/2025    FALL RISK ASSESSMENT  10/28/2025    GLUCOSE  02/14/2027    ADVANCE CARE PLANNING  09/11/2028    DTAP/TDAP/TD IMMUNIZATION (3 - Td or Tdap) 03/09/2030    DEXA  01/26/2038    HEPATITIS C SCREENING  Completed    PHQ-2 (once per calendar year)  Completed    INFLUENZA VACCINE  Completed    Pneumococcal Vaccine: 65+ Years  Completed    ZOSTER IMMUNIZATION  Completed    RSV VACCINE  Completed    COVID-19 Vaccine  Completed    HPV IMMUNIZATION  Aged Out    MENINGITIS IMMUNIZATION  Aged Out    RSV MONOCLONAL ANTIBODY  Aged Out    MAMMO SCREENING  Discontinued    COLORECTAL CANCER SCREENING  Discontinued    LUNG CANCER SCREENING  Discontinued            Objective    Exam  /82   Pulse 75   Ht 1.651 m (5' 5\")   Wt 88 kg (194 lb)   BMI 32.28 kg/m     Estimated body mass index is 32.28 kg/m  as calculated from the following:    Height as of this encounter: 1.651 m (5' 5\").    Weight as of this encounter: 88 kg (194 lb).    Physical Exam  GENERAL: alert and no distress  EYES: Eyes grossly normal to inspection, PERRL and conjunctivae and sclerae normal  NECK: no adenopathy, no asymmetry, masses, or scars  RESP: lungs clear to auscultation - no rales, rhonchi or wheezes  CV: regular rate and rhythm, normal S1 S2, no S3 or S4, no murmur, click or rub, no peripheral edema  ABDOMEN: soft, nontender and bowel sounds normal  SKIN: no suspicious lesions or rashes  NEURO: Normal strength and tone, mentation intact and speech normal  PSYCH: mentation appears normal, affect normal/bright        10/28/2024 "   Mini Cog   Clock Draw Score 2 Normal   3 Item Recall 3 objects recalled   Mini Cog Total Score 5                 Signed Electronically by: Julia Gifford MD

## 2024-10-28 NOTE — PROGRESS NOTES
Chief Complaint   Patient presents with    Physical     Annual exam    Maria A Mcginnis LPN   Answers submitted by the patient for this visit:  Patient Health Questionnaire (G7) (Submitted on 10/28/2024)  DAVID 7 TOTAL SCORE: 0

## 2024-10-28 NOTE — LETTER
"10/28/2024       RE: Esperanza Gage  [address] [place] [address]     Dear Colleague,    Thank you for referring your patient, Esperanza Gage, to the [facility] [place] at [facility]. Please see a copy of my visit note below.    Chief Complaint   Patient presents with     Physical     Annual exam    Maria A Mcginnis LPN   Answers submitted by the patient for this visit:  Patient Health Questionnaire (G7) (Submitted on 10/28/2024)  DAVID 7 TOTAL SCORE: 0      Preventive Care Visit  [facility]  Julia Gifford MD, Internal Medicine  Oct 28, 2024      Assessment & Plan    Medicare annual wellness visit, subsequent  Patient is due for lipid check as well as screening for diabetes.  Discussed vaccines.  Patient is up-to-date.  Age-appropriate cancer screening has been completed.  - Lipid panel reflex to direct LDL Fasting; Future  - Basic metabolic panel  (Ca, Cl, CO2, Creat, Gluc, K, Na, BUN); Future  - Vitamin B6; Future    Essential hypertension  Blood pressure is within acceptable range.  Recommend to continue with current medical therapy without changes.  - losartan (COZAAR) 50 MG tablet; Take 1 tablet (50 mg) by mouth daily.    Pure hypercholesterolemia  Patient is unable to tolerate statin therapy without any significant issues.  Recommend to continue with current medication dose.  - simvastatin (ZOCOR) 10 MG tablet; Take 1 tablet (10 mg) by mouth At Bedtime - Oral    Encounter for screening mammogram for breast cancer  Breast cancer screening discussed.  Patient would like to continue with ongoing screening.  Mammogram was ordered today.  - MA Screen Bilateral w/Kenny; Future        BMI  Estimated body mass index is 32.28 kg/m  as calculated from the following:    Height as of this encounter: 1.651 m (5' 5\").    Weight as of this encounter: 88 kg (194 lb). "       Counseling  Appropriate preventive services were addressed with this patient via screening, questionnaire, or discussion as appropriate for fall prevention, nutrition, physical activity, Tobacco-use cessation, social engagement, weight loss and cognition.  Checklist reviewing preventive services available has been given to the patient.  Reviewed patient's diet, addressing concerns and/or questions.   She is at risk for lack of exercise and has been provided with information to increase physical activity for the benefit of her well-being.           No follow-ups on file.    Lotus Mata is a 78 year old, presenting for the following:  Physical (Annual exam)        10/28/2024     7:54 AM   Additional Questions   [facility]           HPI    Patient is here for routine preventive visit. She is wondering if she should continue with mammograms. She is still dealing with right leg weakness and not able to do squats or get up from a chair without using her arms for support.     Health Care Directive  Patient has a Health Care Directive on file  Advance care planning document is on file and is current.      10/28/2024   General Health   How would you rate your overall physical health? Good   Feel stress (tense, anxious, or unable to sleep) Not at all            10/28/2024   Nutrition   Diet: Regular (no restrictions)            10/28/2024   Exercise   Days per week of moderate/strenous exercise 2 days      (!) EXERCISE CONCERN      10/28/2024   Social Factors   Frequency of gathering with friends or relatives Twice a week   Worry food won't last until get money to buy more No   Food not last or not have enough money for food? No   Do you have housing? (Housing is defined as stable permanent housing and does not include staying ouside in a car, in a tent, in an abandoned building, in an overnight shelter, or couch-surfing.) Yes   Are you worried about losing your housing? No   Lack of transportation? No    Unable to get utilities (heat,electricity)? No            10/28/2024   Fall Risk   Fallen 2 or more times in the past year? Yes    Trouble with walking or balance? No        Patient-reported           10/28/2024   Activities of Daily Living- Home Safety   Needs help with the following daily activites None of the above   Safety concerns in the home None of the above            10/28/2024   Dental   Dentist two times every year? Yes            10/28/2024   Hearing Screening   Hearing concerns? None of the above            10/28/2024   Driving Risk Screening   Patient/family members have concerns about driving No            10/28/2024   General Alertness/Fatigue Screening   Have you been more tired than usual lately? No            10/28/2024   Urinary Incontinence Screening   Bothered by leaking urine in past 6 months No            10/28/2024   TB Screening   Were you born outside of the [place]? No              Today's PHQ-2 Score:       2/14/2024    12:58 PM   PHQ-2 ( 1999 Pf[employer])   Q1: Little interest or pleasure in doing things 0   Q2: Feeling down, depressed or hopeless 0   PHQ-2 Score 0         10/28/2024   Substance Use   Alcohol more than 3/day or more than 7/wk Not Applicable   Do you have a current opioid prescription? No   How severe/bad is pain from 1 to 10? 0/10 (No Pain)   Do you use any other substances recreationally? No        Social History     Tobacco Use     Smoking status: Former     Current packs/day: 0.00     Types: Cigarettes     Smokeless tobacco: Never   Vaping Use     Vaping status: Never Used   Substance Use Topics     Alcohol use: Yes     Comment: Rare     Drug use: No           2/14/2024   LAST FHS-7 RESULTS   1st degree relative breast or ovarian cancer No   Any relative bilateral breast cancer No   Any male have breast cancer No   Any ONE woman have BOTH breast AND ovarian cancer No   Any woman with breast cancer before 50yrs No   2 or more relatives with breast AND/OR ovarian cancer No   2  or more relatives with breast AND/OR bowel cancer No           Mammogram Screening - After age 74- determine frequency with patient based on health status, life expectancy and patient goals    ASCVD Risk   The 10-year ASCVD risk score (Rosio DEWITT, et al., 2019) is: 27.6%    Values used to calculate the score:      Age: 78 years      Sex: Female      Is Non- : No      Diabetic: No      Tobacco smoker: No      Systolic Blood Pressure: 131 mmHg      Is BP treated: Yes      HDL Cholesterol: 80 mg/dL      Total Cholesterol: 184 mg/dL            Reviewed and updated as needed this visit by Provider                    Family History   Problem Relation Age of Onset     Osteoporosis Mother      Hypertension Brother      Cerebrovascular Disease Brother      Cerebrovascular Disease Brother      Heart Disease Father      C.A.D. No family hx of      Diabetes No family hx of      Skin Cancer No family hx of      Melanoma No family hx of      Dementia No family hx of      Heart Disease No family hx of      Other - See Comments Brother         cerebrovascular disease     Hypertension Brother      Coronary Artery Disease No family hx of        Past Medical History:   Diagnosis Date     Hearing problem 2017     Hyperlipidemia 2005     Hyperlipidemia LDL goal < 130      Hypertension      Osteoporosis, post-menopausal      Pelvic fracture (H)      Past Surgical History:   Procedure Laterality Date     ARTHROPLASTY KNEE Left 8/28/2018    Procedure: ARTHROPLASTY KNEE;  Left Total Knee Replacement;  Surgeon: Pierre Campos MD;  Location: [facility] OR     COLONOSCOPY N/A 12/19/2023    Procedure: COLONOSCOPY, WITH POLYPECTOMY;  Surgeon: Karen Alfaro MD;  Location: [facility] OR     KNEE SURGERY       No prior surgeries       ORTHOPEDIC SURGERY       TOTAL KNEE ARTHROPLASTY Left 08/28/2018     Current providers sharing in care for this patient include:  Patient Care Team:  Julia Gifford MD as PCP - General  "(Internal Medicine)  Julia Gifford MD as MD (Internal Medicine)  Peirre Campos MD as MD (Orthopedics)  Julia Gifford MD as Referring Physician (Internal Medicine)  Gurpreet Strange MD as MD (Dermatology)  Yin Srivastava MD PhD as MD (Family Practice)  Frederic Srivastava MD as Resident (Internal Medicine)  Alexx Deshpande APRN CNM as Midwife (OB/Gyn)  Mirela Ta MD as MD (Dermatology)  Braxton Regalado OD as MD (Optometry)  Kapil Kamara DO as Physician (Neurology)  Vadim Bose MD as Assigned Musculoskeletal Provider  Mirela Ta MD as Assigned Surgical Provider  Kapil Kamara DO as Assigned Neuroscience Provider  Julia Gifford MD as Assigned PCP    The following health maintenance items are reviewed in Epic and correct as of today:  Health Maintenance   Topic Date Due     ANNUAL REVIEW OF HM ORDERS  Never done     MEDICARE ANNUAL WELLNESS VISIT  09/11/2024     LIPID  09/11/2024     BMP  02/14/2025     FALL RISK ASSESSMENT  10/28/2025     GLUCOSE  02/14/2027     ADVANCE CARE PLANNING  09/11/2028     DTAP/TDAP/TD IMMUNIZATION (3 - Td or Tdap) 03/09/2030     DEXA  01/26/2038     HEPATITIS C SCREENING  Completed     PHQ-2 (once per calendar year)  Completed     INFLUENZA VACCINE  Completed     Pneumococcal Vaccine: 65+ Years  Completed     ZOSTER IMMUNIZATION  Completed     RSV VACCINE  Completed     COVID-19 Vaccine  Completed     HPV IMMUNIZATION  Aged Out     MENINGITIS IMMUNIZATION  Aged Out     RSV MONOCLONAL ANTIBODY  Aged Out     MAMMO SCREENING  Discontinued     COLORECTAL CANCER SCREENING  Discontinued     LUNG CANCER SCREENING  Discontinued            Objective   Exam  /82   Pulse 75   Ht 1.651 m (5' 5\")   Wt 88 kg (194 lb)   BMI 32.28 kg/m     Estimated body mass index is 32.28 kg/m  as calculated from the following:    Height as of this encounter: 1.651 m (5' 5\").    Weight as of this " encounter: 88 kg (194 lb).    Physical Exam  GENERAL: alert and no distress  EYES: Eyes grossly normal to inspection, PERRL and conjunctivae and sclerae normal  NECK: no adenopathy, no asymmetry, masses, or scars  RESP: lungs clear to auscultation - no rales, rhonchi or wheezes  CV: regular rate and rhythm, normal S1 S2, no S3 or S4, no murmur, click or rub, no peripheral edema  ABDOMEN: soft, nontender and bowel sounds normal  SKIN: no suspicious lesions or rashes  NEURO: Normal strength and tone, mentation intact and speech normal  PSYCH: mentation appears normal, affect normal/bright        10/28/2024   Mini Cog   Clock Draw Score 2 Normal   3 Item Recall 3 objects recalled   Mini Cog Total Score 5                 Signed Electronically by: Julia Gifford MD        Again, thank you for allowing me to participate in the care of your patient.      Sincerely,    Julia Gifford MD

## 2024-10-28 NOTE — PATIENT INSTRUCTIONS
Thank you for trusting us with your care!     If you need to contact us for questions about:    Symptoms, Scheduling & Medical Questions: Non-urgent (2-3 day response) Luis message, Urgent (needing response today) [phone] (if after 3:30pm next day response)   Prescriptions: Please call your Pharmacy   Billing: [place] [phone] or [facility]:[phone]

## 2024-10-29 ENCOUNTER — OFFICE VISIT (OUTPATIENT)
Dept: DERMATOLOGY | Facility: CLINIC | Age: 79
End: 2024-10-29
Attending: DERMATOLOGY
Payer: COMMERCIAL

## 2024-10-29 DIAGNOSIS — L82.1 SEBORRHEIC KERATOSES: ICD-10-CM

## 2024-10-29 DIAGNOSIS — R20.2 NOTALGIA PARESTHETICA: ICD-10-CM

## 2024-10-29 DIAGNOSIS — D18.01 CHERRY ANGIOMA: Primary | ICD-10-CM

## 2024-10-29 PROCEDURE — 99213 OFFICE O/P EST LOW 20 MIN: CPT | Performed by: DERMATOLOGY

## 2024-10-29 RX ORDER — SIMVASTATIN 10 MG
TABLET ORAL
Qty: 90 TABLET | Refills: 0 | OUTPATIENT
Start: 2024-10-29

## 2024-10-29 RX ORDER — LOSARTAN POTASSIUM 50 MG/1
50 TABLET ORAL DAILY
Qty: 90 TABLET | Refills: 0 | OUTPATIENT
Start: 2024-10-29

## 2024-10-29 ASSESSMENT — KOOS JR
KOOS JR SCORING: 73.34
RISING FROM SITTING: MODERATE
BENDING TO THE FLOOR TO PICK UP OBJECT: MODERATE
HOW SEVERE IS YOUR KNEE STIFFNESS AFTER FIRST WAKING IN MORNING: MILD

## 2024-10-29 ASSESSMENT — PAIN SCALES - GENERAL: PAINLEVEL_OUTOF10: NO PAIN (0)

## 2024-10-29 NOTE — LETTER
10/29/2024       RE: Esperanza Gage  [address]  [place] [address]     Dear Colleague,    Thank you for referring your patient, Esperanza Gage, to the [facility] [place] [facility]. Please see a copy of my visit note below.    [facility] Dermatology Note  Encounter Date: Oct 29, 2024  Office Visit     Dermatology Problem List:  1. Seborrheic keratoses on trunk and back.  2. Epidermal Inclusion Cysts on back.  ____________________________________________    Assessment & Plan:    # Notalgia paresthetica    - Educated on benign condition and hand-out on exercises to help alleviate.   - Apply Sarna to affected area on back daily as needed.     # Epidermal Inclusion Cysts on upper and lower left back.  - Discussed lesions are benign.  - Monitor: Patient to continue monitoring at home and will contact the clinic for any changes.    # Benign skin findings, Seborrheic keratoses on trunk, back; cherry angiomas on trunk.   - Monitor: Patient to continue monitoring at home and will contact the clinic for any changes.  - Sun protection: Counseled SPF30+ sunscreen, UPF clothing, sun avoidance, tanning bed avoidance.  - Skin counseling including: ABCDEs: Counseled ABCDEs of melanoma: Asymmetry, Border (irregularity), Color (not uniform, changes in color), Diameter (greater than 6 mm which is about the size of a pencil eraser), and Evolving (any changes in preexisting moles).Sun protection: Counseled SPF30+ sunscreen, UPF clothing, sun avoidance, tanning bed avoidance.    Procedures Performed:   None      Follow-up: 1 year(s) in-person, or earlier for new or changing lesions    Staff and Medical Student: Student Doctor Belen Elias.  I was present with the medical student who participated in the service and in the documentation of the note. I have verified the history and personally performed the physical exam and medical  decision making. I agree with the assessment and plan of care as documented in the note.  Mirela Ta MD   ____________________________________________    CC: Skin Check (FBSE- Has had a shoulder problem. There is an itchiness on her left shoulder and a constant tingling. )    HPI:  Ms. Esperanza Gage is a(n) 78 year old female who presents today as a return patient for 1 year FBSE. Pt states she has shoulder issues and past 2-3 months feeling itching and constant tingling around her left scapula. Pt wears UPF long sleeves, long pants, and sunscreen when outside for a long duration.    Patient is otherwise feeling well, without additional skin concerns.    Labs:  None reviewed.    Physical Exam:  Vitals: There were no vitals taken for this visit.  SKIN: Total skin excluding the undergarment areas was performed. The exam included the head/face, neck, both arms, chest, back, abdomen, both legs, digits and/or nails.   - Red papules on trunk.  - Tan hyperkeratotic waxy papules on trunk and back.  - Edematous bilateral legs with spider veins on the lower extremities.  - No other lesions of concern on areas examined.     Medications:  Current Outpatient Medications   Medication Sig Dispense Refill     Ascorbic Acid (VITAMIN C) 500 MG CAPS        ASPIRIN PO Take 81 mg by mouth daily       losartan (COZAAR) 50 MG tablet Take 1 tablet (50 mg) by mouth daily. 90 tablet 3     multivitamin w/minerals (THERA-VIT-M) tablet Take 1 tablet by mouth daily       omega 3 1000 MG CAPS        simvastatin (ZOCOR) 10 MG tablet Take 1 tablet (10 mg) by mouth At Bedtime - Oral 90 tablet 3     vitamin D3 (CHOLECALCIFEROL) 50 mcg (2000 units) tablet Take 1 tablet by mouth daily       vitamin E 400 units TABS Take 400 Units by mouth daily       zinc gluconate 50 MG tablet Take 50 mg by mouth daily       vitamin B-Complex Take 1 tablet by mouth daily       Current Facility-Administered Medications   Medication Dose Route Frequency Provider  Last Rate Last Admin     denosumab (PROLIA) injection 60 mg  60 mg Subcutaneous Once          denosumab (PROLIA) injection 60 mg  60 mg Subcutaneous Q6 Months    60 mg at 08/21/24 0850      Past Medical History:   Patient Active Problem List   Diagnosis     Vitamin D deficiency     Hyperlipidemia with target LDL less than 130     Osteopenia     Breast signs and symptoms     Chondromalacia of patella     Primary localized osteoarthrosis, lower leg     Pelvic fracture (H)     Medication management     Leg numbness     Seborrheic keratosis     Milia     History of tinea     Status post knee surgery     Recurrent UTI     Vaginal atrophy     Chronic bilateral low back pain without sciatica     Past Medical History:   Diagnosis Date     Hearing problem 2017     Hyperlipidemia 2005     Hyperlipidemia LDL goal < 130      Hypertension      Osteoporosis, post-menopausal      Pelvic fracture (H)         CC Mirela Ta MD  [address] [address] on close of this encounter.      Again, thank you for allowing me to participate in the care of your patient.      Sincerely,    Mirela Ta MD

## 2024-10-29 NOTE — NURSING NOTE
Dermatology Rooming Note    Esperanza Mauroes's goals for this visit include:   Chief Complaint   Patient presents with    Skin Check     FBSE- Has had a shoulder problem. There is an itchiness on her left shoulder and a constant tingling.      Yang Carroll, EMT  Clinic Support  [facility]     [phone]    Employed by [facility]

## 2024-10-29 NOTE — PROGRESS NOTES
[facility] Dermatology Note  Encounter Date: Oct 29, 2024  Office Visit     Dermatology Problem List:  1. Seborrheic keratoses on trunk and back.  2. Epidermal Inclusion Cysts on back.  ____________________________________________    Assessment & Plan:    # Notalgia paresthetica    - Educated on benign condition and hand-out on exercises to help alleviate.   - Apply Sarna to affected area on back daily as needed.     # Epidermal Inclusion Cysts on upper and lower left back.  - Discussed lesions are benign.  - Monitor: Patient to continue monitoring at home and will contact the clinic for any changes.    # Benign skin findings, Seborrheic keratoses on trunk, back; cherry angiomas on trunk.   - Monitor: Patient to continue monitoring at home and will contact the clinic for any changes.  - Sun protection: Counseled SPF30+ sunscreen, UPF clothing, sun avoidance, tanning bed avoidance.  - Skin counseling including: ABCDEs: Counseled ABCDEs of melanoma: Asymmetry, Border (irregularity), Color (not uniform, changes in color), Diameter (greater than 6 mm which is about the size of a pencil eraser), and Evolving (any changes in preexisting moles).Sun protection: Counseled SPF30+ sunscreen, UPF clothing, sun avoidance, tanning bed avoidance.    Procedures Performed:   None      Follow-up: 1 year(s) in-person, or earlier for new or changing lesions    Staff and Medical Student: Student Doctor Belen Elias.  I was present with the medical student who participated in the service and in the documentation of the note. I have verified the history and personally performed the physical exam and medical decision making. I agree with the assessment and plan of care as documented in the note.  Mirela Ta MD   ____________________________________________    CC: Skin Check (FBSE- Has had a shoulder problem. There is an itchiness on her left shoulder and a constant tingling. )    HPI:  Ms. Esperanza Gage is a(n) 78  year old female who presents today as a return patient for 1 year FBSE. Pt states she has shoulder issues and past 2-3 months feeling itching and constant tingling around her left scapula. Pt wears UPF long sleeves, long pants, and sunscreen when outside for a long duration.    Patient is otherwise feeling well, without additional skin concerns.    Labs:  None reviewed.    Physical Exam:  Vitals: There were no vitals taken for this visit.  SKIN: Total skin excluding the undergarment areas was performed. The exam included the head/face, neck, both arms, chest, back, abdomen, both legs, digits and/or nails.   - Red papules on trunk.  - Tan hyperkeratotic waxy papules on trunk and back.  - Edematous bilateral legs with spider veins on the lower extremities.  - No other lesions of concern on areas examined.     Medications:  Current Outpatient Medications   Medication Sig Dispense Refill    Ascorbic Acid (VITAMIN C) 500 MG CAPS       ASPIRIN PO Take 81 mg by mouth daily      losartan (COZAAR) 50 MG tablet Take 1 tablet (50 mg) by mouth daily. 90 tablet 3    multivitamin w/minerals (THERA-VIT-M) tablet Take 1 tablet by mouth daily      omega 3 1000 MG CAPS       simvastatin (ZOCOR) 10 MG tablet Take 1 tablet (10 mg) by mouth At Bedtime - Oral 90 tablet 3    vitamin D3 (CHOLECALCIFEROL) 50 mcg (2000 units) tablet Take 1 tablet by mouth daily      vitamin E 400 units TABS Take 400 Units by mouth daily      zinc gluconate 50 MG tablet Take 50 mg by mouth daily      vitamin B-Complex Take 1 tablet by mouth daily       Current Facility-Administered Medications   Medication Dose Route Frequency Provider Last Rate Last Admin    denosumab (PROLIA) injection 60 mg  60 mg Subcutaneous Once         denosumab (PROLIA) injection 60 mg  60 mg Subcutaneous Q6 Months    60 mg at 08/21/24 0850      Past Medical History:   Patient Active Problem List   Diagnosis    Vitamin D deficiency    Hyperlipidemia with target LDL less than 130     Osteopenia    Breast signs and symptoms    Chondromalacia of patella    Primary localized osteoarthrosis, lower leg    Pelvic fracture (H)    Medication management    Leg numbness    Seborrheic keratosis    Milia    History of tinea    Status post knee surgery    Recurrent UTI    Vaginal atrophy    Chronic bilateral low back pain without sciatica     Past Medical History:   Diagnosis Date    Hearing problem 2017    Hyperlipidemia 2005    Hyperlipidemia LDL goal < 130     Hypertension     Osteoporosis, post-menopausal     Pelvic fracture (H)         CC Mirela Ta MD  [address] [facility] [address]  [place] [address] on close of this encounter.

## 2024-10-31 ENCOUNTER — ANCILLARY PROCEDURE (OUTPATIENT)
Dept: GENERAL RADIOLOGY | Facility: CLINIC | Age: 79
End: 2024-10-31
Attending: ORTHOPAEDIC SURGERY
Payer: COMMERCIAL

## 2024-10-31 ENCOUNTER — OFFICE VISIT (OUTPATIENT)
Dept: ORTHOPEDICS | Facility: CLINIC | Age: 79
End: 2024-10-31
Payer: COMMERCIAL

## 2024-10-31 ENCOUNTER — PRE VISIT (OUTPATIENT)
Dept: ORTHOPEDICS | Facility: CLINIC | Age: 79
End: 2024-10-31

## 2024-10-31 ENCOUNTER — OFFICE VISIT (OUTPATIENT)
Dept: OPHTHALMOLOGY | Facility: CLINIC | Age: 79
End: 2024-10-31
Payer: COMMERCIAL

## 2024-10-31 DIAGNOSIS — H25.813 COMBINED FORMS OF AGE-RELATED CATARACT OF BOTH EYES: ICD-10-CM

## 2024-10-31 DIAGNOSIS — Z98.890 STATUS POST KNEE SURGERY: ICD-10-CM

## 2024-10-31 DIAGNOSIS — H52.13 MYOPIC ASTIGMATISM OF BOTH EYES: Primary | ICD-10-CM

## 2024-10-31 DIAGNOSIS — Z98.890 HISTORY OF REPAIR OF RETINAL DEFECT BY LASER PHOTOCOAGULATION: ICD-10-CM

## 2024-10-31 DIAGNOSIS — H52.203 MYOPIC ASTIGMATISM OF BOTH EYES: Primary | ICD-10-CM

## 2024-10-31 DIAGNOSIS — M25.561 RIGHT KNEE PAIN, UNSPECIFIED CHRONICITY: ICD-10-CM

## 2024-10-31 DIAGNOSIS — Z98.890 STATUS POST KNEE SURGERY: Primary | ICD-10-CM

## 2024-10-31 LAB — PYRIDOXAL PHOS SERPL-SCNC: 94 NMOL/L

## 2024-10-31 PROCEDURE — 92014 COMPRE OPH EXAM EST PT 1/>: CPT | Performed by: OPTOMETRIST

## 2024-10-31 PROCEDURE — 92015 DETERMINE REFRACTIVE STATE: CPT | Performed by: OPTOMETRIST

## 2024-10-31 PROCEDURE — 99203 OFFICE O/P NEW LOW 30 MIN: CPT | Mod: GC | Performed by: ORTHOPAEDIC SURGERY

## 2024-10-31 PROCEDURE — 73560 X-RAY EXAM OF KNEE 1 OR 2: CPT | Mod: LT | Performed by: RADIOLOGY

## 2024-10-31 PROCEDURE — 73562 X-RAY EXAM OF KNEE 3: CPT | Mod: RT | Performed by: RADIOLOGY

## 2024-10-31 ASSESSMENT — EXTERNAL EXAM - LEFT EYE: OS_EXAM: NORMAL

## 2024-10-31 ASSESSMENT — VISUAL ACUITY
OS_PH_CC: 20/20
METHOD: SNELLEN - LINEAR
OS_CC+: +
OD_CC+: +
OS_CC: 20/25
OD_PH_CC+: -2
OS_CC: J2
CORRECTION_TYPE: GLASSES
OD_CC: J2
OD_CC: 20/25
OS_PH_CC+: -3
OD_PH_CC: 20/20

## 2024-10-31 ASSESSMENT — REFRACTION_MANIFEST
OS_SPHERE: -7.00
OS_CYLINDER: +1.00
OS_AXIS: 105
OD_AXIS: 065
OD_ADD: +2.50
OS_ADD: +2.50
OD_SPHERE: -5.75
OD_CYLINDER: +1.00

## 2024-10-31 ASSESSMENT — REFRACTION_WEARINGRX
OD_AXIS: 086
OS_AXIS: 103
OS_SPHERE: -7.00
OD_SPHERE: -5.50
OD_CYLINDER: +0.75
SPECS_TYPE: PAL
OS_CYLINDER: +1.25

## 2024-10-31 ASSESSMENT — CONF VISUAL FIELD
OS_INFERIOR_TEMPORAL_RESTRICTION: 0
OD_INFERIOR_TEMPORAL_RESTRICTION: 0
OS_SUPERIOR_NASAL_RESTRICTION: 0
OS_INFERIOR_NASAL_RESTRICTION: 0
OS_NORMAL: 1
OS_SUPERIOR_TEMPORAL_RESTRICTION: 0
OD_SUPERIOR_TEMPORAL_RESTRICTION: 0
OD_NORMAL: 1
OD_INFERIOR_NASAL_RESTRICTION: 0
OD_SUPERIOR_NASAL_RESTRICTION: 0

## 2024-10-31 ASSESSMENT — SLIT LAMP EXAM - LIDS
COMMENTS: TR MGD
COMMENTS: TR MGD

## 2024-10-31 ASSESSMENT — TONOMETRY
OS_IOP_MMHG: 17
IOP_METHOD: ICARE
OD_IOP_MMHG: 15

## 2024-10-31 ASSESSMENT — CUP TO DISC RATIO
OS_RATIO: 0.3
OD_RATIO: 0.3

## 2024-10-31 ASSESSMENT — EXTERNAL EXAM - RIGHT EYE: OD_EXAM: NORMAL

## 2024-10-31 NOTE — LETTER
10/31/2024      Esperanza Gage  [address] [place] [address]      Dear Colleague,    Thank you for referring your patient, Esperanza Gage, to the [facility] [place]. Please see a copy of my visit note below.        [facility]  Orthopaedic Surgery, Joint Replacement Consultation  by Pierre Campos M.D.    Esperanza Gage MRN# [id number]   Age: 73 year old YOB: 1945     Requesting physician: Julia Branham     Background history:  DX:  L knee DJD     TREATMENTS:  7/1983, L knee medial meniscectomy. [place], [place]  8/28/18, L TKA (Andres), [facility]    5 YEAR Postoperative knee replacement follow-up clinic visit    Esperanza Gage is doing well after last surgery listed above. The patient reports a minor, relatively infrequent sensation like catching in her knee when rising from a chair or going up steps. She denies any instability. No pain is associated with the catching. Otherwise, she is very satisfied with her outcome thus far. Continues to exercise twice weekly. She also complains of some discomfort in her right knee that is affecting her ability flex her right knee. Denies pain, just reports some weakness in the right lower extremity. Denies any numbness.     Preoperative knee pain is  Was resolved    EXAM:  Incision healing, clean and dry.  Joint range of motion 0-120 degrees, pain free  Effusion: none  No warmth.   Periarticular swelling or leg edema: None  Peroneal and tibial nerve function: Normal  Gait: Normal    Examination of her right knee demonstrates a valgus deformity that is correctable. She has point tenderness over gerdy's tubercle. No snapping of her IT band is appreciated with ROM.   Joint range of motion 0-120 degrees, pain free  Effusion: none  No warmth.   Periarticular swelling or leg edema: None  Peroneal and tibial nerve function: Normal    IMAGING:  Knee Left 10/31/24:  Radiographs demonstrate the knee implant in  satisfactory position without evidence of any complication.    Knee Right 10/31/24:  Demonstrate valgus deformity with moderate-severe lateral compartment arthritis, mild-moderate patellofemoral arthritis.     IMPRESSION:  78 year old female patient status post left total knee arthroplasty doing well. Right knee moderate-severe osteoarthritis becoming increasingly symptomatic.     PLAN:  Follow up in clinic 3-4 months  Referral given for PT for strengthening, ROM exercises for right knee  Right knee WB XR including Xavier view       Total Time = 20 min, 50% of which was spent in counseling and coordination of care as documented above.        Dewayne Sanchez MD  Fellow Adult Reconstruction      Attending MD (Dr. Pierre Campos) Attestation :  This patient was seen and evaluated by me including a history, exam, and interpretation of all imaging and/or lab data.  I formulated the treatment plan along with either a physician's assistant (MANJINDER) or training physician (resident/fellow), who also saw the patient. That individual or a scribe has documented the visit in the attached note which I approve.    Pierre Campos MD  [facility] Professor  Oncology and Adult Reconstructive Surgery  Dept Orthopaedic Surgery, [facility]  [phone] office, [phone] pager  [email or web address]          KOOS Knee Survey Assessment        9/28/2022     8:00 AM   Knee Outcome Survey ADL Scale (Michael, JBRIAN; Obdulia, L; Arben, RS; Helder, FH; Bobby, CD; 1998)   Pain (ADLS1) 3   Stiffness (ADLS2) 4   Swelling (ADLS3) 5   Giving Way, Buckling or Shifting of Knee (ADLS4) 2   Weakness (ADLS5) 3   Limping (ADLS6) 5   Walk? (ADLS7) 5   Go up stairs? (ADLS8) 3   Go down stairs? (ADLS9) 3   Stand? (ADLS10) 5   Kneel on the front of your knee? (ADLS11) 3   Squat? (ADLS12) 1   Sit with your knee bent? (ADLS13) 5   Rise from a chair? (ADLS14) 3   How would you rate the current function of your knee during your usual daily  activities on a scale from 0 to 100 with 100 being your level of knee function prior to your injury and 0 being the inability to perform any of your usual daily activities? 90   How would you rate the overall function of your knee during your usual daily activities?  (please check the one response that best describes you) 3   As a result of your knee injury, how would you rate your current level of daily activity? (please check the one response that best describes you) 2   Sum 50   Count 14   Raw Score 50   Knee Activity of Daily Living Score 71.43            Again, thank you for allowing me to participate in the care of your patient.        Sincerely,        Pierre Campos MD

## 2024-10-31 NOTE — PROGRESS NOTES
History  HPI       Annual Eye Exam    In both eyes. Additional comments: Here for updated glasses prescription. Was not able to fill last prescription due to cost. Vision is blurry at distance and near with old pair. History of longstanding floaters post retinal tear. No other ocular concerns.          Last edited by Jareth Chang on 10/31/2024 11:42 AM.          Assessment/Plan  (H52.203,  H52.13) Myopic astigmatism of both eyes  (primary encounter diagnosis)  Comment: Myopic astigmatism with presbyopia both eyes, stable   Plan: REFRACTION         Educated patient on condition and clinical findings. Dispensed spectacle prescription for full time wear. Monitor annually.    (Z98.890) History of repair of retinal defect by laser photocoagulation  Comment: History of barricade laser, well pigmented  Plan:    Educated patient on signs and symptoms of retinal detachment including increase in flashes, floaters, or a change in vision. If symptoms present, return to clinic immediately.    (H25.813) Combined forms of age-related cataract of both eyes  Comment: Not visually significant  Plan:  No treatment indicated at this time. Monitor annually.    Return to clinic in 1 year for comprehensive eye exam.    Complete documentation of historical and exam elements from today's encounter can  be found in the full encounter summary report (not reduplicated in this progress  note). I personally obtained the chief complaint(s) and history of present illness. I  confirmed and edited as necessary the review of systems, past medical/surgical  history, family history, social history, and examination findings as documented by  others; and I examined the patient myself. I personally reviewed the relevant tests,  images, and reports as documented above. I formulated and edited as necessary the  assessment and plan and discussed the findings and management plan with the  patient and family.    Braxton Regalado, NOAH, FAAO

## 2024-10-31 NOTE — PROGRESS NOTES
[facility]  Orthopaedic Surgery, Joint Replacement Consultation  by Pierre Campos M.D.    Esperanza Gage MRN# [id number]   Age: 73 year old YOB: 1945     Requesting physician: Julia Branham     Background history:  DX:  L knee DJD     TREATMENTS:  7/1983, L knee medial meniscectomy. [place], [place]  8/28/18, L TKA (Andres), [facility]    5 YEAR Postoperative knee replacement follow-up clinic visit    Esperanza Gage is doing well after last surgery listed above. The patient reports a minor, relatively infrequent sensation like catching in her knee when rising from a chair or going up steps. She denies any instability. No pain is associated with the catching. Otherwise, she is very satisfied with her outcome thus far. Continues to exercise twice weekly. She also complains of some discomfort in her right knee that is affecting her ability flex her right knee. Denies pain, just reports some weakness in the right lower extremity. Denies any numbness.     Preoperative knee pain is  Was resolved    EXAM:  Incision healing, clean and dry.  Joint range of motion 0-120 degrees, pain free  Effusion: none  No warmth.   Periarticular swelling or leg edema: None  Peroneal and tibial nerve function: Normal  Gait: Normal    Examination of her right knee demonstrates a valgus deformity that is correctable. She has point tenderness over gerdy's tubercle. No snapping of her IT band is appreciated with ROM.   Joint range of motion 0-120 degrees, pain free  Effusion: none  No warmth.   Periarticular swelling or leg edema: None  Peroneal and tibial nerve function: Normal    IMAGING:  Knee Left 10/31/24:  Radiographs demonstrate the knee implant in satisfactory position without evidence of any complication.    Knee Right 10/31/24:  Demonstrate valgus deformity with moderate-severe lateral compartment arthritis, mild-moderate patellofemoral arthritis.     IMPRESSION:  78 year old female patient  status post left total knee arthroplasty doing well. Right knee moderate-severe osteoarthritis becoming increasingly symptomatic.     PLAN:  Follow up in clinic 3-4 months  Referral given for PT for strengthening, ROM exercises for right knee  Right knee WB XR including Xavier view       Total Time = 20 min, 50% of which was spent in counseling and coordination of care as documented above.        Dewayne Sanchez MD  Fellow Adult Reconstruction      Attending MD (Dr. Pierre Campos) Attestation :  This patient was seen and evaluated by me including a history, exam, and interpretation of all imaging and/or lab data.  I formulated the treatment plan along with either a physician's assistant (MANJINDER) or training physician (resident/fellow), who also saw the patient. That individual or a scribe has documented the visit in the attached note which I approve.    Pierre Campos MD  [facility] Professor  Oncology and Adult Reconstructive Surgery  Dept Orthopaedic Surgery, [facility]  [phone] office, [phone] pager  [email or web address]          KOOS Knee Survey Assessment        9/28/2022     8:00 AM   Knee Outcome Survey ADL Scale (Michael, JJ; Leigh-Gaeller, L; Arben, RS; Helder, FH; Bobby, CD; 1998)   Pain (ADLS1) 3   Stiffness (ADLS2) 4   Swelling (ADLS3) 5   Giving Way, Buckling or Shifting of Knee (ADLS4) 2   Weakness (ADLS5) 3   Limping (ADLS6) 5   Walk? (ADLS7) 5   Go up stairs? (ADLS8) 3   Go down stairs? (ADLS9) 3   Stand? (ADLS10) 5   Kneel on the front of your knee? (ADLS11) 3   Squat? (ADLS12) 1   Sit with your knee bent? (ADLS13) 5   Rise from a chair? (ADLS14) 3   How would you rate the current function of your knee during your usual daily activities on a scale from 0 to 100 with 100 being your level of knee function prior to your injury and 0 being the inability to perform any of your usual daily activities? 90   How would you rate the overall function of your knee during your usual daily  activities?  (please check the one response that best describes you) 3   As a result of your knee injury, how would you rate your current level of daily activity? (please check the one response that best describes you) 2   Sum 50   Count 14   Raw Score 50   Knee Activity of Daily Living Score 71.43

## 2024-11-08 ENCOUNTER — TELEPHONE (OUTPATIENT)
Dept: ORTHOPEDICS | Facility: CLINIC | Age: 79
End: 2024-11-08
Payer: COMMERCIAL

## 2024-11-08 DIAGNOSIS — Z98.890 STATUS POST KNEE SURGERY: Primary | ICD-10-CM

## 2024-11-08 DIAGNOSIS — M25.561 RIGHT KNEE PAIN, UNSPECIFIED CHRONICITY: ICD-10-CM

## 2024-11-08 NOTE — TELEPHONE ENCOUNTER
Order(s): Other:   Reason for requested: Physical Therapy orders. Discussed during her appointment but an order was never placed.   Date needed:  As soon as possible  Provider name: Dr. Campos    Could we send this information to you in [employer] or would you prefer to receive a phone call?:   Patient would like to be contacted via [employer]

## 2024-11-26 ENCOUNTER — OFFICE VISIT (OUTPATIENT)
Dept: INTERNAL MEDICINE | Facility: CLINIC | Age: 79
End: 2024-11-26
Payer: COMMERCIAL

## 2024-11-26 VITALS
DIASTOLIC BLOOD PRESSURE: 79 MMHG | TEMPERATURE: 98 F | SYSTOLIC BLOOD PRESSURE: 133 MMHG | BODY MASS INDEX: 32.26 KG/M2 | OXYGEN SATURATION: 95 % | HEART RATE: 68 BPM | WEIGHT: 193.6 LBS | HEIGHT: 65 IN | RESPIRATION RATE: 16 BRPM

## 2024-11-26 DIAGNOSIS — M75.82 ROTATOR CUFF TENDONITIS, LEFT: Primary | ICD-10-CM

## 2024-11-26 ASSESSMENT — ACTIVITIES OF DAILY LIVING (ADL)
PUTTING_ON_A_SHIRT_THAT_BUTTONS_DOWN_THE_FRONT: 3
PLEASE_INDICATE_YOR_PRIMARY_REASON_FOR_REFERRAL_TO_THERAPY:: SHOULDER
WASHING_YOUR_HAIR?: 3
PLACING_AN_OBJECT_ON_A_HIGH_SHELF: 5
PUTTING_ON_YOUR_PANTS: 3
WHEN_LYING_ON_THE_INVOLVED_SIDE: 4
PUSHING_WITH_THE_INVOLVED_ARM: 4
TOUCHING_THE_BACK_OF_YOUR_NECK: 4
REMOVING_SOMETHING_FROM_YOUR_BACK_POCKET: 4
PUTTING_ON_AN_UNDERSHIRT_OR_A_PULLOVER_SWEATER: 3
CARRYING_A_HEAVY_OBJECT_OF_10_POUNDS: 4
WASHING_YOUR_BACK: 5
AT_ITS_WORST?: 6
REACHING_FOR_SOMETHING_ON_A_HIGH_SHELF: 5

## 2024-11-26 NOTE — PROGRESS NOTES
Assessment & Plan     Rotator cuff tendonitis, left  - Physical Therapy  Referral; Future    I spent a total of  35 minutes in the care of this pt during today's office visit. This time includes reviewing the patient's chart and prior history, obtaining a history, performing an examination and evaluation and counseling the patient. This time also includes ordering medications or tests necessary in addition to communication to other member's of the patient's health care team. Time spent in documentation and care coordination is included.     Tala CABAN, CNP      Subjective   Esperanza is a 78 year old, presenting for the following health issues:  Consult (Left shoulder pain, helps to not use it)      11/26/2024    10:52 AM   Additional Questions   Roomed by [facility] EMT     Esperanza Gage is a 78 year old female presents with left shoulder pain since late summer, but much worse in November since performing yard chores.     She cannot fasten her bra due to left arm pain.  She cannot lie on her left side.  She has been taking tylenol with some minimal relief.  She does think it is somewhat better but still limits her activity.    She is currently seeing PT for IT band issues.       History of Present Illness       Reason for visit:  Severe left shoulder pain  Symptom onset:  3-4 weeks ago  Symptoms include:  Increased pain when I try to lift, reach up, turn my head, twist my torso. Ongoing lesser pain when I am sitting still. Also, it is difficult to sleep because it hurts when I turn over.  Symptom intensity:  Severe  Symptom progression:  Improving  Had these symptoms before:  Yes  Has tried/received treatment for these symptoms:  No  What makes it worse:  See  symptoms  above.  What makes it better:  Not moving.   She is taking medications regularly.       Pre-Provider Visit Orders - Rapid Strep  Does the patient have shortness of breath/trouble breathing, an earache, drooling/too much saliva, or any  "difficulty opening her mouth/moving neck?  Patient reports shortness of breath/trouble breathing               Objective    /79 (BP Location: Right arm, Patient Position: Sitting, Cuff Size: Adult Large)   Pulse 68   Temp 98  F (36.7  C) (Oral)   Resp 16   Ht 1.651 m (5' 5\")   Wt 87.8 kg (193 lb 9.6 oz)   SpO2 95%   BMI 32.22 kg/m    Body mass index is 32.22 kg/m .  Physical Exam   GENERAL: alert and no distress  MS: Pain with palpation over the rotator cuff of left shoulder.  Extension limited at 100 degrees. She cannot extend shoulder posteriorly.  PSYCH: mentation appears normal, affect normal/bright        Signed Electronically by: ARSH Solares CNP    "

## 2024-11-27 ENCOUNTER — THERAPY VISIT (OUTPATIENT)
Dept: PHYSICAL THERAPY | Facility: REHABILITATION | Age: 79
End: 2024-11-27
Attending: NURSE PRACTITIONER
Payer: COMMERCIAL

## 2024-11-27 DIAGNOSIS — M75.82 ROTATOR CUFF TENDONITIS, LEFT: ICD-10-CM

## 2024-11-27 DIAGNOSIS — M77.8 LEFT SHOULDER TENDINITIS: Primary | ICD-10-CM

## 2024-11-27 PROCEDURE — 97140 MANUAL THERAPY 1/> REGIONS: CPT | Mod: GP | Performed by: PHYSICAL THERAPIST

## 2024-11-27 PROCEDURE — 97110 THERAPEUTIC EXERCISES: CPT | Mod: GP | Performed by: PHYSICAL THERAPIST

## 2024-11-27 PROCEDURE — 97161 PT EVAL LOW COMPLEX 20 MIN: CPT | Mod: GP | Performed by: PHYSICAL THERAPIST

## 2024-11-27 NOTE — PROGRESS NOTES
11/27/24 0500   Appointment Info   Signing clinician's name / credentials Cielo Burns, PT, DPT   Total/Authorized Visits 12   Visits Used 1   Medical Diagnosis Rotator cuff tendonitis, left   PT Tx Diagnosis decreased left shoulder ROM   Quick Adds Certification   Progress Note/Certification   Start of Care Date 11/27/24   Onset of illness/injury or Date of Surgery 09/30/24   Therapy Frequency 1x a week   Predicted Duration 90 days   Certification date from 11/27/24   Certification date to 02/19/25   GOALS   PT Goals 2;3;4   PT Goal 1   Goal Identifier HEP   Goal Description Pt will be independent in HEP to manage symptoms   Rationale to maximize safety and independence within the home   Target Date 02/19/25   PT Goal 2   Goal Identifier Putting lotion on her back   Goal Description Pt will reach back to put lotion on her back without pain >2/10 in L shoulder   Rationale to maximize safety and independence with self cares   Target Date 02/05/25   PT Goal 3   Goal Identifier Reaching   Goal Description Pt will reach onto high shelf to put dishes away with R arm   Rationale to maximize safety and independence with performance of ADLs and functional tasks   Target Date 02/05/25   PT Goal 4   Goal Identifier Sleeping   Goal Description Pt will sleep without waking up due to pain in L shoulder >1x a night   Target Date 02/19/25   Subjective Report   Subjective Report see eval   Objective Measures   Objective Measures Objective Measure 1   Objective Measure 1   Objective Measure check cervical AROM in sitting   Treatment Interventions (PT)   Interventions Therapeutic Procedure/Exercise;Manual Therapy;Self Care/Home Management;Neuromuscular Re-education   Therapeutic Procedure/Exercise   Therapeutic Procedures: strength, endurance, ROM, flexibility minutes (30278) 15   Ther Proc 1 AAROM flexion in suping with dowel   Ther Proc 1 - Details 10 reps   Therapeutic Procedures Ther Proc 2;Ther Proc 3;Ther Proc 4  "  Skilled Intervention initiated HEP for L shoulder pain and limited ROM   Patient Response/Progress understanding, tolerated well   Ther Proc 2 self-massage with tennis ball at wall - upper back on L   Ther Proc 2 - Details 2 min   Ther Proc 3 pendulums   Ther Proc 3 - Details L shoulder   Ther Proc 4 Ice   Ther Proc 4 - Details 20 min 1-2x a day   Neuromuscular Re-education   Neuromuscular re-ed of mvmt, balance, coord, kinesthetic sense, posture, proprioception minutes (09586) 3   Neuro Re-ed 1 K-tape applied to L shoulder   Neuro Re-ed 1 - Details 2x4\" strips applied from inferior to superior along deltoid (3 strips) 1 strip 2x3\" from A-P along GH joint   Skilled Intervention initated K-tape   Patient Response/Progress advised to take off after 4-5 days   Manual Therapy   Manual Therapy: Mobilization, MFR, MLD, friction massage minutes (41343) 7   Manual Therapy Manual Therapy 2   Manual Therapy 1 STM in supine   Manual Therapy 1 - Details rhomboids, upper trap, deltoid, pec major   Skilled Intervention manual therapy to address L shoulder pain   Patient Response/Progress tolerated well   Eval/Assessments   RETIRED PT Eval, Low Complexity Minutes (09172) 25   Education   Learner/Method Patient   Plan   Home program see PTRX   Plan for next session review HEP, scap retractions, ER strengthening, wall slides, manual therapy to address L shoulder pain   Comments   Comments On Evaluation: Patient is a 78 year old female with left shoulder complaints that started at the end of September with yard work and worsened after raking 10 days ago .  The following significant findings have been identified: Pain and Decreased ROM/flexibility. These impairments interfere with their ability to perform self care tasks, recreational activities, and household chores as compared to previous level of function.   Total Session Time   Timed Code Treatment Minutes 25   Total Treatment Time (sum of timed and untimed services) 50     "

## 2024-11-27 NOTE — PROGRESS NOTES
"PHYSICAL THERAPY EVALUATION  Type of Visit: Evaluation        Fall Risk Screen:  Fall screen completed by: PT  Have you fallen 2 or more times in the past year?: (Patient-Rptd) No  Have you fallen and had an injury in the past year?: (Patient-Rptd) No  Is patient a fall risk?: No    Subjective         Presenting condition or subjective complaint: (Patient-Rptd) I have been diagnosed with rotator cuff tendinitis  Per pt: Gardening with left hand might have started pain.  Pain was there off and on.  Pain with pulling weeds, sleeping, reaching, lifting.  Pain started in September with yard work but worsened about 10 days ago after raking.    Per MD:   Reason for visit:  Severe left shoulder pain  Symptom onset:  3-4 weeks ago  Symptoms include:  Increased pain when I try to lift, reach up, turn my head, twist my torso. Ongoing lesser pain when I am sitting still. Also, it is difficult to sleep because it hurts when I turn over.  Symptom intensity:  Severe  Symptom progression:  Improving  Had these symptoms before:  Yes  Has tried/received treatment for these symptoms:  No  What makes it worse:  See \"symptoms\" above.  What makes it better:  Not moving.   She is taking medications regularly.    Date of onset: 09/30/24    Relevant medical history: (Patient-Rptd) High blood pressure; History of fractures; Menopause; Numbness or tingling in perianal area; Osteoporosis; Overweight   Dates & types of surgery: (Patient-Rptd) Left knee replacement.    Prior diagnostic imaging/testing results:       Prior therapy history for the same diagnosis, illness or injury: (Patient-Rptd) No      Prior Level of Function  Transfers: Independent  Ambulation: Independent  ADL: Independent  IADL:     Living Environment  Social support: (Patient-Rptd) Alone   Type of home: (Patient-Rptd) House; 2-story; Basement   Stairs to enter the home: (Patient-Rptd) Yes (Patient-Rptd) 4 Is there a railing: (Patient-Rptd) Yes     Ramp: (Patient-Rptd) No "   Stairs inside the home: (Patient-Rptd) Yes (Patient-Rptd) 14 Is there a railing: (Patient-Rptd) Yes     Help at home: (Patient-Rptd) Other  Equipment owned: (Patient-Rptd) Grab bars     Employment: (Patient-Rptd) No    Hobbies/Interests:      Patient goals for therapy: (Patient-Rptd) Put lotion on my back, reach into the back of my refrigerator, put things up on higher shelves, rake my leaves, etc.    Pain assessment: Pain present  Location: L shoulder - travels down her arm, GH, shoulder blade  /Ratin/10 pain (severe) at it's worse, 2/10 mild without movement      Objective   SHOULDER EVALUATION  PAIN: Pain Level at Rest: 2/10  Pain Level with Use: 6/10  Pain is Exacerbated By: reaching up, reaching back, sleeping on L side  Pain is Relieved By: rest  INTEGUMENTARY (edema, incisions):   POSTURE:   GAIT:   Weightbearing Status:   Assistive Device(s):   Gait Deviations:   BALANCE/PROPRIOCEPTION:   WEIGHTBEARING ALIGNMENT:   ROM:   (Degrees) Left AROM Left PROM Right AROM  Right PROM   Shoulder Flexion 125 in sitting - slowly minimal pain  130 with pain at end range  150    Shoulder Extension/IR  25% limited  130 with pain at end range  WFL     Shoulder Abduction 10% limited - needs to move slowly       Shoulder Adduction       Shoulder Internal Rotation  WFL pain at end range - 80      Shoulder External Rotation  65-70 pain at end range      Shoulder Horizontal Abduction       Shoulder Horizontal Adduction       Shoulder Flexion ER       Shoulder Flexion IR       Elbow Extension       Elbow Flexion       Pain:   End feel:     STRENGTH:   Pain: - none + mild ++ moderate +++ severe  Strength Scale: 0-5/5 Left Right   Shoulder Flexion     Shoulder Extension     Shoulder Abduction     Shoulder Adduction     Shoulder Internal Rotation 4+    Shoulder External Rotation 4    Shoulder Horizontal Abduction     Shoulder Horizontal Adduction     Elbow Flexion 5-    Elbow Extension 5-    Mid Trap     Lower Trap     Rhomboid      Serratus Anterior       FLEXIBILITY:   SPECIAL TESTS:   PALPATION:   Pain in long head of bicep, soreness in rhomboids and upper traps   JOINT MOBILITY:   CERVICAL SCREEN:  limited rotation to the left     Assessment & Plan   CLINICAL IMPRESSIONS  Medical Diagnosis: Rotator cuff tendonitis, left    Treatment Diagnosis: decreased left shoulder ROM   Impression/Assessment: Patient is a 78 year old female with left shoulder complaints that started at the end of September with yard work and worsened after raking 10 days ago .  The following significant findings have been identified: Pain and Decreased ROM/flexibility. These impairments interfere with their ability to perform self care tasks, recreational activities, and household chores as compared to previous level of function.     Clinical Decision Making (Complexity):  Clinical Presentation: Stable/Uncomplicated  Clinical Presentation Rationale: based on medical and personal factors listed in PT evaluation  Clinical Decision Making (Complexity): Low complexity    PLAN OF CARE  Treatment Interventions:  Interventions: Manual Therapy, Neuromuscular Re-education, Therapeutic Activity, Therapeutic Exercise, Self-Care/Home Management    Long Term Goals     PT Goal 1  Goal Identifier: HEP  Goal Description: Pt will be independent in HEP to manage symptoms  Rationale: to maximize safety and independence within the home  Target Date: 02/19/25  PT Goal 2  Goal Identifier: Putting lotion on her back  Goal Description: Pt will reach back to put lotion on her back without pain >2/10 in L shoulder  Rationale: to maximize safety and independence with self cares  Target Date: 02/05/25  PT Goal 3  Goal Identifier: Reaching  Goal Description: Pt will reach onto high shelf to put dishes away with R arm  Rationale: to maximize safety and independence with performance of ADLs and functional tasks  Target Date: 02/05/25  PT Goal 4  Goal Identifier: Sleeping  Goal Description: Pt will  sleep without waking up due to pain in L shoulder >1x a night  Target Date: 02/19/25      Frequency of Treatment: 1x a week  Duration of Treatment: 90 days    Recommended Referrals to Other Professionals:   Education Assessment:   Learner/Method: Patient    Risks and benefits of evaluation/treatment have been explained.   Patient/Family/caregiver agrees with Plan of Care.     Evaluation Time:     RETIRED PT Eval, Low Complexity Minutes (37421): 25       Signing Clinician: JELENA Chavez [facility]                                                                                   OUTPATIENT PHYSICAL THERAPY      PLAN OF TREATMENT FOR OUTPATIENT REHABILITATION   Patient's Last Name, First Name, Esperanza Agosto YOB: 1945   Provider's Name   [facility]   Medical Record No.  [id number]     Onset Date: 09/30/24  Start of Care Date: 11/27/24     Medical Diagnosis:  Rotator cuff tendonitis, left      PT Treatment Diagnosis:  decreased left shoulder ROM Plan of Treatment  Frequency/Duration: 1x a week/ 90 days    Certification date from 11/27/24 to 02/19/25         See note for plan of treatment details and functional goals     Cielo Burns, PT                         I CERTIFY THE NEED FOR THESE SERVICES FURNISHED UNDER        THIS PLAN OF TREATMENT AND WHILE UNDER MY CARE     (Physician attestation of this document indicates review and certification of the therapy plan).              Referring Provider:  Tala Arrington    Initial Assessment  See Epic Evaluation- Start of Care Date: 11/27/24

## 2024-12-01 ASSESSMENT — ACTIVITIES OF DAILY LIVING (ADL)
WEAKNESS: THE SYMPTOM AFFECTS MY ACTIVITY MODERATELY
KNEEL ON THE FRONT OF YOUR KNEE: ACTIVITY IS SOMEWHAT DIFFICULT
GO UP STAIRS: ACTIVITY IS SOMEWHAT DIFFICULT
RISE FROM A CHAIR: I AM UNABLE TO DO THE ACTIVITY
GIVING WAY, BUCKLING OR SHIFTING OF KNEE: THE SYMPTOM AFFECTS MY ACTIVITY MODERATELY
GO DOWN STAIRS: ACTIVITY IS SOMEWHAT DIFFICULT
RAW_SCORE: 45
STIFFNESS: I DO NOT HAVE THE SYMPTOM
SWELLING: I DO NOT HAVE THE SYMPTOM
WALK: ACTIVITY IS NOT DIFFICULT
AS_A_RESULT_OF_YOUR_KNEE_INJURY,_HOW_WOULD_YOU_RATE_YOUR_CURRENT_LEVEL_OF_DAILY_ACTIVITY?: ABNORMAL
PLEASE_INDICATE_YOR_PRIMARY_REASON_FOR_REFERRAL_TO_THERAPY:: KNEE
HOW_WOULD_YOU_RATE_THE_OVERALL_FUNCTION_OF_YOUR_KNEE_DURING_YOUR_USUAL_DAILY_ACTIVITIES?: ABNORMAL
SIT WITH YOUR KNEE BENT: ACTIVITY IS NOT DIFFICULT
SIT WITH YOUR KNEE BENT: ACTIVITY IS NOT DIFFICULT
STIFFNESS: I DO NOT HAVE THE SYMPTOM
LIMPING: I DO NOT HAVE THE SYMPTOM
LIMPING: I DO NOT HAVE THE SYMPTOM
STAND: ACTIVITY IS NOT DIFFICULT
KNEE_ACTIVITY_OF_DAILY_LIVING_SCORE: 64.29
WEAKNESS: THE SYMPTOM AFFECTS MY ACTIVITY MODERATELY
GIVING WAY, BUCKLING OR SHIFTING OF KNEE: THE SYMPTOM AFFECTS MY ACTIVITY MODERATELY
PAIN: THE SYMPTOM AFFECTS MY ACTIVITY MODERATELY
SQUAT: I AM UNABLE TO DO THE ACTIVITY
KNEEL ON THE FRONT OF YOUR KNEE: ACTIVITY IS SOMEWHAT DIFFICULT
GO DOWN STAIRS: ACTIVITY IS SOMEWHAT DIFFICULT
PAIN: THE SYMPTOM AFFECTS MY ACTIVITY MODERATELY
GO UP STAIRS: ACTIVITY IS SOMEWHAT DIFFICULT
STAND: ACTIVITY IS NOT DIFFICULT
AS_A_RESULT_OF_YOUR_KNEE_INJURY,_HOW_WOULD_YOU_RATE_YOUR_CURRENT_LEVEL_OF_DAILY_ACTIVITY?: ABNORMAL
SWELLING: I DO NOT HAVE THE SYMPTOM
KNEE_ACTIVITY_OF_DAILY_LIVING_SUM: 45
SQUAT: I AM UNABLE TO DO THE ACTIVITY
RISE FROM A CHAIR: I AM UNABLE TO DO THE ACTIVITY
WALK: ACTIVITY IS NOT DIFFICULT
HOW_WOULD_YOU_RATE_THE_OVERALL_FUNCTION_OF_YOUR_KNEE_DURING_YOUR_USUAL_DAILY_ACTIVITIES?: ABNORMAL

## 2024-12-02 ENCOUNTER — THERAPY VISIT (OUTPATIENT)
Dept: PHYSICAL THERAPY | Facility: REHABILITATION | Age: 79
End: 2024-12-02
Attending: ORTHOPAEDIC SURGERY
Payer: COMMERCIAL

## 2024-12-02 DIAGNOSIS — M25.561 RIGHT KNEE PAIN, UNSPECIFIED CHRONICITY: ICD-10-CM

## 2024-12-02 PROCEDURE — 97161 PT EVAL LOW COMPLEX 20 MIN: CPT | Mod: GP | Performed by: PHYSICAL THERAPIST

## 2024-12-02 PROCEDURE — 97110 THERAPEUTIC EXERCISES: CPT | Mod: GP | Performed by: PHYSICAL THERAPIST

## 2024-12-02 NOTE — PROGRESS NOTES
PHYSICAL THERAPY EVALUATION  Type of Visit: Evaluation       Fall Risk Screen:  Fall screen completed by: PT  Have you fallen 2 or more times in the past year?: (Patient-Rptd) No  Have you fallen and had an injury in the past year?: (Patient-Rptd) No  Is patient a fall risk?: No    Subjective   Patient has history of L knee meniscectomy with L knee replacement. R knee has been getting to the point where it feels weak and unreliable since 2018 after last knee replacement. Difficulty squatting, stairs, bending over and getting up off of chair.        Presenting condition or subjective complaint: (Patient-Rptd) I have been diagnosed with rotator cuff tendinitis  Date of onset: 11/08/24    Relevant medical history: (Patient-Rptd) High blood pressure; History of fractures; Menopause; Numbness or tingling in perianal area; Osteoporosis; Overweight   Dates & types of surgery: (Patient-Rptd) Left knee replacement.    Prior diagnostic imaging/testing results:       Prior therapy history for the same diagnosis, illness or injury: (Patient-Rptd) No      Prior Level of Function  Transfers: Independent  Ambulation: Independent  ADL: Independent  IADL: Driving, Finances, Housekeeping, Laundry, Meal preparation, Medication management, Yard work    Living Environment  Social support: (Patient-Rptd) Alone   Type of home: (Patient-Rptd) House; 2-story; Basement   Stairs to enter the home: (Patient-Rptd) Yes (Patient-Rptd) 4 Is there a railing: (Patient-Rptd) Yes     Ramp: (Patient-Rptd) No   Stairs inside the home: (Patient-Rptd) Yes (Patient-Rptd) 14 Is there a railing: (Patient-Rptd) Yes     Help at home: (Patient-Rptd) Other  Equipment owned: (Patient-Rptd) Grab bars     Employment: (Patient-Rptd) No    Hobbies/Interests:      Patient goals for therapy: (Patient-Rptd) Put lotion on my back, reach into the back of my refrigerator, put things up on higher shelves, rake my leaves, etc.    Pain assessment: See objective evaluation for  additional pain details     Objective   KNEE EVALUATION  PAIN: Pain Level at Rest: 0/10  Pain Level with Use: 0/10  Pain Location: knee  Pain Quality: Patient reports no pain but feels unstable.  Pain Frequency: feeling of instability mostly with bending knee  Pain is Worst: instability is worse with stairs, squatting or bending activities  Pain is Exacerbated By: instability  Pain is Relieved By: none  Pain Progression: Unchanged  INTEGUMENTARY (edema, incisions): WFL  POSTURE: Sitting Posture: Rounded shoulders, Forward head  GAIT:  Weightbearing Status: WBAT  Assistive Device(s): None  Gait Deviations: Base of support increased  Stride length decreased  Vannesa decreased  BALANCE/PROPRIOCEPTION: Single Leg Stance Eyes Open (seconds): 5 seconds each LE  WEIGHTBEARING ALIGNMENT:  valgus alignment at R knee  NON-WEIGHTBEARING ALIGNMENT: WFL  ROM:   (Degrees) Left AROM Left PROM  Right AROM Right PROM   Knee Flexion 117  125    Knee Extension 0  0    Pain:   End feel:   STRENGTH:  hip flexion 4/5 B LE, knee ext & flex 4/5, DF 5/5  FLEXIBILITY:  decreased piriformis and hamstring B LE  SPECIAL TESTS:  future as indicated  FUNCTIONAL TESTS: Double Leg Squat: Anterior knee translation, Knee valgus, Hip internal rotation, and Improper use of glutes/hips  PALPATION:  TTP at distal ITB  JOINT MOBILITY:  assess future as able    Assessment & Plan   CLINICAL IMPRESSIONS  Medical Diagnosis: M25.561 (ICD-10-CM) - Right knee pain, unspecified chronicity    Treatment Diagnosis: 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  The following significant findings have been identified: Pain, Decreased ROM/flexibility, Decreased joint mobility, Decreased strength, Impaired balance, Decreased proprioception, Impaired gait, Impaired muscle performance, Decreased activity tolerance, and Impaired  posture. These impairments interfere with their ability to perform recreational activities, household chores, household mobility, and community mobility as compared to previous level of function.     Clinical Decision Making (Complexity):  Clinical Presentation: Stable/Uncomplicated  Clinical Presentation Rationale: based on medical and personal factors listed in PT evaluation  Clinical Decision Making (Complexity): Low complexity    PLAN OF CARE  Treatment Interventions:  Interventions: Gait Training, Manual Therapy, Neuromuscular Re-education, Therapeutic Activity, Therapeutic Exercise, Self-Care/Home Management    Long Term Goals     PT Goal 1  Goal Identifier: sit <> stand  Goal Description: Patient will be able to complete 5 x sit <> stand with improved time by 4 seconds and without use of UE.  Rationale: to maximize safety and independence with performance of ADLs and functional tasks;to maximize safety and independence within the home;to maximize safety and independence within the community  Goal Progress: initated  Target Date: 02/24/25  PT Goal 2  Goal Identifier: gait speed  Goal Description: Patient will be able to improve  gait speed by 3 seconds for improved safety with ambulation.  Rationale: to maximize safety and independence with performance of ADLs and functional tasks;to maximize safety and independence within the home;to maximize safety and independence within the community  Goal Progress: initiated  Target Date: 02/24/25  PT Goal 3  Goal Identifier: stairs  Goal Description: Patient will be able to complete flight of stairs with reciprical pattern with use of 1 railing.  Rationale: to maximize safety and independence with performance of ADLs and functional tasks;to maximize safety and independence within the home;to maximize safety and independence within the community  Goal Progress: initiated  Target Date: 02/24/25  PT Goal 4  Goal Identifier: HEP  Goal Description: Patient will be able to  complete 6 exercises for progression to independent management.  Rationale: to maximize safety and independence with performance of ADLs and functional tasks;to maximize safety and independence within the home;to maximize safety and independence within the community  Goal Progress: initiated  Target Date: 02/24/25      Frequency of Treatment: 1 x weekly decreasing as able  Duration of Treatment: 12 weeks    Recommended Referrals to Other Professionals:  none at this time  Education Assessment:   Learner/Method: Patient;Listening;Reading;Demonstration;Pictures/Video    Risks and benefits of evaluation/treatment have been explained.   Patient/Family/caregiver agrees with Plan of Care.     Evaluation Time:     PT Eval, Low Complexity Minutes (85683): 30       Signing Clinician: JELENA Loera [facility]                                                                                   OUTPATIENT PHYSICAL THERAPY      PLAN OF TREATMENT FOR OUTPATIENT REHABILITATION   Patient's Last Name, First Name, Esperanza Agosto YOB: 1945   Provider's Name   [facility]   Medical Record No.  [id number]     Onset Date: 11/08/24  Start of Care Date: 12/02/24     Medical Diagnosis:  M25.561 (ICD-10-CM) - Right knee pain, unspecified chronicity      PT Treatment Diagnosis:  R > L knee instability, impaired force production Plan of Treatment  Frequency/Duration: 1 x weekly decreasing as able/ 12 weeks    Certification date from 12/02/24 to 02/24/25         See note for plan of treatment details and functional goals     Clotilde Mota PT                         I CERTIFY THE NEED FOR THESE SERVICES FURNISHED UNDER        THIS PLAN OF TREATMENT AND WHILE UNDER MY CARE     (Physician attestation of this document indicates review and certification of the therapy plan).              Referring Provider:  Pierre Campos    Initial Assessment  See Epic  Evaluation- Start of Care Date: 12/02/24

## 2024-12-04 ENCOUNTER — THERAPY VISIT (OUTPATIENT)
Dept: PHYSICAL THERAPY | Facility: REHABILITATION | Age: 79
End: 2024-12-04
Payer: COMMERCIAL

## 2024-12-04 DIAGNOSIS — M77.8 LEFT SHOULDER TENDINITIS: Primary | ICD-10-CM

## 2024-12-04 PROCEDURE — 97112 NEUROMUSCULAR REEDUCATION: CPT | Mod: GP

## 2024-12-04 PROCEDURE — 97110 THERAPEUTIC EXERCISES: CPT | Mod: GP

## 2024-12-09 ENCOUNTER — THERAPY VISIT (OUTPATIENT)
Dept: PHYSICAL THERAPY | Facility: REHABILITATION | Age: 79
End: 2024-12-09
Attending: ORTHOPAEDIC SURGERY
Payer: COMMERCIAL

## 2024-12-09 DIAGNOSIS — G89.29 CHRONIC PAIN OF RIGHT KNEE: Primary | ICD-10-CM

## 2024-12-09 DIAGNOSIS — M25.561 CHRONIC PAIN OF RIGHT KNEE: Primary | ICD-10-CM

## 2024-12-09 PROCEDURE — 97110 THERAPEUTIC EXERCISES: CPT | Mod: GP | Performed by: PHYSICAL THERAPIST

## 2024-12-09 PROCEDURE — 97140 MANUAL THERAPY 1/> REGIONS: CPT | Mod: GP | Performed by: PHYSICAL THERAPIST

## 2024-12-16 ENCOUNTER — THERAPY VISIT (OUTPATIENT)
Dept: PHYSICAL THERAPY | Facility: REHABILITATION | Age: 79
End: 2024-12-16
Attending: ORTHOPAEDIC SURGERY
Payer: COMMERCIAL

## 2024-12-16 DIAGNOSIS — G89.29 CHRONIC PAIN OF RIGHT KNEE: Primary | ICD-10-CM

## 2024-12-16 DIAGNOSIS — M25.561 CHRONIC PAIN OF RIGHT KNEE: Primary | ICD-10-CM

## 2024-12-16 PROCEDURE — 97110 THERAPEUTIC EXERCISES: CPT | Mod: GP | Performed by: PHYSICAL THERAPIST

## 2024-12-26 ENCOUNTER — THERAPY VISIT (OUTPATIENT)
Dept: PHYSICAL THERAPY | Facility: REHABILITATION | Age: 79
End: 2024-12-26
Payer: COMMERCIAL

## 2024-12-26 DIAGNOSIS — M77.8 LEFT SHOULDER TENDINITIS: Primary | ICD-10-CM

## 2024-12-30 ENCOUNTER — THERAPY VISIT (OUTPATIENT)
Dept: PHYSICAL THERAPY | Facility: REHABILITATION | Age: 79
End: 2024-12-30
Payer: COMMERCIAL

## 2024-12-30 DIAGNOSIS — G89.29 CHRONIC PAIN OF RIGHT KNEE: Primary | ICD-10-CM

## 2024-12-30 DIAGNOSIS — M25.561 CHRONIC PAIN OF RIGHT KNEE: Primary | ICD-10-CM

## 2024-12-30 PROCEDURE — 97110 THERAPEUTIC EXERCISES: CPT | Mod: GP | Performed by: PHYSICAL THERAPIST

## 2025-01-06 ENCOUNTER — THERAPY VISIT (OUTPATIENT)
Dept: PHYSICAL THERAPY | Facility: REHABILITATION | Age: 80
End: 2025-01-06
Payer: COMMERCIAL

## 2025-01-06 DIAGNOSIS — G89.29 CHRONIC PAIN OF RIGHT KNEE: Primary | ICD-10-CM

## 2025-01-06 DIAGNOSIS — M25.561 CHRONIC PAIN OF RIGHT KNEE: Primary | ICD-10-CM

## 2025-01-06 PROCEDURE — 97110 THERAPEUTIC EXERCISES: CPT | Mod: GP | Performed by: PHYSICAL THERAPIST

## 2025-01-13 ENCOUNTER — THERAPY VISIT (OUTPATIENT)
Dept: PHYSICAL THERAPY | Facility: REHABILITATION | Age: 80
End: 2025-01-13
Payer: COMMERCIAL

## 2025-01-13 DIAGNOSIS — M77.8 LEFT SHOULDER TENDINITIS: Primary | ICD-10-CM

## 2025-01-13 PROCEDURE — 97110 THERAPEUTIC EXERCISES: CPT | Mod: GP | Performed by: PHYSICAL THERAPIST

## 2025-01-27 ENCOUNTER — ANCILLARY PROCEDURE (OUTPATIENT)
Dept: BONE DENSITY | Facility: CLINIC | Age: 80
End: 2025-01-27
Attending: FAMILY MEDICINE
Payer: COMMERCIAL

## 2025-01-27 DIAGNOSIS — M81.0 SENILE OSTEOPOROSIS: ICD-10-CM

## 2025-01-27 PROCEDURE — 77080 DXA BONE DENSITY AXIAL: CPT | Performed by: INTERNAL MEDICINE

## 2025-02-10 ENCOUNTER — THERAPY VISIT (OUTPATIENT)
Dept: PHYSICAL THERAPY | Facility: REHABILITATION | Age: 80
End: 2025-02-10
Payer: COMMERCIAL

## 2025-02-10 DIAGNOSIS — M25.561 CHRONIC PAIN OF RIGHT KNEE: Primary | ICD-10-CM

## 2025-02-10 DIAGNOSIS — G89.29 CHRONIC PAIN OF RIGHT KNEE: Primary | ICD-10-CM

## 2025-02-10 PROCEDURE — 97110 THERAPEUTIC EXERCISES: CPT | Mod: GP | Performed by: PHYSICAL THERAPIST

## 2025-02-10 PROCEDURE — 97140 MANUAL THERAPY 1/> REGIONS: CPT | Mod: GP | Performed by: PHYSICAL THERAPIST

## 2025-02-20 NOTE — MR AVS SNAPSHOT
After Visit Summary   9/24/2018    Esperanza Gage    MRN: [id number]           Patient Information     Date Of Birth          1945        Visit Information        Provider Department      9/24/2018 9:30 AM Pierre Campos MD [facility]        Today's Diagnoses     Status post total left knee replacement    -  1       Follow-ups after your visit        Your next 10 appointments already scheduled     Sep 25, 2018 10:40 AM CDT   Return Visit with Julia Gifford MD   [facility]  ([facility])    [address],[facility] [address]  [address] [id number] [phone]              Who to contact     Please call your clinic at [phone] to:    Ask questions about your health    Make or cancel appointments    Discuss your medicines    Learn about your test results    Speak to your doctor            Additional Information About Your Visit        MyChart Information     [employer] gives you secure access to your electronic health record. If you see a primary care provider, you can also send messages to your care team and make appointments. If you have questions, please call your primary care clinic.  If you do not have a primary care provider, please call [phone] and they will assist you.      [employer] is an electronic gateway that provides easy, online access to your medical records. With [employer], you can request a clinic appointment, read your test results, renew a prescription or communicate with your care team.     To access your existing account, please contact your [facility] or call [phone] for assistance.        Care EveryWhere ID     This is your Care EveryWhere ID. This could be used by other organizations to access your [facility] medical records  [id number]         Blood Pressure from Last 3 Encounters:   08/31/18 114/53   08/13/18 142/84   07/24/18 145/83    Weight  from Last 3 Encounters:   08/28/18 89.8 kg (197 lb 15.6 oz)   08/13/18 89.1 kg (196 lb 6.4 oz)   07/24/18 88.9 kg (195 lb 15.8 oz)              Today, you had the following     No orders found for display         Today's Medication Changes          These changes are accurate as of 9/24/18 11:33 AM.  If you have any questions, ask your nurse or doctor.               These medicines have changed or have updated prescriptions.        Dose/Directions    alendronate 70 MG tablet   Commonly known as:  FOSAMAX   This may have changed:  additional instructions   Used for:  Senile osteoporosis        Dose:  70 mg   Take 1 tablet (70 mg) by mouth every 7 days Take with over 8 ounces water and stay upright for at least 30 minutes after dose.  Take at least 60 minutes before breakfast   Quantity:  12 tablet   Refills:  3       ciclopirox 0.77 % cream   Commonly known as:  LOPROX   This may have changed:    - when to take this  - reasons to take this   Used for:  Tinea pedis of both feet        Apply topically 2 times daily   Quantity:  30 g   Refills:  3       losartan 25 MG tablet   Commonly known as:  COZAAR   This may have changed:  when to take this   Used for:  Benign essential hypertension        Dose:  25 mg   Take 1 tablet (25 mg) by mouth daily   Quantity:  90 tablet   Refills:  3         Stop taking these medicines if you haven't already. Please contact your care team if you have questions.     oxyCODONE IR 5 MG tablet   Commonly known as:  ROXICODONE   Stopped by:  Pierre Campos MD           senna-docusate 8.6-50 MG per tablet   Commonly known as:  SENOKOT-S;PERICOLACE   Stopped by:  Pierre Campos MD                    Primary Care Provider Office Phone # Fax #    Julia Raúl Gifford [phone]       [facility] [address]  [facility] [address]        Equal Access to Services     NICHOLAS JUAREZ : Sam Painter, dolly akhtar, noelle olmedo  bibiana nicholsonflaco higgins'aan ah. So [facility] [phone].    ATENCIÓN: Si alejandro rebolledo, tiene a nair disposición servicios gratuitos de asistencia lingüística. Ngoc al [phone].    We comply with applicable federal civil rights laws and [place] laws. We do not discriminate on the basis of race, color, national origin, age, disability, sex, sexual orientation, or gender identity.            Thank you!     Thank you for choosing [facility]  for your care. Our goal is always to provide you with excellent care. Hearing back from our patients is one way we can continue to improve our services. Please take a few minutes to complete the written survey that you may receive in the mail after your visit with us. Thank you!             Your Updated Medication List - Protect others around you: Learn how to safely use, store and throw away your medicines at www.disposemymeds.org.          This list is accurate as of 9/24/18 11:33 AM.  Always use your most recent med list.                   Brand Name Dispense Instructions for use Diagnosis    acetaminophen 325 MG tablet    TYLENOL    100 tablet    Take 2 tablets (650 mg) by mouth every 4 hours as needed for other (adjunct to pain control)    Status post knee surgery       alendronate 70 MG tablet    FOSAMAX    12 tablet    Take 1 tablet (70 mg) by mouth every 7 days Take with over 8 ounces water and stay upright for at least 30 minutes after dose.  Take at least 60 minutes before breakfast    Senile osteoporosis       * aspirin 325 MG EC tablet     40 tablet    Take 1 tablet (325 mg) by mouth 2 times daily for 26 days    Status post knee surgery       * aspirin 81 MG tablet   Start taking on:  9/26/2018     30 tablet    Take 1 tablet (81 mg) by mouth At Bedtime Resume your home baby aspirin AFTER completing your high dose post-operative aspirin (4 weeks)    Status post knee surgery       ciclopirox 0.77 % cream    LOPROX    30 g    Apply topically 2 times  daily    Tinea pedis of both feet       losartan 25 MG tablet    COZAAR    90 tablet    Take 1 tablet (25 mg) by mouth daily    Benign essential hypertension       simvastatin 10 MG tablet    ZOCOR    90 tablet    Take 1 tablet (10 mg) by mouth At Bedtime    Pure hypercholesterolemia       * Notice:  This list has 2 medication(s) that are the same as other medications prescribed for you. Read the directions carefully, and ask your doctor or other care provider to review them with you.       Name band;

## 2025-02-23 ENCOUNTER — HEALTH MAINTENANCE LETTER (OUTPATIENT)
Age: 80
End: 2025-02-23

## 2025-02-24 ENCOUNTER — THERAPY VISIT (OUTPATIENT)
Dept: PHYSICAL THERAPY | Facility: REHABILITATION | Age: 80
End: 2025-02-24
Payer: COMMERCIAL

## 2025-02-24 DIAGNOSIS — G89.29 CHRONIC PAIN OF RIGHT KNEE: Primary | ICD-10-CM

## 2025-02-24 DIAGNOSIS — M25.561 CHRONIC PAIN OF RIGHT KNEE: Primary | ICD-10-CM

## 2025-02-24 PROCEDURE — 97110 THERAPEUTIC EXERCISES: CPT | Mod: GP | Performed by: PHYSICAL THERAPIST

## 2025-02-24 NOTE — PROGRESS NOTES
02/24/25 0500   Appointment Info   Signing clinician's name / credentials Emmie Mota DPT, PT   Total/Authorized Visits 12   Visits Used 8   Medical Diagnosis M25.561 (ICD-10-CM) - Right knee pain, unspecified chronicity   PT Tx Diagnosis R > L knee instability, impaired force production   Progress Note/Certification   Start of Care Date 12/02/24   Onset of illness/injury or Date of Surgery 11/08/24   Therapy Frequency 1 x weekly decreasing as able   Predicted Duration 8 weeks   Certification date from 02/25/25   Certification date to 04/21/25   Progress Note Due Date 04/21/25   GOALS   PT Goals 2;3;4   PT Goal 1   Goal Identifier sit <> stand   Goal Description Patient will be able to complete 5 x sit <> stand with improved time by 4 seconds and without use of UE.   Rationale to maximize safety and independence with performance of ADLs and functional tasks;to maximize safety and independence within the home;to maximize safety and independence within the community   Goal Progress improved by 41 seconds   Target Date 02/24/25   PT Goal 2   Goal Identifier gait speed   Goal Description Patient will be able to improve  gait speed by 3 seconds for improved safety with ambulation.   Rationale to maximize safety and independence with performance of ADLs and functional tasks;to maximize safety and independence within the home;to maximize safety and independence within the community   Goal Progress able to ambulate 1.2 m/s   Target Date 02/24/25   Date Met 02/24/25   PT Goal 3   Goal Identifier stairs   Goal Description Patient will be able to complete flight of stairs with reciprical pattern with use of 1 railing.   Rationale to maximize safety and independence with performance of ADLs and functional tasks;to maximize safety and independence within the home;to maximize safety and independence within the community   Goal Progress continues to demonstrate impairment in knee control on R   Target Date 02/24/25   PT Goal  4   Goal Identifier HEP   Goal Description Patient will be able to complete 6 exercises for progression to independent management.   Rationale to maximize safety and independence with performance of ADLs and functional tasks;to maximize safety and independence within the home;to maximize safety and independence within the community   Goal Progress requires furthre progression and revision   Target Date 02/24/25   Subjective Report   Subjective Report Able to go up/down stairs reciprically uses railing for safety. Doesn't feel weak anymore.   Objective Measures   Objective Measures Objective Measure 1   Objective Measure 1   Objective Measure 5 x sit <> stand   Details 19 seconds today, decreased compensatory strategy, continues to need UE lightly on chair   Objective Measure 2   Objective Measure gait speed   Details 1.2 m/s   Treatment Interventions (PT)   Interventions Therapeutic Procedure/Exercise;Manual Therapy;Self Care/Home Management;Neuromuscular Re-education   Therapeutic Procedure/Exercise   Therapeutic Procedures Ther Proc 2;Ther Proc 3;Ther Proc 4   Ther Proc 1 NU step   Ther Proc 1 - Details level 6, 275 steps, 6 minutes, increased strength and endurace, subjective measures taken, patient has signficant difficulty with correction of knee position without compensation at foot continues to need cue   Ther Proc 2 UBE   Ther Proc 2 - Details level 0, 6' fwd, improved mobility, endurance, axial rotation, subjective measures taken, fatigue noted after completion   Ther Proc 4 Ice   Ther Proc 4 - Details HEP   PTRx Ther Proc 1 Piriformis Stretch Above 90 Degress Supine   PTRx Ther Proc 1 - Details HEP   PTRx Ther Proc 2 Isometric Quad   PTRx Ther Proc 2 - Details HEP   PTRx Ther Proc 3 Seated Hamstring Stretch   PTRx Ther Proc 3 - Details HEP   PTRx Ther Proc 4 Short Arc Knee Extension   PTRx Ther Proc 4 - Details HEP   PTRx Ther Proc 5 Hip Flexion Straight Leg Raise   PTRx Ther Proc 5 - Details HEP   PTRx  Ther Proc 6 Iliotibial Band Stretch Supine   PTRx Ther Proc 6 - Details HEP   PTRx Ther Proc 7 Clamshell Feet Together   PTRx Ther Proc 7 - Details x 10 green band level 3   PTRx Ther Proc 8 Bridging #1   PTRx Ther Proc 8 - Details HEP   PTRx Ther Proc 9 Functional Knee Extension with Tubing   PTRx Ther Proc 9 - Details HEP   PTRx Ther Proc 10 Step Up and Over - Reciprocal Right Foot   PTRx Ther Proc 10 - Details not completed today   PTRx Ther Proc 11 Squats with Theraband   PTRx Ther Proc 11 - Details x 15 green band level 3 chair behind weight into heels patient was able to demonstrate with minimal cue   PTRx Ther Proc 12 Lateral Step Down   PTRx Ther Proc 12 - Details 2 x 8 1.5 inches required cue for proper knee position and activation of glut med at hip   Skilled Intervention improved strength, mobility, stability   Patient Response/Progress verbalized understanding   PTRx Ther Proc 13 Side Stepping With Theraband   PTRx Ther Proc 13 - Details 5 steps each direction green band level 3   Neuromuscular Re-education   Neuro Re-ed 1 K-tape applied to L shoulder   Neuro Re-ed 1 - Details not today   PTRx Neuro Re-ed 1 Single Leg Stance - Balance Right Foot   PTRx Neuro Re-ed 1 - Details patient reports progress is variable   PTRx Neuro Re-ed 2 Shoulder Theraband Rows   PTRx Neuro Re-ed 2 - Details verbal review   PTRx Neuro Re-ed 3 Shoulder Theraband Low Row/Pulldown   PTRx Neuro Re-ed 3 - Details verbal review   Skilled Intervention Scap stab - see above   Patient Response/Progress Pt tolerated well - really enjoyed new exercises and feeling new muscles activate   Manual Therapy   Manual Therapy Manual Therapy 2   Manual Therapy 1 STW   Manual Therapy 1 - Details XFM iliotibial band, distal lateral hamstring   Skilled Intervention decreased pain, improved mobility   Patient Response/Progress verbalized understanding   Education   Learner/Method Patient;Listening;Reading;Demonstration;Pictures/Video   Plan   Home  program PTRx   Plan for next session review and progress, focus on eccentric control of quadriceps, manual therapy as indicated   Comments   Comments On Evaluation: Patient is a 78 year old female with improved ability to completed stairs and safe ambulation, continues to have potential for progress and continues to be appropriate for skilled services.         [facility]                                                                                   OUTPATIENT PHYSICAL THERAPY    PLAN OF TREATMENT FOR OUTPATIENT REHABILITATION   Patient's Last Name, First Name, Esperanza Agosto YOB: 1945   Provider's Name   [facility]   Medical Record No.  [id number]     Onset Date: 11/08/24  Start of Care Date: 12/02/24     Medical Diagnosis:  M25.561 (ICD-10-CM) - Right knee pain, unspecified chronicity      PT Treatment Diagnosis:  R > L knee instability, impaired force production Plan of Treatment  Frequency/Duration: 1 x weekly decreasing as able/ 8 weeks    Certification date from 02/25/25 to 04/21/25         See note for plan of treatment details and functional goals     Emmie Mota, PT                         I CERTIFY THE NEED FOR THESE SERVICES FURNISHED UNDER        THIS PLAN OF TREATMENT AND WHILE UNDER MY CARE     (Physician attestation of this document indicates review and certification of the therapy plan).              Referring Provider:  Pierre Campos    Initial Assessment  See Epic Evaluation- Start of Care Date: 12/02/24            PLAN  Continue therapy per current plan of care.    Beginning/End Dates of Progress Note Reporting Period:    12/02/2024 to 02/24/2025    Referring Provider:  Pierre Campos

## 2025-02-25 ENCOUNTER — TELEPHONE (OUTPATIENT)
Dept: FAMILY MEDICINE | Facility: CLINIC | Age: 80
End: 2025-02-25
Payer: COMMERCIAL

## 2025-02-25 ENCOUNTER — LAB (OUTPATIENT)
Dept: LAB | Facility: CLINIC | Age: 80
End: 2025-02-25
Payer: COMMERCIAL

## 2025-02-25 DIAGNOSIS — Z92.29 PERSONAL HISTORY OF OTHER DRUG THERAPY: ICD-10-CM

## 2025-02-25 DIAGNOSIS — M81.0 SENILE OSTEOPOROSIS: ICD-10-CM

## 2025-02-25 DIAGNOSIS — Z92.29 HISTORY OF BISPHOSPHONATE THERAPY: ICD-10-CM

## 2025-02-25 DIAGNOSIS — M81.0 AGE-RELATED OSTEOPOROSIS WITHOUT CURRENT PATHOLOGICAL FRACTURE: Primary | ICD-10-CM

## 2025-02-25 PROCEDURE — 80048 BASIC METABOLIC PNL TOTAL CA: CPT

## 2025-02-25 PROCEDURE — 36415 COLL VENOUS BLD VENIPUNCTURE: CPT

## 2025-02-25 NOTE — TELEPHONE ENCOUNTER
Writer contacted pt to inform her she would need to complete a BMP lab prior to her Prolia injection, scheduled to be completed during her office visit with Dr. Srivastava on Wednesday 2/25. Pt will need to have lab scheduled with BMP order submitted prior to her visit tomorrow.     No answer after 3 calls,  left voicemail with instruction to call PCP.    Agustin Nascimento, EMT 9:56 AM on 2/25/2025    
Hospitalist
Internal Medicine
Internal Medicine
Hospitalist
Internal Medicine

## 2025-02-25 NOTE — PROGRESS NOTES
The following steps were completed to comply with the REMS program for Prolia:  Reviewed the serious risks of Prolia  and the symptoms of each risk.  Advised patient to seek prompt medical attention if they have signs or symptoms of any of the serious risks.  Patient will be provided a copy of the Medication Guide and Patient Brochure prior to first injection.    2-25-25 pt called and aware needs BMP tomorrow before the prolia shot - prolia reordered    Yin Srivastava MD PhD

## 2025-02-25 NOTE — PROGRESS NOTES
ASSESSMENT:  This is a 79-year-old postmenopausal female with history of osteoporosis by original T-scores in history of compression fractures and pelvic fracture.  Initially she was on alendronate 20 14-20 20.  She then had a holiday until 2022 when she started Prolia.  She has not had any side effects with Prolia.  We have reviewed REMS  with her.  She received her Prolia today.  She is aware the next Prolia is due end of August.  Her most recent DEXA was January 2025 which we reviewed.  She does have osteopenic scores and  she has also had gain in the hip.  Will continue with Prolia.  And I will see her in a year.  We reviewed calcium and vitamin D.    PLAN:  Next prolia is due 8/27/25  - nurse visit   Get a bmp at least 1 wk ahead of the shot   Needs blood work 2 wks AFTER the prolia     Patient should take 1200mg of calcium/day in divided doses (all food and all supplements combined )  and vitamin D3 2000IU/day.    DXA  1/2027    See me in 1 yr.                      Prolia      Prolia is used to protect further re-absorption of bone mass in men and women with osteoporosis who are at high risk for fracture.         1 ml of a 60 mg/ml solution in a single -use prefilled syringe subcutaneously into the upper arm, upper thigh, or abdomen.      Patient should take calcium 1200 mg or dose instructed by provider      Patient should take at least 1000 units of Vitamin D daily        Before each injection      Calcium level within 30 days of each injection      Verify no serious infections currently        After each infection      Need to go to any [facility] two weeks after Prolia injections        Calcium      Magnesium      Phosphorus       Follow up in six months after first Prolia injection with your provider.     You will need to have a calcium level drawn before this visit.           Follow up annually with your provider        Adverse Reactions            Hypocalcemia      Serious infections of the skin,  lower abdomin, bladder or ear          Endocarditis      Dermatitis      Severe jaw bone problems (osteonecrosis)      Muscle aches      Diarrhea      Headache      Possible atypical femur ( leg) fractures      Possible spinal fracture after discontinuation         Thank you for allowing me to participate in the care of your patient.  Please do not hesitate to call with questions or concerns.    Sincerely,    Yin Srivastava MD, PhD  CC Julia Gifford MD     Time note (e4, 30'): The total of my time (on the date of service) for this service was 30 minutes, including discussion/face-to-face, chart review, interpretation not otherwise reported, documentation, and updating of the computerized record.    Esperanza is a  79 year old female post menopausal] [GR0, P0] that presents today for osteoporosis follow up patient was last seen 8/2024. She has osteoporosis by original T-scores and history of compression fractures and a pelvic fracture.  Initially she was on alendronate  70 mg from 2014 to 2020.  She then had a holiday till 2022 when she started Prolia.  She received Prolia recently - 2-26-24 .    Referring Physician: Julia Gifford MD     HPI     Have you ever had a bone density test? Yes  Where = [facility]  When = 1/2025  Lumbar spine L1-L2  T-score -2.2 , BMD 0.903 g/cm2.      Left Femoral  neck  T-score -1.4 , BMD 0.848 g/cm2.  Left Total hip  T-score -1.4 , BMD 0.828 g/cm2.     Right Femoral neck  T-score -1.4, BMD  0.845 g/cm2.  Right Total hip  T-score -0.9 , BMD  0.890 g/cm2.     Wrist   T-score -1.0 , BMD 0.635 g/cm2.      Interval change  Bone density compared to the prior study has changed at left total hip by +3.9%, at the right total hip by +3.7% and at the radius by -6.8%.  Ref 3     Have you received any x-ray dye or contrast in the last ten days? No  How many servings of dairy products do you consume per day? 2 Type: milk every day, yogurt every day   Do you take a multi-vitamin daily? Yes  some calcium   Do  you take a vitamin D supplement? Yes 2000IU   Do you take a calcium supplement daily?  Calcium citrate  1 bid   Do you take a supplement containing strontium? No  Are you exposed to natural sunlight at least 20 minutes three times a week? Yes     Social History   reports that she has quit smoking. Her smoking use included cigarettes. She has never used smokeless tobacco. She reports current alcohol use. She reports that she does not use drugs.  Do you smoke cigarettes? Reformed smoker   Do you exercise? Yes. Details: exercise 2x/wk  60 min class   Do you drink alcohol? rare    Medication History  Have you used any of the following medications?   Actonel (Risedronate): No              Aredia (Pamidronate): No              Boniva (Ibindronate): No              Didronil (Etidronate): No              Evista (Raloxifene): No               Fosamax (Alendronate): - started 35 mg/wk in 2002-was on it 10 yrs. 5735-6000 took a drug holiday and fell and fractured pelvis - then resumed alendronate  70mg/wk  2508-6668 then took another 1 year holiday off alendronate               Forteo (Parathyroid hormone) injections: No              HCTZ (Thiazide): No              Calcitonin nasal spray: No              Reclast or Zometa (Zolendronate): No              Prolia (Denosumab): started in 2/2022  -had last shot 2/26/25    Current Outpatient Medications   Medication Sig Dispense Refill    Ascorbic Acid (VITAMIN C) 500 MG CAPS       ASPIRIN PO Take 81 mg by mouth daily      losartan (COZAAR) 50 MG tablet Take 1 tablet (50 mg) by mouth daily. 90 tablet 3    multivitamin w/minerals (THERA-VIT-M) tablet Take 1 tablet by mouth daily      omega 3 1000 MG CAPS       simvastatin (ZOCOR) 10 MG tablet Take 1 tablet (10 mg) by mouth At Bedtime - Oral 90 tablet 3    vitamin D3 (CHOLECALCIFEROL) 50 mcg (2000 units) tablet Take 1 tablet by mouth daily      vitamin E 400 units TABS Take 400 Units by mouth daily      zinc gluconate 50 MG tablet  "Take 50 mg by mouth daily          No Known Allergies    Past Medical History    Family History   Problem Relation Age of Onset    Osteoporosis Mother     Hypertension Brother     Cerebrovascular Disease Brother     Cerebrovascular Disease Brother     Heart Disease Father     C.A.D. No family hx of     Diabetes No family hx of     Skin Cancer No family hx of     Melanoma No family hx of     Dementia No family hx of     Heart Disease No family hx of     Other - See Comments Brother         cerebrovascular disease    Hypertension Brother     Coronary Artery Disease No family hx of        ROS:  General: none  Head/Eyes: none  Ears/Nose/Throat: none  Cardiovascular: none  Respiratory: none  Gastrointestinal: none  Breast: none  Genitourinary: none  Sexual Function: none  Musculoskeletal: joint pain left shoulder pain - in PT   Skin: none  Neurological: none  Mental Health: none  Endocrine: none    Clinic Measurements  Vitals: /78 (BP Location: Right arm, Patient Position: Sitting, Cuff Size: Adult Large)   Pulse 71   Temp 97.9  F (36.6  C) (Oral)   Resp 16   Ht 1.651 m (5' 5\")   SpO2 96%   BMI 32.22 kg/m    BMI= Body mass index is 32.22 kg/m .    Physical exam  Constitutional: Well appearing woman in no acute distress.   Psychological: appropriate mood.  Neck: No thyroidmegaly.  no carotid bruits.  Cardiovascular: regular rate and rhythm, normal S1 and S2, no murmurs, rubs or gallops, peripheral pulses full and symmetric   Respiratory: clear to auscultation, no wheezes or crackles, normal breath sounds.  Musculoskeletal: full range of motion, no edema, and motor strength is equal in the upper and lower extremities    Spine: Straight, not tender, Flexion good, Extension adequate, Lateral movement good, Rotational movement good  Skin: no concerning lesions, no jaundice.  Neurological: cranial nerves intact, normal strength, reflexes at patella and biceps normal, normal gait, no tremor.     LAB  Vertebra; " Fracture Assessment: Vertebra; Fracture Assessment: 2013           grade 1 (mild) compression fracture at T10           grade 2 (moderate) biconcave fracture at T11           grade 1 (mild) biconcave fracture at T12.  Dexa Scan: 1/2025     FRAX Assessment Tool: [N/A,   Risk Factors:  +FHx - PM, age,  T scores  Compression fractures   Reformed smoker      Yin Srivastava MD, PhD    Answers submitted by the patient for this visit:  General Questionnaire (Submitted on 2/26/2025)  Chief Complaint: Chronic problems general questions HPI Form  What is the reason for your visit today? : general and prolia  Questionnaire about: Chronic problems general questions HPI Form (Submitted on 2/26/2025)  Chief Complaint: Chronic problems general questions HPI Form

## 2025-02-26 ENCOUNTER — OFFICE VISIT (OUTPATIENT)
Dept: FAMILY MEDICINE | Facility: CLINIC | Age: 80
End: 2025-02-26
Payer: COMMERCIAL

## 2025-02-26 VITALS
DIASTOLIC BLOOD PRESSURE: 78 MMHG | RESPIRATION RATE: 16 BRPM | BODY MASS INDEX: 32.22 KG/M2 | TEMPERATURE: 97.9 F | HEIGHT: 65 IN | SYSTOLIC BLOOD PRESSURE: 136 MMHG | HEART RATE: 71 BPM | OXYGEN SATURATION: 96 %

## 2025-02-26 DIAGNOSIS — M81.0 SENILE OSTEOPOROSIS: Primary | ICD-10-CM

## 2025-02-26 LAB
ANION GAP SERPL CALCULATED.3IONS-SCNC: 10 MMOL/L (ref 7–15)
BUN SERPL-MCNC: 15.7 MG/DL (ref 8–23)
CALCIUM SERPL-MCNC: 10 MG/DL (ref 8.8–10.4)
CHLORIDE SERPL-SCNC: 102 MMOL/L (ref 98–107)
CREAT SERPL-MCNC: 0.63 MG/DL (ref 0.51–0.95)
EGFRCR SERPLBLD CKD-EPI 2021: 90 ML/MIN/1.73M2
GLUCOSE SERPL-MCNC: 112 MG/DL (ref 70–99)
HCO3 SERPL-SCNC: 27 MMOL/L (ref 22–29)
POTASSIUM SERPL-SCNC: 4.5 MMOL/L (ref 3.4–5.3)
SODIUM SERPL-SCNC: 139 MMOL/L (ref 135–145)

## 2025-02-26 NOTE — PATIENT INSTRUCTIONS
Next prolia is due 8/27/25  - nurse visit   Get a bmp at least 1 wk ahead of the shot   Needs blood work 2 wks AFTER the prolia     Patient should take 1200mg of calcium/day in divided doses (all food and all supplements combined )  and vitamin D3 2000IU/day.    DXA  1/2027    See me in 1 yr.                      Prolia      Prolia is used to protect further re-absorption of bone mass in men and women with osteoporosis who are at high risk for fracture.         1 ml of a 60 mg/ml solution in a single -use prefilled syringe subcutaneously into the upper arm, upper thigh, or abdomen.      Patient should take calcium 1200 mg or dose instructed by provider      Patient should take at least 1000 units of Vitamin D daily        Before each injection      Calcium level within 30 days of each injection      Verify no serious infections currently        After each infection      Need to go to any [facility] two weeks after Prolia injections        Calcium      Magnesium      Phosphorus       Follow up in six months after first Prolia injection with your provider.     You will need to have a calcium level drawn before this visit.           Follow up annually with your provider        Adverse Reactions            Hypocalcemia      Serious infections of the skin, lower abdomin, bladder or ear          Endocarditis      Dermatitis      Severe jaw bone problems (osteonecrosis)      Muscle aches      Diarrhea      Headache      Possible atypical femur ( leg) fractures      Possible spinal fracture after discontinuation

## 2025-02-26 NOTE — PROGRESS NOTES
"      Subjective   Esperanza is a 79 year old, presenting for the following health issues:  Follow Up      2/26/2025    11:03 AM   Additional Questions   Roomed by MR     History of Present Illness       Reason for visit:  General and prolia                      Objective    /78 (BP Location: Right arm, Patient Position: Sitting, Cuff Size: Adult Large)   Pulse 71   Temp 97.9  F (36.6  C) (Oral)   Resp 16   Ht 1.651 m (5' 5\")   SpO2 96%   BMI 32.22 kg/m    Body mass index is 32.22 kg/m .  Physical Exam               Signed Electronically by: Yin Srivastava MD PhD    "

## 2025-02-26 NOTE — PROGRESS NOTES
Esperanza Gage received the Prolia injection today in clinic at the request of Dr. Srivastava. The injection was given under the supervision of Dr. Srivastava if assistance was needed. The patient does not report a history of adverse reactions associated with medication/injection administration. The injection site was cleaned with an alcohol prep wipe. The injection was given without incident--see MAR list for administration details. The patient received the medication information and patient guide handouts and was advised to remain in fourth floor lobby of the [facility] for fifteen minutes after the injection in case of an adverse reaction.      Prolia injections are administered every 6 months. The patient's next anticipated Prolia injection is 2025. The patient was provided with an injection schedule and was instructed to call the Primary Care Scheduling Number to schedule their next Prolia injection.      Clinic Administered Medication Documentation      Prolia Documentation    Indication: Prolia  (denosumab) is a prescription medicine used to treat osteoporosis in patients who:   Are at high risk for fracture, meaning patients who have had a fracture related to osteoporosis, or who have multiple risk factors for fracture.  Cannot use another osteoporosis medicine or other osteoporosis medicines did not work well.  The timeline for early/late injections would be 4 weeks early and any time after the 6 month aga. If a patient receives their injection late, then the subsequent injection would be 6 months from the date that they actually received the injection.    When was the last injection?  2024  Was the last injection at least 6 months ago? Yes  Has the prior authorization been completed?  Yes  Is there an active order (written within the past 365 days, with administrations remaining, not ) in the chart?  Yes   GFR Estimate   Date Value Ref Range Status   2025 90 >60 mL/min/1.73m2  Final     Comment:     eGFR calculated using 2021 CKD-EPI equation.   08/31/2020 90 >60 mL/min/[1.73_m2] Final     Comment:     Non  GFR Calc  Starting 12/18/2018, serum creatinine based estimated GFR (eGFR) will be   calculated using the Chronic Kidney Disease Epidemiology Collaboration   (CKD-EPI) equation.       Has patient had a GFR within the last 12 months? Yes   Is GFR under 30, or patient has a diagnosis of CKD4 or CKD5? Yes   Calcium   Date Value Ref Range Status   02/25/2025 10.0 8.8 - 10.4 mg/dL Final   08/31/2020 9.3 8.5 - 10.1 mg/dL Final     Is the calcium results 8.8 or above? Yes   Was the calcium done after last Prolia injection? Yes   Is there a calcium order for 1 month post Prolia injection? Yes. Schedule patient for Calcium lab in next month.   Patient denies gastric bypass or parathyroid surgery in past 6 months? Yes - patient denies.   Patient denies undergoing any dental procedures involving drilling into the bone, such as implants, extractions, or oral surgery, within the past two months that have not yet healed?  Yes - patient denies  Patient denies plans for an emergency tooth extraction within the next week? Yes    The following steps were completed to comply with the REMS program for Prolia:  Confirms that patient received education from RN or provider via the Medication Guide, including the serious risks of Prolia  and the symptoms of each risk.  Told the patient to seek prompt medical attention if they have signs or symptoms of any of the serious risks, as described in the Medication Guide that was reviewed between the patient and RN or LP.  Provided each patient a copy of the Medication Guide and Patient Guide.    Prior to injection, verified patient identity using patient's name and date of birth. Medication was administered. Please see MAR and medication order for additional information. Patient instructed to remain in clinic for 15 minutes, report any adverse  reaction to staff immediately, and remain in clinic for 15 minutes and report any adverse reaction to staff immediately but patient declined.    Vial/Syringe: Syringe  Was this medication supplied by the patient? No  Verified that the patient has administrations remaining in their prescription.      Anna Martinez, EMT at 11:20 AM on 2/26/2025

## 2025-03-10 ENCOUNTER — THERAPY VISIT (OUTPATIENT)
Dept: PHYSICAL THERAPY | Facility: REHABILITATION | Age: 80
End: 2025-03-10
Payer: COMMERCIAL

## 2025-03-10 DIAGNOSIS — M77.8 LEFT SHOULDER TENDINITIS: Primary | ICD-10-CM

## 2025-03-10 DIAGNOSIS — M25.561 RIGHT KNEE PAIN, UNSPECIFIED CHRONICITY: Primary | ICD-10-CM

## 2025-03-10 NOTE — PROGRESS NOTES
[facility]  Orthopaedic Surgery, Joint Replacement Consultation  by Pierre Campos M.D.    Esperanza Gage MRN# [id number]   Age: 73 year old YOB: 1945     Requesting physician: Julia Branham     Background history:  DX:  L knee DJD     TREATMENTS:  7/1983, L knee medial meniscectomy. [place], [place]  8/28/18, L TKA (Andres), [facility]      Esperanza returns for recheck in regards to her right knee.  She is quite happy with the outcome of the left knee arthroplasty.  She is undergoing physical therapy which has been beneficial in terms of symptom reduction yet she does notice the grating sensation within her right knee joint.  She has difficulty arising from a chair.    Radiographs were obtained today and clearly show complete loss of cartilage thickness from the lateral compartment of her right knee joint as noted on the Xavier view and the lateral radiograph as well.            IMPRESSION:  79 year old female patient status post left total knee arthroplasty doing well.   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  When her symptoms reached a point where they are problematic on a daily basis and she has to accommodate for them, I advised her that she should consider right total knee arthroplasty surgery.  Meanwhile, nonsurgical measures such as physiotherapy, activity modification and occasional usage of a walking cane would be appropriate.  She will follow-up on an as-needed basis if she would like further discussion regarding her decision for right knee replacement surgery.     Total Time = 20 min, 50% of which was spent in counseling and coordination of care as documented above.    MD Al Zimmerman Family Professor  Oncology and Adult Reconstructive Surgery  Dept Orthopaedic Surgery, [facility]  [phone]  office, [phone] pager  [email or web address]

## 2025-03-12 ENCOUNTER — LAB (OUTPATIENT)
Dept: LAB | Facility: CLINIC | Age: 80
End: 2025-03-12
Payer: COMMERCIAL

## 2025-03-12 DIAGNOSIS — M81.0 SENILE OSTEOPOROSIS: ICD-10-CM

## 2025-03-12 PROCEDURE — 83735 ASSAY OF MAGNESIUM: CPT

## 2025-03-12 PROCEDURE — 36415 COLL VENOUS BLD VENIPUNCTURE: CPT

## 2025-03-12 PROCEDURE — 84100 ASSAY OF PHOSPHORUS: CPT

## 2025-03-13 LAB
MAGNESIUM SERPL-MCNC: 2 MG/DL (ref 1.7–2.3)
PHOSPHATE SERPL-MCNC: 3.3 MG/DL (ref 2.5–4.5)

## 2025-03-15 LAB — CALCIUM SERPL-MCNC: 9.3 MG/DL (ref 8.8–10.4)

## 2025-03-17 ENCOUNTER — ANCILLARY PROCEDURE (OUTPATIENT)
Dept: GENERAL RADIOLOGY | Facility: CLINIC | Age: 80
End: 2025-03-17
Attending: ORTHOPAEDIC SURGERY
Payer: COMMERCIAL

## 2025-03-17 ENCOUNTER — OFFICE VISIT (OUTPATIENT)
Dept: ORTHOPEDICS | Facility: CLINIC | Age: 80
End: 2025-03-17
Payer: COMMERCIAL

## 2025-03-17 DIAGNOSIS — Z98.890 STATUS POST KNEE SURGERY: Primary | ICD-10-CM

## 2025-03-17 DIAGNOSIS — M25.561 RIGHT KNEE PAIN, UNSPECIFIED CHRONICITY: ICD-10-CM

## 2025-03-17 PROCEDURE — 99213 OFFICE O/P EST LOW 20 MIN: CPT | Performed by: ORTHOPAEDIC SURGERY

## 2025-03-17 PROCEDURE — 73564 X-RAY EXAM KNEE 4 OR MORE: CPT | Mod: RT | Performed by: RADIOLOGY

## 2025-03-17 ASSESSMENT — KOOS JR
RISING FROM SITTING: SEVERE
BENDING TO THE FLOOR TO PICK UP OBJECT: MILD
KOOS JR SCORING: 73.34
GOING UP OR DOWN STAIRS: MILD

## 2025-03-17 NOTE — LETTER
3/17/2025      Esperanza Gage  [address]  [place] [address]      Dear Colleague,    Thank you for referring your patient, Esperanza Gage, to the [facility] [place]. Please see a copy of my visit note below.        [facility]  Orthopaedic Surgery, Joint Replacement Consultation  by Pierre Campos M.D.    Esperanza Gage MRN# [id number]   Age: 73 year old YOB: 1945     Requesting physician: Julia Branham     Background history:  DX:  L knee DJD     TREATMENTS:  7/1983, L knee medial meniscectomy. [place], [place]  8/28/18, L TKA ([facility]), [facility]      Esperanza returns for recheck in regards to her right knee.  She is quite happy with the outcome of the left knee arthroplasty.  She is undergoing physical therapy which has been beneficial in terms of symptom reduction yet she does notice the grating sensation within her right knee joint.  She has difficulty arising from a chair.    Radiographs were obtained today and clearly show complete loss of cartilage thickness from the lateral compartment of her right knee joint as noted on the Xavier view and the lateral radiograph as well.            IMPRESSION:  79 year old female patient status post left total knee arthroplasty doing well.   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  When her symptoms reached a point where they are problematic on a daily basis and she has to accommodate for them, I advised her that she should consider right total knee arthroplasty surgery.  Meanwhile, nonsurgical measures such as physiotherapy, activity modification and occasional usage of a walking cane would be appropriate.  She will follow-up on an as-needed basis if she would like further discussion regarding her decision for right knee replacement surgery.     Total Time  = 20 min, 50% of which was spent in counseling and coordination of care as documented above.    MD Al Zimmerman Family Professor  Oncology and Adult Reconstructive Surgery  Dept Orthopaedic Surgery, [facility]  [phone] office, [phone] pager  [email or web address]      Again, thank you for allowing me to participate in the care of your patient.        Sincerely,        Pierre Campos MD    Electronically signed

## 2025-04-08 NOTE — TELEPHONE ENCOUNTER
----- Message from Martina Infante sent at 1/11/2023 11:38 AM CST -----  Regarding: FW: Prolia  I assume this one needs that pre auth too?  ----- Message -----  From: Sima Martel RN  Sent: 1/11/2023  11:05 AM CST  To: [facility]  Subject: Prolia                                           Please call patient and help her schedule nurse visit for prolia injection, thank you!       93

## 2025-08-27 ENCOUNTER — ALLIED HEALTH/NURSE VISIT (OUTPATIENT)
Dept: INTERNAL MEDICINE | Facility: CLINIC | Age: 80
End: 2025-08-27
Payer: COMMERCIAL

## 2025-08-27 DIAGNOSIS — M81.0 AGE-RELATED OSTEOPOROSIS WITHOUT CURRENT PATHOLOGICAL FRACTURE: Primary | ICD-10-CM

## 2025-08-27 PROCEDURE — 96372 THER/PROPH/DIAG INJ SC/IM: CPT | Performed by: INTERNAL MEDICINE

## 2025-08-27 PROCEDURE — 99207 PR NO CHARGE NURSE ONLY: CPT

## (undated) DEVICE — BLADE KNIFE SURG 15 371115

## (undated) DEVICE — PREP POVIDONE IODINE SCRUB 7.5% 120ML

## (undated) DEVICE — SOL WATER IRRIG 500ML BOTTLE 2F7113

## (undated) DEVICE — KIT ENDO TURNOVER/PROCEDURE CARRY-ON 101822

## (undated) DEVICE — GLOVE PROTEXIS BLUE W/NEU-THERA 8.0  2D73EB80

## (undated) DEVICE — STRAP KNEE/BODY 31143004

## (undated) DEVICE — DRSG STERI STRIP 1/2X4" R1547

## (undated) DEVICE — GLOVE PROTEXIS W/NEU-THERA 8.0  2D73TE80

## (undated) DEVICE — GOWN IMPERVIOUS 2XL BLUE

## (undated) DEVICE — BONE CEMENT MIXEVAC III HI VAC KIT  0206-015-000

## (undated) DEVICE — WIPES FOLEY CARE SURESTEP PROVON DFC100

## (undated) DEVICE — DRAPE XRAY CASSETTE 20X23

## (undated) DEVICE — PREP SKIN SCRUB TRAY 4461A

## (undated) DEVICE — SPECIMEN CONTAINER 3OZ W/FORMALIN 59901

## (undated) DEVICE — GLOVE PROTEXIS BLUE W/NEU-THERA 7.5  2D73EB75

## (undated) DEVICE — TAPE CLOTH 3" CARDINAL 3TRCL03

## (undated) DEVICE — LINEN GOWN X4 5410

## (undated) DEVICE — BNDG ESMARK 6" STERILE LF 820-3612

## (undated) DEVICE — DRSG DRAIN 4X4" 7086

## (undated) DEVICE — CATH TRAY FOLEY SURESTEP 16FR WDRAIN BAG STLK LATEX A300316A

## (undated) DEVICE — GLOVE PROTEXIS W/NEU-THERA 7.0  2D73TE70

## (undated) DEVICE — BONE CLEANING TIP INTERPULSE  0210-010-000

## (undated) DEVICE — SUCTION MANIFOLD DORNOCH ULTRA CART UL-CL500

## (undated) DEVICE — DRAIN JACKSON PRATT RESERVOIR 400ML SU130-1000

## (undated) DEVICE — TOURNIQUET CUFF 30" REPRO BLUE 60-7070-105

## (undated) DEVICE — SU VICRYL 2-0 CT-1 27" UND J259H

## (undated) DEVICE — SU PDS II 3-0 PS-1 18" Z683G

## (undated) DEVICE — SUCTION IRR SYSTEM W/O TIP INTERPULSE HANDPIECE 0210-100-000

## (undated) DEVICE — DRAPE STOCKINETTE 8" 8586

## (undated) DEVICE — Device

## (undated) DEVICE — LINEN GOWN OVERSIZE 5408

## (undated) DEVICE — DRSG TEGADERM 4X4 3/4" 1626W

## (undated) DEVICE — LINEN BACK PACK 5440

## (undated) DEVICE — GOWN IMPERVIOUS ZONED XLG 9041

## (undated) DEVICE — ESU GROUND PAD UNIVERSAL W/O CORD

## (undated) DEVICE — SUCTION MANIFOLD NEPTUNE 2 SYS 1 PORT 702-025-000

## (undated) DEVICE — SOL NACL 0.9% INJ 1000ML BAG 2B1324X

## (undated) DEVICE — CAST PADDING 6" STERILE 9046S

## (undated) DEVICE — SU VICRYL 0 CT-1 36" J946H

## (undated) DEVICE — GLOVE PROTEXIS BLUE W/NEU-THERA 8.5  2D73EB85

## (undated) DEVICE — GLOVE PROTEXIS POWDER FREE 8.0 ORTHOPEDIC 2D73ET80

## (undated) DEVICE — BLADE SAW SAGITTAL STRK 25X79.5X1.24MM 4/2000 2108-318-000

## (undated) DEVICE — DRSG ABDOMINAL 07 1/2X8" 7197D

## (undated) DEVICE — SOL WATER IRRIG 1000ML BOTTLE 2F7114

## (undated) DEVICE — SNARE CAPIVATOR ROUND COLD SNR BX10 M00561101

## (undated) DEVICE — SOL NACL 0.9% IRRIG 3000ML BAG 2B7477

## (undated) DEVICE — HOOD FLYTE W/PEELAWAY 408-800-100

## (undated) DEVICE — DRAIN JACKSON PRATT 15FR ROUND SU130-1323

## (undated) DEVICE — SPONGE LAP 18X18" X8435

## (undated) DEVICE — GOWN IMPERVIOUS SPECIALTY XLG/XLONG 32474

## (undated) DEVICE — BASIN SET MAJOR

## (undated) DEVICE — PREP DURAPREP 26ML APL 8630

## (undated) DEVICE — SUCTION TIP YANKAUER STR K87

## (undated) DEVICE — TUBING SUCTION MEDI-VAC 1/4"X20' N620A

## (undated) RX ORDER — ACETAMINOPHEN 325 MG/1
TABLET ORAL
Status: DISPENSED
Start: 2018-07-24

## (undated) RX ORDER — GABAPENTIN 300 MG/1
CAPSULE ORAL
Status: DISPENSED
Start: 2018-07-24

## (undated) RX ORDER — ONDANSETRON 2 MG/ML
INJECTION INTRAMUSCULAR; INTRAVENOUS
Status: DISPENSED
Start: 2018-07-24

## (undated) RX ORDER — FENTANYL CITRATE 50 UG/ML
INJECTION, SOLUTION INTRAMUSCULAR; INTRAVENOUS
Status: DISPENSED
Start: 2018-07-24

## (undated) RX ORDER — DEXAMETHASONE SODIUM PHOSPHATE 4 MG/ML
INJECTION, SOLUTION INTRA-ARTICULAR; INTRALESIONAL; INTRAMUSCULAR; INTRAVENOUS; SOFT TISSUE
Status: DISPENSED
Start: 2018-08-28

## (undated) RX ORDER — HYDROMORPHONE HYDROCHLORIDE 1 MG/ML
INJECTION, SOLUTION INTRAMUSCULAR; INTRAVENOUS; SUBCUTANEOUS
Status: DISPENSED
Start: 2018-08-28

## (undated) RX ORDER — FENTANYL CITRATE 50 UG/ML
INJECTION, SOLUTION INTRAMUSCULAR; INTRAVENOUS
Status: DISPENSED
Start: 2023-12-19

## (undated) RX ORDER — PHENYLEPHRINE HCL IN 0.9% NACL 1 MG/10 ML
SYRINGE (ML) INTRAVENOUS
Status: DISPENSED
Start: 2018-07-24

## (undated) RX ORDER — EPHEDRINE SULFATE 50 MG/ML
INJECTION, SOLUTION INTRAMUSCULAR; INTRAVENOUS; SUBCUTANEOUS
Status: DISPENSED
Start: 2018-08-28

## (undated) RX ORDER — LIDOCAINE HYDROCHLORIDE 20 MG/ML
INJECTION, SOLUTION EPIDURAL; INFILTRATION; INTRACAUDAL; PERINEURAL
Status: DISPENSED
Start: 2018-08-28

## (undated) RX ORDER — CEFAZOLIN SODIUM 2 G/100ML
INJECTION, SOLUTION INTRAVENOUS
Status: DISPENSED
Start: 2018-07-24

## (undated) RX ORDER — PROPOFOL 10 MG/ML
INJECTION, EMULSION INTRAVENOUS
Status: DISPENSED
Start: 2018-07-24

## (undated) RX ORDER — FENTANYL CITRATE 50 UG/ML
INJECTION, SOLUTION INTRAMUSCULAR; INTRAVENOUS
Status: DISPENSED
Start: 2018-08-28

## (undated) RX ORDER — CEFAZOLIN SODIUM 1 G/3ML
INJECTION, POWDER, FOR SOLUTION INTRAMUSCULAR; INTRAVENOUS
Status: DISPENSED
Start: 2018-08-28

## (undated) RX ORDER — ACETAMINOPHEN 500 MG
TABLET ORAL
Status: DISPENSED
Start: 2018-08-28

## (undated) RX ORDER — DEXAMETHASONE SODIUM PHOSPHATE 4 MG/ML
INJECTION, SOLUTION INTRA-ARTICULAR; INTRALESIONAL; INTRAMUSCULAR; INTRAVENOUS; SOFT TISSUE
Status: DISPENSED
Start: 2018-07-24

## (undated) RX ORDER — PROPOFOL 10 MG/ML
INJECTION, EMULSION INTRAVENOUS
Status: DISPENSED
Start: 2018-08-28

## (undated) RX ORDER — ONDANSETRON 2 MG/ML
INJECTION INTRAMUSCULAR; INTRAVENOUS
Status: DISPENSED
Start: 2018-08-28

## (undated) RX ORDER — SODIUM CHLORIDE, SODIUM LACTATE, POTASSIUM CHLORIDE, CALCIUM CHLORIDE 600; 310; 30; 20 MG/100ML; MG/100ML; MG/100ML; MG/100ML
INJECTION, SOLUTION INTRAVENOUS
Status: DISPENSED
Start: 2018-08-28

## (undated) RX ORDER — CEFAZOLIN SODIUM 2 G/100ML
INJECTION, SOLUTION INTRAVENOUS
Status: DISPENSED
Start: 2018-08-28